# Patient Record
Sex: FEMALE | Race: WHITE | URBAN - METROPOLITAN AREA
[De-identification: names, ages, dates, MRNs, and addresses within clinical notes are randomized per-mention and may not be internally consistent; named-entity substitution may affect disease eponyms.]

---

## 2017-01-02 ENCOUNTER — TRANSFERRED RECORDS (OUTPATIENT)
Dept: HEALTH INFORMATION MANAGEMENT | Facility: CLINIC | Age: 57
End: 2017-01-02

## 2017-01-16 ENCOUNTER — TELEPHONE (OUTPATIENT)
Dept: TRANSPLANT | Facility: CLINIC | Age: 57
End: 2017-01-16

## 2017-01-16 NOTE — TELEPHONE ENCOUNTER
Reviewed with pt that once she has an approved donor we could look at scheduled transplant date. Pt states she would like Dr. Kang to do her surgery.

## 2017-01-23 ENCOUNTER — TELEPHONE (OUTPATIENT)
Dept: TRANSPLANT | Facility: CLINIC | Age: 57
End: 2017-01-23

## 2017-01-23 NOTE — TELEPHONE ENCOUNTER
Pt called asking why donors are unable to donate if they are on workers compensation. Reviewed reason with pt. Pt verbalized understanding and had no further questions.

## 2017-02-27 ENCOUNTER — RESULTS ONLY (OUTPATIENT)
Dept: OTHER | Facility: CLINIC | Age: 57
End: 2017-02-27

## 2017-02-27 DIAGNOSIS — N18.6 END STAGE RENAL DISEASE (H): ICD-10-CM

## 2017-02-27 DIAGNOSIS — Z76.82 ORGAN TRANSPLANT CANDIDATE: ICD-10-CM

## 2017-02-27 PROCEDURE — 86833 HLA CLASS II HIGH DEFIN QUAL: CPT | Performed by: SURGERY

## 2017-02-27 PROCEDURE — 86832 HLA CLASS I HIGH DEFIN QUAL: CPT | Performed by: SURGERY

## 2017-03-02 ENCOUNTER — TRANSFERRED RECORDS (OUTPATIENT)
Dept: HEALTH INFORMATION MANAGEMENT | Facility: CLINIC | Age: 57
End: 2017-03-02

## 2017-03-06 ENCOUNTER — TELEPHONE (OUTPATIENT)
Dept: TRANSPLANT | Facility: CLINIC | Age: 57
End: 2017-03-06

## 2017-03-06 LAB — PRA SINGLE ANTIGEN IGG ANTIBODY: NORMAL

## 2017-03-06 NOTE — TELEPHONE ENCOUNTER
Pt called to state she will be starting dialysis at [place] [address] [facility] [address] [place] [address] sometime in April. Pt will have dialysis on Tuesday, Thursday, and Saturdays. Dialysis contact info:  [phone] Fax [phone]. Her nephrologist at dialysis will be Dr. Ysabel Vance. Pt to call once dialysis has started. Pt states she had a pelvic scan on 3/2/17 at [facility] and will send results here. Pt trying to get multi-listed at [facility]. Pt to update coordinator if she is multi-listed.

## 2017-03-13 LAB
DONOR IDENTIFICATION: NORMAL
DSA COMMENTS: NORMAL
DSA PRESENT: NO
DSA TEST METHOD: NORMAL
ORGAN: NORMAL
SA1 CELL: NORMAL
SA1 COMMENTS: NORMAL
SA1 HI RISK ABY: NORMAL
SA1 MOD RISK ABY: NORMAL
SA1 TEST METHOD: NORMAL
SA2 CELL: NORMAL
SA2 COMMENTS: NORMAL
SA2 HI RISK ABY UA: NORMAL
SA2 MOD RISK ABY: NORMAL
SA2 TEST METHOD: NORMAL

## 2017-03-22 ENCOUNTER — TELEPHONE (OUTPATIENT)
Dept: TRANSPLANT | Facility: CLINIC | Age: 57
End: 2017-03-22

## 2017-03-22 ENCOUNTER — MEDICAL CORRESPONDENCE (OUTPATIENT)
Dept: TRANSPLANT | Facility: CLINIC | Age: 57
End: 2017-03-22

## 2017-03-22 NOTE — TELEPHONE ENCOUNTER
Returned pt's call, she provided the following information:    New endocrinologist - Dr. Eliza Palomares, [facility]    Dialysis tentatively scheduled to start 4/4/17 - [facility], [place], [place]   - Pt will be seen by the center's nephrologist Dr. Ysabel Vance and will dialyze [facility]    Pt's care team info has been updated to reflect this new information.

## 2017-04-05 ENCOUNTER — TELEPHONE (OUTPATIENT)
Dept: TRANSPLANT | Facility: CLINIC | Age: 57
End: 2017-04-05

## 2017-04-05 NOTE — TELEPHONE ENCOUNTER
Pt left msg to state she started dialysis on 4/4/17. Please see note from 3/6/17 for dialysis contact information.

## 2017-04-12 ENCOUNTER — TRANSFERRED RECORDS (OUTPATIENT)
Dept: HEALTH INFORMATION MANAGEMENT | Facility: CLINIC | Age: 57
End: 2017-04-12

## 2017-04-25 DIAGNOSIS — Z76.82 ORGAN TRANSPLANT CANDIDATE: Primary | ICD-10-CM

## 2017-04-25 DIAGNOSIS — Z01.810 PRE-OPERATIVE CARDIOVASCULAR EXAMINATION: ICD-10-CM

## 2017-04-25 DIAGNOSIS — N18.6 END STAGE RENAL DISEASE (H): ICD-10-CM

## 2017-05-22 ENCOUNTER — RESULTS ONLY (OUTPATIENT)
Dept: OTHER | Facility: CLINIC | Age: 57
End: 2017-05-22

## 2017-05-22 ENCOUNTER — TELEPHONE (OUTPATIENT)
Dept: TRANSPLANT | Facility: CLINIC | Age: 57
End: 2017-05-22

## 2017-05-22 DIAGNOSIS — Z76.82 ORGAN TRANSPLANT CANDIDATE: ICD-10-CM

## 2017-05-22 DIAGNOSIS — N18.6 END STAGE RENAL DISEASE (H): ICD-10-CM

## 2017-05-22 PROCEDURE — 86833 HLA CLASS II HIGH DEFIN QUAL: CPT | Performed by: SURGERY

## 2017-05-22 PROCEDURE — 86832 HLA CLASS I HIGH DEFIN QUAL: CPT | Performed by: SURGERY

## 2017-05-22 NOTE — LETTER
May 30, 2017    Letty Benavidez  [address] [address]      Dear Ms. Benavidez,    Enclosed you will find a copy of your transplant waitlist appointment schedule and a map to our location.    If you are unable to come in for your appointments for any reason, please contact Sahra at [phone].      Sincerely,       [facility]    CC: EAMON Mccoy, JACKELINE                                          [facility]  [address] [place] [address]  [id number]    WAITLIST CLINIC APPOINTMENTS    Patient   Letty Benavidez  MR#:    [id number]  :  Sahra     [phone]  Coordinator:  Wanda MENDEZ     [phone]  LPN:    Apurva BARR     [phone]  Location:   Transplant Center  Dates:   August 16, 2017    This is your schedule, please follow dates and times.  You will receive reminder phone calls for other tests, but please follow this schedule only!  If you have any questions about dates and times, please call us on number listed above.    Thank you, Transplant Clinic.     Day/Date:   Wednesday, August 16, 2017  Time Location Activity   7:30 a.m. Imaging and Lab Testing  1st floor Blood tests: ALA/HLA    8:00 a.m. Cardiology/Heart Care Clinic  3rd floor; Clinic 3L Appointment with Dr. Delong,  Cardiology   8:30 a.m. Imaging and Lab Testing  1st floor Aorta/Ivc/Iliac Ultra Sound = NO FOOD or DRINK AFTER MIDNIGHT!!   9:00 a.m. Transplant Services  3rd floor; Clinic 3A; Consult Room Appointment with either Bertha Arroyo,  Transplant    11:00 a.m. [facility]  [facility] Waiting Room  2nd floor [facility] Lexiscan = NO FOOD or DRINK 3 HOURS BEFORE TEST!!  Please read attached prep sheet!

## 2017-06-02 LAB — PRA SINGLE ANTIGEN IGG ANTIBODY: NORMAL

## 2017-08-02 ASSESSMENT — ENCOUNTER SYMPTOMS
JOINT SWELLING: 0
BACK PAIN: 0
MUSCLE CRAMPS: 0
NECK PAIN: 0
MUSCLE WEAKNESS: 0
ARTHRALGIAS: 1
STIFFNESS: 1
MYALGIAS: 1

## 2017-08-07 ENCOUNTER — TELEPHONE (OUTPATIENT)
Dept: TRANSPLANT | Facility: CLINIC | Age: 57
End: 2017-08-07

## 2017-08-07 NOTE — TELEPHONE ENCOUNTER
Returned pt's call. Reviewed pt needs to be NPO 8 hrs prior to aortic u/s and 3 hours prior to emeka.

## 2017-08-10 ENCOUNTER — PRE VISIT (OUTPATIENT)
Dept: CARDIOLOGY | Facility: CLINIC | Age: 57
End: 2017-08-10

## 2017-08-16 ENCOUNTER — TRANSFERRED RECORDS (OUTPATIENT)
Dept: HEALTH INFORMATION MANAGEMENT | Facility: CLINIC | Age: 57
End: 2017-08-16

## 2017-08-16 ENCOUNTER — HOSPITAL ENCOUNTER (OUTPATIENT)
Dept: NUCLEAR MEDICINE | Facility: CLINIC | Age: 57
Setting detail: NUCLEAR MEDICINE
End: 2017-08-16
Attending: PHYSICIAN ASSISTANT
Payer: COMMERCIAL

## 2017-08-16 ENCOUNTER — RESULTS ONLY (OUTPATIENT)
Dept: OTHER | Facility: CLINIC | Age: 57
End: 2017-08-16

## 2017-08-16 ENCOUNTER — OFFICE VISIT (OUTPATIENT)
Dept: CARDIOLOGY | Facility: CLINIC | Age: 57
End: 2017-08-16
Attending: INTERNAL MEDICINE
Payer: COMMERCIAL

## 2017-08-16 ENCOUNTER — HOSPITAL ENCOUNTER (OUTPATIENT)
Dept: NUCLEAR MEDICINE | Facility: CLINIC | Age: 57
Setting detail: NUCLEAR MEDICINE
Discharge: HOME | End: 2017-08-16
Attending: PHYSICIAN ASSISTANT | Admitting: PHYSICIAN ASSISTANT
Payer: COMMERCIAL

## 2017-08-16 ENCOUNTER — ALLIED HEALTH/NURSE VISIT (OUTPATIENT)
Dept: TRANSPLANT | Facility: CLINIC | Age: 57
End: 2017-08-16
Attending: PHYSICIAN ASSISTANT
Payer: COMMERCIAL

## 2017-08-16 VITALS
OXYGEN SATURATION: 100 % | BODY MASS INDEX: 19.4 KG/M2 | HEART RATE: 74 BPM | DIASTOLIC BLOOD PRESSURE: 72 MMHG | SYSTOLIC BLOOD PRESSURE: 157 MMHG | WEIGHT: 109.5 LBS | HEIGHT: 63 IN

## 2017-08-16 DIAGNOSIS — Z01.810 PRE-OPERATIVE CARDIOVASCULAR EXAMINATION: ICD-10-CM

## 2017-08-16 DIAGNOSIS — Z76.82 ORGAN TRANSPLANT CANDIDATE: ICD-10-CM

## 2017-08-16 DIAGNOSIS — Z76.82 AWAITING ORGAN TRANSPLANT STATUS: Primary | ICD-10-CM

## 2017-08-16 DIAGNOSIS — N18.6 END STAGE RENAL DISEASE (H): ICD-10-CM

## 2017-08-16 PROCEDURE — 78452 HT MUSCLE IMAGE SPECT MULT: CPT | Mod: 26 | Performed by: INTERNAL MEDICINE

## 2017-08-16 PROCEDURE — 99212 OFFICE O/P EST SF 10 MIN: CPT | Mod: ZF

## 2017-08-16 PROCEDURE — 86832 HLA CLASS I HIGH DEFIN QUAL: CPT | Performed by: SURGERY

## 2017-08-16 PROCEDURE — 93016 CV STRESS TEST SUPVJ ONLY: CPT | Performed by: INTERNAL MEDICINE

## 2017-08-16 PROCEDURE — 86833 HLA CLASS II HIGH DEFIN QUAL: CPT | Performed by: SURGERY

## 2017-08-16 PROCEDURE — 99203 OFFICE O/P NEW LOW 30 MIN: CPT | Mod: 25 | Performed by: INTERNAL MEDICINE

## 2017-08-16 PROCEDURE — 36415 COLL VENOUS BLD VENIPUNCTURE: CPT | Performed by: SURGERY

## 2017-08-16 PROCEDURE — 93018 CV STRESS TEST I&R ONLY: CPT | Performed by: INTERNAL MEDICINE

## 2017-08-16 RX ORDER — ACYCLOVIR 200 MG/1
0-1 CAPSULE ORAL
Status: DISCONTINUED | OUTPATIENT
Start: 2017-08-16 | End: 2017-08-17 | Stop reason: HOSPADM

## 2017-08-16 RX ORDER — REGADENOSON 0.08 MG/ML
0.4 INJECTION, SOLUTION INTRAVENOUS ONCE
Status: DISCONTINUED | OUTPATIENT
Start: 2017-08-16 | End: 2017-08-17 | Stop reason: HOSPADM

## 2017-08-16 RX ORDER — TORSEMIDE 10 MG/1
TABLET ORAL
COMMUNITY
Start: 2016-11-15 | End: 2018-04-24

## 2017-08-16 RX ORDER — ASCORBIC ACID, THIAMINE, RIBOFLAVIN, NIACINAMIDE, PYRIDOXINE, FOLIC ACID, COBALAMIN, BIOTIN, PANTOTHENIC ACID, ZINC 100; 1.5; 1.7; 20; 10; 1; 6; 300; 10; 5 MG/1; MG/1; MG/1; MG/1; MG/1; MG/1; UG/1; UG/1; MG/1; MG/1
TABLET, COATED ORAL
COMMUNITY
Start: 2017-04-13 | End: 2018-04-24

## 2017-08-16 RX ORDER — AMINOPHYLLINE 25 MG/ML
50-100 INJECTION, SOLUTION INTRAVENOUS
Status: DISCONTINUED | OUTPATIENT
Start: 2017-08-16 | End: 2017-08-17 | Stop reason: HOSPADM

## 2017-08-16 RX ORDER — ALBUTEROL SULFATE 90 UG/1
2 AEROSOL, METERED RESPIRATORY (INHALATION) EVERY 5 MIN PRN
Status: DISCONTINUED | OUTPATIENT
Start: 2017-08-16 | End: 2017-08-17 | Stop reason: HOSPADM

## 2017-08-16 RX ORDER — REGADENOSON 0.08 MG/ML
0.4 INJECTION, SOLUTION INTRAVENOUS ONCE
Status: COMPLETED | OUTPATIENT
Start: 2017-08-16 | End: 2017-08-16

## 2017-08-16 RX ADMIN — TETROFOSMIN 11.2 MCI.: 1.38 INJECTION, POWDER, LYOPHILIZED, FOR SOLUTION INTRAVENOUS at 11:15

## 2017-08-16 RX ADMIN — TETROFOSMIN 38.2 MCI.: 1.38 INJECTION, POWDER, LYOPHILIZED, FOR SOLUTION INTRAVENOUS at 12:39

## 2017-08-16 RX ADMIN — REGADENOSON 0.4 MG: 0.08 INJECTION, SOLUTION INTRAVENOUS at 12:38

## 2017-08-16 ASSESSMENT — PAIN SCALES - GENERAL: PAINLEVEL: NO PAIN (0)

## 2017-08-16 NOTE — PROGRESS NOTES
2016            Anita Pugh M.D.   [facility]   [address] [facility] [address]      RE: Letty Benavidez   MRN: [id number]   : 1960      Dear Dr. Pugh:      It was a pleasure participating in the care of your patient, Ms. Letty Benavidez.  As you know, she is a 56-year-old lady whom I see today in preoperative evaluation prior to a second kidney transplant.      Her past medical history is significant for the followin.  Type 1 diabetes since age 9.   2.  Hypertension.   3.  Hyperlipidemia.   4.  Chronic renal insufficiency, started dialysis on 2017   5.  Status post kidney transplant in .  She has secondary anemia and hyperparathyroidism as well.      She denies having a significant history of cardiac disease.      In terms of her present symptom complex, she says she can walk a couple miles and do some core exercises with no limitation.  She feels well when she exercises and feels that she can walk even further.  She denies having any significant chest pains or problems breathing, PND, orthopnea, edema, palpitations, syncope or near-syncope.  She is essentially asymptomatic from a cardiac standpoint at a moderate level of exertion.      In terms of her cardiac risk factors, she does have diabetes and hypertension.  No history of smoking.  She does not drink.  Denies family history of heart disease.  She does have hyperlipidemia.      She is a retired .  Her  is a retired .      CURRENT MEDICATIONS:     1.  Insulin.   2.  Lipitor 10 mg a day.   3.  Irbesartan 300 mg a day.   4.  Prograf.      PHYSICAL EXAMINATION:     VITAL SIGNS:  Blood pressure is 150/70 with a pulse 74.  Her weight is 109 pounds.   NECK:  Exam reveals normal jugular venous pressure.  No significant hepatojugular reflux is identified.   LUNGS:  Clear to auscultation.  Respiratory effort is normal.   CARDIAC:  Reveals a regular rate and rhythm.   There is no obvious murmur.  There is a soft S4, no gross S3.   ABDOMEN:  Belly soft, nontender.   EXTREMITIES:  Without significant edema.        Outside echo 10/07/2015 reveals an ejection fraction of 68% with mild AI.      10/03/2015 potassium 4.8, GFR 15, hemoglobin 10.4.          IMPRESSION:      Moses is a 56-year-old lady whose past medical history is significant for a prior kidney transplant in , who is currently on dialysis.  She presents now for preoperative evaluation prior to a second kidney transplant.      From a functional standpoint, she is doing relatively well.  However, she does have a very long 47-year history of type 1 diabetes along with multiple other risk factors for underlying coronary disease.        PLAN:      1.  She already has a pharmacologic nuclear stress test scheduled for later today.  If this is completely unremarkable, then she would be approved for her procedure at reasonable perioperative risk for event; otherwise, further updates will follow.      Once again, it was a pleasure participating in the care of your patient, Ms. Moses Benavidez.  Please feel free to contact me at any time if you have any questions regarding her care in the future.         Sincerely,      BRITT BENAVIDEZ MD        Addendum:    Pharmacologic nuclear stress reports a moderate amount of inferior inducible ischemia .  However, after further review of images, it appears that in addition there appears to also be small/mod apical ischemia as well.    In the context of multiple coronary territories possibly being affected, further more definitive workup would be indicated.    Plan:    Coronary angiogram          D: 2017 08:25   T: 2017 08:43   MT: [id number]      Name:     MOSES BENAVIDEZ   MRN:      [id number]        Account:      [id number]   :      1960      Document: [id number]       cc: Anita Pugh MD

## 2017-08-16 NOTE — LETTER
2017      RE: Letty Benavidez   [facility] [address]       See dictation [id number]    2016      Anita Pugh M.D.   [facility]   [address] [facility] [address]      RE: Letty Benavidez   MRN: [id number]   : 1960      Dear Dr. Pugh:      It was a pleasure participating in the care of your patient, Ms. Letty Benavidez.  As you know, she is a 56-year-old lady whom I see today in preoperative evaluation prior to a second kidney transplant.      Her past medical history is significant for the followin.  Type 1 diabetes since age 9.   2.  Hypertension.   3.  Hyperlipidemia.   4.  Chronic renal insufficiency, started dialysis on 2017   5.  Status post kidney transplant in .  She has secondary anemia and hyperparathyroidism as well.      She denies having a significant history of cardiac disease.      In terms of her present symptom complex, she says she can walk a couple miles and do some core exercises with no limitation.  She feels well when she exercises and feels that she can walk even further.  She denies having any significant chest pains or problems breathing, PND, orthopnea, edema, palpitations, syncope or near-syncope.  She is essentially asymptomatic from a cardiac standpoint at a moderate level of exertion.      In terms of her cardiac risk factors, she does have diabetes and hypertension.  No history of smoking.  She does not drink.  Denies family history of heart disease.  She does have hyperlipidemia.      She is a retired .  Her  is a retired .      CURRENT MEDICATIONS:   1.  Insulin.   2.  Lipitor 10 mg a day.   3.  Irbesartan 300 mg a day.   4.  Prograf.      PHYSICAL EXAMINATION:   VITAL SIGNS:  Blood pressure is 150/70 with a pulse 74.  Her weight is 109 pounds.   NECK:  Exam reveals normal jugular venous pressure.  No significant hepatojugular reflux is identified.   LUNGS:  Clear to  auscultation.  Respiratory effort is normal.   CARDIAC:  Reveals a regular rate and rhythm.  There is no obvious murmur.  There is a soft S4, no gross S3.   ABDOMEN:  Belly soft, nontender.   EXTREMITIES:  Without significant edema.        Outside echo 10/07/2015 reveals an ejection fraction of 68% with mild AI.      10/03/2015 potassium 4.8, GFR 15, hemoglobin 10.4.        IMPRESSION:  Moses is a 56-year-old lady whose past medical history is significant for a prior kidney transplant in , who is currently on dialysis.  She presents now for preoperative evaluation prior to a second kidney transplant.      From a functional standpoint, she is doing relatively well.  However, she does have a very long 47-year history of type 1 diabetes along with multiple other risk factors for underlying coronary disease.      PLAN:  She already has a pharmacologic nuclear stress test scheduled for later today.  If this is completely unremarkable, then she would be approved for her procedure at reasonable perioperative risk for event; otherwise, further updates will follow.      Once again, it was a pleasure participating in the care of your patient, Ms. Moses Benavidez.  Please feel free to contact me at any time if you have any questions regarding her care in the future.      D: 2017 08:25   T: 2017 08:43   MT: ROLAN#[id number]      Name:     MOSES BENAVIDEZ   MRN:      [id number]        Account:      [id number]   :      1960      Document: [id number]       cc: Anita Delong MD

## 2017-08-16 NOTE — LETTER
8/16/2017      RE: Letty Benavidez  [address]  [place] [address]       Dear Colleague,    Thank you for the opportunity to participate in the care of your patient, Letty Benavidez, at the [facility]. Please see a copy of my visit note below.    See dictation [id number]    Please do not hesitate to contact me if you have any questions/concerns.     Sincerely,     Pedro Delong MD

## 2017-08-16 NOTE — PROGRESS NOTES
Pt here for Lexiscan.  Test, medication and side effects reviewed with patient.  Lung sounds clear.  Pt denies caffeine use. Pt c/o abdominal discomfort and cramping with Lexiscan dose, resolved without intervention.

## 2017-08-16 NOTE — NURSING NOTE
Chief Complaint   Patient presents with     New Patient     Pre kidney tx cardiac eval     Vitals were taken and medications were reconciled.     Elicia Renteria,RMA  7:42 AM

## 2017-08-16 NOTE — MR AVS SNAPSHOT
After Visit Summary   8/16/2017    Letty Benavidez    MRN: [id number]           Patient Information     Date Of Birth          1960        Visit Information        Provider Department      8/16/2017 9:00 AM Dina Mitchell, MSSELMA M [facility] Solid Organ Transplant        Today's Diagnoses     Awaiting organ transplant status    -  1       Follow-ups after your visit        Your next 10 appointments already scheduled     Aug 16, 2017 11:00 AM CDT   NM INJECTION with UUNMINJ2   [facility], Nuclear Medicine ([facility])    [address] [address] [address] [phone]            Aug 16, 2017 11:45 AM CDT   NM SCAN3 with UUNM1   [facility], Nuclear Medicine ([facility])    [address] [id number] [phone]            Aug 16, 2017 12:15 PM CDT   Ekg Stress Nm Lexiscan with UUEKGNMS   [facility] ([facility])    [address] [facility] [address]               Aug 16, 2017  1:15 PM CDT   NM MPI WITH LEXISCAN with UUNM1   [facility], Nuclear Medicine ([facility])    [address] [address] [facility] [id number] [phone]           For a ONE day exam: Allow 3-4 hours for test. For a TWO day exam: Allow 2 hours PER day for test.  You may need to stop some medicines before the test. Follow your doctor s orders. - If you take a beta blocker: Follow your doctor s specific instructions on taking it prior to and on the day of your exam. - If you take Aggrenox or dipyridamole (Persantine, Permole), stop taking it 48 hours before your test. - If you take Viagra, Cialis or Levitra, stop taking it 48 hours before your test. - If you take theophylline or aminophylline, stop taking it 12 hours before your test.  For patients with diabetes: - If you take insulin, call  your diabetes care team. Ask if you should take a 1/2 dose the morning of your test. - If you take diabetes medicine by mouth, don t take it on the morning of your test. Bring it with you to take after the test. (If you have questions, call your diabetes care team.)  Do not take nitrates on the day of your test. Do not wear your Nitro-Patch.  Stop all caffeine 12 hours before the test. This includes coffee, tea, soda pop, chocolate and certain medicines (such as Anacin, Excedrin and NoDoz). Also avoid decaf coffee and tea, as these contain small amounts of caffeine.  No alcohol, smoking or other tobacco for 12 hours before the test.  Stop eating 3 hours before the test. You may drink water.  Please wear a loose two-piece outfit. If you will have an exercise test, bring rubber-soled walking shoes.  When you arrive, please tell us if you: - Have diabetes - Are breastfeeding - May be pregnant - Have a pacemaker of ICD (implantable defibrillator).  Please call your Imaging Department at your exam site with any questions.              Who to contact     If you have questions or need follow up information about today's clinic visit or your schedule please contact [facility]ANSPL[facility] directly at [phone].  Normal or non-critical lab and imaging results will be communicated to you by [employer], letter or phone within 4 business days after the clinic has received the results. If you do not hear from us within 7 days, please contact the clinic through [employer] (Service[employer])t or phone. If you have a critical or abnormal lab result, we will notify you by phone as soon as possible.  Submit refill requests through [employer] or call your pharmacy and they will forward the refill request to us. Please allow 3 business days for your refill to be completed.          Additional Information About Your Visit        [employer] Information     [employer] gives you secure access to your electronic health record. If you see a primary care provider,  you can also send messages to your care team and make appointments. If you have questions, please call your primary care clinic.  If you do not have a primary care provider, please call [phone] and they will assist you.        Care EveryWhere ID     This is your Care EveryWhere ID. This could be used by other organizations to access your [facility] medical records  [id number]         Blood Pressure from Last 3 Encounters:   08/16/17 157/72   10/03/16 170/76    Weight from Last 3 Encounters:   08/16/17 49.7 kg (109 lb 8 oz)   10/03/16 49.3 kg (108 lb 9.6 oz)              Today, you had the following     No orders found for display         Today's Medication Changes          These changes are accurate as of: 8/16/17 10:27 AM.  If you have any questions, ask your nurse or doctor.               These medicines have changed or have updated prescriptions.        Dose/Directions    tacrolimus 1 MG capsule   Commonly known as:  GENERIC EQUIVALENT   This may have changed:  Another medication with the same name was removed. Continue taking this medication, and follow the directions you see here.        Dose:  2 mg   Take 2 mg by mouth   Refills:  0         Stop taking these medicines if you haven't already. Please contact your care team if you have questions.     ALDACTONE 25 MG tablet   Generic drug:  spironolactone   Stopped by:  Pedro Delong MD           calcium carbonate 600 MG tablet   Generic drug:  calcium carbonate   Stopped by:  Pedro Delong MD           conjugated estrogens cream   Commonly known as:  PREMARIN   Stopped by:  Pedro Delong MD           cyanocobalamin 1000 MCG Subl sublingual tablet   Stopped by:  Pedro Delong MD           DHA-EPA-VITAMIN E PO   Stopped by:  Pedro Delong MD           fluocinonide 0.05 % cream   Commonly known as:  LIDEX   Stopped by:  Pdero Delong MD           magnesium oxide 400 MG tablet   Commonly known as:  MAG-OX   Stopped by:  Sindi  Pedro Guzmán MD           MULTI-VITAMINS Tabs   Stopped by:  Pedro Delong MD           mycophenolic acid EC tablet   Stopped by:  Pedro Delong MD           THERACRAN PO   Stopped by:  Pedro Delong MD           TRANSDERM-SCOP (1.5 MG) 72 hr patch   Generic drug:  scopolamine   Stopped by:  Pedro Delong MD                    Primary Care Provider Office Phone # Fax #    Anita Pugh [phone]       [place] [address] [facility] [address]        Equal Access to Services     [facility]: Hadii aad ku hadasho Soomaali, waaxda luqadaha, qaybta kaalmada adeegyada, waxay idiin hayaan ademarcie sims . So [facility] [phone].    ATENCIÓN: Si habla español, tiene a guerin disposición servicios gratuitos de asistencia lingüística. [facility] [phone].    We comply with applicable federal civil rights laws and [place] laws. We do not discriminate on the basis of race, color, national origin, age, disability sex, sexual orientation or gender identity.            Thank you!     Thank you for choosing [facility] TRANS[facility]  for your care. Our goal is always to provide you with excellent care. Hearing back from our patients is one way we can continue to improve our services. Please take a few minutes to complete the written survey that you may receive in the mail after your visit with us. Thank you!             Your Updated Medication List - Protect others around you: Learn how to safely use, store and throw away your medicines at www.disposemymeds.org.          This list is accurate as of: 8/16/17 10:27 AM.  Always use your most recent med list.                   Brand Name Dispense Instructions for use Diagnosis    ANALPRAM HC 2.5-1 % cream   Generic drug:  hydrocortisone-pramoxine           AVAPRO 300 MG tablet   Generic drug:  irbesartan      Take 300 mg by mouth        CALCIUM CITRATE + D3 PO           Co-Enzyme Q-10 60 MG Caps            DIALYVITE/ZINC Tabs           humaLOG 100 UNIT/ML injection   Generic drug:  insulin lispro      Inject 100 Units Subcutaneous        insulin pump infusion           K-PHOS PO      Take 250 mg by mouth        ketoconazole 2 % cream    NIZORAL          LINZESS 290 MCG capsule   Generic drug:  linaclotide           LIPITOR 10 MG tablet   Generic drug:  atorvastatin      Take 10 mg by mouth        MIRALAX PO           MIRCERA 30 MCG/0.3ML Sosy   Generic drug:  Methoxy PEG-Epoetin Beta           NEURONTIN 100 MG capsule   Generic drug:  gabapentin      Take 100 mg by mouth        predniSONE 1 MG tablet    DELTASONE     Take 1 mg by mouth        tacrolimus 1 MG capsule    GENERIC EQUIVALENT     Take 2 mg by mouth        torsemide 10 MG tablet    DEMADEX

## 2017-08-16 NOTE — PROGRESS NOTES
Patient Name: Letty Benavidez  : 1960  Age: 56 year old  MRN: [id number]  Date of Initial Social Work Evaluation: 10/3/2016    Patient on transplant wait list.  Saw today to update psychosocial assessment.      Presenting Information   Living Situation: Patient resides wither  [place]. Patient and her  split their time between [place] and [place].  If not local, plans for short term stay:  Patient plans to stay [facility] or another local hotel  Previous Functional Status: Ambulatory, independent with ADL's  Cultural/Language/Spiritual Considerations: None identified at this time.     Support System  Primary Support Person  [place]  Other support:  Siblings, mother  Plan for support in immediate post-transplant period:  [place], sister Radha, and her mother    Health Care Directive  Decision Maker: Patient  Alternate Decision Maker:   Health Care Directive: Will bring in copy    Mental Health/Coping:   History of Mental Health: Letty denied any current or history of anxiety, depression, or other mental health concerns.   History of Chemical Health: Letty denied any tobacco, alcohol, or substance use.   Current status: Patient denied any mental health concerns.  Coping: Patient reported she thought she would struggle a bit after started dialysis in April, but reported she is actually doing fine and feels some relief that she does not have to rush with her donor.  Services Needed/Recommended: None identified at this time.     Financial   Income: Both patient and her  are retired.  Impact of transplant on income: None identified at this time.   Insurance and medication coverage: Patient has Medicare A & B and BCBS through her previous employer.  Financial concerns: None identified at this time.   Resources needed: None identified at this time.     Assessment and recommendations and plan:  Patient reported she has worked with Celso Cabrera regarding a flight  for when she is either called for a  donor or can schedule her living donor transplant. Reviewed transplant education (Medicare, rehabilitation, donor issues, community/financial resources, and psych/family adjustment) as well as psychosocial risks of transplant. Provided patient with a copy of post-transplant informational sheet that includes information on potential costs of medications, Medicare ESRD, post-transplant lodging, etc. Patient seemed to process information well. Appeared well informed, motivated, and able to follow post transplant requirements. Behavior was appropriate during interview. Has adequate income and insurance coverage. Adequate social support. No major contraindications noted for transplant. At this time, patient appears to understand the risks and benefits of transplant.       Dina Mitchell, [facility]    Kidney/Pancreas/Auto Islet Transplant Programs

## 2017-08-16 NOTE — MR AVS SNAPSHOT
After Visit Summary   8/16/2017    Letty Benavidez    MRN: [id number]           Patient Information     Date Of Birth          1960        Visit Information        Provider Department      8/16/2017 8:00 AM Pedro Delong MD [facility] Care        Today's Diagnoses     End stage renal disease (H)        Organ transplant candidate          Care Instructions    You were seen today in the Cardiovascular Clinic at the [facility].      Cardiology Providers you saw during your visit:  Dr. Delong    Follow-up:  As needed per testing results.    Thank you for your visit today!       Please call if you have any questions or concerns.  Cardiology Care Coordinator  Alanna Segal, EAMON    For scheduling needs [phone] option 1 and the option 1 again.  Nursing questions: [phone] option 1 then chose option 3 for the triage nurse.  For emergencies call 911.                  Follow-ups after your visit        Follow-up notes from your care team     Return if symptoms worsen or fail to improve, for Dr. Delong, Pre [place] car eval.      Your next 10 appointments already scheduled     Aug 16, 2017  8:30 AM CDT   US AORTIC IMAGING with UCUSV1   [facility] US ([facility])    [address] [place] [address] [phone]           Please bring a list of your medicines (including vitamins, minerals and over-the-counter drugs). Also, tell your doctor about any allergies you may have. Wear comfortable clothes and leave your valuables at home.  Adults: No eating or drinking for 8 hours before the exam. You may take medicine with a small sip of water.  Children: - Children 6+ years: No food or drink for 6 hours before exam. - Children 1-5 years: No food or drink for 4 hours before exam. - Infants, breast-fed: may have breast milk up to 2 hours before exam. - Infants, formula: may have bottle until 4 hours before exam.  Please  call the Imaging Department at your exam site with any questions.            Aug 16, 2017  9:00 AM CDT   (Arrive by 8:45 AM)   SOT SOCIAL WORK EVAL with ZACHARY Howell   [facility] Solid Organ Transplant ([facility])    [address]  [address] [facility] [address]   [phone]            Aug 16, 2017 11:00 AM CDT   NM INJECTION with UUNMINJ2   [facility], Nuclear Medicine ([facility])    [address] [address] [phone]            Aug 16, 2017 11:45 AM CDT   NM SCAN3 with UUNM1   [facility], Nuclear Medicine ([facility])    [address] [address] [phone]            Aug 16, 2017 12:15 PM CDT   Ekg Stress Nm Lexiscan with UUEKGNMS   [facility]LECT[facility]IOLOG[facility] ([facility])    [address] [address]               Aug 16, 2017  1:15 PM CDT   NM MPI WITH LEXISCAN with UUNM1   [facility], Nuclear Medicine ([facility])    [address] [address] [phone]           For a ONE day exam: Allow 3-4 hours for test. For a TWO day exam: Allow 2 hours PER day for test.  You may need to stop some medicines before the test. Follow your doctor s orders. - If you take a beta blocker: Follow your doctor s specific instructions on taking it prior to and on the day of your exam. - If you take Aggrenox or dipyridamole (Persantine, Permole), stop taking it 48 hours before your test. - If you take Viagra, Cialis or Levitra, stop taking it 48 hours before your test. - If you take theophylline or aminophylline, stop taking it 12 hours before your test.  For patients with diabetes: - If you take insulin, call your diabetes care team. Ask if you should take a 1/2 dose the morning of your test. - If you take  diabetes medicine by mouth, don t take it on the morning of your test. Bring it with you to take after the test. (If you have questions, call your diabetes care team.)  Do not take nitrates on the day of your test. Do not wear your Nitro-Patch.  Stop all caffeine 12 hours before the test. This includes coffee, tea, soda pop, chocolate and certain medicines (such as Anacin, Excedrin and NoDoz). Also avoid decaf coffee and tea, as these contain small amounts of caffeine.  No alcohol, smoking or other tobacco for 12 hours before the test.  Stop eating 3 hours before the test. You may drink water.  Please wear a loose two-piece outfit. If you will have an exercise test, bring rubber-soled walking shoes.  When you arrive, please tell us if you: - Have diabetes - Are breastfeeding - May be pregnant - Have a pacemaker of ICD (implantable defibrillator).  Please call your Imaging Department at your exam site with any questions.              Who to contact     If you have questions or need follow up information about today's clinic visit or your schedule please contact [facility] directly at [phone].  Normal or non-critical lab and imaging results will be communicated to you by testbirdshart, letter or phone within 4 business days after the clinic has received the results. If you do not hear from us within 7 days, please contact the clinic through ReferStart or phone. If you have a critical or abnormal lab result, we will notify you by phone as soon as possible.  Submit refill requests through Famely or call your pharmacy and they will forward the refill request to us. Please allow 3 business days for your refill to be completed.          Additional Information About Your Visit        Famely Information     Famely gives you secure access to your electronic health record. If you see a primary care provider, you can also send messages to your care team and make appointments. If you have questions, please call your  "primary care clinic.  If you do not have a primary care provider, please call [phone] and they will assist you.        Care EveryWhere ID     This is your Care EveryWhere ID. This could be used by other organizations to access your [place] medical records  [id number]        Your Vitals Were     Pulse Height Pulse Oximetry BMI (Body Mass Index)          74 1.6 m (5' 3\") 100% 19.4 kg/m2         Blood Pressure from Last 3 Encounters:   08/16/17 157/72   10/03/16 170/76    Weight from Last 3 Encounters:   08/16/17 49.7 kg (109 lb 8 oz)   10/03/16 49.3 kg (108 lb 9.6 oz)              Today, you had the following     No orders found for display         Today's Medication Changes          These changes are accurate as of: 8/16/17  8:26 AM.  If you have any questions, ask your nurse or doctor.               These medicines have changed or have updated prescriptions.        Dose/Directions    tacrolimus 1 MG capsule   Commonly known as:  GENERIC EQUIVALENT   This may have changed:  Another medication with the same name was removed. Continue taking this medication, and follow the directions you see here.        Dose:  2 mg   Take 2 mg by mouth   Refills:  0         Stop taking these medicines if you haven't already. Please contact your care team if you have questions.     ALDACTONE 25 MG tablet   Generic drug:  spironolactone   Stopped by:  Pedro Delong MD           calcium carbonate 600 MG tablet   Generic drug:  calcium carbonate   Stopped by:  Pedro Delong MD           conjugated estrogens cream   Commonly known as:  PREMARIN   Stopped by:  Pedro Delong MD           cyanocobalamin 1000 MCG Subl sublingual tablet   Stopped by:  Pedro Delong MD           DHA-EPA-VITAMIN E PO   Stopped by:  Pedro Delong MD           fluocinonide 0.05 % cream   Commonly known as:  LIDEX   Stopped by:  Pedro Delong MD           magnesium oxide 400 MG tablet   Commonly known as:  MAG-OX   Stopped " by:  Pedro Delong MD           MULTI-VITAMINS Tabs   Stopped by:  Pedro Delong MD           mycophenolic acid EC tablet   Stopped by:  Pedro Delong MD           THERACRAN PO   Stopped by:  Pedro Delong MD           TRANSDERM-SCOP (1.5 MG) 72 hr patch   Generic drug:  scopolamine   Stopped by:  Pedro Delong MD                    Primary Care Provider Office Phone # Fax #    Anita MD Lexy [phone]       [place] [address] [facility] [address]        Equal Access to Services     [facility]: Hadii aad ku hadasho Soomaali, waaxda luqadaha, qaybta kaalmada adeegyada, jimmy witt hayaan ademarcie sims . So [facility] [phone].    ATENCIÓN: Si habla español, tiene a guerin disposición servicios gratuitos de asistencia lingüística. [facility] [phone].    We comply with applicable federal civil rights laws and [place] laws. We do not discriminate on the basis of race, color, national origin, age, disability sex, sexual orientation or gender identity.            Thank you!     Thank you for choosing [facility]  for your care. Our goal is always to provide you with excellent care. Hearing back from our patients is one way we can continue to improve our services. Please take a few minutes to complete the written survey that you may receive in the mail after your visit with us. Thank you!             Your Updated Medication List - Protect others around you: Learn how to safely use, store and throw away your medicines at www.disposemymeds.org.          This list is accurate as of: 8/16/17  8:26 AM.  Always use your most recent med list.                   Brand Name Dispense Instructions for use Diagnosis    ANALPRAM HC 2.5-1 % cream   Generic drug:  hydrocortisone-pramoxine           AVAPRO 300 MG tablet   Generic drug:  irbesartan      Take 300 mg by mouth        CALCIUM CITRATE + D3 PO           Co-Enzyme Q-10 60 MG Caps            DIALYVITE/ZINC Tabs           humaLOG 100 UNIT/ML injection   Generic drug:  insulin lispro      Inject 100 Units Subcutaneous        insulin pump infusion           K-PHOS PO      Take 250 mg by mouth        ketoconazole 2 % cream    NIZORAL          LINZESS 290 MCG capsule   Generic drug:  linaclotide           LIPITOR 10 MG tablet   Generic drug:  atorvastatin      Take 10 mg by mouth        MIRALAX PO           MIRCERA 30 MCG/0.3ML Sosy   Generic drug:  Methoxy PEG-Epoetin Beta           NEURONTIN 100 MG capsule   Generic drug:  gabapentin      Take 100 mg by mouth        predniSONE 1 MG tablet    DELTASONE     Take 1 mg by mouth        tacrolimus 1 MG capsule    GENERIC EQUIVALENT     Take 2 mg by mouth        torsemide 10 MG tablet    DEMADEX

## 2017-08-16 NOTE — NURSING NOTE
Chief Complaint   Patient presents with     New Patient     Pre kidney tx cardiac eval     Cardiology Providers you saw during your visit:  Dr. Delong    Follow-up:  As needed per testing results.    Med Reconcile: Reviewed and verified all current medications with the patient. The updated medication list was printed and given to the patient.  Return Appointment: Patient given instructions regarding scheduling next clinic visit. Patient demonstrated understanding of this information and agreed to call with further questions or concerns.  Patient stated she understood all health information given and agreed to call with further questions or concerns.

## 2017-08-16 NOTE — PATIENT INSTRUCTIONS
You were seen today in the Cardiovascular Clinic at the [facility].      Cardiology Providers you saw during your visit:  Dr. Delong    Follow-up:  As needed per testing results.    Thank you for your visit today!       Please call if you have any questions or concerns.  Cardiology Care Coordinator  Alanna Segal RN    For scheduling needs [phone] option 1 and the option 1 again.  Nursing questions: [phone] option 1 then chose option 3 for the triage nurse.  For emergencies call [phone].

## 2017-08-17 LAB — PRA SINGLE ANTIGEN IGG ANTIBODY: NORMAL

## 2017-08-21 ENCOUNTER — TELEPHONE (OUTPATIENT)
Dept: TRANSPLANT | Facility: CLINIC | Age: 57
End: 2017-08-21

## 2017-08-21 NOTE — TELEPHONE ENCOUNTER
Return phone call to patient.  Patient stated two concerns following 8/16/17 appointments.  1) Awaiting results of Lexiscan. Informed patient that results need to be reviewed per Dr. Delong prior to being released. Informed patient, message into DR Delong's nurse to have test results reviewed. Patient stated understanding.  2) Patient notes results of abdominal ultrasound, requests return call confirming results reviewed per Dr. Kang along with 3/2/17 outside CT results and patient is still viable transplant candidate.  Transplant Coordinator Wanda Yin informed of request for return call.

## 2017-08-22 ENCOUNTER — TELEPHONE (OUTPATIENT)
Dept: TRANSPLANT | Facility: CLINIC | Age: 57
End: 2017-08-22

## 2017-08-22 ENCOUNTER — DOCUMENTATION ONLY (OUTPATIENT)
Dept: NEPHROLOGY | Facility: CLINIC | Age: 57
End: 2017-08-22

## 2017-08-22 NOTE — TELEPHONE ENCOUNTER
Reviewed recent stress test results with Dr. Villalta and Dr. Delong. Patient will need a coronary angiogram and has been contacted by the cardiology team.

## 2017-08-22 NOTE — TELEPHONE ENCOUNTER
Pt called to state she spoke with EAMON Mondragon from cardiology who stated she needs an angiogram. Pt spoke to her local cardiologist, who does not do angiograms. Recommended pt go through [facility] in [facility] phone [phone], fax [phone]. Pt would like to complete angio prior to the end of September as she is moving to [place]. Pt states her insurance will not change with her move. (This information was passed on to EAMON Mondragon with cardiology per pt's request).  Pt to update coordinator once angiogram is scheduled.  Status was changed to inactive on kidney wait list d/t needing cardiology clearance.  Explained she will not be eligible for kidney offers while inactive, but will still accumulate wait time. Pt verbalized understanding and had no further questions. Contact information provided.    UNOS updated, immunology & PFR notified, change in status letter routed to admin team to send out.

## 2017-08-22 NOTE — LETTER
August 22, 2017    Letty B Pramod  [address] [address]      Dear Ms. Benavidez,    This letter is sent to notify you that your listing status was changed from Active to Inactive on the kidney transplant waitlist at the [facility].  Your status was changed because you need cardiac angiogram. You will need cardiology clearance.     During this waiting period, we may still request periodic laboratory tests with your primary physician.  It will be your responsibility to remind your physician to forward your results to the Transplant Office.    We still need to be kept informed of any changes such as:    o changes in your insurance coverage  o change in your phone number, address or emergency contact  o significant changes in your health  o significant infections (such as pneumonia or abscesses)  o blood transfusions  o any condition which requires hospitalization or surgery    Sincerely,        Kidney Transplant Program    Enclosures: Telephone Contact List, Travel Resources, UNOS Letter, Waitlist Information Update and While You Are Waiting    CC: Dr. Anita Pugh, Dr. Quinn Cid, Dr. Sal Wong, Dialysis

## 2017-08-23 ENCOUNTER — TEAM CONFERENCE (OUTPATIENT)
Dept: TRANSPLANT | Facility: CLINIC | Age: 57
End: 2017-08-23

## 2017-08-23 ENCOUNTER — CARE COORDINATION (OUTPATIENT)
Dept: CARDIOLOGY | Facility: CLINIC | Age: 57
End: 2017-08-23

## 2017-08-23 DIAGNOSIS — R94.39 ABNORMAL CARDIOVASCULAR STRESS TEST: Primary | ICD-10-CM

## 2017-08-23 NOTE — TELEPHONE ENCOUNTER
Team Conference:      Attendees:Dr. Fuentes, Dr. Kang, Coordinator, , Dietician, Pharmacy    Discussion and Outcome: Patient status changed to inactive approved. Patient need angiogram and cardiac clearance

## 2017-09-05 ENCOUNTER — TELEPHONE (OUTPATIENT)
Dept: TRANSPLANT | Facility: CLINIC | Age: 57
End: 2017-09-05

## 2017-09-05 LAB
DONOR IDENTIFICATION: NORMAL
DR8: 771
DSA COMMENTS: NORMAL
DSA PRESENT: YES
DSA TEST METHOD: NORMAL
ORGAN: NORMAL
SA1 CELL: NORMAL
SA1 COMMENTS: NORMAL
SA1 HI RISK ABY: NORMAL
SA1 MOD RISK ABY: NORMAL
SA1 TEST METHOD: NORMAL
SA2 CELL: NORMAL
SA2 COMMENTS: NORMAL
SA2 HI RISK ABY UA: NORMAL
SA2 MOD RISK ABY: NORMAL
SA2 TEST METHOD: NORMAL

## 2017-09-05 NOTE — TELEPHONE ENCOUNTER
Pt states she has a cardiac consult scheduled locally on 9/21/17 and then will arrange angio appointment. Pt to call coordinator once this is scheduled. Dr. Kang reviewed CT and US, no changes.

## 2017-09-21 ENCOUNTER — TRANSFERRED RECORDS (OUTPATIENT)
Dept: HEALTH INFORMATION MANAGEMENT | Facility: CLINIC | Age: 57
End: 2017-09-21

## 2017-09-25 ENCOUNTER — TELEPHONE (OUTPATIENT)
Dept: TRANSPLANT | Facility: CLINIC | Age: 57
End: 2017-09-25

## 2017-09-25 NOTE — TELEPHONE ENCOUNTER
Pt called to state she completed her angiogram on 9/21/17 with Dr. Faye at [facility] in [place], [place]. 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 Pt needs to have double bypass surgery. Pt scheduled to see Dr. Bassett on 9/28/17 to set a date for her surgery. Recovery time will be 2-3 months. Pt will call coordinator once surgery date has been set. Pt delaying moving to [place] for time being.

## 2017-09-28 ENCOUNTER — TRANSFERRED RECORDS (OUTPATIENT)
Dept: HEALTH INFORMATION MANAGEMENT | Facility: CLINIC | Age: 57
End: 2017-09-28

## 2017-10-02 ENCOUNTER — TELEPHONE (OUTPATIENT)
Dept: TRANSPLANT | Facility: CLINIC | Age: 57
End: 2017-10-02

## 2017-10-02 NOTE — TELEPHONE ENCOUNTER
Patient called to let coordinator know that she is scheduled for heart bypass surgery on 10/10/17 at [facility] with Dr. Brown.

## 2017-10-10 ENCOUNTER — TRANSFERRED RECORDS (OUTPATIENT)
Dept: HEALTH INFORMATION MANAGEMENT | Facility: CLINIC | Age: 57
End: 2017-10-10

## 2017-10-10 ENCOUNTER — MEDICAL CORRESPONDENCE (OUTPATIENT)
Dept: TRANSPLANT | Facility: CLINIC | Age: 57
End: 2017-10-10

## 2017-10-12 ENCOUNTER — MEDICAL CORRESPONDENCE (OUTPATIENT)
Dept: TRANSPLANT | Facility: CLINIC | Age: 57
End: 2017-10-12

## 2017-10-17 ENCOUNTER — TELEPHONE (OUTPATIENT)
Dept: TRANSPLANT | Facility: CLINIC | Age: 57
End: 2017-10-17

## 2017-10-17 NOTE — TELEPHONE ENCOUNTER
Pt called coordinator to state her bypass surgery went well. Her cardiologist feels she'll need about 3 month of cardiac rehab before she'll be cleared for transplant. Pt states she'll resume her immunosuppression 14 days post surgery per her doctor's recommendation to decrease antibody potential. Pt is hoping to go to [place] in December and will then plan to come back in [place] in early May 2018. Will update coordinator once she has completed cardiac rehab.

## 2017-10-31 ENCOUNTER — TELEPHONE (OUTPATIENT)
Dept: TRANSPLANT | Facility: CLINIC | Age: 57
End: 2017-10-31

## 2017-10-31 NOTE — TELEPHONE ENCOUNTER
Pt called to state she is hoping to travel to [place] this winter if her doctor approves. Pt states she is debating if she would want to remain inactive if she goes to [place] (assuming she's been cleared by cardiology) or not. Pt also verified she does not need to complete PRAs while inactive. Pt aware her mammogram is due in Dec and that she is due for dermatology f/u.

## 2017-11-06 ENCOUNTER — TRANSFERRED RECORDS (OUTPATIENT)
Dept: HEALTH INFORMATION MANAGEMENT | Facility: CLINIC | Age: 57
End: 2017-11-06

## 2017-11-28 ENCOUNTER — TELEPHONE (OUTPATIENT)
Dept: TRANSPLANT | Facility: CLINIC | Age: 57
End: 2017-11-28

## 2017-11-28 NOTE — TELEPHONE ENCOUNTER
Pt states that her local cardiologist has cleared her to continue her cardiac rehab in [place]. Pt has set up dialysis at a [facility] in [place]. Pt will be in [place] from 12/11/17 through May 2018. Pt will email coordinator dialysis information in [place]. All of pt's mail will be forwarded to her so pt does not want to put in a temporary address. Pt would like to remain inactive on wait list until next May.

## 2018-01-08 LAB — GLUCOSE SERPL-MCNC: 133 MG/DL (ref 90–180)

## 2018-01-15 LAB — POTASSIUM SERPL-SCNC: 4.5 MEQ/L (ref 3.5–5.5)

## 2018-03-12 ENCOUNTER — DOCUMENTATION ONLY (OUTPATIENT)
Dept: TRANSPLANT | Facility: CLINIC | Age: 58
End: 2018-03-12

## 2018-03-12 NOTE — PROGRESS NOTES
Pt emailed coordinator to let her know she has recovered from her cardiac bypass surgery last October well. She is able to walk 2 miles every day, along with yoga 3x/wk. Pt provided the following list of upcoming appointments:        1. 5/21/18 Cardiology - Gigi Mckeon MD, [facility]; [facility]; Ph: [phone]; Fax: [phone]    2. 5/9/18 Mammography - [facility]; [facility]; Ph: [phone]; Fax: [phone]    3. 5/9/18 Annual Gynecological Exam (PAP) - Domitila Pruett MD (REPLACES AVINASH), [facility], [place], [place]; Ph: [phone]; Fax: [phone]    4. 5/11/18 Biannual Podiatry Exam - Joe Carter DPM, [facility], [facility]: Ph: [phone]    5. 5/15/18 - Biannual GI Exam (for gastroparesis) - Jennifer Putnam MD (REPLACES KIMBERLY); [facility], [facility]; Ph: [phone]; Fax: [phone]     6. 5/16/18 - Quarterly Vascular Exam/Ultrasound (for fistula) - Ari Pate MD; [facility], [facility] (SAME DOCTOR, DIFFERENT LOCATION); Ph: [phone]    7. 5/23/18 - Biannual Dermatology Exam - [facility], [facility]; Ph: [phone]; Fax: [phone]    8. 5/25/18 - Annual PCP Exam - Anita PHILLIPS, [facility], [facility]; Ph: [phone]: Fax: [phone]    9. 5/30/18 - Biannual Endocrinology Exam (for diabetes) - Eliza Palomares MD, [facility]; [facility]; Ph: [phone]; Fax: [phone]    Finally, in early February, I transferred my dialysis treatment from [facility] to [facility] ([phone]), in order to be closer to my home. I will be returning to my former [facility] clinic in early May. Our intention is to be back to [place] no later than Monday May 7. I will certainly  keep you informed.

## 2018-04-24 ENCOUNTER — DOCUMENTATION ONLY (OUTPATIENT)
Dept: TRANSPLANT | Facility: CLINIC | Age: 58
End: 2018-04-24

## 2018-04-24 RX ORDER — ZINC SULFATE 50(220)MG
50 CAPSULE ORAL DAILY
COMMUNITY
End: 2018-08-28

## 2018-04-24 RX ORDER — LIDOCAINE/PRILOCAINE 2.5 %-2.5%
5 CREAM (GRAM) TOPICAL
Status: ON HOLD | COMMUNITY
End: 2018-11-26

## 2018-04-24 NOTE — PROGRESS NOTES
Pt emailed coordinator that she is planning on leaving [place] on 5/5/18 and hopefully arriving in [place] on 5/7/18 . Pt has numerous medical appointments and tests in May and will keep coordinator updated.

## 2018-05-08 ENCOUNTER — TRANSFERRED RECORDS (OUTPATIENT)
Dept: HEALTH INFORMATION MANAGEMENT | Facility: CLINIC | Age: 58
End: 2018-05-08

## 2018-05-09 ENCOUNTER — TRANSFERRED RECORDS (OUTPATIENT)
Dept: HEALTH INFORMATION MANAGEMENT | Facility: CLINIC | Age: 58
End: 2018-05-09

## 2018-05-10 ENCOUNTER — TELEPHONE (OUTPATIENT)
Dept: TRANSPLANT | Facility: CLINIC | Age: 58
End: 2018-05-10

## 2018-05-10 NOTE — TELEPHONE ENCOUNTER
Patient called to say she is having ALA done tomorrow, has recently had a bone density test done, mammogram, and pap, all of which should be faxed to our tx number. Letty also mentions she is seeing cardiology on 5/22/2018 and if that looks good she will reach out to her pervious potential donor to see if she is still willing to donate. Patient also is thinking about home dialysis if she is not going to get transplanted in the next 6-12 months.

## 2018-05-11 ENCOUNTER — RESULTS ONLY (OUTPATIENT)
Dept: OTHER | Facility: CLINIC | Age: 58
End: 2018-05-11

## 2018-05-11 ENCOUNTER — TRANSFERRED RECORDS (OUTPATIENT)
Dept: HEALTH INFORMATION MANAGEMENT | Facility: CLINIC | Age: 58
End: 2018-05-11

## 2018-05-11 DIAGNOSIS — N18.6 END STAGE RENAL DISEASE (H): ICD-10-CM

## 2018-05-11 DIAGNOSIS — Z76.82 ORGAN TRANSPLANT CANDIDATE: ICD-10-CM

## 2018-05-11 LAB
CHOLEST SERPL-MCNC: 159 MG/DL
HBA1C MFR BLD: 5.9 % (ref 4.4–5.6)
HDLC SERPL-MCNC: 59 MG/DL
LDLC SERPL CALC-MCNC: 71 MG/DL
NONHDLC SERPL-MCNC: 100 MG/DL
TRIGL SERPL-MCNC: 146 MG/DL

## 2018-05-15 ENCOUNTER — TRANSFERRED RECORDS (OUTPATIENT)
Dept: HEALTH INFORMATION MANAGEMENT | Facility: CLINIC | Age: 58
End: 2018-05-15

## 2018-05-16 ENCOUNTER — TRANSFERRED RECORDS (OUTPATIENT)
Dept: HEALTH INFORMATION MANAGEMENT | Facility: CLINIC | Age: 58
End: 2018-05-16

## 2018-05-17 LAB — PRA SINGLE ANTIGEN IGG ANTIBODY: NORMAL

## 2018-05-18 LAB
PROTOCOL CUTOFF: NORMAL
UNACCEPTABLE ANTIGEN: NORMAL

## 2018-05-21 ENCOUNTER — TELEPHONE (OUTPATIENT)
Dept: TRANSPLANT | Facility: CLINIC | Age: 58
End: 2018-05-21

## 2018-05-21 NOTE — LETTER
May 21, 2018    Letty Benavidez   [facility] [facility] [address]      Dr. Gigi Mckeon,    You are scheduled to see mutual patient Letty Benavidez (: 1960) on 18. Letty is currently listed inactive on our kidney transplant wait list. She will need cardiac clearance for active status consideration on our wait list. Please dictate in your note on 18 if Letty is cleared from a cardiology standpoint for surgery.      If you have any questions, please contact me at [phone]. I am in the office Monday through .      Sincerely,    Wanda Yin RN, BSN  Kidney Transplant Wait List Coordinator  [address] [address]  Phone: [phone]  Fax: [phone]      cc: Letty Benavidez

## 2018-05-21 NOTE — TELEPHONE ENCOUNTER
Pt left [facility] that her cardiology appointment was moved to 6/4/18. Letter routed to admin staff to send to cardiologist at their request. Cardiology's information: [facility] - Gigi Mckeon MD  Fax: [phone], Phone: [phone]

## 2018-05-23 ENCOUNTER — TRANSFERRED RECORDS (OUTPATIENT)
Dept: HEALTH INFORMATION MANAGEMENT | Facility: CLINIC | Age: 58
End: 2018-05-23

## 2018-05-24 LAB
SA1 CELL: NORMAL
SA1 COMMENTS: NORMAL
SA1 HI RISK ABY: NORMAL
SA1 MOD RISK ABY: NORMAL
SA1 TEST METHOD: NORMAL
SA2 CELL: NORMAL
SA2 COMMENTS: NORMAL
SA2 HI RISK ABY UA: NORMAL
SA2 MOD RISK ABY: NORMAL
SA2 TEST METHOD: NORMAL

## 2018-06-01 ENCOUNTER — TRANSFERRED RECORDS (OUTPATIENT)
Dept: HEALTH INFORMATION MANAGEMENT | Facility: CLINIC | Age: 58
End: 2018-06-01

## 2018-06-04 ENCOUNTER — MEDICAL CORRESPONDENCE (OUTPATIENT)
Dept: TRANSPLANT | Facility: CLINIC | Age: 58
End: 2018-06-04

## 2018-06-04 ENCOUNTER — TELEPHONE (OUTPATIENT)
Dept: TRANSPLANT | Facility: CLINIC | Age: 58
End: 2018-06-04

## 2018-06-04 ENCOUNTER — TRANSFERRED RECORDS (OUTPATIENT)
Dept: HEALTH INFORMATION MANAGEMENT | Facility: CLINIC | Age: 58
End: 2018-06-04

## 2018-06-04 NOTE — TELEPHONE ENCOUNTER
Pt called to state her local cardiologist cleared her for transplant and will faxing over clearance letter today. Also wondering if there are donor handouts she can provide to family and friends.

## 2018-06-05 NOTE — TELEPHONE ENCOUNTER
Coordinator updated pt that we haven't received cardiology note yet. Our team did request progress note though. Will update pt once received.

## 2018-06-07 ENCOUNTER — MEDICAL CORRESPONDENCE (OUTPATIENT)
Dept: TRANSPLANT | Facility: CLINIC | Age: 58
End: 2018-06-07

## 2018-06-12 ENCOUNTER — TELEPHONE (OUTPATIENT)
Dept: TRANSPLANT | Facility: CLINIC | Age: 58
End: 2018-06-12

## 2018-06-12 NOTE — TELEPHONE ENCOUNTER
Pt requests coordinator let her know if she can be made active status after kidney selection mtg tomorrow. She was also wondering if her tacrolimus script could be written by us, as she is no longer followed by the [facility]. This request was forwarded to pt's post transplant coordinator. Pt aware her post coordinator should be contacting her to address her tac question.

## 2018-06-13 ENCOUNTER — DOCUMENTATION ONLY (OUTPATIENT)
Dept: TRANSPLANT | Facility: CLINIC | Age: 58
End: 2018-06-13

## 2018-06-13 ENCOUNTER — TEAM CONFERENCE (OUTPATIENT)
Dept: TRANSPLANT | Facility: CLINIC | Age: 58
End: 2018-06-13

## 2018-06-13 DIAGNOSIS — Z76.82 ORGAN TRANSPLANT CANDIDATE: Primary | ICD-10-CM

## 2018-06-13 DIAGNOSIS — N18.6 END STAGE RENAL DISEASE (H): ICD-10-CM

## 2018-06-13 NOTE — TELEPHONE ENCOUNTER
TEAM CONFERENCE:    ATTENDEES: Ambar Kang Riad, Keys, Coordinators, Social Work, Pharmacy, Finance, and Dietary    DISCUSSION and OUTCOME: Reviewed pt's cariology clearance by local cardiologist. Ok for pt to be listed actively. Per Dr. Kang if pt gets an approved living donor, will need to have updated imaging first. Otherwise, update pt's imaging the next time she is here for return wait list appointments.      Coordinator updated pt on selection's decision. Pt aware she's due next for return wait list appointments this Fall.

## 2018-06-14 ENCOUNTER — TELEPHONE (OUTPATIENT)
Dept: TRANSPLANT | Facility: CLINIC | Age: 58
End: 2018-06-14

## 2018-06-14 DIAGNOSIS — N18.6 END STAGE RENAL DISEASE (H): ICD-10-CM

## 2018-06-14 DIAGNOSIS — Z76.82 ORGAN TRANSPLANT CANDIDATE: Primary | ICD-10-CM

## 2018-06-14 NOTE — TELEPHONE ENCOUNTER
Patient called and asked to be seen by Dr. Kang when she comes in August for her [facility] appts; writer requested schedulers pull her in to Dr. Kang's clinic either 8/14/ or 8/128 per patient's request. Patient mentions that she has two potential donors; one might be direct and one might be PKE and that they were going to call donor team today. I reminded Letty that any potential donors need to register on website before anything else if possible. Patient verbalized understanding and has no further questions at this time.

## 2018-06-15 ENCOUNTER — TELEPHONE (OUTPATIENT)
Dept: TRANSPLANT | Facility: CLINIC | Age: 58
End: 2018-06-15

## 2018-06-15 NOTE — TELEPHONE ENCOUNTER
"----- Message -----      From: Wanda Yin RN      Sent: 6/12/2018  11:02 AM        To: Sneha Latham RN   Subject: tac question from pt, pls f/u with her           Hello :)     I'm presenting pt at [facility] tomorrow to hopefully be activated on kidney WL. Not sure how much you've worked with pt. She's extremely on-top of her care. She just emailed me the following post question and I told her I would forward to you. Pls f/u with pt.     Per Letty: \"my Tacro is still prescribed by the [facility] where I got my first transplant. I have otherwise severed my ties with this medical facility following their decline to consider me for second transplant in 2016. I'm not sure how much longer I can expect my former [facility] nephrologist to continue to renew the Tacro. They haven't asked me to check my Tacro blood concentration level in over a year. Wouldn't it make more sense to have a nephrologist at [facility]  responsibility for my immunosuppression therapy?\"     Thanks!   Wanda                           I spoke with pt regarding her phone call with Wanda and IS management. I let pt know unfortunately as of now, we aren't accepting outside post transplant patients.  Pt educated [facility] or her primary nephrologist should cont managing her IS.  Pt educated once she gets a transplant from us, we will take over her IS management.   "

## 2018-06-18 DIAGNOSIS — N18.6 END STAGE RENAL DISEASE (H): ICD-10-CM

## 2018-06-18 DIAGNOSIS — Z76.82 ORGAN TRANSPLANT CANDIDATE: Primary | ICD-10-CM

## 2018-06-21 ENCOUNTER — TELEPHONE (OUTPATIENT)
Dept: TRANSPLANT | Facility: CLINIC | Age: 58
End: 2018-06-21

## 2018-06-21 NOTE — TELEPHONE ENCOUNTER
Spoke with Lizett at [facility] who asked for patient's clinical documentation and labs. Writer obtaining GRISELDA and will send most recent records today. Lizett mentioned that they will need patient's most recent medical documentation when she get called for transplant; writer explained that we will not know when she will be called, therefore they will have only what has been requested most recently.

## 2018-06-26 ENCOUNTER — TELEPHONE (OUTPATIENT)
Dept: TRANSPLANT | Facility: CLINIC | Age: 58
End: 2018-06-26

## 2018-06-26 NOTE — TELEPHONE ENCOUNTER
Pt wondering if she were to have an approved donor who would need to give via Paired Exchange and [place] if they could donate at a closer center. Reviewed donor would still need to come to our center to donate, even if it were via paired exchange. Pt verbalized understanding. Pt to started home PD training on 7/12/18 and will notify coordinator once this is official. Pt will be followed by same dialysis center for PD.

## 2018-06-27 ENCOUNTER — TELEPHONE (OUTPATIENT)
Dept: TRANSPLANT | Facility: CLINIC | Age: 58
End: 2018-06-27

## 2018-06-27 ENCOUNTER — DOCUMENTATION ONLY (OUTPATIENT)
Dept: TRANSPLANT | Facility: CLINIC | Age: 58
End: 2018-06-27

## 2018-06-27 NOTE — PROGRESS NOTES
Pt notified coordinator that her dialysis contact information will be the following effective 7/12/18:    [facility]  [address]  [place] [address]    Office: [phone], Fax: [phone]

## 2018-07-04 ENCOUNTER — TRANSFERRED RECORDS (OUTPATIENT)
Dept: HEALTH INFORMATION MANAGEMENT | Facility: CLINIC | Age: 58
End: 2018-07-04

## 2018-08-02 ENCOUNTER — RESULTS ONLY (OUTPATIENT)
Dept: OTHER | Facility: CLINIC | Age: 58
End: 2018-08-02

## 2018-08-02 DIAGNOSIS — Z76.82 ORGAN TRANSPLANT CANDIDATE: ICD-10-CM

## 2018-08-02 DIAGNOSIS — N18.6 END STAGE RENAL DISEASE (H): ICD-10-CM

## 2018-08-06 ENCOUNTER — TELEPHONE (OUTPATIENT)
Dept: TRANSPLANT | Facility: CLINIC | Age: 58
End: 2018-08-06

## 2018-08-06 NOTE — TELEPHONE ENCOUNTER
Pt called with some general questions about paired exchange. Coordinator reviewed all questions. Pt verbalized understanding and has no further questions.

## 2018-08-07 ENCOUNTER — TRANSFERRED RECORDS (OUTPATIENT)
Dept: HEALTH INFORMATION MANAGEMENT | Facility: CLINIC | Age: 58
End: 2018-08-07

## 2018-08-07 DIAGNOSIS — Z76.82 AWAITING ORGAN TRANSPLANT: Primary | ICD-10-CM

## 2018-08-07 DIAGNOSIS — N18.6 ESRD (END STAGE RENAL DISEASE) (H): ICD-10-CM

## 2018-08-07 LAB
PRA SINGLE ANTIGEN IGG ANTIBODY: NORMAL
SA1 CELL: NORMAL
SA1 COMMENTS: NORMAL
SA1 HI RISK ABY: NORMAL
SA1 MOD RISK ABY: NORMAL
SA1 TEST METHOD: NORMAL
SA2 CELL: NORMAL
SA2 COMMENTS: NORMAL
SA2 HI RISK ABY UA: NORMAL
SA2 MOD RISK ABY: NORMAL
SA2 TEST METHOD: NORMAL

## 2018-08-16 ENCOUNTER — TRANSFERRED RECORDS (OUTPATIENT)
Dept: HEALTH INFORMATION MANAGEMENT | Facility: CLINIC | Age: 58
End: 2018-08-16

## 2018-08-20 ENCOUNTER — DOCUMENTATION ONLY (OUTPATIENT)
Dept: TRANSPLANT | Facility: CLINIC | Age: 58
End: 2018-08-20

## 2018-08-20 NOTE — PROGRESS NOTES
Pt contact coordinator with following provider updates. Coordinator forwarded to admin staff to update provider information in EPIC.    1. My primary care physician got  in the last year, so her name is now Anita Jurado, rather than Anita Pugh.  2. I am no longer seeing Dr. Ham for diabetes care. He has been replaced by Dr. Palomares, who is already in your database.

## 2018-08-27 ENCOUNTER — TELEPHONE (OUTPATIENT)
Dept: TRANSPLANT | Facility: CLINIC | Age: 58
End: 2018-08-27

## 2018-08-27 NOTE — TELEPHONE ENCOUNTER
I spoke with the patient today at the request of her incompatible donor.  I introduced my self and stated that I will be in clinic tomorrow to meet with her briefly regarding KPD and if she is willing to be in that program then she can sign the consent forms.  Letty agreed with that plan as she said she wants to be ready once her incompatible donor is finished with her evaluation.   I answered her questions regarding typical timing to get an offer in KPD.  Letty mentioned that she and her  are snowbirds and are trying to make a decision if they should stay put here in [place].  She is currently on PD.

## 2018-08-27 NOTE — TELEPHONE ENCOUNTER
Coordinator called pt to let her know her appointment with Dr. Kang had to be cancelled for tomorrow. Ensured pt that her images that are scheduled for tomorrow will be reviewed by our selection committee this week. Dr. Kang should be present at this meeting unless she is called in for a pancreas transplant. Pt verbalizes understanding.

## 2018-08-28 ENCOUNTER — RADIANT APPOINTMENT (OUTPATIENT)
Dept: CT IMAGING | Facility: CLINIC | Age: 58
End: 2018-08-28
Attending: SURGERY
Payer: COMMERCIAL

## 2018-08-28 ENCOUNTER — ALLIED HEALTH/NURSE VISIT (OUTPATIENT)
Dept: TRANSPLANT | Facility: CLINIC | Age: 58
End: 2018-08-28
Attending: SURGERY
Payer: COMMERCIAL

## 2018-08-28 ENCOUNTER — DOCUMENTATION ONLY (OUTPATIENT)
Dept: TRANSPLANT | Facility: CLINIC | Age: 58
End: 2018-08-28

## 2018-08-28 ENCOUNTER — RADIANT APPOINTMENT (OUTPATIENT)
Dept: ULTRASOUND IMAGING | Facility: CLINIC | Age: 58
End: 2018-08-28
Attending: SURGERY
Payer: COMMERCIAL

## 2018-08-28 ENCOUNTER — RESULTS ONLY (OUTPATIENT)
Dept: OTHER | Facility: CLINIC | Age: 58
End: 2018-08-28

## 2018-08-28 ENCOUNTER — OFFICE VISIT (OUTPATIENT)
Dept: NEPHROLOGY | Facility: CLINIC | Age: 58
End: 2018-08-28
Attending: PHYSICIAN ASSISTANT
Payer: COMMERCIAL

## 2018-08-28 VITALS
RESPIRATION RATE: 18 BRPM | WEIGHT: 112.6 LBS | HEIGHT: 63 IN | SYSTOLIC BLOOD PRESSURE: 148 MMHG | HEART RATE: 73 BPM | BODY MASS INDEX: 19.95 KG/M2 | DIASTOLIC BLOOD PRESSURE: 78 MMHG

## 2018-08-28 DIAGNOSIS — N18.6 END STAGE KIDNEY DISEASE (H): ICD-10-CM

## 2018-08-28 DIAGNOSIS — Z76.82 ORGAN TRANSPLANT CANDIDATE: ICD-10-CM

## 2018-08-28 DIAGNOSIS — Z79.899 ENCOUNTER FOR LONG-TERM CURRENT USE OF MEDICATION: ICD-10-CM

## 2018-08-28 DIAGNOSIS — N18.6 END STAGE RENAL DISEASE (H): ICD-10-CM

## 2018-08-28 DIAGNOSIS — Z48.298 AFTERCARE FOLLOWING ORGAN TRANSPLANT: ICD-10-CM

## 2018-08-28 DIAGNOSIS — Z76.82 ORGAN TRANSPLANT CANDIDATE: Primary | ICD-10-CM

## 2018-08-28 DIAGNOSIS — Z76.82 AWAITING ORGAN TRANSPLANT STATUS: Primary | ICD-10-CM

## 2018-08-28 DIAGNOSIS — Z01.818 PRE-TRANSPLANT EVALUATION FOR KIDNEY TRANSPLANT: ICD-10-CM

## 2018-08-28 DIAGNOSIS — Z94.0 KIDNEY REPLACED BY TRANSPLANT: Primary | ICD-10-CM

## 2018-08-28 PROCEDURE — G0463 HOSPITAL OUTPT CLINIC VISIT: HCPCS | Mod: ZF

## 2018-08-28 PROCEDURE — 36415 COLL VENOUS BLD VENIPUNCTURE: CPT | Performed by: SURGERY

## 2018-08-28 PROCEDURE — 86832 HLA CLASS I HIGH DEFIN QUAL: CPT | Performed by: SURGERY

## 2018-08-28 PROCEDURE — 86833 HLA CLASS II HIGH DEFIN QUAL: CPT | Performed by: SURGERY

## 2018-08-28 RX ORDER — IOPAMIDOL 755 MG/ML
100 INJECTION, SOLUTION INTRAVASCULAR ONCE
Status: COMPLETED | OUTPATIENT
Start: 2018-08-28 | End: 2018-08-28

## 2018-08-28 RX ORDER — ASCORBIC ACID 125 MG
1 TABLET,CHEWABLE ORAL DAILY PRN
Status: ON HOLD | COMMUNITY
Start: 2018-04-22 | End: 2018-11-26

## 2018-08-28 RX ORDER — ACETAMINOPHEN 160 MG
1 TABLET,DISINTEGRATING ORAL EVERY EVENING
COMMUNITY
Start: 2018-06-10 | End: 2019-03-29

## 2018-08-28 RX ORDER — GLYCERIN/MALTODEXTRIN
LIQUID (ML) ORAL
Status: ON HOLD | COMMUNITY
Start: 2017-06-01 | End: 2018-11-21

## 2018-08-28 RX ORDER — ASCORBIC ACID, THIAMINE, RIBOFLAVIN, NIACINAMIDE, PYRIDOXINE, FOLIC ACID, COBALAMIN, BIOTIN, PANTOTHENIC ACID, ZINC 100; 1.5; 1.7; 20; 10; 1; 6; 300; 10; 5 MG/1; MG/1; MG/1; MG/1; MG/1; MG/1; UG/1; UG/1; MG/1; MG/1
0.8 TABLET, COATED ORAL EVERY EVENING
Status: ON HOLD | COMMUNITY
Start: 2017-04-13 | End: 2018-11-21

## 2018-08-28 RX ADMIN — IOPAMIDOL 100 ML: 755 INJECTION, SOLUTION INTRAVASCULAR at 08:24

## 2018-08-28 ASSESSMENT — PAIN SCALES - GENERAL: PAINLEVEL: NO PAIN (0)

## 2018-08-28 NOTE — PROGRESS NOTES
Patient Name: Letty Benavidez  : 1960  Age: 58 year old  MRN: [id number]  Date of Initial Social Work Evaluation: 10/3/2016, most recent transplant social work visit 2017     Patient on kidney transplant wait list.  Saw today to update psychosocial assessment along with her sister Radha.       Presenting Information   Living Situation: Patient resides wither  [place] in [place], [place]. Patient and her  split their time between [place] and [place]. Patient reported she is not sure if she will be going to [place] this winter if she is able to have a paired exchange transplant.  If not local, plans for short term stay:  Patient plans to stay at the [facility] or another local hotel  Previous Functional Status: Ambulatory, independent with ADL's  Cultural/Language/Spiritual Considerations: None identified at this time.      Support System  Primary Support Person  [place]  Other support:  Siblings, mother  Plan for support in immediate post-transplant period:  [place], sister Radha, and her mother     Health Care Directive  Decision Maker: Patient  Alternate Decision Maker:   Health Care Directive: Will bring in copy     Mental Health/Coping:   History of Mental Health: Letty denied any current or history of anxiety, depression, or other mental health concerns.   History of Chemical Health: Letty denied any tobacco, alcohol, or substance use.   Current status: Patient denied any mental health or chemical health concerns.  Coping: Patient reported now that she is doing home hemo she feels her quality of life is much better than doing in-center hemo.   Services Needed/Recommended: None identified at this time.      Financial   Income: Both patient and her  are retired.  Impact of transplant on income: None identified at this time.   Insurance and medication coverage: Patient has Medicare A & B and BCBS through her previous employer. Medicare is secondary until  October 2018 per patient.   Financial concerns: None identified at this time.   Resources needed: None identified at this time.      Assessment and recommendations and plan:  Patient started dialysis in 2017 and is now doing home hemo which works best for her. Patient is hopeful she will be able to have a paired exchange transplant in the next 6 months. Reviewed transplant education (Medicare, rehabilitation, donor issues, community/financial resources, and psych/family adjustment) as well as psychosocial risks of transplant. Provided patient with a copy of post-transplant informational sheet that includes information on potential costs of medications, Medicare ESRD, post-transplant lodging, etc. Patient seemed to process information well. Appeared well informed, motivated, and able to follow post transplant requirements. Behavior was appropriate during interview. Has adequate income and insurance coverage. Adequate social support. No major contraindications noted for transplant. At this time, patient appears to understand the risks and benefits of transplant.     Dina Mitchell [facility]    Kidney/Pancreas/Auto Islet Transplant Programs

## 2018-08-28 NOTE — MR AVS SNAPSHOT
"              After Visit Summary   8/28/2018    Letty Benavidez    MRN: [id number]           Patient Information     Date Of Birth          1960        Visit Information        Provider Department      8/28/2018 12:00 PM Leda Lemons PA-C [facility] Nephrology        Today's Diagnoses     Organ transplant candidate    -  1    End stage kidney disease (H)        Pre-transplant evaluation for kidney transplant           Follow-ups after your visit        Who to contact     If you have questions or need follow up information about today's clinic visit or your schedule please contact [facility] NEPHROLOGY directly at [phone].  Normal or non-critical lab and imaging results will be communicated to you by Glokalisehart, letter or phone within 4 business days after the clinic has received the results. If you do not hear from us within 7 days, please contact the clinic through Samatoat or phone. If you have a critical or abnormal lab result, we will notify you by phone as soon as possible.  Submit refill requests through CoffeeTable or call your pharmacy and they will forward the refill request to us. Please allow 3 business days for your refill to be completed.          Additional Information About Your Visit        MyChart Information     CoffeeTable gives you secure access to your electronic health record. If you see a primary care provider, you can also send messages to your care team and make appointments. If you have questions, please call your primary care clinic.  If you do not have a primary care provider, please call [phone] and they will assist you.        Care EveryWhere ID     This is your Care EveryWhere ID. This could be used by other organizations to access your [facility] medical records  [id number]        Your Vitals Were     Pulse Respirations Height BMI (Body Mass Index)          73 18 1.6 m (5' 3\") 19.95 kg/m2         Blood Pressure from Last 3 Encounters:   08/28/18 148/78   08/16/17 157/72 "   10/03/16 170/76    Weight from Last 3 Encounters:   08/28/18 51.1 kg (112 lb 9.6 oz)   08/16/17 49.7 kg (109 lb 8 oz)   10/03/16 49.3 kg (108 lb 9.6 oz)              Today, you had the following     No orders found for display         Today's Medication Changes          These changes are accurate as of 8/28/18 11:59 PM.  If you have any questions, ask your nurse or doctor.               These medicines have changed or have updated prescriptions.        Dose/Directions    vitamin D3 2000 units Caps   This may have changed:  Another medication with the same name was removed. Continue taking this medication, and follow the directions you see here.   Changed by:  Leda Lemons PA-C        Refills:  0         Stop taking these medicines if you haven't already. Please contact your care team if you have questions.     NONFORMULARY   Stopped by:  Leda Lemons PA-C           UNABLE TO FIND   Stopped by:  Leda Lemons PA-C           UNABLE TO FIND   Stopped by:  Leda Lemons PA-C           zinc sulfate 220 (50 Zn) MG capsule   Commonly known as:  ZINCATE   Stopped by:  Leda Lemons PA-C                    Primary Care Provider Office Phone # Fax #    Anita Jurado [phone]       [place] [address]        Equal Access to Services     [facility]: Hadii valentino duarte hadasho Somary, waaxda luqadaha, qaybta kaalmada lloyd, jimmy sims . So [facility] [phone].    ATENCIÓN: Si habla español, tiene a guerin disposición servicios gratuitos de asistencia lingüística. Llame al [phone].    We comply with applicable federal civil rights laws and [place] laws. We do not discriminate on the basis of race, color, national origin, age, disability, sex, sexual orientation, or gender identity.            Thank you!     Thank you for choosing [facility] NEPHROLOGY  for your care. Our goal is always to  provide you with excellent care. Hearing back from our patients is one way we can continue to improve our services. Please take a few minutes to complete the written survey that you may receive in the mail after your visit with us. Thank you!             Your Updated Medication List - Protect others around you: Learn how to safely use, store and throw away your medicines at www.disposemymeds.org.          This list is accurate as of 8/28/18 11:59 PM.  Always use your most recent med list.                   Brand Name Dispense Instructions for use Diagnosis    amylase-lipase-protease 32964 units Cpep    ZENPEP     Take by mouth 3 times daily (with meals) Pt reports taking Zenpep (L-15K; A=82K; P=51K)        ASPIRIN PO      Take 81 mg by mouth daily Cardiac health        Co-Enzyme Q-10 60 MG Caps      Take 600 mg by mouth daily        DIALYVITE/ZINC Tabs           humaLOG 100 UNIT/ML injection   Generic drug:  insulin lispro      Inject 100 Units Subcutaneous        insulin pump infusion           lidocaine-prilocaine cream    EMLA     Apply 5 mg topically three times a week        LINZESS 290 MCG capsule   Generic drug:  linaclotide      See Admin Instructions 4x/wk, taken on non-dialysis days        LIPITOR 10 MG tablet   Generic drug:  atorvastatin      Take 20 mg by mouth daily        LIQUACEL Liqd           LUNESTA PO      Take by mouth daily        Magnesium Citrate 125 MG Caps           MIRALAX PO      Take 17 g by mouth three times a week        MIRCERA 50 MCG/0.3ML Sosy   Generic drug:  Methoxy PEG-Epoetin Beta           NEURONTIN 100 MG capsule   Generic drug:  gabapentin      Take 100 mg by mouth        tacrolimus 1 MG capsule    GENERIC EQUIVALENT     Take 2 mg by mouth        vitamin D3 2000 units Caps

## 2018-08-28 NOTE — Clinical Note
2018      RE: Letty Benavidez   [facility] [address]       Assessment and Plan:  1. Kidney transplant wait list evaluation - Ms. Benavidez is a {desc.:122002} candidate overall. She should remain active on the wait list.  2. {P TRANSPLANT ESKD/CKD/DM:488647607}  3. Cardiac risk - ***  4. ***     Discussed the risks and benefits of a transplant, including the risk of surgery and immunosuppression medications.    KDPI: We discussed approximate remaining wait time and how that is influenced by issues such as blood type and sensitization (PRA) and access to a living donor. I contrasted potential waiting time for living vs  donor kidneys from  normal (0-85%) or higher (%) kidney donor profile index (KDPI) donors and their associated outcomes. I {Would:028803} recommend Ms. Benavidez to consider kidneys from high KDPI donors. The reason for this decision is best summarized as: {KDPI REASON:308698577}.    Patient s overall re-evaluation may require further discussion in the Transplant Program s multidisciplinary selection committee for a final recommendation on the patient s suitability for transplant.     Reason for Visit:  Letty Benavidez is a 58 year old female with {UMP TRANSPLANT ESKD/CKD/DM:767857710}, who presents for {[facility] TRANSPLANT LIVING DONOR ORGAN:093043939} transplant wait list evaluation.     Last Evaluation Clinic Visit Date:  ***        Wait List Date: 10/21/2016  Current phase/status: Waitlist: Active as of 2018  Transplant coordinator: Sneha Mccoy Transplant Office phone number [phone]     Previous Medical Issues:  ***     HPI: Ms. Benavidez is a 58-year-old female with type 1 diabetes since , ESKD from diabetes s/p LDKT 1998, skin cancer***; gastroparesis, history of small intestinal bacterial overgrowth, pancreatic exocrine dysfunction, and diabetic enteropathy, and ***who returns for wait list follow up. She has been listed  since October 2016 and is currently listed actively.         Interim events/issues    Since she was last seen in October 2016, she initiated dialysis in April 2017. HHD 7/2018, some uo 250 ml, no sxs    She underwent 2 vessel CABG (LIMA to LAD and SVG to right posterior lateral artery (RCA***)) in October 2017 and completed cardiac rehab. ***angio, most recent ECHO/stress October 2016. She had cardiac risk assessment in June 2018 in [place] and was noted to be and  acceptable candidate.     -immunosuppression - remains on tac 1 mg bid    skin cancer - no new lesions, last checked may 2018    GI issues - Miralax, Zenpep, Linzess, low FODMAP diet. She continues to follow with her local GI provider and was noted to be doing well from their standpoint in May 2018. Sxs currently controlled.    Diabetes - humalog insulin pump, about 18 units per day, 5.4 A1c, checks sugars 10x/d. Overnight lows in the 50s waking at night, less frequent with dietary changes. No unawareness. Followed by endocrine.    PVD - getting images to be reviewed by [facility].     pap May 2018***, August 2015 colonoscopy up to date, May 2018 mammogram up to date    No other admits other than CABG 10/2017. No recent blood transfusion. New PEP donor.    HHD, doing much better, good exercise tolerance, walking, no exertional sxs, diet ok, no n/v/d, tolerating tac ok, concerns about vascular dz, new PEP         Kidney Disease Hx       Kidney Disease Dx: ***       Biopsy Proven: {YES WITH WILD CARD/NO:51451087}        On Dialysis: {UMP YES NO NEPH:217223104}       Primary Nephrologist: Dr. Vance          Uremic Symptoms: {UMP TX UREMIC SYMPTOMS:407160521}          Potential Donor(s): {YES WITH WILD CARD/NO:14784339}          Cardiac history:       Last cardiac risk assessment: ***       Last stress test/coronary angiogram: ***       Exertional symptoms: ***       Recent cardiac events: ***     Pertinent issues: ***  Blood transfusion: {YES WITH WILD  CARD/NO:24894187}  Mariuszirais machado Witness: {YES WITH WILD CARD/NO:71499548}  Pregnancies: {YES WITH WILD CARD/NO:69595179}  Previous transplant: {YES WITH WILD CARD/NO:50139016}  Urine output: {YES WITH WILD CARD/NO:23504266}  Bladder dysfunction: {YES WITH WILD CARD/NO:93713141}  Claudication: {YES WITH WILD CARD/NO:36982716}  Previous amputation: {YES WITH WILD CARD/NO:86796333}  Cancer: {YES WITH WILD CARD/NO:10206231}  Recurrent infection: {YES WITH WILD CARD/NO:03024202}  Chronic anticoagulation: {YES WITH WILD CARD/NO:96825277}      ROS:  {UMP TX GRISELDA:581345993}     PMH:  Medical records {Records reviewed:16806343}.  Past Medical History:   Diagnosis Date     Anemia of chronic kidney failure      Cataracts, bilateral      Chronic kidney disease (CKD), stage 4 (severe)      Diabetic peripheral neuropathy (H)      Diabetic retinopathy (H)      Dyslipidemia      Gastroparesis      History of skin cancer      Hypertension      Osteoporosis      Pancreatic insufficiency      Secondary hyperparathyroidism (H)      Type 1 diabetes (H)         PSH:  Past Surgical History:   Procedure Laterality Date     CORONARY ANGIOGRAPHY ADULT ORDER  09/21/2017    and Left ventriculography     HC HEMORROIDECTOMY, EXTERNAL, SINGLE COLUMN/GROUP      x2     HCL SQUAMOUS CELL CARCINOMA AG Bilateral     arms and legs     OOPHORECTOMY Right      TRANSPLANT KIDNEY RECIPIENT LIVING RELATED       TUBAL LIGATION  1981        Personal Hx:  Social History     Social History     Marital status:      Spouse name: N/A     Number of children: 0     Years of education: 16     Occupational History           Social History Main Topics     Smoking status: Never Smoker     Smokeless tobacco: Never Used     Alcohol use No     Drug use: No     Sexual activity: Yes     Partners: Male     Other Topics Concern     Blood Transfusions No     Seat Belt Yes     Social History Narrative        Allergies:  Allergies   Allergen Reactions      "Metronidazole Nausea and Vomiting     Flagyl     Mycophenolate Nausea and Vomiting and Other (See Comments)     Nausea / Vomiting / Tremors        Medications:  Prior to Admission medications    Medication Sig Start Date End Date Taking? Authorizing Provider   Amino Acids (LIQUACEL) LIQD  6/1/17  Yes Reported, Patient   amylase-lipase-protease (ZENPEP) 68753 units CPEP Take by mouth 3 times daily (with meals) Pt reports taking Zenpep (L-15K; A=82K; P=51K)   Yes Reported, Patient   ASPIRIN PO Take 81 mg by mouth daily Cardiac health   Yes Reported, Patient   atorvastatin (LIPITOR) 10 MG tablet Take 20 mg by mouth daily 4/28/16  Yes Reported, Patient   B Complex-C-Zn-Folic Acid (DIALYVITE/ZINC) TABS  4/13/17  Yes Reported, Patient   Cholecalciferol (VITAMIN D3) 2000 units CAPS  6/10/18  Yes Reported, Patient   Co-Enzyme Q-10 60 MG CAPS Take 600 mg by mouth daily 4/26/11  Yes Reported, Patient   Eszopiclone (LUNESTA PO) Take by mouth daily   Yes Reported, Patient   gabapentin (NEURONTIN) 100 MG capsule Take 100 mg by mouth 9/26/16  Yes Reported, Patient   insulin lispro (HUMALOG) 100 UNIT/ML VIAL Inject 100 Units Subcutaneous 6/21/16  Yes Reported, Patient   INSULIN PUMP - OUTPATIENT  2/17/1995  Yes Reported, Patient   lidocaine-prilocaine (EMLA) cream Apply 5 mg topically three times a week   Yes Reported, Patient   linaclotide (LINZESS) 290 MCG capsule See Admin Instructions 4x/wk, taken on non-dialysis days 5/28/17  Yes Reported, Patient   Magnesium Citrate 125 MG CAPS  4/22/18  Yes Reported, Patient   Methoxy PEG-Epoetin Beta (MIRCERA) 50 MCG/0.3ML SOSY  4/27/17  Yes Reported, Patient   Polyethylene Glycol 3350 (MIRALAX PO) Take 17 g by mouth three times a week 6/8/09  Yes Reported, Patient   tacrolimus (PROGRAF - GENERIC EQUIVALENT) 1 MG capsule Take 2 mg by mouth 2/16/16  Yes Reported, Patient        Vitals:  /78  Pulse 73  Resp 18  Ht 1.6 m (5' 3\")  Wt 51.1 kg (112 lb 9.6 oz)  BMI 19.95 kg/m2   " "  Exam:  {EXAM FAST TX:[id number]::\"GENERAL APPEARANCE: alert and no distress\",\"HENT: mouth without ulcers or lesions\",\"LYMPHATICS: no cervical or supraclavicular nodes\",\"RESP: lungs clear to auscultation - no rales, rhonchi or wheezes\",\"CV: regular rhythm, normal rate, no rub, no murmur\",\"EDEMA: no LE edema bilaterally\",\"ABDOMEN: soft, nondistended, nontender, bowel sounds normal\",\"MS: extremities normal - no gross deformities noted, no evidence of inflammation in joints, no muscle tenderness\",\"SKIN: no rash\"}     Results:  ***      Leda Lemons PA-C    "

## 2018-08-28 NOTE — LETTER
8/28/2018       RE: Letty Benavidez  [address]  [place] [address]     Dear Colleague,    Thank you for referring your patient, Letty Benavidez, to the [facility] NEPHROLOGY at [facility]. Please see a copy of my visit note below.    Assessment and Plan:  1. Kidney transplant wait list evaluation - Ms. Benavidez is a good candidate overall. She should remain active on the wait list pending transplant surgery review of vascular imaging.  2. ESKD from diabetes s/p failed LDKT (1998) on dialysis since April 2017, doing well with recent transition to home hemodialysis in July 2018, but may benefit from a second kidney transplant. She reports that she may have a new potential living donor. She is ABO O and cPRA 38%.   3. Cardiac risk - she is s/p 2 vessel CABG in October 2017. She has good exercise tolerance without exertional symptoms. She had cardiac risk assessment in June 2018 in [place] and was noted to be an  acceptable candidate.    4. Type 1 diabetes - current daily insulin requirements 18 units via humalog pump and currently controlled with recent hemoglobin A1c of 5.4%. She will continue to follow with her local endocrinologist for management.   5. Peripheral vascular disease - CTA done earlier this morning will be reviewed for vascular targets by transplant surgery.  6. History of skin cancer - followed by local dermatologist with no new lesions identified as of her last visit in May 2018.  7. Gastroparesis, history of small intestinal bacterial overgrowth, pancreatic exocrine dysfunction, and diabetic enteropathy - controlled with current regimen and followed by local GI provider.  8. Health maintenance - pap May 2018, colonoscopy August 2015,  mammogram May 2018, all up to date.     Discussed the risks and benefits of a transplant, including the risk of surgery and immunosuppression medications.    Patient s overall re-evaluation may require further discussion in  the Transplant Program s multidisciplinary selection committee for a final recommendation on the patient s suitability for transplant.     Reason for Visit:  Letty Benavidez is a 58-year-old female with ESKD from diabetes, who presents for kidney transplant wait list evaluation.     Last Evaluation Clinic Visit Date:  October 2016        Wait List Date: 10/21/2016  Current phase/status: Waitlist: Active as of 9/4/2018  Transplant coordinator: Sneha Mccoy Transplant Office phone number [phone]     Previous Medical Issues:  #PVD     HPI: Ms. Benavidez is a 58-year-old female with type 1 diabetes since 1970, ESKD from diabetes s/p LDKT November 1998 at [facility], skin cancer; gastroparesis, history of small intestinal bacterial overgrowth, pancreatic exocrine dysfunction, and diabetic enteropathy who returns for wait list follow up. She has been listed since October 2016 and is currently listed actively.         Interim events/issues  Dialysis - Since she was last seen in October 2016, she initiated dialysis in April 2017 and recently transitioned to home hemodialysis in July 2018, which is going well. She estimates that she continues to make about 250 ml of urine daily without dysuria, hematuria, or trouble emptying her bladder. She continues on tacrolimus 1 mg bid, which she tolerates well.    Cardiac - She underwent 2 vessel CABG (LIMA to LAD and SVG to RCA) in October 2017 and completed cardiac rehab. She had cardiac risk assessment in June 2018 in [place] and was noted to be an  acceptable candidate.  She reports good exercise tolerance and can walk several blocks without chest pain, SOB, or claudication.     Skin cancer - no new lesions as of last local dermatology visit in May 2018.    GI issues - She currently feels symptoms are controlled with Miralax, Zenpep, Linzess, and a low FODMAP diet. She continues to follow with her local GI provider and was noted to be doing  well from their standpoint in May 2018. She reports strict adherence to a renal diet. She denies abdominal pain, nausea, vomiting, or diarrhea.    Diabetes - She continues to use a humalog insulin pump, about 18 units per day, with a most recent hemoglobin A1c of 5.4%. She checks blood sugars about 10 times daily. Recently she's had some overnight lows in the 50's, which do wake her from sleep, although this has occurred less frequently with dietary changes. No unawareness. She is followed by endocrinology locally.     PVD - She was previously denied for a second kidney transplant at [facility] due to vascular disease. She had a CTA done earlier this morning for further evaluation of vascular targets.    Overall, she reports doing much better since transitioning to home hemodialysis. Her energy level is stable. She feels her strength is improving. She hasn't been admitted to the hospital since CABG in late 2017. No recent blood transfusions. She may have a new donor. No recent illness, fevers, sweats, or chills.         Kidney Disease Hx       Kidney Disease Dx: DM       Biopsy Proven: Yes        On Dialysis: Yes, Date initiated: April 2017 and Dialysis Type: Home HD;       Primary Nephrologist: Dr. Vance          Uremic Symptoms: Fatigue: No; Cold: No; Nausea: No; Poor Appetite: No; Metallic Taste: No; Edema: No;          Potential Donor(s): Yes     Pertinent issues:   Blood transfusion: Yes  Jehovah s Witness: No  Pregnancies: Yes  Previous transplant: Yes  Urine output: Yes  Bladder dysfunction: No  Claudication: No  Previous amputation: No  Cancer: Yes; skin  Recurrent infection: No  Chronic anticoagulation: No      ROS:  A comprehensive review of systems was obtained and negative, except as noted in the HPI or PMH.     PMH:  Medical records were obtained and reviewed.  Past Medical History:   Diagnosis Date     Anemia of chronic kidney failure      Cataracts, bilateral      Diabetic peripheral  neuropathy (H)      Diabetic retinopathy (H)      Dyslipidemia      End stage kidney disease (H)      Gastroparesis      History of skin cancer      Hypertension      Immunosuppressed status (H)      Kidney replaced by transplant      Osteoporosis      Pancreatic insufficiency      Secondary hyperparathyroidism (H)      Type 1 diabetes (H)         PSH:  Past Surgical History:   Procedure Laterality Date     C CABG, ARTERIAL, TWO  10/2017     CORONARY ANGIOGRAPHY ADULT ORDER  09/21/2017    and Left ventriculography     HC HEMORROIDECTOMY, EXTERNAL, SINGLE COLUMN/GROUP      x2     HCL SQUAMOUS CELL CARCINOMA AG Bilateral     arms and legs     OOPHORECTOMY Right      TRANSPLANT KIDNEY RECIPIENT LIVING RELATED       TUBAL LIGATION  1981        Personal Hx:  Social History     Social History     Marital status:      Spouse name: N/A     Number of children: 0     Years of education: 16     Occupational History           Social History Main Topics     Smoking status: Never Smoker     Smokeless tobacco: Never Used     Alcohol use No     Drug use: No     Sexual activity: Yes     Partners: Male     Other Topics Concern     Blood Transfusions No     Seat Belt Yes     Social History Narrative        Allergies:  Allergies   Allergen Reactions     Metronidazole Nausea and Vomiting     Flagyl     Mycophenolate Nausea and Vomiting and Other (See Comments)     Nausea / Vomiting / Tremors        Medications:  Prior to Admission medications    Medication Sig Start Date End Date Taking? Authorizing Provider   Amino Acids (LIQUACEL) LIQD  6/1/17  Yes Reported, Patient   amylase-lipase-protease (ZENPEP) 94055 units CPEP Take by mouth 3 times daily (with meals) Pt reports taking Zenpep (L-15K; A=82K; P=51K)   Yes Reported, Patient   ASPIRIN PO Take 81 mg by mouth daily Cardiac health   Yes Reported, Patient   atorvastatin (LIPITOR) 10 MG tablet Take 20 mg by mouth daily 4/28/16  Yes Reported, Patient   B  "Complex-C-Zn-Folic Acid (DIALYVITE/ZINC) TABS  4/13/17  Yes Reported, Patient   Cholecalciferol (VITAMIN D3) 2000 units CAPS  6/10/18  Yes Reported, Patient   Co-Enzyme Q-10 60 MG CAPS Take 600 mg by mouth daily 4/26/11  Yes Reported, Patient   Eszopiclone (LUNESTA PO) Take by mouth daily   Yes Reported, Patient   gabapentin (NEURONTIN) 100 MG capsule Take 100 mg by mouth 9/26/16  Yes Reported, Patient   insulin lispro (HUMALOG) 100 UNIT/ML VIAL Inject 100 Units Subcutaneous 6/21/16  Yes Reported, Patient   INSULIN PUMP - OUTPATIENT  2/17/1995  Yes Reported, Patient   lidocaine-prilocaine (EMLA) cream Apply 5 mg topically three times a week   Yes Reported, Patient   linaclotide (LINZESS) 290 MCG capsule See Admin Instructions 4x/wk, taken on non-dialysis days 5/28/17  Yes Reported, Patient   Magnesium Citrate 125 MG CAPS  4/22/18  Yes Reported, Patient   Methoxy PEG-Epoetin Beta (MIRCERA) 50 MCG/0.3ML SOSY  4/27/17  Yes Reported, Patient   Polyethylene Glycol 3350 (MIRALAX PO) Take 17 g by mouth three times a week 6/8/09  Yes Reported, Patient   tacrolimus (PROGRAF - GENERIC EQUIVALENT) 1 MG capsule Take 2 mg by mouth 2/16/16  Yes Reported, Patient        Vitals:  /78  Pulse 73  Resp 18  Ht 1.6 m (5' 3\")  Wt 51.1 kg (112 lb 9.6 oz)  BMI 19.95 kg/m2     Exam:  GENERAL APPEARANCE: alert and no distress  HENT: mouth without ulcers or lesions. Fair dentition   LYMPHATICS: no cervical or supraclavicular nodes  RESP: lungs clear to auscultation - no rales, rhonchi or wheezes  CV: regular rhythm, normal rate, no rub, no murmur  EDEMA: no LE edema bilaterally  ABDOMEN: soft, nondistended, nontender  MS: extremities normal - no gross deformities noted, no evidence of inflammation in joints, no muscle tenderness  SKIN: no rash        Again, thank you for allowing me to participate in the care of your patient.      Sincerely,    Leda Lemons PA-C    "

## 2018-08-28 NOTE — PROGRESS NOTES
I met with the patient today in clinic. Present also was Nurse Annamaria as well as the patient's sister.  She was here to see Waitlist NP and get some testing done.  My purpose for seeing her was to review Kidney paired Exchange Program and provide her with the opportunity to sign the consent forms.    Timing:  Her donor is not yet approved.  Once donor is approved then she can be listed with the donor in the paired exchange program.      I reviewed details pertaining to the Paired Exchange programs:      1.National Kidney Registry    2.[place] for Paired donation    3.[facility] KPD Program    4. Internal Exchange  I discussed with patient the matching procedure and logistics of each program.  Including that the pair can not choose their match.  Patient has signed consent for:    1.National Kidney Registry     2.[place] for Paired Donation    3.[facility] KPD Program    4. Shipping of donor kidney    5. Internal Exchange      Patient signed GRISELDA form for purposes of sharing evaluation and listing data for the Paired Exchange Program.  I reviewed our program's policy regarding communication between the patient and her matched donor.  I stated that only if both parties are willing to exchange identities, we can facilitate that, but must require a waiver.  I reviewed that she is welcome to send her donor a card, but to leave it anonymously signed to preserve her privacy.  I reviewed that the Transplant office staff will see that the card or letter gets to the donor.  I reviewed that shipping of the kidney is a process that is monitored closely but is not without risk.  In the event that the kidney is damaged or lost there is no guarantee that she will have priority on the [facility]  donor list.  I reviewed that the programs managing the exchange try to repair broken chains but there is no guarantee that she would receive a kidney.   I reviewed with the patient that at any time she can withdraw from the KPD program or refuse a  match offer.    Questions answered.  Yeni Taylor RN  Living Donor Coordinator  08/28/2018 4:25 PM

## 2018-08-28 NOTE — NURSING NOTE
"Chief Complaint   Patient presents with     Transplant Waitlist Maintenance     waitlist follow up     /78  Pulse 73  Resp 18  Ht 1.6 m (5' 3\")  Wt 51.1 kg (112 lb 9.6 oz)  BMI 19.95 kg/m2  YURIDIA KHAN CMA    "

## 2018-08-28 NOTE — PROGRESS NOTES
Assessment and Plan:  1. Kidney transplant wait list evaluation - Ms. Benavidez is a good candidate overall. She should remain active on the wait list pending transplant surgery review of vascular imaging.  2. ESKD from diabetes s/p failed LDKT (1998) on dialysis since April 2017, doing well with recent transition to home hemodialysis in July 2018, but may benefit from a second kidney transplant. She reports that she may have a new potential living donor. She is ABO O and cPRA 38%.   3. Cardiac risk - she is s/p 2 vessel CABG in October 2017. She has good exercise tolerance without exertional symptoms. She had cardiac risk assessment in June 2018 in [place] and was noted to be an  acceptable candidate.    4. Type 1 diabetes - current daily insulin requirements 18 units via humalog pump and currently controlled with recent hemoglobin A1c of 5.4%. She will continue to follow with her local endocrinologist for management.   5. Peripheral vascular disease - CTA done earlier this morning will be reviewed for vascular targets by transplant surgery.  6. History of skin cancer - followed by local dermatologist with no new lesions identified as of her last visit in May 2018.  7. Gastroparesis, history of small intestinal bacterial overgrowth, pancreatic exocrine dysfunction, and diabetic enteropathy - controlled with current regimen and followed by local GI provider.  8. Health maintenance - pap May 2018, colonoscopy August 2015,  mammogram May 2018, all up to date.     Discussed the risks and benefits of a transplant, including the risk of surgery and immunosuppression medications.    Patient s overall re-evaluation may require further discussion in the Transplant Program s multidisciplinary selection committee for a final recommendation on the patient s suitability for transplant.     Reason for Visit:  Letty Benavidez is a 58-year-old female with ESKD from diabetes, who presents for kidney transplant wait list  evaluation.     Last Evaluation Clinic Visit Date:  October 2016        Wait List Date: 10/21/2016  Current phase/status: Waitlist: Active as of 9/4/2018  Transplant coordinator: Sneha Mccoy Transplant Office phone number [phone]     Previous Medical Issues:  #PVD     HPI: Ms. Benavidez is a 58-year-old female with type 1 diabetes since 1970, ESKD from diabetes s/p LDKT November 1998 at [facility], skin cancer; gastroparesis, history of small intestinal bacterial overgrowth, pancreatic exocrine dysfunction, and diabetic enteropathy who returns for wait list follow up. She has been listed since October 2016 and is currently listed actively.         Interim events/issues  Dialysis - Since she was last seen in October 2016, she initiated dialysis in April 2017 and recently transitioned to home hemodialysis in July 2018, which is going well. She estimates that she continues to make about 250 ml of urine daily without dysuria, hematuria, or trouble emptying her bladder. She continues on tacrolimus 1 mg bid, which she tolerates well.    Cardiac - She underwent 2 vessel CABG (LIMA to LAD and SVG to RCA) in October 2017 and completed cardiac rehab. She had cardiac risk assessment in June 2018 in [place] and was noted to be an  acceptable candidate.  She reports good exercise tolerance and can walk several blocks without chest pain, SOB, or claudication.     Skin cancer - no new lesions as of last local dermatology visit in May 2018.    GI issues - She currently feels symptoms are controlled with Miralax, Zenpep, Linzess, and a low FODMAP diet. She continues to follow with her local GI provider and was noted to be doing well from their standpoint in May 2018. She reports strict adherence to a renal diet. She denies abdominal pain, nausea, vomiting, or diarrhea.    Diabetes - She continues to use a humalog insulin pump, about 18 units per day, with a most recent hemoglobin A1c of 5.4%.  She checks blood sugars about 10 times daily. Recently she's had some overnight lows in the 50's, which do wake her from sleep, although this has occurred less frequently with dietary changes. No unawareness. She is followed by endocrinology locally.     PVD - She was previously denied for a second kidney transplant at [facility] due to vascular disease. She had a CTA done earlier this morning for further evaluation of vascular targets.    Overall, she reports doing much better since transitioning to home hemodialysis. Her energy level is stable. She feels her strength is improving. She hasn't been admitted to the hospital since CABG in late 2017. No recent blood transfusions. She may have a new donor. No recent illness, fevers, sweats, or chills.         Kidney Disease Hx       Kidney Disease Dx: DM       Biopsy Proven: Yes        On Dialysis: Yes, Date initiated: April 2017 and Dialysis Type: Home HD;       Primary Nephrologist: Dr. Vance          Uremic Symptoms: Fatigue: No; Cold: No; Nausea: No; Poor Appetite: No; Metallic Taste: No; Edema: No;          Potential Donor(s): Yes     Pertinent issues:   Blood transfusion: Yes  Jehovah s Witness: No  Pregnancies: Yes  Previous transplant: Yes  Urine output: Yes  Bladder dysfunction: No  Claudication: No  Previous amputation: No  Cancer: Yes; skin  Recurrent infection: No  Chronic anticoagulation: No      ROS:  A comprehensive review of systems was obtained and negative, except as noted in the HPI or PMH.     PMH:  Medical records were obtained and reviewed.  Past Medical History:   Diagnosis Date     Anemia of chronic kidney failure      Cataracts, bilateral      Diabetic peripheral neuropathy (H)      Diabetic retinopathy (H)      Dyslipidemia      End stage kidney disease (H)      Gastroparesis      History of skin cancer      Hypertension      Immunosuppressed status (H)      Kidney replaced by transplant      Osteoporosis      Pancreatic  insufficiency      Secondary hyperparathyroidism (H)      Type 1 diabetes (H)         PSH:  Past Surgical History:   Procedure Laterality Date     C CABG, ARTERIAL, TWO  10/2017     CORONARY ANGIOGRAPHY ADULT ORDER  09/21/2017    and Left ventriculography     HC HEMORROIDECTOMY, EXTERNAL, SINGLE COLUMN/GROUP      x2     HCL SQUAMOUS CELL CARCINOMA AG Bilateral     arms and legs     OOPHORECTOMY Right      TRANSPLANT KIDNEY RECIPIENT LIVING RELATED       TUBAL LIGATION  1981        Personal Hx:  Social History     Social History     Marital status:      Spouse name: N/A     Number of children: 0     Years of education: 16     Occupational History           Social History Main Topics     Smoking status: Never Smoker     Smokeless tobacco: Never Used     Alcohol use No     Drug use: No     Sexual activity: Yes     Partners: Male     Other Topics Concern     Blood Transfusions No     Seat Belt Yes     Social History Narrative        Allergies:  Allergies   Allergen Reactions     Metronidazole Nausea and Vomiting     Flagyl     Mycophenolate Nausea and Vomiting and Other (See Comments)     Nausea / Vomiting / Tremors        Medications:  Prior to Admission medications    Medication Sig Start Date End Date Taking? Authorizing Provider   Amino Acids (LIQUACEL) LIQD  6/1/17  Yes Reported, Patient   amylase-lipase-protease (ZENPEP) 21709 units CPEP Take by mouth 3 times daily (with meals) Pt reports taking Zenpep (L-15K; A=82K; P=51K)   Yes Reported, Patient   ASPIRIN PO Take 81 mg by mouth daily Cardiac health   Yes Reported, Patient   atorvastatin (LIPITOR) 10 MG tablet Take 20 mg by mouth daily 4/28/16  Yes Reported, Patient   B Complex-C-Zn-Folic Acid (DIALYVITE/ZINC) TABS  4/13/17  Yes Reported, Patient   Cholecalciferol (VITAMIN D3) 2000 units CAPS  6/10/18  Yes Reported, Patient   Co-Enzyme Q-10 60 MG CAPS Take 600 mg by mouth daily 4/26/11  Yes Reported, Patient   Eszopiclone (LUNESTA PO) Take by  "mouth daily   Yes Reported, Patient   gabapentin (NEURONTIN) 100 MG capsule Take 100 mg by mouth 9/26/16  Yes Reported, Patient   insulin lispro (HUMALOG) 100 UNIT/ML VIAL Inject 100 Units Subcutaneous 6/21/16  Yes Reported, Patient   INSULIN PUMP - OUTPATIENT  2/17/1995  Yes Reported, Patient   lidocaine-prilocaine (EMLA) cream Apply 5 mg topically three times a week   Yes Reported, Patient   linaclotide (LINZESS) 290 MCG capsule See Admin Instructions 4x/wk, taken on non-dialysis days 5/28/17  Yes Reported, Patient   Magnesium Citrate 125 MG CAPS  4/22/18  Yes Reported, Patient   Methoxy PEG-Epoetin Beta (MIRCERA) 50 MCG/0.3ML SOSY  4/27/17  Yes Reported, Patient   Polyethylene Glycol 3350 (MIRALAX PO) Take 17 g by mouth three times a week 6/8/09  Yes Reported, Patient   tacrolimus (PROGRAF - GENERIC EQUIVALENT) 1 MG capsule Take 2 mg by mouth 2/16/16  Yes Reported, Patient        Vitals:  /78  Pulse 73  Resp 18  Ht 1.6 m (5' 3\")  Wt 51.1 kg (112 lb 9.6 oz)  BMI 19.95 kg/m2     Exam:  GENERAL APPEARANCE: alert and no distress  HENT: mouth without ulcers or lesions. Fair dentition   LYMPHATICS: no cervical or supraclavicular nodes  RESP: lungs clear to auscultation - no rales, rhonchi or wheezes  CV: regular rhythm, normal rate, no rub, no murmur  EDEMA: no LE edema bilaterally  ABDOMEN: soft, nondistended, nontender  MS: extremities normal - no gross deformities noted, no evidence of inflammation in joints, no muscle tenderness  SKIN: no rash      "

## 2018-08-29 ENCOUNTER — TEAM CONFERENCE (OUTPATIENT)
Dept: TRANSPLANT | Facility: CLINIC | Age: 58
End: 2018-08-29

## 2018-08-29 NOTE — TELEPHONE ENCOUNTER
TEAM CONFERENCE:    ATTENDEES: Tereza Kang Spong, Keys, Schenk, Coordinators, Social Work, Pharmacy, Finance, and Dietary    DISCUSSION and OUTCOME: Dr. Kang reviewed abd pelvis CT and Abd ultrasound at committee. Per Dr. Kang, updated images show pt is still transplantable. No further imaging required, pt to remain active status.    Coordinator notified pt of committee's discussion.

## 2018-09-04 ENCOUNTER — TELEPHONE (OUTPATIENT)
Dept: TRANSPLANT | Facility: CLINIC | Age: 58
End: 2018-09-04

## 2018-09-13 ENCOUNTER — TRANSFERRED RECORDS (OUTPATIENT)
Dept: HEALTH INFORMATION MANAGEMENT | Facility: CLINIC | Age: 58
End: 2018-09-13

## 2018-09-17 ENCOUNTER — TELEPHONE (OUTPATIENT)
Dept: TRANSPLANT | Facility: CLINIC | Age: 58
End: 2018-09-17

## 2018-09-17 NOTE — TELEPHONE ENCOUNTER
Patient Call: General    Reason for call: wants to follow- up with discussion on Paired exchange and data base-     Call back needed? Yes    Return Call Needed  Same as documented in contacts section  When to return call?: Greater than one day: Route standard priority

## 2018-09-17 NOTE — TELEPHONE ENCOUNTER
Letty called to say she is trying to make a decision [place]  I reviewed that it is not certain how long of waiting time in [facility] we will have.  She reviewed that her friend called her to tell her that her anatomy is tricky and that may be more challenging to find a match for her.  I reviewed that until I get them active it is hard for me to predict.  We planned to talk again next week.  They are making plans within their house here to prepare for the peritoneal dialysis machine.

## 2018-09-21 ENCOUNTER — RESULTS ONLY (OUTPATIENT)
Dept: OTHER | Facility: CLINIC | Age: 58
End: 2018-09-21

## 2018-09-21 LAB — CROSSMATCH RESULT: NORMAL

## 2018-10-04 ENCOUNTER — TELEPHONE (OUTPATIENT)
Dept: TRANSPLANT | Facility: CLINIC | Age: 58
End: 2018-10-04

## 2018-10-04 NOTE — TELEPHONE ENCOUNTER
Letty called me to ask some questions about the Kidney paired exchange program logistically planning.  I aswered those questions.  She also has a question for her Coordinator Jacqui, she said she sent an email to her regarding her Shingrix Vaccination.  She has a question when she should have her next PrA sample drawn?  She had the initial shot on Tuesday and will have the booster in 2 - 6 months.    I told her to keep to the plan for her November PRA and if they want her to do it a different time, Apurva Norman/Marilee Rivers will call her to instruct her.  I let her know that her Coordinator Jacqui is now on Maturnity leave.  I will relay her question to them.

## 2018-10-09 ENCOUNTER — TELEPHONE (OUTPATIENT)
Dept: TRANSPLANT | Facility: CLINIC | Age: 58
End: 2018-10-09

## 2018-10-09 NOTE — TELEPHONE ENCOUNTER
Spoke with patient and let her know that in Wanda's absence she can email me any pertinent information via my email. Writer will send patient my direct contact information today.

## 2018-10-16 ENCOUNTER — TELEPHONE (OUTPATIENT)
Dept: TRANSPLANT | Facility: CLINIC | Age: 58
End: 2018-10-16

## 2018-10-16 NOTE — TELEPHONE ENCOUNTER
The patient called me today to get an update on her listing on Kidney Paired exchange program.  I stated we do have a match offer that we are fielding, it is in the very early stage.  I stated that our team will be assessing the match and blood test will occur tomorrow to confirm her match.  I stated that her Coordinator will call her later in the week with an update.  Potential surgery date would be November 20.    She thanked me for the news and stated she is reaching a point where if she is going to stay here, she needs to cancel some of the planning for her seasonal move to [place], she felt that by the end of the week should suffice.

## 2018-10-17 ENCOUNTER — RESULTS ONLY (OUTPATIENT)
Dept: OTHER | Facility: CLINIC | Age: 58
End: 2018-10-17

## 2018-10-19 ENCOUNTER — DOCUMENTATION ONLY (OUTPATIENT)
Dept: TRANSPLANT | Facility: CLINIC | Age: 58
End: 2018-10-19

## 2018-10-19 ENCOUNTER — TELEPHONE (OUTPATIENT)
Dept: TRANSPLANT | Facility: CLINIC | Age: 58
End: 2018-10-19

## 2018-10-19 DIAGNOSIS — Z76.82 AWAITING ORGAN TRANSPLANT: Primary | ICD-10-CM

## 2018-10-19 DIAGNOSIS — N18.6 ESRD (END STAGE RENAL DISEASE) (H): ICD-10-CM

## 2018-10-19 LAB — CROSSMATCH RESULT: NORMAL

## 2018-10-19 NOTE — TELEPHONE ENCOUNTER
Spoke with patient about transplant scheduling and that she will receive more detailed information via email as well as regular mail today. Patient mentions she will have the last of HEP B series at dialysis today and will speak to them about arranging dialysis on her preop day of 11/19/2018. Encouraged patient to call with any questions or concerns.

## 2018-10-24 ENCOUNTER — TELEPHONE (OUTPATIENT)
Dept: TRANSPLANT | Facility: CLINIC | Age: 58
End: 2018-10-24

## 2018-10-24 NOTE — TELEPHONE ENCOUNTER
I received a voicemail message from the patient, and today I called her to answer her questions she had on the KPD event.  She wanted to know where the donor kidney was coming from.  I reviewed that it is an internal pairing.  She stated understanding and thanked me for the call back.  I wished her well with the upcoming transplant and will continue to monitor the logistics.

## 2018-10-29 ENCOUNTER — TELEPHONE (OUTPATIENT)
Dept: TRANSPLANT | Facility: CLINIC | Age: 58
End: 2018-10-29

## 2018-10-29 NOTE — TELEPHONE ENCOUNTER
Received message from Apurva Norman LPN, that pt has questions about diet in the hospital with her upcoming living donor kidney transplant 11/2018. Pt is on dialysis currently, following a dialysis diet. She also has type 1 diabetes and GI dysmotility, malabsorption, etc. She follows a low FODMAP and low fiber diet in general to maintain her QOL and weight. Pt reports being hospitalized in [place] last year for heart surgery and that the hospital menu was rather limited.     We discussed that pt can request from the unit dietitian the nutrition information on all items from the menu. She does try to get a certain amount of carb, protein, and fat grams daily, so this information may be helpful to her. Encouraged pt to consider bringing in well-tolerated foods if she would like. Pt currently takes liquacel 2 oz/day (30 g protein) for dialysis protein needs. We discussed the need for increased protein intake s/p txp x 2 months for healing (not quite as high as dialysis protein, needs, however). Pt may bring in liquacel as well. She only tolerates egg whites and chicken for her solid protein food sources.     Discussed that K may be an issue warranting diet restriction post txp, but usually short term. Encouraged pt to call with any further questions prior to her scheduled transplant.

## 2018-10-29 NOTE — TELEPHONE ENCOUNTER
Returned patient's call about the final crossmatch kits she rec'd; they are correct and consist of 4 yellow tubes and 2 red tubes. Patient verbalized understanding and has no further questions at this time.

## 2018-10-31 ENCOUNTER — TELEPHONE (OUTPATIENT)
Dept: TRANSPLANT | Facility: CLINIC | Age: 58
End: 2018-10-31

## 2018-10-31 NOTE — TELEPHONE ENCOUNTER
Pt called and LVM. Pt reported in VM that she did not need a call back. She wanted to make a note in her chart that when she is on clear liquids s/p transplant, she does not want any milk based products except for plain Greek yogurt. She does not tolerate these foods. Pt follows a low FODMAP diet. Pt will be able to request these exclusions once admitted. She will be able to choose to forgo ordering these foods on the menu per her preference. Did discuss this with pt earlier this week as well.

## 2018-11-05 ENCOUNTER — DOCUMENTATION ONLY (OUTPATIENT)
Dept: TRANSPLANT | Facility: CLINIC | Age: 58
End: 2018-11-05

## 2018-11-06 ENCOUNTER — TELEPHONE (OUTPATIENT)
Dept: TRANSPLANT | Facility: CLINIC | Age: 58
End: 2018-11-06

## 2018-11-06 NOTE — TELEPHONE ENCOUNTER
"Letty had some questions about post transplant care. She wanted to know if she could \"bundle\" a few of her post op monthly visits. She said that we hoped we would consider the financial hardship this could pose since she is traveling from [place]. I stated that at this time we will invite her back for all of her post op care. This is our regimine for quality care and outcomes identified within our transplant program. We have many patients that choose to come to the [facility] for a transplant due to our outcomes, and that while we understand the hardship this may cause, this is what we ask if one chooses to be a transplant patient here in [place]. She understood that, and also that at this time, pre transplant, we do not have a crystal ball to know how she will be post op.   She will work with her post coordinator to answer any further questions about post transplant care across the [place].     "

## 2018-11-08 ENCOUNTER — DOCUMENTATION ONLY (OUTPATIENT)
Dept: TRANSPLANT | Facility: CLINIC | Age: 58
End: 2018-11-08

## 2018-11-14 ENCOUNTER — RESULTS ONLY (OUTPATIENT)
Dept: OTHER | Facility: CLINIC | Age: 58
End: 2018-11-14

## 2018-11-14 DIAGNOSIS — Z76.82 AWAITING ORGAN TRANSPLANT: ICD-10-CM

## 2018-11-14 DIAGNOSIS — N18.6 ESRD (END STAGE RENAL DISEASE) (H): ICD-10-CM

## 2018-11-19 ENCOUNTER — APPOINTMENT (OUTPATIENT)
Dept: TRANSPLANT | Facility: CLINIC | Age: 58
End: 2018-11-19
Attending: TRANSPLANT SURGERY
Payer: COMMERCIAL

## 2018-11-19 ENCOUNTER — OFFICE VISIT (OUTPATIENT)
Dept: TRANSPLANT | Facility: CLINIC | Age: 58
End: 2018-11-19
Attending: NURSE PRACTITIONER
Payer: COMMERCIAL

## 2018-11-19 ENCOUNTER — APPOINTMENT (OUTPATIENT)
Dept: GENERAL RADIOLOGY | Facility: CLINIC | Age: 58
End: 2018-11-19
Attending: TRANSPLANT SURGERY
Payer: COMMERCIAL

## 2018-11-19 ENCOUNTER — TELEPHONE (OUTPATIENT)
Dept: TRANSPLANT | Facility: CLINIC | Age: 58
End: 2018-11-19

## 2018-11-19 ENCOUNTER — OFFICE VISIT (OUTPATIENT)
Dept: NEPHROLOGY | Facility: CLINIC | Age: 58
End: 2018-11-19
Attending: TRANSPLANT SURGERY
Payer: COMMERCIAL

## 2018-11-19 ENCOUNTER — ANESTHESIA EVENT (OUTPATIENT)
Dept: SURGERY | Facility: CLINIC | Age: 58
End: 2018-11-19
Payer: COMMERCIAL

## 2018-11-19 VITALS
WEIGHT: 115.8 LBS | SYSTOLIC BLOOD PRESSURE: 132 MMHG | HEART RATE: 75 BPM | OXYGEN SATURATION: 95 % | DIASTOLIC BLOOD PRESSURE: 64 MMHG | HEIGHT: 63 IN | TEMPERATURE: 97.5 F | BODY MASS INDEX: 20.52 KG/M2

## 2018-11-19 DIAGNOSIS — Z76.82 ORGAN TRANSPLANT CANDIDATE: ICD-10-CM

## 2018-11-19 DIAGNOSIS — N18.6 ESRD (END STAGE RENAL DISEASE) (H): ICD-10-CM

## 2018-11-19 DIAGNOSIS — Z76.82 AWAITING ORGAN TRANSPLANT: ICD-10-CM

## 2018-11-19 DIAGNOSIS — Z94.0 KIDNEY REPLACED BY TRANSPLANT: ICD-10-CM

## 2018-11-19 DIAGNOSIS — N18.5 TYPE 1 DIABETES MELLITUS WITH STAGE 5 CHRONIC KIDNEY DISEASE NOT ON CHRONIC DIALYSIS (H): ICD-10-CM

## 2018-11-19 DIAGNOSIS — N25.81 SECONDARY HYPERPARATHYROIDISM (H): ICD-10-CM

## 2018-11-19 DIAGNOSIS — N18.6 END STAGE KIDNEY DISEASE (H): Primary | ICD-10-CM

## 2018-11-19 DIAGNOSIS — N18.6 ESRD (END STAGE RENAL DISEASE) (H): Primary | ICD-10-CM

## 2018-11-19 DIAGNOSIS — D63.1 ANEMIA OF CHRONIC RENAL FAILURE, UNSPECIFIED CKD STAGE: ICD-10-CM

## 2018-11-19 DIAGNOSIS — Z76.82 AWAITING ORGAN TRANSPLANT: Primary | ICD-10-CM

## 2018-11-19 DIAGNOSIS — K31.84 GASTROPARESIS: ICD-10-CM

## 2018-11-19 DIAGNOSIS — Z01.818 PRE-TRANSPLANT EVALUATION FOR KIDNEY TRANSPLANT: ICD-10-CM

## 2018-11-19 DIAGNOSIS — Z48.298 AFTERCARE FOLLOWING ORGAN TRANSPLANT: ICD-10-CM

## 2018-11-19 DIAGNOSIS — K86.89 PANCREATIC INSUFFICIENCY: ICD-10-CM

## 2018-11-19 DIAGNOSIS — E10.22 TYPE 1 DIABETES MELLITUS WITH STAGE 5 CHRONIC KIDNEY DISEASE NOT ON CHRONIC DIALYSIS (H): ICD-10-CM

## 2018-11-19 DIAGNOSIS — Z79.899 ENCOUNTER FOR LONG-TERM CURRENT USE OF MEDICATION: ICD-10-CM

## 2018-11-19 DIAGNOSIS — N18.9 ANEMIA OF CHRONIC RENAL FAILURE, UNSPECIFIED CKD STAGE: ICD-10-CM

## 2018-11-19 LAB
ALBUMIN SERPL-MCNC: 3.6 G/DL (ref 3.4–5)
ALBUMIN UR-MCNC: 30 MG/DL
ALP SERPL-CCNC: 166 U/L (ref 40–150)
ALT SERPL W P-5'-P-CCNC: 35 U/L (ref 0–50)
ANION GAP SERPL CALCULATED.3IONS-SCNC: 6 MMOL/L (ref 3–14)
APPEARANCE UR: CLEAR
AST SERPL W P-5'-P-CCNC: 24 U/L (ref 0–45)
BACTERIA #/AREA URNS HPF: ABNORMAL /HPF
BILIRUB SERPL-MCNC: 0.4 MG/DL (ref 0.2–1.3)
BILIRUB UR QL STRIP: NEGATIVE
BUN SERPL-MCNC: 78 MG/DL (ref 7–30)
CALCIUM SERPL-MCNC: 8.7 MG/DL (ref 8.5–10.1)
CHLORIDE SERPL-SCNC: 93 MMOL/L (ref 94–109)
CO2 SERPL-SCNC: 29 MMOL/L (ref 20–32)
COLOR UR AUTO: YELLOW
CREAT SERPL-MCNC: 5.26 MG/DL (ref 0.52–1.04)
ERYTHROCYTE [DISTWIDTH] IN BLOOD BY AUTOMATED COUNT: 13.8 % (ref 10–15)
FERRITIN SERPL-MCNC: 952 NG/ML (ref 8–252)
GFR SERPL CREATININE-BSD FRML MDRD: 8 ML/MIN/1.7M2
GLUCOSE SERPL-MCNC: 123 MG/DL (ref 70–99)
GLUCOSE UR STRIP-MCNC: NEGATIVE MG/DL
HCT VFR BLD AUTO: 34.5 % (ref 35–47)
HGB BLD-MCNC: 11.5 G/DL (ref 11.7–15.7)
HGB UR QL STRIP: ABNORMAL
HYALINE CASTS #/AREA URNS LPF: 2 /LPF (ref 0–2)
IRON SATN MFR SERPL: 18 % (ref 15–46)
IRON SERPL-MCNC: 38 UG/DL (ref 35–180)
KETONES UR STRIP-MCNC: NEGATIVE MG/DL
LEUKOCYTE ESTERASE UR QL STRIP: NEGATIVE
MCH RBC QN AUTO: 32.8 PG (ref 26.5–33)
MCHC RBC AUTO-ENTMCNC: 33.3 G/DL (ref 31.5–36.5)
MCV RBC AUTO: 98 FL (ref 78–100)
MUCOUS THREADS #/AREA URNS LPF: PRESENT /LPF
NITRATE UR QL: NEGATIVE
PH UR STRIP: 5 PH (ref 5–7)
PLATELET # BLD AUTO: 163 10E9/L (ref 150–450)
POTASSIUM SERPL-SCNC: 3.1 MMOL/L (ref 3.4–5.3)
PROT SERPL-MCNC: 7.2 G/DL (ref 6.8–8.8)
PTH-INTACT SERPL-MCNC: 212 PG/ML (ref 18–80)
RBC # BLD AUTO: 3.51 10E12/L (ref 3.8–5.2)
RBC #/AREA URNS AUTO: 1 /HPF (ref 0–2)
SODIUM SERPL-SCNC: 129 MMOL/L (ref 133–144)
SOURCE: ABNORMAL
SP GR UR STRIP: 1.01 (ref 1–1.03)
SQUAMOUS #/AREA URNS AUTO: 1 /HPF (ref 0–1)
TACROLIMUS BLD-MCNC: 8.9 UG/L (ref 5–15)
TIBC SERPL-MCNC: 212 UG/DL (ref 240–430)
TME LAST DOSE: NORMAL H
UROBILINOGEN UR STRIP-MCNC: 0 MG/DL (ref 0–2)
WBC # BLD AUTO: 6.2 10E9/L (ref 4–11)
WBC #/AREA URNS AUTO: <1 /HPF (ref 0–5)

## 2018-11-19 PROCEDURE — 87340 HEPATITIS B SURFACE AG IA: CPT | Performed by: TRANSPLANT SURGERY

## 2018-11-19 PROCEDURE — 86665 EPSTEIN-BARR CAPSID VCA: CPT | Performed by: TRANSPLANT SURGERY

## 2018-11-19 PROCEDURE — 80053 COMPREHEN METABOLIC PANEL: CPT | Performed by: TRANSPLANT SURGERY

## 2018-11-19 PROCEDURE — 82306 VITAMIN D 25 HYDROXY: CPT | Performed by: TRANSPLANT SURGERY

## 2018-11-19 PROCEDURE — 86704 HEP B CORE ANTIBODY TOTAL: CPT | Performed by: TRANSPLANT SURGERY

## 2018-11-19 PROCEDURE — 87086 URINE CULTURE/COLONY COUNT: CPT | Performed by: TRANSPLANT SURGERY

## 2018-11-19 PROCEDURE — 86850 RBC ANTIBODY SCREEN: CPT | Performed by: TRANSPLANT SURGERY

## 2018-11-19 PROCEDURE — 87389 HIV-1 AG W/HIV-1&-2 AB AG IA: CPT | Performed by: TRANSPLANT SURGERY

## 2018-11-19 PROCEDURE — 86832 HLA CLASS I HIGH DEFIN QUAL: CPT | Performed by: TRANSPLANT SURGERY

## 2018-11-19 PROCEDURE — 83970 ASSAY OF PARATHORMONE: CPT | Performed by: TRANSPLANT SURGERY

## 2018-11-19 PROCEDURE — 86645 CMV ANTIBODY IGM: CPT | Performed by: TRANSPLANT SURGERY

## 2018-11-19 PROCEDURE — 83540 ASSAY OF IRON: CPT | Performed by: TRANSPLANT SURGERY

## 2018-11-19 PROCEDURE — 85027 COMPLETE CBC AUTOMATED: CPT | Performed by: TRANSPLANT SURGERY

## 2018-11-19 PROCEDURE — G0463 HOSPITAL OUTPT CLINIC VISIT: HCPCS | Mod: ZF

## 2018-11-19 PROCEDURE — 83550 IRON BINDING TEST: CPT | Performed by: TRANSPLANT SURGERY

## 2018-11-19 PROCEDURE — 82728 ASSAY OF FERRITIN: CPT | Performed by: TRANSPLANT SURGERY

## 2018-11-19 PROCEDURE — 86803 HEPATITIS C AB TEST: CPT | Performed by: TRANSPLANT SURGERY

## 2018-11-19 PROCEDURE — 80197 ASSAY OF TACROLIMUS: CPT | Performed by: TRANSPLANT SURGERY

## 2018-11-19 PROCEDURE — 86901 BLOOD TYPING SEROLOGIC RH(D): CPT | Performed by: TRANSPLANT SURGERY

## 2018-11-19 PROCEDURE — 86665 EPSTEIN-BARR CAPSID VCA: CPT | Mod: 91 | Performed by: TRANSPLANT SURGERY

## 2018-11-19 PROCEDURE — 86900 BLOOD TYPING SEROLOGIC ABO: CPT | Performed by: TRANSPLANT SURGERY

## 2018-11-19 PROCEDURE — 81001 URINALYSIS AUTO W/SCOPE: CPT | Performed by: TRANSPLANT SURGERY

## 2018-11-19 PROCEDURE — 86706 HEP B SURFACE ANTIBODY: CPT | Performed by: TRANSPLANT SURGERY

## 2018-11-19 PROCEDURE — 86644 CMV ANTIBODY: CPT | Performed by: TRANSPLANT SURGERY

## 2018-11-19 PROCEDURE — 36415 COLL VENOUS BLD VENIPUNCTURE: CPT | Performed by: TRANSPLANT SURGERY

## 2018-11-19 PROCEDURE — 86923 COMPATIBILITY TEST ELECTRIC: CPT | Performed by: TRANSPLANT SURGERY

## 2018-11-19 RX ORDER — TRIAMCINOLONE ACETONIDE 1 MG/G
OINTMENT TOPICAL
Refills: 0 | COMMUNITY
Start: 2017-11-21 | End: 2018-12-06

## 2018-11-19 RX ORDER — ATORVASTATIN CALCIUM 20 MG/1
20 TABLET, FILM COATED ORAL AT BEDTIME
COMMUNITY
Start: 2018-09-17

## 2018-11-19 RX ORDER — PANCRELIPASE LIPASE, PANCRELIPASE PROTEASE, PANCRELIPASE AMYLASE 15000; 47000; 63000 [USP'U]/1; [USP'U]/1; [USP'U]/1
2 CAPSULE, DELAYED RELEASE ORAL
Status: ON HOLD | COMMUNITY
Start: 2018-09-18 | End: 2018-11-21

## 2018-11-19 RX ORDER — ZOSTER VACCINE RECOMBINANT, ADJUVANTED 50 MCG/0.5
KIT INTRAMUSCULAR
Refills: 0 | Status: ON HOLD | COMMUNITY
Start: 2018-10-01 | End: 2018-11-21

## 2018-11-19 RX ORDER — DIPHENOXYLATE HYDROCHLORIDE AND ATROPINE SULFATE 2.5; .025 MG/1; MG/1
1 TABLET ORAL
Status: ON HOLD | COMMUNITY
End: 2018-11-21

## 2018-11-19 ASSESSMENT — LIFESTYLE VARIABLES: TOBACCO_USE: 0

## 2018-11-19 ASSESSMENT — COPD QUESTIONNAIRES: COPD: 0

## 2018-11-19 ASSESSMENT — ENCOUNTER SYMPTOMS: ORTHOPNEA: 0

## 2018-11-19 ASSESSMENT — PAIN SCALES - GENERAL: PAINLEVEL: NO PAIN (0)

## 2018-11-19 NOTE — NURSING NOTE
"Chief Complaint   Patient presents with     Transplant Evaluation     day minus     Blood pressure 149/75, pulse 75, temperature 97.5  F (36.4  C), height 1.6 m (5' 3\"), weight 52.5 kg (115 lb 12.8 oz), SpO2 95 %.    Kendra Hung LPN    "

## 2018-11-19 NOTE — PROGRESS NOTES
Reason for Visit:  Letty Benavidez is a 58-year-old female with ESKD from diabetes, who presents for evaluation one day prior to scheduled living donor kidney transplant.    HPI: Ms. Benavidez is a 58-year-old female with type 1 diabetes since 1970, ESKD from diabetes s/p LDKT November 1998 at [facility], skin cancer; gastroparesis, history of small intestinal bacterial overgrowth, pancreatic exocrine dysfunction, and diabetic enteropathy who returns for wait list follow up. She has been listed since October 2016 and is currently listed actively.       Interim events/issues  Dialysis - she initiated dialysis in April 2017 and transitioned to home hemodialysis in July 2018, which is going well. She estimates that she continues to make about 250 ml of urine daily without dysuria, hematuria, or trouble emptying her bladder. She continues on tacrolimus 1 mg bid, which she tolerates well.     Cardiac - She underwent 2 vessel CABG (LIMA to LAD and SVG to RCA) in October 2017 and completed cardiac rehab. She had cardiac risk assessment in June 2018 in [place] and was noted to be an  acceptable candidate.  She reports good exercise tolerance and can walk several blocks without chest pain, SOB, or claudication.      Skin cancer - no new lesions as of last local dermatology visit in May 2018.     GI issues - She currently feels symptoms are controlled with Miralax, Zenpep, Linzess, and a low FODMAP diet. She continues to follow with her local GI provider and was noted to be doing well from their standpoint in May 2018. She reports strict adherence to a renal diet. She denies abdominal pain, nausea, vomiting, or diarrhea.     Diabetes - She continues to use a humalog insulin pump, about 18 units per day, with a most recent hemoglobin A1c of 5.4%. She checks blood sugars about 10 times daily. Recently she's had some overnight lows in the 50's, which do wake her from sleep, although this has occurred less frequently  with dietary changes. No unawareness. She is followed by endocrinology locally.      PVD - She was previously denied for a second kidney transplant at [facility] due to vascular disease. She had a CTA done 8/2018 for further evaluation of vascular targets.  This cta was reviewed by Dr. Kang.     Overall, she reports doing much better since transitioning to home hemodialysis. Her energy level is stable. She feels her strength is improving. She hasn't been admitted to the hospital since CABG in late 2017. No recent blood transfusions. No recent illness, fevers, sweats, or chills.         Kidney Disease Hx:        Kidney disease dx: diabetic nephropathy       Biopsy proven: No       On dialysis: Yes         Previous Transplant Hx:       Yes          Transplant Sensitization Hx:       Previous Tx: yes       Blood Transfusion: no           Last Evaluation Clinic Visit Date:  August 2018             Wait List Date: 10/21/2016       Current phase/status: Waitlist: Active as of 9/4/2018              Cardiovascular Hx:       h/o Cardiac Issues: Yes -  CAB 2017       Exercise Tolerance: no chest pain or shortness of breath with exertion.    ROS: A comprehensive review of systems was obtained and negative, except as noted in the HPI or PMH.    PMH: Medical record was reviewed and PMH was discussed with patient and noted below.  Past Medical History:   Diagnosis Date     Anemia of chronic kidney failure      Cataracts, bilateral      Coronary artery disease      Diabetic peripheral neuropathy (H)      Diabetic retinopathy (H)      Dyslipidemia      End stage kidney disease (H)      Gastroparesis      History of skin cancer      Hypertension      Immunosuppressed status (H)      Kidney replaced by transplant      Osteoporosis      Pancreatic insufficiency      Secondary hyperparathyroidism (H)      Type 1 diabetes (H)      PSH: Personal or family history of bleeding or anesthesia problems: No    Family Hx:   Family  History   Problem Relation Age of Onset     Brain Cancer Father      Diabetes Sister      HEART DISEASE Maternal Grandfather      Cerebrovascular Disease Maternal Grandfather      HEART DISEASE Paternal Grandfather      Colon Cancer Maternal Grandmother      Personal Hx:   Social History     Social History     Marital status:      Spouse name: N/A     Number of children: 0     Years of education: 16     Occupational History           Social History Main Topics     Smoking status: Never Smoker     Smokeless tobacco: Never Used     Alcohol use No     Drug use: No     Sexual activity: Yes     Partners: Male     Other Topics Concern     Blood Transfusions No     Seat Belt Yes     Social History Narrative       Pain Score this Visit: Data Unavailable    Allergies   Allergen Reactions     Erythromycin Nausea and Vomiting     Ok to have if not taking tacrolimus     Metronidazole Nausea and Vomiting     Flagyl     Mycophenolate Nausea and Vomiting and Other (See Comments)     Nausea / Vomiting / Tremors     Neomycin Other (See Comments)     Due to other medications       Prior to Admission medications    Medication Sig Start Date End Date Taking? Authorizing Provider   Amino Acids (LIQUACEL) LIQD  6/1/17   Reported, Patient   amylase-lipase-protease (ZENPEP) 49113 units CPEP Take by mouth 3 times daily (with meals) Pt reports taking Zenpep (L-15K; A=82K; P=51K)    Reported, Patient   ASPIRIN PO Take 81 mg by mouth daily Cardiac health    Reported, Patient   atorvastatin (LIPITOR) 10 MG tablet Take 20 mg by mouth daily 4/28/16   Reported, Patient   atorvastatin (LIPITOR) 20 MG tablet  9/17/18   Reported, Patient   B Complex-C-Zn-Folic Acid (DIALYVITE/ZINC) TABS  4/13/17   Reported, Patient   Cholecalciferol (VITAMIN D3) 2000 units CAPS  6/10/18   Reported, Patient   Co-Enzyme Q-10 60 MG CAPS Take 400 mg by mouth daily  4/26/11   Reported, Patient   Eszopiclone (LUNESTA PO) Take by mouth daily    Reported,  Patient   gabapentin (NEURONTIN) 100 MG capsule Take 100 mg by mouth At Bedtime  9/26/16   Reported, Patient   insulin lispro (HUMALOG) 100 UNIT/ML VIAL  6/21/16   Reported, Patient   INSULIN PUMP - OUTPATIENT  2/17/1995   Reported, Patient   lidocaine-prilocaine (EMLA) cream Apply 5 mg topically three times a week    Reported, Patient   linaclotide (LINZESS) 290 MCG capsule See Admin Instructions 4x/wk, taken on non-dialysis days 5/28/17   Reported, Patient   Magnesium Citrate 125 MG CAPS  4/22/18   Reported, Patient   Methoxy PEG-Epoetin Beta (MIRCERA) 50 MCG/0.3ML SOSY  4/27/17   Reported, Patient   Multiple Vitamin (MULTI-VITAMINS) TABS Take 1 tablet by mouth    Reported, Patient   Polyethylene Glycol 3350 (MIRALAX PO) Take 17 g by mouth three times a week 6/8/09   Reported, Patient   Pyrethrins-Piperonyl Butoxide (RID EX)     Reported, Patient   SHINGRIX injection TO BE ADMINISTERED BY PHARMACIST FOR IMMUNIZATION 10/1/18   Reported, Patient   tacrolimus (PROGRAF - GENERIC EQUIVALENT) 1 MG capsule Take 2 mg by mouth 2 times daily  2/16/16   Reported, Patient   triamcinolone (KENALOG) 0.1 % ointment APPLY AA QD FOR 2 WEEKS THEN Q 3 DAYS FOR 1 WEEK PRN 11/21/17   Reported, Patient   ZENPEP 11770-74930 units CPEP  9/18/18   Reported, Patient     /64  HR 75  Temp 97.5   Weight 52.5 kg (dry weight 50 kg)    Exam: GENERAL APPEARANCE: alert and no distress  HENT: mouth without ulcers or lesions  LYMPHATICS: no cervical or supraclavicular nodes  RESP: lungs clear to auscultation - no rales, rhonchi or wheezes  CV: regular rhythm, normal rate, no rub, no murmur  EDEMA: no LE edema bilaterally  ABDOMEN: soft, nondistended, nontender, bowel sounds normal  MS: extremities normal - no gross deformities noted, no evidence of inflammation in joints, no muscle tenderness  SKIN: no rash  DIALYSIS ACCESS:  RUE AV Fistula + bruit    Results:   Recent Results (from the past 168 hour(s))   HLA Final Crossmatch Recipient     Collection Time: 11/14/18  2:16 PM   Result Value Ref Range    SA1 Test Method SA FCS     SA1 Cell Class I     SA1 Hi Risk Martha None     SA1 Mod Risk Martha None     SA1 Comments        Test performed by modified procedure. Serum heat inactivated and tested   by a modified (Hewitt) protocol including fetal calf serum addition.   High-risk, mfi >3,000. Mod-risk, mfi 500-3,000.      SA2 Test Method SA FCS     SA2 Cell Class II     SA2 Hi Risk Martha None     SA2 Mod Risk Martha None     SA2 Comments        Test performed by modified procedure. Serum heat inactivated and tested   by a modified (Hewitt) protocol including fetal calf serum addition.   High-risk, mfi >3,000. Mod-risk, mfi 500-3,000.      Protocol Cutoff Plan A, 500 mfi cumulative      UNOS cPRA 38     Unacceptable Antigen B:37 DR:8 16 DRw:51       EKG 12-lead complete w/read - Clinics    Collection Time: 11/19/18 10:22 AM   Result Value Ref Range    Interpretation ECG Click View Image link to view waveform and result    CBC with platelets    Collection Time: 11/19/18 10:36 AM   Result Value Ref Range    WBC 6.2 4.0 - 11.0 10e9/L    RBC Count 3.51 (L) 3.8 - 5.2 10e12/L    Hemoglobin 11.5 (L) 11.7 - 15.7 g/dL    Hematocrit 34.5 (L) 35.0 - 47.0 %    MCV 98 78 - 100 fl    MCH 32.8 26.5 - 33.0 pg    MCHC 33.3 31.5 - 36.5 g/dL    RDW 13.8 10.0 - 15.0 %    Platelet Count 163 150 - 450 10e9/L   Comprehensive metabolic panel    Collection Time: 11/19/18 10:36 AM   Result Value Ref Range    Sodium 129 (L) 133 - 144 mmol/L    Potassium 3.1 (L) 3.4 - 5.3 mmol/L    Chloride 93 (L) 94 - 109 mmol/L    Carbon Dioxide 29 20 - 32 mmol/L    Anion Gap 6 3 - 14 mmol/L    Glucose 123 (H) 70 - 99 mg/dL    Urea Nitrogen 78 (H) 7 - 30 mg/dL    Creatinine 5.26 (H) 0.52 - 1.04 mg/dL    GFR Estimate 8 (L) >60 mL/min/1.7m2    GFR Estimate If Black 10 (L) >60 mL/min/1.7m2    Calcium 8.7 8.5 - 10.1 mg/dL    Bilirubin Total 0.4 0.2 - 1.3 mg/dL    Albumin 3.6 3.4 - 5.0 g/dL    Protein Total 7.2 6.8  - 8.8 g/dL    Alkaline Phosphatase 166 (H) 40 - 150 U/L    ALT 35 0 - 50 U/L    AST 24 0 - 45 U/L   ABO/Rh type and screen    Collection Time: 11/19/18 10:36 AM   Result Value Ref Range    Units Ordered 2     ABO O     RH(D) Neg     Antibody Screen Neg     Test Valid Only At          [facility]    Specimen Expires 11/22/2018     Crossmatch Red Blood Cells    Blood component    Collection Time: 11/19/18 10:36 AM   Result Value Ref Range    Unit Number [id number]     Blood Component Type Red Blood Cells LeukoReduced (Part 2)     Division Number 00     Status of Unit Ready for patient 11/19/2018 1207     Blood Product Code [id number]     Unit Status NOEMI    Blood component    Collection Time: 11/19/18 10:36 AM   Result Value Ref Range    Unit Number [id number]     Blood Component Type Red Blood Cells Leukocyte Reduced     Division Number 00     Status of Unit Ready for patient 11/19/2018 1207     Blood Product Code [id number]     Unit Status NOEMI        Assessment and Plan:  1. Kidney transplant evaluation - patient is a excellent candidate overall.  She will be proceeding with the living donor kidney transplant tomorrow (paired exchange via NDD).  She will receive standard immunosuppression with ATG followed by Tacrolimus IR and Myfortic.  A typical dose for tacrolimus level 8-10 is likely 3 mg bid.    2.  ESKD from diabetic nephropathy.  She is currently on hemodialysis (home hemodialysis) via a right arm av fistula.  She is scheduled for an [facility] dialysis session today and her potassium is notably low.  She will inform the dialysis unit about the low potassium today and dialyze on a 3 or 4 potassium bath.    3. CAD s/p 2-v Cab 10/2017 - she has no current cardiac symptoms    4. Health Maintenance - pap May 2018, colonoscopy August 2015,  mammogram May 2018, all up to date.    5. Type 1 diabetes - current daily insulin requirements insulin pump.  She will be on insulin  gtt for perioperative management of her diabetes.     6. Peripheral vascular disease - CTA reviewed by transplant surgery and will be discussed by transplant surgery today to evaluate the appropriate approach.     6. History of skin cancer - followed by local dermatologist with no new lesions identified as of her last visit in May 2018.    7. Gastroparesis, history of small intestinal bacterial overgrowth, pancreatic exocrine dysfunction, and diabetic enteropathy - controlled with current regimen and followed by local GI provider.

## 2018-11-19 NOTE — LETTER
11/19/2018       RE: Letty Benavidez  [address]  [place] [address]     Dear Colleague,    Thank you for referring your patient, Letty Benavidez, to the [facility] NEPHROLOGY at [facility]. Please see a copy of my visit note below.    Reason for Visit:  Letty Benavidez is a 58-year-old female with ESKD from diabetes, who presents for evaluation one day prior to scheduled living donor kidney transplant.    HPI: Ms. Benavidez is a 58-year-old female with type 1 diabetes since 1970, ESKD from diabetes s/p LDKT November 1998 at [facility], skin cancer; gastroparesis, history of small intestinal bacterial overgrowth, pancreatic exocrine dysfunction, and diabetic enteropathy who returns for wait list follow up. She has been listed since October 2016 and is currently listed actively.       Interim events/issues  Dialysis - she initiated dialysis in April 2017 and transitioned to home hemodialysis in July 2018, which is going well. She estimates that she continues to make about 250 ml of urine daily without dysuria, hematuria, or trouble emptying her bladder. She continues on tacrolimus 1 mg bid, which she tolerates well.     Cardiac - She underwent 2 vessel CABG (LIMA to LAD and SVG to RCA) in October 2017 and completed cardiac rehab. She had cardiac risk assessment in June 2018 in [place] and was noted to be an  acceptable candidate.  She reports good exercise tolerance and can walk several blocks without chest pain, SOB, or claudication.      Skin cancer - no new lesions as of last local dermatology visit in May 2018.     GI issues - She currently feels symptoms are controlled with Miralax, Zenpep, Linzess, and a low FODMAP diet. She continues to follow with her local GI provider and was noted to be doing well from their standpoint in May 2018. She reports strict adherence to a renal diet. She denies abdominal pain, nausea, vomiting, or diarrhea.     Diabetes -  She continues to use a humalog insulin pump, about 18 units per day, with a most recent hemoglobin A1c of 5.4%. She checks blood sugars about 10 times daily. Recently she's had some overnight lows in the 50's, which do wake her from sleep, although this has occurred less frequently with dietary changes. No unawareness. She is followed by endocrinology locally.      PVD - She was previously denied for a second kidney transplant at [facility] due to vascular disease. She had a CTA done 8/2018 for further evaluation of vascular targets.  This cta was reviewed by Dr. Kang.     Overall, she reports doing much better since transitioning to home hemodialysis. Her energy level is stable. She feels her strength is improving. She hasn't been admitted to the hospital since CABG in late 2017. No recent blood transfusions. No recent illness, fevers, sweats, or chills.         Kidney Disease Hx:        Kidney disease dx: diabetic nephropathy       Biopsy proven: No       On dialysis: Yes         Previous Transplant Hx:       Yes          Transplant Sensitization Hx:       Previous Tx: yes       Blood Transfusion: no           Last Evaluation Clinic Visit Date:   August 2018             Wait List Date: 10/21/2016       Current phase/status: Waitlist: Active as of 9/4/2018              Cardiovascular Hx:       h/o Cardiac Issues: Yes -  CAB 2017       Exercise Tolerance: no chest pain or shortness of breath with exertion.    ROS: A comprehensive review of systems was obtained and negative, except as noted in the HPI or PMH.    PMH: Medical record was reviewed and PMH was discussed with patient and noted below.  Past Medical History:   Diagnosis Date     Anemia of chronic kidney failure      Cataracts, bilateral      Coronary artery disease      Diabetic peripheral neuropathy (H)      Diabetic retinopathy (H)      Dyslipidemia      End stage kidney disease (H)      Gastroparesis      History of skin cancer       Hypertension      Immunosuppressed status (H)      Kidney replaced by transplant      Osteoporosis      Pancreatic insufficiency      Secondary hyperparathyroidism (H)      Type 1 diabetes (H)      PSH: Personal or family history of bleeding or anesthesia problems: No    Family Hx:   Family History   Problem Relation Age of Onset     Brain Cancer Father      Diabetes Sister      HEART DISEASE Maternal Grandfather      Cerebrovascular Disease Maternal Grandfather      HEART DISEASE Paternal Grandfather      Colon Cancer Maternal Grandmother      Personal Hx:   Social History     Social History     Marital status:      Spouse name: N/A     Number of children: 0     Years of education: 16     Occupational History           Social History Main Topics     Smoking status: Never Smoker     Smokeless tobacco: Never Used     Alcohol use No     Drug use: No     Sexual activity: Yes     Partners: Male     Other Topics Concern     Blood Transfusions No     Seat Belt Yes     Social History Narrative       Pain Score this Visit: Data Unavailable    Allergies   Allergen Reactions     Erythromycin Nausea and Vomiting     Ok to have if not taking tacrolimus     Metronidazole Nausea and Vomiting     Flagyl     Mycophenolate Nausea and Vomiting and Other (See Comments)     Nausea / Vomiting / Tremors     Neomycin Other (See Comments)     Due to other medications       Prior to Admission medications    Medication Sig Start Date End Date Taking? Authorizing Provider   Amino Acids (LIQUACEL) LIQD  6/1/17   Reported, Patient   amylase-lipase-protease (ZENPEP) 41629 units CPEP Take by mouth 3 times daily (with meals) Pt reports taking Zenpep (L-15K; A=82K; P=51K)    Reported, Patient   ASPIRIN PO Take 81 mg by mouth daily Cardiac health    Reported, Patient   atorvastatin (LIPITOR) 10 MG tablet Take 20 mg by mouth daily 4/28/16   Reported, Patient   atorvastatin (LIPITOR) 20 MG tablet  9/17/18   Reported, Patient   DRISS  Complex-C-Zn-Folic Acid (DIALYVITE/ZINC) TABS  4/13/17   Reported, Patient   Cholecalciferol (VITAMIN D3) 2000 units CAPS  6/10/18   Reported, Patient   Co-Enzyme Q-10 60 MG CAPS Take 400 mg by mouth daily  4/26/11   Reported, Patient   Eszopiclone (LUNESTA PO) Take by mouth daily    Reported, Patient   gabapentin (NEURONTIN) 100 MG capsule Take 100 mg by mouth At Bedtime  9/26/16   Reported, Patient   insulin lispro (HUMALOG) 100 UNIT/ML VIAL  6/21/16   Reported, Patient   INSULIN PUMP - OUTPATIENT  2/17/1995   Reported, Patient   lidocaine-prilocaine (EMLA) cream Apply 5 mg topically three times a week    Reported, Patient   linaclotide (LINZESS) 290 MCG capsule See Admin Instructions 4x/wk, taken on non-dialysis days 5/28/17   Reported, Patient   Magnesium Citrate 125 MG CAPS  4/22/18   Reported, Patient   Methoxy PEG-Epoetin Beta (MIRCERA) 50 MCG/0.3ML SOSY  4/27/17   Reported, Patient   Multiple Vitamin (MULTI-VITAMINS) TABS Take 1 tablet by mouth    Reported, Patient   Polyethylene Glycol 3350 (MIRALAX PO) Take 17 g by mouth three times a week 6/8/09   Reported, Patient   Pyrethrins-Piperonyl Butoxide (RID EX)     Reported, Patient   SHINGRIX injection TO BE ADMINISTERED BY PHARMACIST FOR IMMUNIZATION 10/1/18   Reported, Patient   tacrolimus (PROGRAF - GENERIC EQUIVALENT) 1 MG capsule Take 2 mg by mouth 2 times daily  2/16/16   Reported, Patient   triamcinolone (KENALOG) 0.1 % ointment APPLY AA QD FOR 2 WEEKS THEN Q 3 DAYS FOR 1 WEEK PRN 11/21/17   Reported, Patient   ZENPEP 99393-73020 units CPEP  9/18/18   Reported, Patient     /64  HR 75  Temp 97.5   Weight 52.5 kg (dry weight 50 kg)    Exam: GENERAL APPEARANCE: alert and no distress  HENT: mouth without ulcers or lesions  LYMPHATICS: no cervical or supraclavicular nodes  RESP: lungs clear to auscultation - no rales, rhonchi or wheezes  CV: regular rhythm, normal rate, no rub, no murmur  EDEMA: no LE edema bilaterally  ABDOMEN: soft, nondistended,  nontender, bowel sounds normal  MS: extremities normal - no gross deformities noted, no evidence of inflammation in joints, no muscle tenderness  SKIN: no rash  DIALYSIS ACCESS:  RUE AV Fistula + bruit    Results:   Recent Results (from the past 168 hour(s))   HLA Final Crossmatch Recipient    Collection Time: 11/14/18  2:16 PM   Result Value Ref Range    SA1 Test Method SA FCS     SA1 Cell Class I     SA1 Hi Risk Martha None     SA1 Mod Risk Martha None     SA1 Comments        Test performed by modified procedure. Serum heat inactivated and tested   by a modified ([place]) protocol including fetal calf serum addition.   High-risk, mfi >3,000. Mod-risk, mfi 500-3,000.      SA2 Test Method SA FCS     SA2 Cell Class II     SA2 Hi Risk Martha None     SA2 Mod Risk Martha None     SA2 Comments        Test performed by modified procedure. Serum heat inactivated and tested   by a modified ([place]) protocol including fetal calf serum addition.   High-risk, mfi >3,000. Mod-risk, mfi 500-3,000.      Protocol Cutoff Plan A, 500 mfi cumulative      UNOS cPRA 38     Unacceptable Antigen B:37 DR:8 16 DRw:51       EKG 12-lead complete w/read - Clinics    Collection Time: 11/19/18 10:22 AM   Result Value Ref Range    Interpretation ECG Click View Image link to view waveform and result    CBC with platelets    Collection Time: 11/19/18 10:36 AM   Result Value Ref Range    WBC 6.2 4.0 - 11.0 10e9/L    RBC Count 3.51 (L) 3.8 - 5.2 10e12/L    Hemoglobin 11.5 (L) 11.7 - 15.7 g/dL    Hematocrit 34.5 (L) 35.0 - 47.0 %    MCV 98 78 - 100 fl    MCH 32.8 26.5 - 33.0 pg    MCHC 33.3 31.5 - 36.5 g/dL    RDW 13.8 10.0 - 15.0 %    Platelet Count 163 150 - 450 10e9/L   Comprehensive metabolic panel    Collection Time: 11/19/18 10:36 AM   Result Value Ref Range    Sodium 129 (L) 133 - 144 mmol/L    Potassium 3.1 (L) 3.4 - 5.3 mmol/L    Chloride 93 (L) 94 - 109 mmol/L    Carbon Dioxide 29 20 - 32 mmol/L    Anion Gap 6 3 - 14 mmol/L    Glucose 123 (H) 70 - 99  mg/dL    Urea Nitrogen 78 (H) 7 - 30 mg/dL    Creatinine 5.26 (H) 0.52 - 1.04 mg/dL    GFR Estimate 8 (L) >60 mL/min/1.7m2    GFR Estimate If Black 10 (L) >60 mL/min/1.7m2    Calcium 8.7 8.5 - 10.1 mg/dL    Bilirubin Total 0.4 0.2 - 1.3 mg/dL    Albumin 3.6 3.4 - 5.0 g/dL    Protein Total 7.2 6.8 - 8.8 g/dL    Alkaline Phosphatase 166 (H) 40 - 150 U/L    ALT 35 0 - 50 U/L    AST 24 0 - 45 U/L   ABO/Rh type and screen    Collection Time: 11/19/18 10:36 AM   Result Value Ref Range    Units Ordered 2     ABO O     RH(D) Neg     Antibody Screen Neg     Test Valid Only At          [facility]    Specimen Expires 11/22/2018     Crossmatch Red Blood Cells    Blood component    Collection Time: 11/19/18 10:36 AM   Result Value Ref Range    Unit Number [id number]     Blood Component Type Red Blood Cells LeukoReduced (Part 2)     Division Number 00     Status of Unit Ready for patient 11/19/2018 1207     Blood Product Code [id number]     Unit Status NOEMI    Blood component    Collection Time: 11/19/18 10:36 AM   Result Value Ref Range    Unit Number [id number]     Blood Component Type Red Blood Cells Leukocyte Reduced     Division Number 00     Status of Unit Ready for patient 11/19/2018 1207     Blood Product Code [id number]     Unit Status NOEMI        Assessment and Plan:  1. Kidney transplant evaluation - patient is a excellent candidate overall.  She will be proceeding with the living donor kidney transplant tomorrow (paired exchange via NDD).  She will receive standard immunosuppression with ATG followed by Tacrolimus IR and Myfortic.  A typical dose for tacrolimus level 8-10 is likely 3 mg bid.    2.  ESKD from diabetic nephropathy.  She is currently on hemodialysis (home hemodialysis) via a right arm av fistula.  She is scheduled for an [facility] dialysis session today and her potassium is notably low.  She will inform the dialysis unit about the low potassium today and  dialyze on a 3 or 4 potassium bath.    3. CAD s/p 2-v Cab 10/2017 - she has no current cardiac symptoms    4. Health Maintenance - pap May 2018, colonoscopy August 2015,  mammogram May 2018, all up to date.    5. Type 1 diabetes - current daily insulin requirements insulin pump.  She will be on insulin gtt for perioperative management of her diabetes.     6. Peripheral vascular disease - CTA reviewed by transplant surgery and will be discussed by transplant surgery today to evaluate the appropriate approach.     6. History of skin cancer - followed by local dermatologist with no new lesions identified as of her last visit in May 2018.    7. Gastroparesis, history of small intestinal bacterial overgrowth, pancreatic exocrine dysfunction, and diabetic enteropathy - controlled with current regimen and followed by local GI provider.    Again, thank you for allowing me to participate in the care of your patient.      Sincerely,    Kidney/Pancreas Recipient

## 2018-11-19 NOTE — LETTER
11/19/2018       RE: Letty Benavidez  [address]  [place] [address]     Dear Colleague,    Thank you for referring your patient, Letty Benavidez, to the [facility] SOLID ORGAN TRANSPLANT at [facility]. Please see a copy of my visit note below.    See surgeon note     Again, thank you for allowing me to participate in the care of your patient.      Sincerely,    Julianna Thornton NP

## 2018-11-19 NOTE — PROGRESS NOTES
Transplant Surgery H&P:                           HPI:      Ms. Benavidez is a 58 year old female who comes to clinic today for preop prior to planned living donor kidney transplantation. The patient was previously reviewed by the multidisciplinary selection committee and found to be medically and psychosocially appropriate for kidney transplantation. Ms. Benavidez has End stage renal failure due to Type 1 diabetes.  Previous TXP is 11/14/1998    The patient is on dialysis.      If on dialysis, modality: HD, via right arm fistula  Dialysis days: Tuesday, Thursday, Saturday, Sunday    Chronic anticoagulation  [x]      [] Indication:   Recurrent infections  [x]      []  Type:                  Bladder dysfunction  []      [x] Cause: Urine 200mL per day   Claudication   [x]      [] Distance:    Previous Amputation  [x]      [] Cause:       Health events since transplant evaluation: None    Special considerations:  PONV: yes  Yazdanism: No    MEDICAL HISTORY:      Patient Active Problem List    Diagnosis Date Noted     Pancreatic insufficiency      Priority: Medium     History of skin cancer      Priority: Medium     End stage kidney disease (H)      Priority: Medium     Immunosuppressed status (H)      Priority: Medium     ACP (advance care planning) 12/29/2016     Priority: Medium     Advance Care Planning 12/29/2016: Receipt of ACP document:  Received: Health Care Directive which was witnessed or notarized on 6/27/97.  Document previously scanned on 11/1/16.  Validation form completed and sent to be scanned.  Code Status needs to be updated to reflect choices in most recent ACP document.  Confirmed/documented designated decision maker(s).  Added by Lucia Duque RN, Advance Care Planning Liaison.         Secondary hyperparathyroidism (H)      Priority: Medium     Anemia of chronic kidney failure      Priority: Medium     Dyslipidemia      Priority: Medium     Hypertension      Priority: Medium     Gastroparesis       Priority: Medium     Diabetic peripheral neuropathy (H)      Priority: Medium     Diabetic retinopathy (H)      Priority: Medium     Type 1 diabetes (H)      Priority: Medium      Past Medical History:   Diagnosis Date     Anemia of chronic kidney failure      Cataracts, bilateral      Coronary artery disease      Diabetic peripheral neuropathy (H)      Diabetic retinopathy (H)      Dyslipidemia      End stage kidney disease (H)      Gastroparesis      History of skin cancer      Hypertension      Immunosuppressed status (H)      Kidney replaced by transplant      Osteoporosis      Pancreatic insufficiency      Secondary hyperparathyroidism (H)      Type 1 diabetes (H)      Past Surgical History:   Procedure Laterality Date     BASAL CELL CARCINOMA REMOVAL  2015    Rightr Elbow     BASAL CELL CARCINOMA REMOVAL  2016    Left Cheek     BIOPSY BREAST Left 1993    Benign     BIOPSY BREAST Right 1996    Benign     BIOPSY BREAST Right     Benign     C CABG, ARTERIAL, TWO  10/2017     CORONARY ANGIOGRAPHY ADULT ORDER  09/21/2017    and Left ventriculography     CREATE FISTULA ARTERIOVENOUS UPPER EXTREMITY  2017     HCL SQUAMOUS CELL CARCINOMA  REMOVAL  2015    Left side of face     HCL SQUAMOUS CELL CARCINOMA REMOVAL  2014    left ankle     HCL SQUAMOUS CELL CARCINOMA REMOVAL  2009    Right thigh     HCL SQUAMOUS CELL CARCINOMA REMOVAL   2011    Right Shoulder     HEMORRHOIDECTOMY  2000     HEMORRHOIDECTOMY  2002    x2     LASER SURGERY OF EYE  1980    Proliferative retinopathy     LEEP TX, CERVICAL  2004     LEEP TX, CERVICAL  2012     OOPHORECTOMY Right 1995     RELEASE TRIGGER FINGER  2010     TONSILLECTOMY  1963     TRANSPLANT KIDNEY RECIPIENT LIVING RELATED  1998     TUBAL LIGATION  1981     Current Outpatient Prescriptions   Medication Sig Dispense Refill     Amino Acids (LIQUACEL) LIQD        amylase-lipase-protease (ZENPEP) 99288 units CPEP Take by mouth 3 times daily (with meals) Pt reports taking Zenpep  (L-15K; A=82K; P=51K)       ASPIRIN PO Take 81 mg by mouth daily Cardiac health       atorvastatin (LIPITOR) 10 MG tablet Take 20 mg by mouth daily       atorvastatin (LIPITOR) 20 MG tablet        B Complex-C-Zn-Folic Acid (DIALYVITE/ZINC) TABS        Cholecalciferol (VITAMIN D3) 2000 units CAPS        Co-Enzyme Q-10 60 MG CAPS Take 400 mg by mouth daily        Eszopiclone (LUNESTA PO) Take by mouth daily       gabapentin (NEURONTIN) 100 MG capsule Take 100 mg by mouth At Bedtime        insulin lispro (HUMALOG) 100 UNIT/ML VIAL        INSULIN PUMP - OUTPATIENT        lidocaine-prilocaine (EMLA) cream Apply 5 mg topically three times a week       linaclotide (LINZESS) 290 MCG capsule See Admin Instructions 4x/wk, taken on non-dialysis days       Magnesium Citrate 125 MG CAPS        Methoxy PEG-Epoetin Beta (MIRCERA) 50 MCG/0.3ML SOSY        Multiple Vitamin (MULTI-VITAMINS) TABS Take 1 tablet by mouth       Polyethylene Glycol 3350 (MIRALAX PO) Take 17 g by mouth three times a week       Pyrethrins-Piperonyl Butoxide (RID EX)        SHINGRIX injection TO BE ADMINISTERED BY PHARMACIST FOR IMMUNIZATION  0     tacrolimus (PROGRAF - GENERIC EQUIVALENT) 1 MG capsule Take 2 mg by mouth 2 times daily        triamcinolone (KENALOG) 0.1 % ointment APPLY AA QD FOR 2 WEEKS THEN Q 3 DAYS FOR 1 WEEK PRN  0     ZENPEP 86242-75771 units CPEP        OTC products: None, except as noted above  Allergies   Allergen Reactions     Erythromycin Nausea and Vomiting     Ok to have if not taking tacrolimus     Metronidazole Nausea and Vomiting     Flagyl     Mycophenolate Nausea and Vomiting and Other (See Comments)     Nausea / Vomiting / Tremors     Neomycin Other (See Comments)     Due to other medications      Social History   Substance Use Topics     Smoking status: Never Smoker     Smokeless tobacco: Never Used     Alcohol use No     OR  Social History     Social History     Marital status:      Spouse name: N/A     Number of  children: 0     Years of education: 16     Occupational History           Social History Main Topics     Smoking status: Never Smoker     Smokeless tobacco: Never Used     Alcohol use No     Drug use: No     Sexual activity: Yes     Partners: Male     Other Topics Concern     Blood Transfusions No     Seat Belt Yes     Social History Narrative     History   Drug Use No     Family History   Problem Relation Age of Onset     Brain Cancer Father      Diabetes Sister      HEART DISEASE Maternal Grandfather      Cerebrovascular Disease Maternal Grandfather      HEART DISEASE Paternal Grandfather      Colon Cancer Maternal Grandmother        REVIEW OF SYSTEMS:        CONSTITUTIONAL: NEGATIVE for fever, chills, change in weight  INTEGUMENTARY/SKIN: NEGATIVE for worrisome rashes, moles or lesions  EYES: NEGATIVE for vision changes or irritation  ENT/MOUTH: NEGATIVE for ear, mouth and throat problems  RESP: NEGATIVE for significant cough or SOB  BREAST: NEGATIVE for masses, tenderness or discharge  CV: NEGATIVE for chest pain, palpitations or peripheral edema  GI: NEGATIVE for nausea, abdominal pain, heartburn, or change in bowel habits  : NEGATIVE for frequency, dysuria, or hematuria  MUSCULOSKELETAL: NEGATIVE for significant arthralgias or myalgia  NEURO: NEGATIVE for weakness, dizziness or paresthesias  ENDOCRINE: NEGATIVE for temperature intolerance, skin/hair changes  HEME: NEGATIVE for bleeding problems  PSYCHIATRIC: NEGATIVE for changes in mood or affect    EXAM:                       Temp:  [97.5  F (36.4  C)] 97.5  F (36.4  C)  Pulse:  [75] 75  BP: (149)/(75) 149/75  SpO2:  [95 %] 95 %    GENERAL APPEARANCE: healthy, alert and no distress     EYES: EOMI, PERRL     HENT: ear canals and TM's normal and nose and mouth without ulcers or lesions     NECK: no adenopathy, no asymmetry, masses, or scars and thyroid normal to palpation     RESP: lungs clear to auscultation - no rales, rhonchi or wheezes     CV:  regular rates and rhythm, normal S1 S2, no S3 or S4 and no murmur, click or rub     ABDOMEN:  soft, nontender, no HSM or masses and bowel sounds normal     MS: extremities normal- no gross deformities noted, no evidence of inflammation in joints, FROM in all extremities.     SKIN: no suspicious lesions or rashes     NEURO: Normal strength and tone, sensory exam grossly normal, mentation intact and speech normal     PSYCH: mentation appears normal. and affect normal/bright     LYMPHATICS: No cervical adenopathy      DIAGNOSTICS:                EKG: appears normal, NSR, non specific T wave abnormality, unchanged from previous tracings  Chest XRay  Labs Resulted Today:   Results for orders placed or performed in visit on 11/19/18   CBC with platelets   Result Value Ref Range    WBC 6.2 4.0 - 11.0 10e9/L    RBC Count 3.51 (L) 3.8 - 5.2 10e12/L    Hemoglobin 11.5 (L) 11.7 - 15.7 g/dL    Hematocrit 34.5 (L) 35.0 - 47.0 %    MCV 98 78 - 100 fl    MCH 32.8 26.5 - 33.0 pg    MCHC 33.3 31.5 - 36.5 g/dL    RDW 13.8 10.0 - 15.0 %    Platelet Count 163 150 - 450 10e9/L   Comprehensive metabolic panel   Result Value Ref Range    Sodium 129 (L) 133 - 144 mmol/L    Potassium 3.1 (L) 3.4 - 5.3 mmol/L    Chloride 93 (L) 94 - 109 mmol/L    Carbon Dioxide 29 20 - 32 mmol/L    Anion Gap 6 3 - 14 mmol/L    Glucose 123 (H) 70 - 99 mg/dL    Urea Nitrogen 78 (H) 7 - 30 mg/dL    Creatinine 5.26 (H) 0.52 - 1.04 mg/dL    GFR Estimate 8 (L) >60 mL/min/1.7m2    GFR Estimate If Black 10 (L) >60 mL/min/1.7m2    Calcium 8.7 8.5 - 10.1 mg/dL    Bilirubin Total 0.4 0.2 - 1.3 mg/dL    Albumin 3.6 3.4 - 5.0 g/dL    Protein Total 7.2 6.8 - 8.8 g/dL    Alkaline Phosphatase 166 (H) 40 - 150 U/L    ALT 35 0 - 50 U/L    AST 24 0 - 45 U/L   ABO/Rh type and screen   Result Value Ref Range    Units Ordered 2     ABO O     RH(D) Neg     Antibody Screen Neg     Test Valid Only At          [facility]    Specimen  Expires 11/22/2018     Crossmatch Red Blood Cells    Blood component   Result Value Ref Range    Unit Number [id number]     Blood Component Type Red Blood Cells LeukoReduced (Part 2)     Division Number 00     Status of Unit Ready for patient 11/19/2018 1207     Blood Product Code [id number]     Unit Status NOEMI    Blood component   Result Value Ref Range    Unit Number [id number]     Blood Component Type Red Blood Cells Leukocyte Reduced     Division Number 00     Status of Unit Ready for patient 11/19/2018 1207     Blood Product Code [id number]     Unit Status NOEMI      Labs Drawn and in Process:   Unresulted Labs Ordered in the Past 30 Days of this Admission     Date and Time Order Name Status Description    11/19/2018 1015 VITAMIN D DEFICIENCY SCREENING In process     11/19/2018 1015 PARATHYROID HORMONE INTACT In process     11/19/2018 1015 FERRITIN In process     11/19/2018 1015 IRON AND IRON BINDING CAPACITY In process     11/19/2018 1015 HEPATITIS C ANTIBODY In process     11/19/2018 1015 HEPATITIS B CORE ANTIBODY In process     11/19/2018 1015 HEPATITIS B SURFACE ANTIGEN In process     11/19/2018 1015 HEPATITIS B SURFACE ANTIBODY In process     11/19/2018 1015 HIV ANTIGEN ANTIBODY COMBO In process     11/19/2018 1015 EBV CAPSID ANTIBODY IGM In process     11/19/2018 1015 EBV CAPSID ANTIBODY IGG In process     11/19/2018 1015 CMV ANTIBODY IGM In process     11/19/2018 1015 CMV ANTIBODY IGG In process     11/19/2018 1015 TACROLIMUS LEVEL In process         Recent Labs   Lab Test  11/19/18   1036 05/11/18 01/15/18  10/03/16   0653   HGB  11.5*   --    --   10.4*   PLT  163   --    --   222   INR   --    --    --   0.96   NA  129*   --    --   130*   POTASSIUM  3.1*   --   4.5  4.8   CR  5.26*   --    --   3.11*   A1C   --   5.9*   --   6.9*     UNOS cPRA   Date Value Ref Range Status   11/14/2018 38  Final     PENDING    ASSESSMENT:                 Ms. Benavidez is a 58 year old female who is appropriate for  elective living donor kidney transplantation with the following pertinent issues:    1. Labs reviewed. Findings requiring additional evaluation before surgery: yes K is 3.1   Recommend replacement  2. EKG (11/19/2018): appears normal  3. ECHO (10/7/2016); Normal ejection fraction  4. ABO= PENDING  5. Paired Exchange case: Yes  6. Outstanding issues: Final ABO and cross match    PLAN:                 1. Consent: Done  2. Outstanding issues: Final ABO and cross Match    Signed Electronically by: Julianna Thornton CNP     Ms. Benavidez was seen and evaluated today as part of a shared APRN/PA visit.     I personally reviewed past medical and surgical history, vital signs, medications and labs, present and past medical history,and significant physical exam findings with the advanced practice provider.    My key findings include:  Significant arthrosclerosis bilaterally  Large tx kidney on the right    Key management decisions made by me and carried out under my direction include:  Plan left side LDKT  Estelita Guzman M.D.

## 2018-11-19 NOTE — LETTER
11/19/2018      RE: Letty Benavidez  [address] [address]       See surgeon note     Julianna Thornton, NP

## 2018-11-19 NOTE — TELEPHONE ENCOUNTER
Cheryl from local dialysis center called asking if we were going to send orders for her run that his set up or if she was going to need a run. I took Cheryl's number and told her she would be updated later today.

## 2018-11-19 NOTE — MR AVS SNAPSHOT
After Visit Summary   11/19/2018    Letty Benavidez    MRN: [id number]           Patient Information     Date Of Birth          1960        Visit Information        Provider Department      11/19/2018 12:00 PM Julianna Thornton NP [facility] Solid Organ Transplant        Today's Diagnoses     Awaiting organ transplant    -  1       Follow-ups after your visit        Your next 10 appointments already scheduled     Dec 06, 2018  1:00 PM CST   (Arrive by 12:45 PM)   Post-Op with Estelita Guzman MD   [facility] Solid Organ Transplant ([facility])    [address]  [address]  [place] [address]   [phone]            Dec 11, 2018 11:30 AM CST   (Arrive by 11:00 AM)   Return Kidney Transplant with Uc Early Post Transplant   [facility] Nephrology ([facility])    [address]  [address]  [place] [address]   [phone]            Dec 17, 2018  9:00 AM CST   (Arrive by 8:45 AM)   Cystoscopy with Julianna Thornton NP   [facility] Solid Organ Transplant ([facility])    [address]  [address]  [place] [address]   [phone]            Gary 15, 2019 10:30 AM CST   (Arrive by 10:00 AM)   Return Kidney Transplant with Uc Early Post Transplant   [facility] Nephrology ([facility])    [address]  [address]  [place] [address]   [phone]            Mar 11, 2019 10:00 AM CDT   (Arrive by 9:30 AM)   Return Kidney Transplant with Uc Early Post Transplant   [facility] Nephrology ([facility])    [address]  [address]  [place] [address]   [phone]            May 06, 2019 10:00 AM CDT   (Arrive by 9:30 AM)   Return Kidney Transplant with Uc Early Post Transplant   [facility] Nephrology ([facility])    [address]  [address]  [place] [address]   [phone]              Who  "to contact     If you have questions or need follow up information about today's clinic visit or your schedule please contact [facility] SOLID ORGAN TRANSPLANT directly at [phone].  Normal or non-critical lab and imaging results will be communicated to you by MyChart, letter or phone within 4 business days after the clinic has received the results. If you do not hear from us within 7 days, please contact the clinic through [employer] or phone. If you have a critical or abnormal lab result, we will notify you by phone as soon as possible.  Submit refill requests through [employer] or call your pharmacy and they will forward the refill request to us. Please allow 3 business days for your refill to be completed.          Additional Information About Your Visit        [employer] Information     [employer] gives you secure access to your electronic health record. If you see a primary care provider, you can also send messages to your care team and make appointments. If you have questions, please call your primary care clinic.  If you do not have a primary care provider, please call [phone] and they will assist you.        Care EveryWhere ID     This is your Care EveryWhere ID. This could be used by other organizations to access your [place] medical records  [id number]        Your Vitals Were     Pulse Temperature Height Pulse Oximetry BMI (Body Mass Index)       75 97.5  F (36.4  C) 1.6 m (5' 3\") 95% 20.51 kg/m2        Blood Pressure from Last 3 Encounters:   11/20/18 (!) 84/46   11/19/18 132/64   08/28/18 148/78    Weight from Last 3 Encounters:   11/20/18 51.7 kg (113 lb 15.7 oz)   11/19/18 52.5 kg (115 lb 12.8 oz)   08/28/18 51.1 kg (112 lb 9.6 oz)              Today, you had the following     No orders found for display       Primary Care Provider Office Phone # Fax #    Anita Jurado [phone]       [facility] [address] [address]        Equal Access to Services     " JIM [facility]: Hadii aad ku niranjan Whyte, waaxda luqadaha, qaybta kaalmada adevarun, jimmy silvioin hayaagabe florentinomarcie schmitt levi . So [facility] [phone].    ATENCIÓN: Si jodi gilmore, tiene a guerin disposición servicios gratuitos de asistencia lingüística. Llame al [phone].    We comply with applicable federal civil rights laws and [place] laws. We do not discriminate on the basis of race, color, national origin, age, disability, sex, sexual orientation, or gender identity.            Thank you!     Thank you for choosing [facility] SOLID ORGAN TRANSPLANT  for your care. Our goal is always to provide you with excellent care. Hearing back from our patients is one way we can continue to improve our services. Please take a few minutes to complete the written survey that you may receive in the mail after your visit with us. Thank you!             Your Updated Medication List - Protect others around you: Learn how to safely use, store and throw away your medicines at www.disposemymeds.org.          This list is accurate as of 11/19/18 11:59 PM.  Always use your most recent med list.                   Brand Name Dispense Instructions for use Diagnosis    * amylase-lipase-protease 72679 units Cpep    ZENPEP     Take by mouth 3 times daily (with meals) Pt reports taking Zenpep (L-15K; A=82K; P=51K)        * ZENPEP 62524-61257 units Cpep   Generic drug:  lipase-protease-amylase           ASPIRIN PO      Take 81 mg by mouth daily Cardiac health        Co-Enzyme Q-10 60 MG Caps      Take 400 mg by mouth daily        DIALYVITE/ZINC Tabs           humaLOG 100 UNIT/ML injection   Generic drug:  insulin lispro           insulin pump infusion           lidocaine-prilocaine cream    EMLA     Apply 5 mg topically three times a week        LINZESS 290 MCG capsule   Generic drug:  linaclotide      See Admin Instructions 4x/wk, taken on non-dialysis days        * LIPITOR 10 MG tablet   Generic drug:  atorvastatin      Take 20 mg by  mouth daily        * atorvastatin 20 MG tablet    LIPITOR          LIQUACEL Liqd           LUNESTA PO      Take by mouth daily        Magnesium Citrate 125 MG Caps           MIRALAX PO      Take 17 g by mouth three times a week        MIRCERA 50 MCG/0.3ML Sosy   Generic drug:  Methoxy PEG-Epoetin Beta           MULTI-VITAMINS Tabs      Take 1 tablet by mouth        NEURONTIN 100 MG capsule   Generic drug:  gabapentin      Take 100 mg by mouth At Bedtime        RID EX           SHINGRIX injection   Generic drug:  zoster vaccine recombinant adjuvanted      TO BE ADMINISTERED BY PHARMACIST FOR IMMUNIZATION        tacrolimus 1 MG capsule    GENERIC EQUIVALENT     Take 2 mg by mouth 2 times daily        triamcinolone 0.1 % ointment    KENALOG     APPLY AA QD FOR 2 WEEKS THEN Q 3 DAYS FOR 1 WEEK PRN        vitamin D3 2000 units Caps           * Notice:  This list has 4 medication(s) that are the same as other medications prescribed for you. Read the directions carefully, and ask your doctor or other care provider to review them with you.

## 2018-11-19 NOTE — ANESTHESIA PREPROCEDURE EVALUATION
Anesthesia Pre-Procedure Evaluation    Patient: Letty Benavidez   MRN:     [id number] Gender:   female   Age:    58 year old :      1960        Preoperative Diagnosis: Diabetes Mellitus Type I    Procedure(s):  Kidney Transplant Living Unrelated (Anonymous) Non Direct Recipient      Past Medical History:   Diagnosis Date     Anemia of chronic kidney failure      Cataracts, bilateral      Coronary artery disease      Diabetic peripheral neuropathy (H)      Diabetic retinopathy (H)      Dyslipidemia      End stage kidney disease (H)      Gastroparesis      History of skin cancer      Hypertension      Immunosuppressed status (H)      Kidney replaced by transplant      Osteoporosis      Pancreatic insufficiency      Secondary hyperparathyroidism (H)      Type 1 diabetes (H)       Past Surgical History:   Procedure Laterality Date     BASAL CELL CARCINOMA REMOVAL  2015    Rightr Elbow     BASAL CELL CARCINOMA REMOVAL  2016    Left Cheek     BIOPSY BREAST Left 1993    Benign     BIOPSY BREAST Right 1996    Benign     BIOPSY BREAST Right     Benign     C CABG, ARTERIAL, TWO  10/2017     CORONARY ANGIOGRAPHY ADULT ORDER  2017    and Left ventriculography     CREATE FISTULA ARTERIOVENOUS UPPER EXTREMITY       HCL SQUAMOUS CELL CARCINOMA  REMOVAL      Left side of face     HCL SQUAMOUS CELL CARCINOMA REMOVAL      left ankle     HCL SQUAMOUS CELL CARCINOMA REMOVAL  2009    Right thigh     HCL SQUAMOUS CELL CARCINOMA REMOVAL       Right Shoulder     HEMORRHOIDECTOMY  2000     HEMORRHOIDECTOMY  2002    x2     LASER SURGERY OF EYE  1980    Proliferative retinopathy     LEEP TX, CERVICAL  2004     LEEP TX, CERVICAL       OOPHORECTOMY Right 1995     RELEASE TRIGGER FINGER  2010     TONSILLECTOMY  1963     TRANSPLANT KIDNEY RECIPIENT LIVING RELATED       TUBAL LIGATION            Anesthesia Evaluation     . Pt has had prior anesthetic. Type: General    No history of anesthetic  complications          ROS/MED HX    ENT/Pulmonary:      (-) tobacco use, asthma and COPD   Neurologic:     (+)neuropathy - diabetic neuropathy. On Gabapentin,     Cardiovascular:     (+) hypertension--CAD, -CABG-date: 10/2017 - 2V CABG. LIMA to LAD, SVG to RCA, . : . . . :. .      (-) CHF, DEAL and orthopnea/PND   METS/Exercise Tolerance: Comment: Walks 2-4 miles daily. No SOB or chest pain >4 METS   Hematologic: Comments: Anemia of chronic disease. No recent transfusions. 11/19/2018 Hgb 11.5    (+) History of blood clots Anemia, -      Musculoskeletal:         GI/Hepatic: Comment: Gastroparesis and pancreatic insuf        Renal/Genitourinary:     (+) chronic renal disease, type: ESRD, Pt requires dialysis, type: Hemodialysis, Pt has history of transplant, date: LDKT 1998. Back on HD since 04/2017, now doing HD at home. Still getiing Tacrolimus 1mg BID,       Endo: Comment: On insulin pump, getting ~18U/d    (+) type I DM, Last HgA1c: 5.4 Using insulin - using insulin pump Diabetic complications: nephropathy neuropathy retinopathy gastroparesis, .      Psychiatric:         Infectious Disease:  - neg infectious disease ROS       Malignancy:   (+)   Hx/o cervix Ca, s/p LEEP 2004 and 2012. Hx/o Sqamous cell Ca, and Basal Cell Ca/.         Other:    (+) No chance of pregnancy C-spine cleared: N/A, no other significant disability                        PHYSICAL EXAM:   Mental Status/Neuro:    Airway: Facies: Feasible  Mallampati: II  Mouth/Opening: Limited  TM distance: > 6 cm  Neck ROM: Not Assessed   Respiratory: Auscultation: CTAB     Resp. Rate: Normal     Resp. Effort: Normal      CV: Rhythm: Regular  Rate: Age appropriate  Heart: Normal Sounds   Comments:      Dental: Normal                  Lab Results   Component Value Date    WBC 6.2 11/19/2018    HGB 11.5 (L) 11/19/2018    HCT 34.5 (L) 11/19/2018     11/19/2018     (L) 11/19/2018    POTASSIUM 3.1 (L) 11/19/2018    CHLORIDE 93 (L) 11/19/2018    CO2  "29 11/19/2018    BUN 78 (H) 11/19/2018    CR 5.26 (H) 11/19/2018     (H) 11/19/2018    PERRI 8.7 11/19/2018    ALBUMIN 3.6 11/19/2018    PROTTOTAL 7.2 11/19/2018    ALT 35 11/19/2018    AST 24 11/19/2018    ALKPHOS 166 (H) 11/19/2018    BILITOTAL 0.4 11/19/2018    PTT 33 10/03/2016    INR 0.96 10/03/2016    HCGS Negative 10/03/2016       Preop Vitals  BP Readings from Last 3 Encounters:   11/19/18 132/64   08/28/18 148/78   08/16/17 157/72    Pulse Readings from Last 3 Encounters:   11/19/18 75   08/28/18 73   08/16/17 74      Resp Readings from Last 3 Encounters:   08/28/18 18    SpO2 Readings from Last 3 Encounters:   11/19/18 95%   08/16/17 100%   10/03/16 100%      Temp Readings from Last 1 Encounters:   11/19/18 36.4  C (97.5  F)    Ht Readings from Last 1 Encounters:   11/19/18 1.6 m (5' 3\")      Wt Readings from Last 1 Encounters:   11/19/18 52.5 kg (115 lb 12.8 oz)    Estimated body mass index is 20.51 kg/(m^2) as calculated from the following:    Height as of 11/19/18: 1.6 m (5' 3\").    Weight as of 11/19/18: 52.5 kg (115 lb 12.8 oz).     LDA:            Assessment:   ASA SCORE: 3    NPO Status: > 6 hours since completed Solid Foods   Documentation: H&P complete; Preop Testing complete; Consents complete   Proceeding: Proceed without further delay  Tobacco Use:  NO Active use of Tobacco/UNKNOWN Tobacco use status     Plan:   Anes. Type:  General   Pre-Induction: Midazolam IV   Induction:  IV (RSI)   Airway: Oral ETT   Access/Monitoring: PIV; 2nd PIV; A-Line; FloTrac   Maintenance: Balanced   Emergence: Procedure Site   Logistics: Observation/Admission     Postop Pain/Sedation Strategy:  Standard-Options: Opioids PRN     PONV Management:  Adult Risk Factors: Female, Non-Smoker, Postop Opioids     CONSENT: Direct conversation   Plan and risks discussed with: Patient        Comments for Plan/Consent:  Anesthesia Plan :     58 year old female with ESRD on HD, now scheduled to receive living donor kidney " transplantation. Patient has End stage renal failure due to Type 1 diabetes.  Previous TXP 11/14/1998. Requiring HD since 04/2017, currently doing home-HD. Patient has a Hx/o CAD, s/p 2v CABG 10/2018. T1DM on insulin pump, receiving aprox 18U/d. Reported neuropathy, retinopathy, and gastroparesis.     - ASA 3  - GETA/RSI with standard ASA monitors, IV induction, balanced anesthetic  - PIVx2  - Arterial line and Central Line in OR after induction  - Antibiotics per surgery  - PONV prophylaxis with Decadron and Zofran  - Multimodal analgesia     Kelsey Salgado MD  [facility]-1 Anesthesiology Resident                           Kelsey Salgado MD

## 2018-11-19 NOTE — MR AVS SNAPSHOT
After Visit Summary   11/19/2018    Letty Benavidez    MRN: [id number]           Patient Information     Date Of Birth          1960        Visit Information        Provider Department      11/19/2018 1:15 PM 1, Ron Kidney/Pancreas Recipient [facility] Nephrology        Today's Diagnoses     End stage kidney disease (H)    -  1    Organ transplant candidate        Pre-transplant evaluation for kidney transplant        Pancreatic insufficiency        Secondary hyperparathyroidism (H)        Anemia of chronic renal failure, unspecified CKD stage        Gastroparesis        Type 1 diabetes mellitus with stage 5 chronic kidney disease not on chronic dialysis (H)           Follow-ups after your visit        Your next 10 appointments already scheduled     Dec 06, 2018  1:00 PM CST   (Arrive by 12:45 PM)   Post-Op with Estelita Guzman MD   [facility] Solid Organ Transplant ([facility])    [address]  [address]  [place] [address]   [phone]            Dec 11, 2018 11:30 AM CST   (Arrive by 11:00 AM)   Return Kidney Transplant with Uc Early Post Transplant   [facility] Nephrology ([facility])    [address]  [address]  [place] [address]   [phone]            Dec 17, 2018  9:00 AM CST   (Arrive by 8:45 AM)   Cystoscopy with Julianna Thornton NP   [facility] Solid Organ Transplant ([facility])    [address] [place] [address]  [address]  [place] [address]   [phone]            Gary 15, 2019 10:30 AM CST   (Arrive by 10:00 AM)   Return Kidney Transplant with Uc Early Post Transplant   [facility]ph[facility] ([facility])    [address]  [address]  [place] [address]   [phone]            Mar 11, 2019 10:00 AM CDT   (Arrive by 9:30 AM)   Return Kidney Transplant with Uc Early Post Transplant   [facility] Neph[facility] ([facility])     [address]  [address]  [place] [id number] [phone]            May 06, 2019 10:00 AM CDT   (Arrive by 9:30 AM)   Return Kidney Transplant with  Early Post Transplant   [facility]phrology ([facility])    [address]  [address]  [place] [facility] [id number] [phone]              Who to contact     If you have questions or need follow up information about today's clinic visit or your schedule please contact [facility] NEPH[facility]OLOG[facility] directly at [phone].  Normal or non-critical lab and imaging results will be communicated to you by WellRighthart, letter or phone within 4 business days after the clinic has received the results. If you do not hear from us within 7 days, please contact the clinic through Airseedt or phone. If you have a critical or abnormal lab result, we will notify you by phone as soon as possible.  Submit refill requests through Btarget or call your pharmacy and they will forward the refill request to us. Please allow 3 business days for your refill to be completed.          Additional Information About Your Visit        WellRighthart Information     Btarget gives you secure access to your electronic health record. If you see a primary care provider, you can also send messages to your care team and make appointments. If you have questions, please call your primary care clinic.  If you do not have a primary care provider, please call [phone] and they will assist you.        Care EveryWhere ID     This is your Care EveryWhere ID. This could be used by other organizations to access your [place] medical records  [id number]         Blood Pressure from Last 3 Encounters:   11/20/18 (!) 84/46   11/19/18 132/64   08/28/18 148/78    Weight from Last 3 Encounters:   11/20/18 51.7 kg (113 lb 15.7 oz)   11/19/18 52.5 kg (115 lb 12.8 oz)   08/28/18 51.1 kg (112 lb 9.6 oz)              Today, you had the following     No orders found for display       Primary  Care Provider Office Phone # Fax #    Anita Jurado [phone]       [place] INTER[facility] [address] [address]        Equal Access to Services     JIM REED : Hadcrystal valentino ku hadmarizao Soomaali, waaxda luqadaha, qaybta kaalmada ademichelleda, jimmy dentandrez aguillon. So [facility] [phone].    ATENCIÓN: Si habla español, tiene a guerin disposición servicios gratuitos de asistencia lingüística. Llame al [phone].    We comply with applicable federal civil rights laws and [place] laws. We do not discriminate on the basis of race, color, national origin, age, disability, sex, sexual orientation, or gender identity.            Thank you!     Thank you for choosing [facility] NEPHROLOGY  for your care. Our goal is always to provide you with excellent care. Hearing back from our patients is one way we can continue to improve our services. Please take a few minutes to complete the written survey that you may receive in the mail after your visit with us. Thank you!             Your Updated Medication List - Protect others around you: Learn how to safely use, store and throw away your medicines at www.disposemymeds.org.          This list is accurate as of 11/19/18 11:59 PM.  Always use your most recent med list.                   Brand Name Dispense Instructions for use Diagnosis    * amylase-lipase-protease 95106 units Cpep    ZENPEP     Take by mouth 3 times daily (with meals) Pt reports taking Zenpep (L-15K; A=82K; P=51K)        * ZENPEP 13415-04604 units Cpep   Generic drug:  lipase-protease-amylase           ASPIRIN PO      Take 81 mg by mouth daily Cardiac health        Co-Enzyme Q-10 60 MG Caps      Take 400 mg by mouth daily        DIALYVITE/ZINC Tabs           humaLOG 100 UNIT/ML injection   Generic drug:  insulin lispro           insulin pump infusion           lidocaine-prilocaine cream    EMLA     Apply 5 mg topically three times a week        LINZESS 290 MCG  capsule   Generic drug:  linaclotide      See Admin Instructions 4x/wk, taken on non-dialysis days        * LIPITOR 10 MG tablet   Generic drug:  atorvastatin      Take 20 mg by mouth daily        * atorvastatin 20 MG tablet    LIPITOR          LIQUACEL Liqd           LUNESTA PO      Take by mouth daily        Magnesium Citrate 125 MG Caps           MIRALAX PO      Take 17 g by mouth three times a week        MIRCERA 50 MCG/0.3ML Sosy   Generic drug:  Methoxy PEG-Epoetin Beta           MULTI-VITAMINS Tabs      Take 1 tablet by mouth        NEURONTIN 100 MG capsule   Generic drug:  gabapentin      Take 100 mg by mouth At Bedtime        RID EX           SHINGRIX injection   Generic drug:  zoster vaccine recombinant adjuvanted      TO BE ADMINISTERED BY PHARMACIST FOR IMMUNIZATION        tacrolimus 1 MG capsule    GENERIC EQUIVALENT     Take 2 mg by mouth 2 times daily        triamcinolone 0.1 % ointment    KENALOG     APPLY AA QD FOR 2 WEEKS THEN Q 3 DAYS FOR 1 WEEK PRN        vitamin D3 2000 units Caps           * Notice:  This list has 4 medication(s) that are the same as other medications prescribed for you. Read the directions carefully, and ask your doctor or other care provider to review them with you.

## 2018-11-19 NOTE — TELEPHONE ENCOUNTER
Spoke with Julianna Thornton and asked if she would let me know if patient's dialysis run order would be changed by our team or if the local dialysis center should be using existing orders. Per Julianna, she doesn't think patient will need a run today since her surgery is scheduled for tomorrow. Asked for clarification and direction as soon as available.

## 2018-11-20 ENCOUNTER — APPOINTMENT (OUTPATIENT)
Dept: ULTRASOUND IMAGING | Facility: CLINIC | Age: 58
End: 2018-11-20
Attending: TRANSPLANT SURGERY
Payer: COMMERCIAL

## 2018-11-20 ENCOUNTER — APPOINTMENT (OUTPATIENT)
Dept: GENERAL RADIOLOGY | Facility: CLINIC | Age: 58
End: 2018-11-20
Attending: TRANSPLANT SURGERY
Payer: COMMERCIAL

## 2018-11-20 ENCOUNTER — HOSPITAL ENCOUNTER (INPATIENT)
Facility: CLINIC | Age: 58
LOS: 6 days | Discharge: HOME | End: 2018-11-26
Attending: TRANSPLANT SURGERY | Admitting: TRANSPLANT SURGERY
Payer: COMMERCIAL

## 2018-11-20 ENCOUNTER — ANESTHESIA (OUTPATIENT)
Dept: SURGERY | Facility: CLINIC | Age: 58
End: 2018-11-20
Payer: COMMERCIAL

## 2018-11-20 ENCOUNTER — SURGERY (OUTPATIENT)
Age: 58
End: 2018-11-20

## 2018-11-20 ENCOUNTER — DOCUMENTATION ONLY (OUTPATIENT)
Dept: TRANSPLANT | Facility: CLINIC | Age: 58
End: 2018-11-20

## 2018-11-20 DIAGNOSIS — Z94.0 KIDNEY TRANSPLANTED: ICD-10-CM

## 2018-11-20 DIAGNOSIS — N18.5 TYPE 1 DIABETES MELLITUS WITH STAGE 5 CHRONIC KIDNEY DISEASE NOT ON CHRONIC DIALYSIS (H): Primary | ICD-10-CM

## 2018-11-20 DIAGNOSIS — E10.22 TYPE 1 DIABETES MELLITUS WITH STAGE 5 CHRONIC KIDNEY DISEASE NOT ON CHRONIC DIALYSIS (H): Primary | ICD-10-CM

## 2018-11-20 LAB
ABO + RH BLD: NORMAL
ABO + RH BLD: NORMAL
ANGLE RATE OF CLOT GROWTH: 70.7 DEG (ref 59–74)
ANION GAP SERPL CALCULATED.3IONS-SCNC: 8 MMOL/L (ref 3–14)
ANION GAP SERPL CALCULATED.3IONS-SCNC: 9 MMOL/L (ref 3–14)
BACTERIA SPEC CULT: NO GROWTH
BASE DEFICIT BLDA-SCNC: 0.3 MMOL/L
BASE DEFICIT BLDA-SCNC: 0.4 MMOL/L
BASE EXCESS BLDA CALC-SCNC: 2 MMOL/L
BLD GP AB SCN SERPL QL: NORMAL
BLD PROD TYP BPU: NORMAL
BLD PROD TYP BPU: NORMAL
BLD UNIT ID BPU: 0
BLOOD BANK CMNT PATIENT-IMP: NORMAL
BLOOD BANK CMNT PATIENT-IMP: NORMAL
BLOOD PRODUCT CODE: NORMAL
BPU ID: NORMAL
BUN SERPL-MCNC: 29 MG/DL (ref 7–30)
BUN SERPL-MCNC: 30 MG/DL (ref 7–30)
CA-I BLD-MCNC: 4 MG/DL (ref 4.4–5.2)
CA-I BLD-MCNC: 4.1 MG/DL (ref 4.4–5.2)
CA-I BLD-MCNC: 4.3 MG/DL (ref 4.4–5.2)
CALCIUM SERPL-MCNC: 8.2 MG/DL (ref 8.5–10.1)
CALCIUM SERPL-MCNC: 8.4 MG/DL (ref 8.5–10.1)
CHLORIDE SERPL-SCNC: 102 MMOL/L (ref 94–109)
CHLORIDE SERPL-SCNC: 99 MMOL/L (ref 94–109)
CI HYPERCOAGULATION INDEX: 3.5 RATIO (ref 0–3)
CLOT LYSIS 30M P MA LENFR BLD TEG: 1 % (ref 0–8)
CLOT STRENGTH BLD TEG: 9.5 KD/SC (ref 5.3–13.2)
CMV IGG SERPL QL IA: >8 AI (ref 0–0.8)
CMV IGM SERPL QL IA: 0.3 AI (ref 0–0.8)
CO2 SERPL-SCNC: 26 MMOL/L (ref 20–32)
CO2 SERPL-SCNC: 27 MMOL/L (ref 20–32)
CREAT SERPL-MCNC: 2.44 MG/DL (ref 0.52–1.04)
CREAT SERPL-MCNC: 2.57 MG/DL (ref 0.52–1.04)
DEPRECATED CALCIDIOL+CALCIFEROL SERPL-MC: 63 UG/L (ref 20–75)
EBV VCA IGG SER QL IA: <0.2 AI (ref 0–0.8)
EBV VCA IGM SER QL IA: <0.2 AI (ref 0–0.8)
ERYTHROCYTE [DISTWIDTH] IN BLOOD BY AUTOMATED COUNT: 13.7 % (ref 10–15)
ERYTHROCYTE [DISTWIDTH] IN BLOOD BY AUTOMATED COUNT: 14.2 % (ref 10–15)
GFR SERPL CREATININE-BSD FRML MDRD: 19 ML/MIN/1.7M2
GFR SERPL CREATININE-BSD FRML MDRD: 20 ML/MIN/1.7M2
GLUCOSE BLD-MCNC: 122 MG/DL (ref 70–99)
GLUCOSE BLD-MCNC: 129 MG/DL (ref 70–99)
GLUCOSE BLD-MCNC: 144 MG/DL (ref 70–99)
GLUCOSE BLDC GLUCOMTR-MCNC: 104 MG/DL (ref 70–99)
GLUCOSE BLDC GLUCOMTR-MCNC: 134 MG/DL (ref 70–99)
GLUCOSE BLDC GLUCOMTR-MCNC: 143 MG/DL (ref 70–99)
GLUCOSE BLDC GLUCOMTR-MCNC: 150 MG/DL (ref 70–99)
GLUCOSE BLDC GLUCOMTR-MCNC: 162 MG/DL (ref 70–99)
GLUCOSE BLDC GLUCOMTR-MCNC: 162 MG/DL (ref 70–99)
GLUCOSE BLDC GLUCOMTR-MCNC: 167 MG/DL (ref 70–99)
GLUCOSE BLDC GLUCOMTR-MCNC: 169 MG/DL (ref 70–99)
GLUCOSE BLDC GLUCOMTR-MCNC: 173 MG/DL (ref 70–99)
GLUCOSE BLDC GLUCOMTR-MCNC: 186 MG/DL (ref 70–99)
GLUCOSE BLDC GLUCOMTR-MCNC: 91 MG/DL (ref 70–99)
GLUCOSE SERPL-MCNC: 113 MG/DL (ref 70–99)
GLUCOSE SERPL-MCNC: 197 MG/DL (ref 70–99)
HBA1C MFR BLD: 5.5 % (ref 0–5.6)
HBV CORE AB SERPL QL IA: NONREACTIVE
HBV SURFACE AB SERPL IA-ACNC: 64.09 M[IU]/ML
HBV SURFACE AG SERPL QL IA: NONREACTIVE
HCO3 BLD-SCNC: 24 MMOL/L (ref 21–28)
HCO3 BLD-SCNC: 25 MMOL/L (ref 21–28)
HCO3 BLD-SCNC: 26 MMOL/L (ref 21–28)
HCT VFR BLD AUTO: 24.4 % (ref 35–47)
HCT VFR BLD AUTO: 32.9 % (ref 35–47)
HCV AB SERPL QL IA: NONREACTIVE
HGB BLD-MCNC: 10.7 G/DL (ref 11.7–15.7)
HGB BLD-MCNC: 7.9 G/DL (ref 11.7–15.7)
HGB BLD-MCNC: 8.2 G/DL (ref 11.7–15.7)
HGB BLD-MCNC: 8.7 G/DL (ref 11.7–15.7)
HGB BLD-MCNC: 8.9 G/DL (ref 11.7–15.7)
HGB BLD-MCNC: 9.4 G/DL (ref 11.7–15.7)
HIV 1+2 AB+HIV1 P24 AG SERPL QL IA: NONREACTIVE
INTERPRETATION ECG - MUSE: NORMAL
K TIME TO SPEC CLOT STRENGTH: 1.3 MIN (ref 1–3)
LACTATE BLD-SCNC: 0.5 MMOL/L (ref 0.7–2)
LACTATE BLD-SCNC: 0.6 MMOL/L (ref 0.7–2)
LACTATE BLD-SCNC: 1.2 MMOL/L (ref 0.7–2)
LY60 LYSIS AT 60 MINUTES: 1.9 % (ref 0–15)
Lab: NORMAL
MA MAXIMUM CLOT STRENGTH: 65.6 MM (ref 55–74)
MAGNESIUM SERPL-MCNC: 2.4 MG/DL (ref 1.6–2.3)
MCH RBC QN AUTO: 32 PG (ref 26.5–33)
MCH RBC QN AUTO: 32 PG (ref 26.5–33)
MCHC RBC AUTO-ENTMCNC: 32.4 G/DL (ref 31.5–36.5)
MCHC RBC AUTO-ENTMCNC: 32.5 G/DL (ref 31.5–36.5)
MCV RBC AUTO: 99 FL (ref 78–100)
MCV RBC AUTO: 99 FL (ref 78–100)
NUM BPU REQUESTED: 3
O2/TOTAL GAS SETTING VFR VENT: 45 %
O2/TOTAL GAS SETTING VFR VENT: 46 %
O2/TOTAL GAS SETTING VFR VENT: 50 %
PCO2 BLD: 39 MM HG (ref 35–45)
PCO2 BLD: 39 MM HG (ref 35–45)
PCO2 BLD: 43 MM HG (ref 35–45)
PH BLD: 7.38 PH (ref 7.35–7.45)
PH BLD: 7.41 PH (ref 7.35–7.45)
PH BLD: 7.44 PH (ref 7.35–7.45)
PHOSPHATE SERPL-MCNC: 2.7 MG/DL (ref 2.5–4.5)
PLATELET # BLD AUTO: 70 10E9/L (ref 150–450)
PLATELET # BLD AUTO: 84 10E9/L (ref 150–450)
PO2 BLD: 193 MM HG (ref 80–105)
PO2 BLD: 199 MM HG (ref 80–105)
PO2 BLD: 211 MM HG (ref 80–105)
POTASSIUM BLD-SCNC: 2.8 MMOL/L (ref 3.4–5.3)
POTASSIUM BLD-SCNC: 3.1 MMOL/L (ref 3.4–5.3)
POTASSIUM BLD-SCNC: 3.3 MMOL/L (ref 3.4–5.3)
POTASSIUM SERPL-SCNC: 3 MMOL/L (ref 3.4–5.3)
POTASSIUM SERPL-SCNC: 3.1 MMOL/L (ref 3.4–5.3)
POTASSIUM SERPL-SCNC: 3.2 MMOL/L (ref 3.4–5.3)
R TIME UNTIL CLOT FORMS: 2.4 MIN (ref 4–9)
RBC # BLD AUTO: 2.47 10E12/L (ref 3.8–5.2)
RBC # BLD AUTO: 3.34 10E12/L (ref 3.8–5.2)
SODIUM BLD-SCNC: 129 MMOL/L (ref 133–144)
SODIUM BLD-SCNC: 132 MMOL/L (ref 133–144)
SODIUM BLD-SCNC: 134 MMOL/L (ref 133–144)
SODIUM SERPL-SCNC: 136 MMOL/L (ref 133–144)
SODIUM SERPL-SCNC: 137 MMOL/L (ref 133–144)
SPECIMEN EXP DATE BLD: NORMAL
SPECIMEN SOURCE: NORMAL
TRANSFUSION STATUS PATIENT QL: NORMAL
TRANSFUSION STATUS PATIENT QL: NORMAL
WBC # BLD AUTO: 1.8 10E9/L (ref 4–11)
WBC # BLD AUTO: 7.3 10E9/L (ref 4–11)

## 2018-11-20 PROCEDURE — 84132 ASSAY OF SERUM POTASSIUM: CPT | Performed by: TRANSPLANT SURGERY

## 2018-11-20 PROCEDURE — 83605 ASSAY OF LACTIC ACID: CPT | Performed by: TRANSPLANT SURGERY

## 2018-11-20 PROCEDURE — 25000125 ZZHC RX 250: Performed by: ANESTHESIOLOGY

## 2018-11-20 PROCEDURE — 25000128 H RX IP 250 OP 636: Performed by: STUDENT IN AN ORGANIZED HEALTH CARE EDUCATION/TRAINING PROGRAM

## 2018-11-20 PROCEDURE — 40000014 ZZH STATISTIC ARTERIAL MONITORING DAILY

## 2018-11-20 PROCEDURE — P9016 RBC LEUKOCYTES REDUCED: HCPCS | Performed by: TRANSPLANT SURGERY

## 2018-11-20 PROCEDURE — 25000125 ZZHC RX 250: Performed by: NURSE PRACTITIONER

## 2018-11-20 PROCEDURE — 80048 BASIC METABOLIC PNL TOTAL CA: CPT | Performed by: STUDENT IN AN ORGANIZED HEALTH CARE EDUCATION/TRAINING PROGRAM

## 2018-11-20 PROCEDURE — 25000132 ZZH RX MED GY IP 250 OP 250 PS 637: Performed by: STUDENT IN AN ORGANIZED HEALTH CARE EDUCATION/TRAINING PROGRAM

## 2018-11-20 PROCEDURE — 84295 ASSAY OF SERUM SODIUM: CPT

## 2018-11-20 PROCEDURE — 36415 COLL VENOUS BLD VENIPUNCTURE: CPT | Performed by: ANESTHESIOLOGY

## 2018-11-20 PROCEDURE — 82803 BLOOD GASES ANY COMBINATION: CPT

## 2018-11-20 PROCEDURE — 83036 HEMOGLOBIN GLYCOSYLATED A1C: CPT | Performed by: ANESTHESIOLOGY

## 2018-11-20 PROCEDURE — 82330 ASSAY OF CALCIUM: CPT | Performed by: TRANSPLANT SURGERY

## 2018-11-20 PROCEDURE — 85027 COMPLETE CBC AUTOMATED: CPT | Performed by: STUDENT IN AN ORGANIZED HEALTH CARE EDUCATION/TRAINING PROGRAM

## 2018-11-20 PROCEDURE — 36415 COLL VENOUS BLD VENIPUNCTURE: CPT | Performed by: NURSE PRACTITIONER

## 2018-11-20 PROCEDURE — 36592 COLLECT BLOOD FROM PICC: CPT | Performed by: STUDENT IN AN ORGANIZED HEALTH CARE EDUCATION/TRAINING PROGRAM

## 2018-11-20 PROCEDURE — 76776 US EXAM K TRANSPL W/DOPPLER: CPT

## 2018-11-20 PROCEDURE — 25000566 ZZH SEVOFLURANE, EA 15 MIN: Performed by: TRANSPLANT SURGERY

## 2018-11-20 PROCEDURE — 83605 ASSAY OF LACTIC ACID: CPT

## 2018-11-20 PROCEDURE — 84295 ASSAY OF SERUM SODIUM: CPT | Performed by: TRANSPLANT SURGERY

## 2018-11-20 PROCEDURE — 84100 ASSAY OF PHOSPHORUS: CPT | Performed by: TRANSPLANT SURGERY

## 2018-11-20 PROCEDURE — 25000125 ZZHC RX 250: Performed by: STUDENT IN AN ORGANIZED HEALTH CARE EDUCATION/TRAINING PROGRAM

## 2018-11-20 PROCEDURE — 0TY10Z0 TRANSPLANTATION OF LEFT KIDNEY, ALLOGENEIC, OPEN APPROACH: ICD-10-PCS | Performed by: TRANSPLANT SURGERY

## 2018-11-20 PROCEDURE — 40000196 ZZH STATISTIC RAPCV CVP MONITORING

## 2018-11-20 PROCEDURE — 81100000 ZZH ACQUISITION KIDNEY LIVING DONOR

## 2018-11-20 PROCEDURE — 83735 ASSAY OF MAGNESIUM: CPT | Performed by: TRANSPLANT SURGERY

## 2018-11-20 PROCEDURE — 84132 ASSAY OF SERUM POTASSIUM: CPT | Performed by: STUDENT IN AN ORGANIZED HEALTH CARE EDUCATION/TRAINING PROGRAM

## 2018-11-20 PROCEDURE — 37000008 ZZH ANESTHESIA TECHNICAL FEE, 1ST 30 MIN: Performed by: TRANSPLANT SURGERY

## 2018-11-20 PROCEDURE — 25000128 H RX IP 250 OP 636: Performed by: PHYSICIAN ASSISTANT

## 2018-11-20 PROCEDURE — 84132 ASSAY OF SERUM POTASSIUM: CPT

## 2018-11-20 PROCEDURE — 25000128 H RX IP 250 OP 636: Performed by: NURSE PRACTITIONER

## 2018-11-20 PROCEDURE — 40000171 ZZH STATISTIC PRE-PROCEDURE ASSESSMENT III: Performed by: TRANSPLANT SURGERY

## 2018-11-20 PROCEDURE — 25000131 ZZH RX MED GY IP 250 OP 636 PS 637: Performed by: PHYSICIAN ASSISTANT

## 2018-11-20 PROCEDURE — 82947 ASSAY GLUCOSE BLOOD QUANT: CPT | Performed by: TRANSPLANT SURGERY

## 2018-11-20 PROCEDURE — 25000125 ZZHC RX 250: Performed by: TRANSPLANT SURGERY

## 2018-11-20 PROCEDURE — 37000009 ZZH ANESTHESIA TECHNICAL FEE, EACH ADDTL 15 MIN: Performed by: TRANSPLANT SURGERY

## 2018-11-20 PROCEDURE — 27210794 ZZH OR GENERAL SUPPLY STERILE: Performed by: TRANSPLANT SURGERY

## 2018-11-20 PROCEDURE — 86850 RBC ANTIBODY SCREEN: CPT | Performed by: NURSE PRACTITIONER

## 2018-11-20 PROCEDURE — 84132 ASSAY OF SERUM POTASSIUM: CPT | Performed by: ANESTHESIOLOGY

## 2018-11-20 PROCEDURE — 86900 BLOOD TYPING SEROLOGIC ABO: CPT | Performed by: NURSE PRACTITIONER

## 2018-11-20 PROCEDURE — 80048 BASIC METABOLIC PNL TOTAL CA: CPT | Performed by: TRANSPLANT SURGERY

## 2018-11-20 PROCEDURE — 82330 ASSAY OF CALCIUM: CPT

## 2018-11-20 PROCEDURE — C2617 STENT, NON-COR, TEM W/O DEL: HCPCS | Performed by: TRANSPLANT SURGERY

## 2018-11-20 PROCEDURE — 85396 CLOTTING ASSAY WHOLE BLOOD: CPT | Performed by: TRANSPLANT SURGERY

## 2018-11-20 PROCEDURE — 85027 COMPLETE CBC AUTOMATED: CPT | Performed by: TRANSPLANT SURGERY

## 2018-11-20 PROCEDURE — 12000005 ZZH R&B IMCU CRITICAL UMMC

## 2018-11-20 PROCEDURE — 25000128 H RX IP 250 OP 636: Performed by: ANESTHESIOLOGY

## 2018-11-20 PROCEDURE — 82947 ASSAY GLUCOSE BLOOD QUANT: CPT

## 2018-11-20 PROCEDURE — 36000062 ZZH SURGERY LEVEL 4 1ST 30 MIN - UMMC: Performed by: TRANSPLANT SURGERY

## 2018-11-20 PROCEDURE — 40000275 ZZH STATISTIC RCP TIME EA 10 MIN

## 2018-11-20 PROCEDURE — 36000064 ZZH SURGERY LEVEL 4 EA 15 ADDTL MIN - UMMC: Performed by: TRANSPLANT SURGERY

## 2018-11-20 PROCEDURE — 86901 BLOOD TYPING SEROLOGIC RH(D): CPT | Performed by: NURSE PRACTITIONER

## 2018-11-20 PROCEDURE — 25000131 ZZH RX MED GY IP 250 OP 636 PS 637: Performed by: STUDENT IN AN ORGANIZED HEALTH CARE EDUCATION/TRAINING PROGRAM

## 2018-11-20 PROCEDURE — 25000128 H RX IP 250 OP 636: Performed by: TRANSPLANT SURGERY

## 2018-11-20 PROCEDURE — 40000986 XR CHEST PORT 1 VW

## 2018-11-20 PROCEDURE — 00000146 ZZHCL STATISTIC GLUCOSE BY METER IP

## 2018-11-20 PROCEDURE — 85018 HEMOGLOBIN: CPT | Performed by: STUDENT IN AN ORGANIZED HEALTH CARE EDUCATION/TRAINING PROGRAM

## 2018-11-20 PROCEDURE — 71000014 ZZH RECOVERY PHASE 1 LEVEL 2 FIRST HR: Performed by: TRANSPLANT SURGERY

## 2018-11-20 PROCEDURE — 71000015 ZZH RECOVERY PHASE 1 LEVEL 2 EA ADDTL HR: Performed by: TRANSPLANT SURGERY

## 2018-11-20 PROCEDURE — 82803 BLOOD GASES ANY COMBINATION: CPT | Performed by: TRANSPLANT SURGERY

## 2018-11-20 DEVICE — IMPLANTABLE DEVICE
Type: IMPLANTABLE DEVICE | Site: URETER | Status: NON-FUNCTIONAL
Removed: 2018-12-13

## 2018-11-20 RX ORDER — SULFAMETHOXAZOLE AND TRIMETHOPRIM 400; 80 MG/1; MG/1
1 TABLET ORAL
Status: DISCONTINUED | OUTPATIENT
Start: 2018-11-20 | End: 2018-11-21

## 2018-11-20 RX ORDER — PROPOFOL 10 MG/ML
INJECTION, EMULSION INTRAVENOUS PRN
Status: DISCONTINUED | OUTPATIENT
Start: 2018-11-20 | End: 2018-11-20

## 2018-11-20 RX ORDER — LIDOCAINE 40 MG/G
CREAM TOPICAL
Status: DISCONTINUED | OUTPATIENT
Start: 2018-11-20 | End: 2018-11-20 | Stop reason: HOSPADM

## 2018-11-20 RX ORDER — POLYETHYLENE GLYCOL 3350 17 G/17G
17 POWDER, FOR SOLUTION ORAL DAILY
Status: DISCONTINUED | OUTPATIENT
Start: 2018-11-20 | End: 2018-11-21

## 2018-11-20 RX ORDER — AMOXICILLIN 250 MG
1 CAPSULE ORAL 2 TIMES DAILY
Status: DISCONTINUED | OUTPATIENT
Start: 2018-11-20 | End: 2018-11-26 | Stop reason: HOSPADM

## 2018-11-20 RX ORDER — SODIUM CHLORIDE 9 MG/ML
1000 INJECTION, SOLUTION INTRAVENOUS CONTINUOUS PRN
Status: DISCONTINUED | OUTPATIENT
Start: 2018-11-20 | End: 2018-11-21

## 2018-11-20 RX ORDER — TACROLIMUS 1 MG/1
2 CAPSULE ORAL 2 TIMES DAILY
Status: DISCONTINUED | OUTPATIENT
Start: 2018-11-20 | End: 2018-11-22

## 2018-11-20 RX ORDER — HYDRALAZINE HYDROCHLORIDE 20 MG/ML
2.5-5 INJECTION INTRAMUSCULAR; INTRAVENOUS EVERY 10 MIN PRN
Status: DISCONTINUED | OUTPATIENT
Start: 2018-11-20 | End: 2018-11-20 | Stop reason: HOSPADM

## 2018-11-20 RX ORDER — FENTANYL CITRATE 50 UG/ML
25-50 INJECTION, SOLUTION INTRAMUSCULAR; INTRAVENOUS
Status: DISCONTINUED | OUTPATIENT
Start: 2018-11-20 | End: 2018-11-20 | Stop reason: HOSPADM

## 2018-11-20 RX ORDER — ONDANSETRON 2 MG/ML
INJECTION INTRAMUSCULAR; INTRAVENOUS PRN
Status: DISCONTINUED | OUTPATIENT
Start: 2018-11-20 | End: 2018-11-20

## 2018-11-20 RX ORDER — EPHEDRINE SULFATE 50 MG/ML
INJECTION, SOLUTION INTRAVENOUS PRN
Status: DISCONTINUED | OUTPATIENT
Start: 2018-11-20 | End: 2018-11-20

## 2018-11-20 RX ORDER — NALOXONE HYDROCHLORIDE 0.4 MG/ML
.1-.4 INJECTION, SOLUTION INTRAMUSCULAR; INTRAVENOUS; SUBCUTANEOUS
Status: DISCONTINUED | OUTPATIENT
Start: 2018-11-20 | End: 2018-11-20

## 2018-11-20 RX ORDER — SODIUM CHLORIDE, SODIUM GLUCONATE, SODIUM ACETATE, POTASSIUM CHLORIDE AND MAGNESIUM CHLORIDE 526; 502; 368; 37; 30 MG/100ML; MG/100ML; MG/100ML; MG/100ML; MG/100ML
INJECTION, SOLUTION INTRAVENOUS CONTINUOUS PRN
Status: DISCONTINUED | OUTPATIENT
Start: 2018-11-20 | End: 2018-11-20

## 2018-11-20 RX ORDER — OXYCODONE HYDROCHLORIDE 5 MG/1
5-10 TABLET ORAL EVERY 4 HOURS PRN
Status: DISCONTINUED | OUTPATIENT
Start: 2018-11-20 | End: 2018-11-21

## 2018-11-20 RX ORDER — NICOTINE POLACRILEX 4 MG
15-30 LOZENGE BUCCAL
Status: DISCONTINUED | OUTPATIENT
Start: 2018-11-20 | End: 2018-11-20

## 2018-11-20 RX ORDER — CALCIUM CHLORIDE 100 MG/ML
INJECTION INTRAVENOUS; INTRAVENTRICULAR PRN
Status: DISCONTINUED | OUTPATIENT
Start: 2018-11-20 | End: 2018-11-20

## 2018-11-20 RX ORDER — POLYETHYLENE GLYCOL 3350 17 G/17G
17 POWDER, FOR SOLUTION ORAL
Status: DISCONTINUED | OUTPATIENT
Start: 2018-11-22 | End: 2018-11-20 | Stop reason: CLARIF

## 2018-11-20 RX ORDER — AMOXICILLIN 250 MG
2 CAPSULE ORAL 2 TIMES DAILY
Status: DISCONTINUED | OUTPATIENT
Start: 2018-11-20 | End: 2018-11-26 | Stop reason: HOSPADM

## 2018-11-20 RX ORDER — CEFUROXIME SODIUM 1.5 G/16ML
1.5 INJECTION, POWDER, FOR SOLUTION INTRAVENOUS ONCE
Status: COMPLETED | OUTPATIENT
Start: 2018-11-20 | End: 2018-11-20

## 2018-11-20 RX ORDER — POLYETHYLENE GLYCOL 3350 17 G/17G
17 POWDER, FOR SOLUTION ORAL
Status: DISCONTINUED | OUTPATIENT
Start: 2018-11-22 | End: 2018-11-20

## 2018-11-20 RX ORDER — ONDANSETRON 4 MG/1
4 TABLET, ORALLY DISINTEGRATING ORAL EVERY 30 MIN PRN
Status: DISCONTINUED | OUTPATIENT
Start: 2018-11-20 | End: 2018-11-20 | Stop reason: HOSPADM

## 2018-11-20 RX ORDER — CARDIOPLEG/ORGAN PRESERV NO.1 9-198-2-1
BOTTLE PERFUSION PRN
Status: DISCONTINUED | OUTPATIENT
Start: 2018-11-20 | End: 2018-11-20 | Stop reason: HOSPADM

## 2018-11-20 RX ORDER — FENTANYL CITRATE 50 UG/ML
INJECTION, SOLUTION INTRAMUSCULAR; INTRAVENOUS PRN
Status: DISCONTINUED | OUTPATIENT
Start: 2018-11-20 | End: 2018-11-20

## 2018-11-20 RX ORDER — DEXTROSE MONOHYDRATE 25 G/50ML
25-50 INJECTION, SOLUTION INTRAVENOUS
Status: DISCONTINUED | OUTPATIENT
Start: 2018-11-20 | End: 2018-11-26 | Stop reason: HOSPADM

## 2018-11-20 RX ORDER — PROCHLORPERAZINE MALEATE 5 MG
10 TABLET ORAL EVERY 6 HOURS PRN
Status: DISCONTINUED | OUTPATIENT
Start: 2018-11-20 | End: 2018-11-26 | Stop reason: HOSPADM

## 2018-11-20 RX ORDER — ASPIRIN 81 MG/1
81 TABLET, CHEWABLE ORAL DAILY
Status: DISCONTINUED | OUTPATIENT
Start: 2018-11-21 | End: 2018-11-21

## 2018-11-20 RX ORDER — MANNITOL 20 G/100ML
INJECTION, SOLUTION INTRAVENOUS PRN
Status: DISCONTINUED | OUTPATIENT
Start: 2018-11-20 | End: 2018-11-20

## 2018-11-20 RX ORDER — DEXTROSE MONOHYDRATE 25 G/50ML
25-50 INJECTION, SOLUTION INTRAVENOUS
Status: DISCONTINUED | OUTPATIENT
Start: 2018-11-20 | End: 2018-11-20

## 2018-11-20 RX ORDER — MAGNESIUM OXIDE 400 MG/1
400 TABLET ORAL
Status: DISCONTINUED | OUTPATIENT
Start: 2018-11-22 | End: 2018-11-26 | Stop reason: HOSPADM

## 2018-11-20 RX ORDER — SODIUM CHLORIDE 9 MG/ML
INJECTION, SOLUTION INTRAVENOUS CONTINUOUS
Status: DISCONTINUED | OUTPATIENT
Start: 2018-11-20 | End: 2018-11-20 | Stop reason: HOSPADM

## 2018-11-20 RX ORDER — SODIUM CHLORIDE, SODIUM LACTATE, POTASSIUM CHLORIDE, CALCIUM CHLORIDE 600; 310; 30; 20 MG/100ML; MG/100ML; MG/100ML; MG/100ML
INJECTION, SOLUTION INTRAVENOUS CONTINUOUS
Status: DISCONTINUED | OUTPATIENT
Start: 2018-11-20 | End: 2018-11-20 | Stop reason: HOSPADM

## 2018-11-20 RX ORDER — ONDANSETRON 2 MG/ML
4 INJECTION INTRAMUSCULAR; INTRAVENOUS EVERY 30 MIN PRN
Status: DISCONTINUED | OUTPATIENT
Start: 2018-11-20 | End: 2018-11-20 | Stop reason: HOSPADM

## 2018-11-20 RX ORDER — ACETAMINOPHEN 325 MG/1
650 TABLET ORAL EVERY 4 HOURS PRN
Status: DISCONTINUED | OUTPATIENT
Start: 2018-11-23 | End: 2018-11-23

## 2018-11-20 RX ORDER — DEXTROSE, SODIUM CHLORIDE, SODIUM LACTATE, POTASSIUM CHLORIDE, AND CALCIUM CHLORIDE 5; .6; .31; .03; .02 G/100ML; G/100ML; G/100ML; G/100ML; G/100ML
INJECTION, SOLUTION INTRAVENOUS CONTINUOUS
Status: DISCONTINUED | OUTPATIENT
Start: 2018-11-20 | End: 2018-11-26

## 2018-11-20 RX ORDER — SODIUM CHLORIDE 450 MG/100ML
INJECTION, SOLUTION INTRAVENOUS CONTINUOUS PRN
Status: DISCONTINUED | OUTPATIENT
Start: 2018-11-20 | End: 2018-11-21

## 2018-11-20 RX ORDER — LABETALOL HYDROCHLORIDE 5 MG/ML
10 INJECTION, SOLUTION INTRAVENOUS
Status: DISCONTINUED | OUTPATIENT
Start: 2018-11-20 | End: 2018-11-20 | Stop reason: HOSPADM

## 2018-11-20 RX ORDER — ONDANSETRON 2 MG/ML
4 INJECTION INTRAMUSCULAR; INTRAVENOUS EVERY 6 HOURS PRN
Status: DISCONTINUED | OUTPATIENT
Start: 2018-11-20 | End: 2018-11-26 | Stop reason: HOSPADM

## 2018-11-20 RX ORDER — POTASSIUM CHLORIDE 29.8 MG/ML
INJECTION INTRAVENOUS PRN
Status: DISCONTINUED | OUTPATIENT
Start: 2018-11-20 | End: 2018-11-20

## 2018-11-20 RX ORDER — ACETAMINOPHEN 325 MG/1
975 TABLET ORAL EVERY 8 HOURS
Status: DISCONTINUED | OUTPATIENT
Start: 2018-11-20 | End: 2018-11-23

## 2018-11-20 RX ORDER — ONDANSETRON 4 MG/1
4 TABLET, ORALLY DISINTEGRATING ORAL EVERY 6 HOURS PRN
Status: DISCONTINUED | OUTPATIENT
Start: 2018-11-20 | End: 2018-11-26 | Stop reason: HOSPADM

## 2018-11-20 RX ORDER — MYCOPHENOLIC ACID 360 MG/1
360 TABLET, DELAYED RELEASE ORAL 3 TIMES DAILY
Status: DISCONTINUED | OUTPATIENT
Start: 2018-11-20 | End: 2018-11-25

## 2018-11-20 RX ORDER — GLYCOPYRROLATE 0.2 MG/ML
INJECTION, SOLUTION INTRAMUSCULAR; INTRAVENOUS PRN
Status: DISCONTINUED | OUTPATIENT
Start: 2018-11-20 | End: 2018-11-20

## 2018-11-20 RX ORDER — NICOTINE POLACRILEX 4 MG
15-30 LOZENGE BUCCAL
Status: DISCONTINUED | OUTPATIENT
Start: 2018-11-20 | End: 2018-11-26 | Stop reason: HOSPADM

## 2018-11-20 RX ORDER — LIDOCAINE HYDROCHLORIDE 20 MG/ML
INJECTION, SOLUTION INFILTRATION; PERINEURAL PRN
Status: DISCONTINUED | OUTPATIENT
Start: 2018-11-20 | End: 2018-11-20

## 2018-11-20 RX ORDER — HYDROMORPHONE HYDROCHLORIDE 1 MG/ML
.3-.5 INJECTION, SOLUTION INTRAMUSCULAR; INTRAVENOUS; SUBCUTANEOUS EVERY 5 MIN PRN
Status: DISCONTINUED | OUTPATIENT
Start: 2018-11-20 | End: 2018-11-20 | Stop reason: HOSPADM

## 2018-11-20 RX ORDER — POLYETHYLENE GLYCOL 3350 17 G/17G
17 POWDER, FOR SOLUTION ORAL DAILY
Status: DISCONTINUED | OUTPATIENT
Start: 2018-11-21 | End: 2018-11-20

## 2018-11-20 RX ORDER — VALGANCICLOVIR 450 MG/1
450 TABLET, FILM COATED ORAL
Status: DISCONTINUED | OUTPATIENT
Start: 2018-11-22 | End: 2018-11-21

## 2018-11-20 RX ORDER — NALOXONE HYDROCHLORIDE 0.4 MG/ML
.1-.4 INJECTION, SOLUTION INTRAMUSCULAR; INTRAVENOUS; SUBCUTANEOUS
Status: ACTIVE | OUTPATIENT
Start: 2018-11-20 | End: 2018-11-21

## 2018-11-20 RX ORDER — FUROSEMIDE 10 MG/ML
INJECTION INTRAMUSCULAR; INTRAVENOUS PRN
Status: DISCONTINUED | OUTPATIENT
Start: 2018-11-20 | End: 2018-11-20

## 2018-11-20 RX ADMIN — SULFAMETHOXAZOLE AND TRIMETHOPRIM 1 TABLET: 400; 80 TABLET ORAL at 22:48

## 2018-11-20 RX ADMIN — FENTANYL CITRATE 50 MCG: 50 INJECTION, SOLUTION INTRAMUSCULAR; INTRAVENOUS at 11:15

## 2018-11-20 RX ADMIN — SODIUM CHLORIDE 500 MG: 9 INJECTION, SOLUTION INTRAVENOUS at 09:30

## 2018-11-20 RX ADMIN — SODIUM CHLORIDE 1000 ML: 9 INJECTION, SOLUTION INTRAVENOUS at 16:22

## 2018-11-20 RX ADMIN — HUMAN INSULIN 0.2 UNITS/HR: 100 INJECTION, SOLUTION SUBCUTANEOUS at 08:30

## 2018-11-20 RX ADMIN — FUROSEMIDE 20 MG: 10 INJECTION, SOLUTION INTRAVENOUS at 11:50

## 2018-11-20 RX ADMIN — MANNITOL 12.5 G: 20 INJECTION, SOLUTION INTRAVENOUS at 11:50

## 2018-11-20 RX ADMIN — ACETAMINOPHEN 975 MG: 325 TABLET, FILM COATED ORAL at 22:48

## 2018-11-20 RX ADMIN — POLYETHYLENE GLYCOL 3350 17 G: 17 POWDER, FOR SOLUTION ORAL at 23:07

## 2018-11-20 RX ADMIN — SODIUM CHLORIDE, SODIUM GLUCONATE, SODIUM ACETATE, POTASSIUM CHLORIDE AND MAGNESIUM CHLORIDE: 526; 502; 368; 37; 30 INJECTION, SOLUTION INTRAVENOUS at 08:46

## 2018-11-20 RX ADMIN — PROPOFOL 80 MG: 10 INJECTION, EMULSION INTRAVENOUS at 09:04

## 2018-11-20 RX ADMIN — HUMAN INSULIN 1 UNITS/HR: 100 INJECTION, SOLUTION SUBCUTANEOUS at 16:42

## 2018-11-20 RX ADMIN — Medication 60 MG: at 09:05

## 2018-11-20 RX ADMIN — SODIUM CHLORIDE, POTASSIUM CHLORIDE, SODIUM LACTATE AND CALCIUM CHLORIDE 1000 ML: 600; 310; 30; 20 INJECTION, SOLUTION INTRAVENOUS at 17:50

## 2018-11-20 RX ADMIN — LIDOCAINE HYDROCHLORIDE 100 MG: 20 INJECTION, SOLUTION INFILTRATION; PERINEURAL at 08:57

## 2018-11-20 RX ADMIN — CEFUROXIME 1.5 G: 1.5 INJECTION, POWDER, FOR SOLUTION INTRAVENOUS at 09:17

## 2018-11-20 RX ADMIN — MIDAZOLAM 2 MG: 1 INJECTION INTRAMUSCULAR; INTRAVENOUS at 08:46

## 2018-11-20 RX ADMIN — EPHEDRINE SULFATE 10 MG: 50 INJECTION, SOLUTION INTRAVENOUS at 12:26

## 2018-11-20 RX ADMIN — ONDANSETRON 4 MG: 2 INJECTION INTRAMUSCULAR; INTRAVENOUS at 13:47

## 2018-11-20 RX ADMIN — HUMAN INSULIN 0.2 UNITS/HR: 100 INJECTION, SOLUTION SUBCUTANEOUS at 08:46

## 2018-11-20 RX ADMIN — HUMAN INSULIN 1 UNITS/HR: 100 INJECTION, SOLUTION SUBCUTANEOUS at 21:40

## 2018-11-20 RX ADMIN — ACETAMINOPHEN 975 MG: 325 TABLET, FILM COATED ORAL at 15:26

## 2018-11-20 RX ADMIN — Medication 0.2 MG: at 18:07

## 2018-11-20 RX ADMIN — ROCURONIUM BROMIDE 50 MG: 10 INJECTION INTRAVENOUS at 09:16

## 2018-11-20 RX ADMIN — ROCURONIUM BROMIDE 20 MG: 10 INJECTION INTRAVENOUS at 11:46

## 2018-11-20 RX ADMIN — GLYCOPYRROLATE 0.2 MG: 0.2 INJECTION, SOLUTION INTRAMUSCULAR; INTRAVENOUS at 11:32

## 2018-11-20 RX ADMIN — SODIUM CHLORIDE, SODIUM LACTATE, POTASSIUM CHLORIDE, CALCIUM CHLORIDE AND DEXTROSE MONOHYDRATE: 5; 600; 310; 30; 20 INJECTION, SOLUTION INTRAVENOUS at 14:40

## 2018-11-20 RX ADMIN — ONDANSETRON HYDROCHLORIDE 4 MG: 2 INJECTION, SOLUTION INTRAMUSCULAR; INTRAVENOUS at 19:25

## 2018-11-20 RX ADMIN — FENTANYL CITRATE 50 MCG: 50 INJECTION, SOLUTION INTRAMUSCULAR; INTRAVENOUS at 12:00

## 2018-11-20 RX ADMIN — Medication 0.3 MG: at 15:26

## 2018-11-20 RX ADMIN — FENTANYL CITRATE 50 MCG: 50 INJECTION, SOLUTION INTRAMUSCULAR; INTRAVENOUS at 14:14

## 2018-11-20 RX ADMIN — SODIUM CHLORIDE: 9 INJECTION, SOLUTION INTRAVENOUS at 08:30

## 2018-11-20 RX ADMIN — SODIUM CHLORIDE: 9 INJECTION, SOLUTION INTRAVENOUS at 08:37

## 2018-11-20 RX ADMIN — TACROLIMUS 2 MG: 1 CAPSULE ORAL at 22:47

## 2018-11-20 RX ADMIN — Medication 0.4 MG: at 16:08

## 2018-11-20 RX ADMIN — CALCIUM CHLORIDE 500 MG: 100 INJECTION, SOLUTION INTRAVENOUS at 12:19

## 2018-11-20 RX ADMIN — ANTI-THYMOCYTE GLOBULIN (RABBIT) 100 MG: 5 INJECTION, POWDER, LYOPHILIZED, FOR SOLUTION INTRAVENOUS at 10:00

## 2018-11-20 RX ADMIN — FENTANYL CITRATE 100 MCG: 50 INJECTION, SOLUTION INTRAMUSCULAR; INTRAVENOUS at 13:15

## 2018-11-20 RX ADMIN — SUGAMMADEX 200 MG: 100 INJECTION, SOLUTION INTRAVENOUS at 13:47

## 2018-11-20 RX ADMIN — FENTANYL CITRATE 50 MCG: 50 INJECTION, SOLUTION INTRAMUSCULAR; INTRAVENOUS at 14:35

## 2018-11-20 RX ADMIN — SODIUM CHLORIDE, POTASSIUM CHLORIDE, SODIUM LACTATE AND CALCIUM CHLORIDE 500 ML: 600; 310; 30; 20 INJECTION, SOLUTION INTRAVENOUS at 22:31

## 2018-11-20 RX ADMIN — SENNOSIDES AND DOCUSATE SODIUM 2 TABLET: 8.6; 5 TABLET ORAL at 22:48

## 2018-11-20 RX ADMIN — MYCOPHENOLATE MOFETIL 1000 MG: 500 INJECTION, POWDER, LYOPHILIZED, FOR SOLUTION INTRAVENOUS at 10:00

## 2018-11-20 RX ADMIN — MYCOPHENOLIC ACID 360 MG: 360 TABLET, DELAYED RELEASE ORAL at 22:47

## 2018-11-20 RX ADMIN — CALCIUM GLUCONATE 4 G: 98 INJECTION, SOLUTION INTRAVENOUS at 18:32

## 2018-11-20 RX ADMIN — GLYCOPYRROLATE 0.2 MG: 0.2 INJECTION, SOLUTION INTRAMUSCULAR; INTRAVENOUS at 11:25

## 2018-11-20 RX ADMIN — EPHEDRINE SULFATE 10 MG: 50 INJECTION, SOLUTION INTRAVENOUS at 12:29

## 2018-11-20 RX ADMIN — POTASSIUM CHLORIDE 20 MEQ: 400 INJECTION, SOLUTION INTRAVENOUS at 12:15

## 2018-11-20 RX ADMIN — CALCIUM CHLORIDE 500 MG: 100 INJECTION, SOLUTION INTRAVENOUS at 11:15

## 2018-11-20 RX ADMIN — HEPARIN SODIUM 100 ML: 1000 INJECTION, SOLUTION INTRAVENOUS; SUBCUTANEOUS at 13:15

## 2018-11-20 RX ADMIN — GLYCOPYRROLATE 0.2 MG: 0.2 INJECTION, SOLUTION INTRAMUSCULAR; INTRAVENOUS at 11:28

## 2018-11-20 RX ADMIN — METHYLENE BLUE 50 MG: 10 INJECTION INTRAVENOUS at 18:03

## 2018-11-20 RX ADMIN — Medication 1000 ML: at 12:45

## 2018-11-20 RX ADMIN — CALCIUM CHLORIDE 500 MG: 100 INJECTION, SOLUTION INTRAVENOUS at 13:40

## 2018-11-20 RX ADMIN — Medication: at 15:06

## 2018-11-20 RX ADMIN — FENTANYL CITRATE 100 MCG: 50 INJECTION, SOLUTION INTRAMUSCULAR; INTRAVENOUS at 08:50

## 2018-11-20 RX ADMIN — HUMAN INSULIN 1 UNITS/HR: 100 INJECTION, SOLUTION SUBCUTANEOUS at 12:10

## 2018-11-20 RX ADMIN — ROCURONIUM BROMIDE 20 MG: 10 INJECTION INTRAVENOUS at 13:10

## 2018-11-20 ASSESSMENT — PAIN DESCRIPTION - DESCRIPTORS: DESCRIPTORS: ACHING

## 2018-11-20 NOTE — PROGRESS NOTES
Dr. Mary placed order for 1L NS bolus over 1 hour. She confirmed that patient can transport to floor once systolic blood pressure is above 100. Will start bolus and continue to monitor.     In addition started insulin gtt as patient's blood sugar went above 150. Currently on Algorithm 1, with 1 unit/hr infusing.

## 2018-11-20 NOTE — PROGRESS NOTES
Dr. Guzman at bedside. Updated on patient progress. She states a better BP goal is to have MAP above 60. This is more realistic as patient's systolic blood pressure hasn't been above 100 since her arrival in PACU. She is also going to review labs and ultrasound.

## 2018-11-20 NOTE — ANESTHESIA PROCEDURE NOTES
Central Line Procedure Note  Staff:     Anesthesiologist:  SLADE GAMEZ    Resident/CRNA:  RADHA HILLS    Central line placed by Resident/CRNA in the presence of a teaching physician    Location: In OR after induction  Procedure Start/Stop Times:     patient identified, IV checked, site marked, risks and benefits discussed, informed consent, monitors and equipment checked, pre-op evaluation and at physician/surgeon's request      Correct Patient: Yes      Correct Position: Yes      Correct Site: Yes      Correct Procedure: Yes      Correct Laterality:  Yes    Site Marked:  N/A  Line Placement:     Procedure:  Central Line    Insertion laterality:  Right    Insertion site:  Internal Jugular    Position:  Trendelenburg      Maximal Sterile Barriers: All elements of maximal sterile barrier technique followed      (Maximal sterile barriers include:   Sterile gown, Sterile Gloves, Mask, Cap, Whole body draped, hand hygiene and acceptable skin prep).Skin Prep: Chloraprep         Injection Technique:  Ultrasound guided    Sterile Ultrasound Technique:  Sterile probe cover and Sterile gel    Vein evaluated via U/S for patency/adequacy of catheter insertion and is adequate.  Using realtime U/S imaging the vein was punctured, and needle was observed entering vein on U/S      Permanent Image entered into patient's record      Local skin infiltration:  None    Catheter size:  12 Fr, 3 lumen, 20 cm    Catheter length at skin (cm):  14    Cath secured with: suture and anchor securement device      Dressing:  Tegaderm and Biopatch    Complications:  None obvious    Blood aspirated all lumens: Yes      All Lumens Flushed: Yes      Tip termination: right atrium      Verification method:  Placement to be verified post-op

## 2018-11-20 NOTE — IP AVS SNAPSHOT
MRN:[id number]                      After Visit Summary   11/20/2018    Letty Benavidez    MRN: [id number]           Thank you!     Thank you for choosing [facility] for your care. Our goal is always to provide you with excellent care. Hearing back from our patients is one way we can continue to improve our services. Please take a few minutes to complete the written survey that you may receive in the mail after you visit with us. Thank you!        Patient Information     Date Of Birth          1960        Designated Caregiver       Most Recent Value    Caregiver    Will someone help with your care after discharge? yes    Name of designated caregiver Layton Benaviedz    Phone number of caregiver [phone]    Caregiver address facesheet      About your hospital stay     You were admitted on:  November 20, 2018 You last received care in the:  [address] [facility]    You were discharged on:  November 26, 2018        Reason for your hospital stay       You were hospitalized to undergo kidney transplantation.                  Who to Call     For medical emergencies, please call 911.  For non-urgent questions about your medical care, please call your primary care provider or clinic, [phone]  For questions related to your surgery, please call your surgery clinic        Attending Provider     Provider Specialty    Estelita Guzman MD Transplant       Primary Care Provider Office Phone # Fax #    Anita Jurado [phone]      Follow-up Appointments     Adult [facility] Follow-up and recommended labs and tests       Over the next 3-5 days you will be seen in [facility] at 7 am (ph. [phone], option [phone]) .  Your labs will be drawn at the beginning of your appointment at 7:00 am.  DO NOT take your medications prior to having labs drawn. Please bring all your medications with you from home to take after labs are drawn.    LABS:  CBC, BMP, Mg, Phos and  to be  drawn daily while in ATC, then every Monday, Wednesday, Friday by home health care nurse if arranged, or at an outpatient lab.   Tacrolimus levels (12 hours after administration) daily while in ATC then 3 times weekly.     FOLLOW UP APPOINTMENTS:  Remember to always bring an updated medication list to all appointments.     An appointment with your transplant surgeon is scheduled for 12/6.  Your transplant surgeon is: Dr. Guzman.  You will follow up with transplant nephrology in clinic at 1 and 3 months and then annually.   Follow up with primary care provider in 4-8 weeks. (Pt to schedule)  Follow up with your endocrinologist, Dr. Palomares at [facility], 2-4 weeks after discharge.  You have a ureteral stent in place which needs to be removed in 4-6 weeks.  [place] [facility] [place].     Call scheduling at [phone] if you have not heard about your appointments within 48 hours after discharge.    WHEN TO CONTACT YOUR  COORDINATOR:  Transplant Coordinator [phone]  Notify your coordinator if you have pain over your kidney, increased redness or drainage from your incision, fever greater than 100.5F, or decreased urine output.  Notify your coordinator immediately if you are ever unable to take your immunosuppressive medications for any reason.  If it is outside of office hours, please call the hospital switchboard at [phone] and ask to have the kidney transplant surgery fellow paged for urgent medical questions, or present to the emergency department.    OTHER DISCHARGE INSTRUCTIONS:  [place] plan: Please monitor color and amount of output. Bring these records with you to clinic, and the decision on when to remove will be based on the amount of drainage.   Monitor blood pressure and weight daily initially post transplant.  Monitor blood glucose levels 4 times a day, and adjust your insulin pump as needed.    Activity: Walk at least four times a day, lift no  greater than 10 pounds for 6-8 weeks from the time of surgery.  No driving while taking narcotics or 3 weeks after surgery.  Diet recommendations post-transplant: Heart healthy dietary habits long term (low saturated/trans fat, low sodium). High protein diet x 8 weeks. Practice food safety precautions.    Appointments on [place] and/or [facility] (with [facility] or [facility] provider or service). Call [phone] if you haven't heard regarding these appointments within 7 days of discharge.                  Your next 10 appointments already scheduled     Nov 27, 2018  7:00 AM CST   (Arrive by 6:45 AM)   New Transplant Visit with UC SPEC INFUSION, UC 41 ATC   [facility] Specialty and Procedure ([facility])    [address]  [address]  [facility] [id number] [phone]            Nov 27, 2018  8:00 AM CST   (Arrive by 7:45 AM)   Kidney Transplant Discharge with Ben Romano MD   [facility] Specialty and Procedure ([facility])    [address]  [address]  [facility] [address]   [phone]            Nov 28, 2018  7:00 AM CST   (Arrive by 6:45 AM)   New Transplant Visit with UC SPEC INFUSION, UC 42 ATC   [facility] Specialty and Procedure ([facility])    [address]  [address]  [facility] [address]   [phone]            Nov 28, 2018  8:00 AM CST   (Arrive by 7:45 AM)   Kidney Transplant Discharge with Ben Romano MD   [facility]ty and Procedure ([facility])    [address]  [address]  [facility] [address]   [phone]            Dec 06, 2018  1:00 PM CST   (Arrive by 12:45 PM)   Post-Op with Estelita Guzman MD   [facility] Solid Organ Transplant ([facility])    [address] [place] [address]  [address]  [facility] [address]    [phone]            Dec 11, 2018 11:30 AM CST   (Arrive by 11:00 AM)   Return Kidney Transplant with Uc Early Post Transplant   [facility] Nephrology ([facility])    [address]  [address]  [facility] [address]   [phone]            Dec 17, 2018  9:00 AM CST   (Arrive by 8:45 AM)   Cystoscopy with Julianna Thornton NP   [facility] Solid Organ Transplant ([facility])    [address] [place] [address]  [address]  [facility] [address]   [phone]            Gary 15, 2019 10:30 AM CST   (Arrive by 10:00 AM)   Return Kidney Transplant with Uc Early Post Transplant   [facility] Nephrology ([facility])    [address] [place] [address]  [address]  [facility] [address]   [phone]              Further instructions from your care team       Diet recommendations post-transplant: High protein diet ( grams/day x 8 weeks).  Continue Liquacel 2 oz daily to help meet needs during this time frame. Heart healthy dietary habits long term (low saturated/trans fat, low sodium). Practice food safety precautions. See nutrition handout and food safety booklet for more information.       Additional Information     If you use hormonal birth control (such as the pill, patch, ring or implants): You'll need a second form of birth control for 7 days (condoms, a diaphragm or contraceptive foam). While in the hospital, you received a medicine called Bridion. Your normal birth control will not work as well for a week after taking this medicine.          Pending Results     No orders found from 11/18/2018 to 11/21/2018.            Statement of Approval     Ordered          11/26/18 1159  I have reviewed and agree with all the recommendations and orders detailed in this document.  EFFECTIVE NOW     Approved and electronically signed by:  Lizzie Hernandez APRN CNP             Admission Information     Date & Time Provider Department Dept. Phone     "11/20/2018 Estelita Guzman MD [address] [facility] [phone]      Your Vitals Were     Blood Pressure Pulse Temperature Respirations Height Weight    138/67 (BP Location: Left arm) 74 98.6  F (37  C) (Oral) 16 1.6 m (5' 3\") 52.7 kg (116 lb 3.2 oz)    Pulse Oximetry BMI (Body Mass Index)                100% 20.58 kg/m2          [employer] Information     [employer] gives you secure access to your electronic health record. If you see a primary care provider, you can also send messages to your care team and make appointments. If you have questions, please call your primary care clinic.  If you do not have a primary care provider, please call [phone] and they will assist you.        Care EveryWhere ID     This is your Care EveryWhere ID. This could be used by other organizations to access your [facility] medical records  [id number]        Equal Access to Services     JIM MCBRIDE : Kemi Whyte, ashtyn bunn, tad desai, jimmy sims . So [facility] [phone].    ATENCIÓN: Si habla español, tiene a guerin disposición servicios gratuitos de asistencia lingüística. Llame al [phone].    We comply with applicable federal civil rights laws and [place] laws. We do not discriminate on the basis of race, color, national origin, age, disability, sex, sexual orientation, or gender identity.               Review of your medicines      START taking        Dose / Directions    magnesium oxide 400 MG tablet   Commonly known as:  MAG-OX        Dose:  400 mg   Take 1 tablet (400 mg) by mouth daily (with lunch)   Quantity:  30 tablet   Refills:  2       multivitamin, therapeutic Tabs tablet        Dose:  1 tablet   Start taking on:  11/27/2018   Take 1 tablet by mouth daily   Quantity:  30 tablet   Refills:  2       mycophenolic acid 180 MG EC tablet        Dose:  540 mg   Take 3 tablets (540 mg) by mouth 2 times daily   Quantity:  180 tablet   Refills:  11       " sulfamethoxazole-trimethoprim 400-80 MG per tablet   Commonly known as:  BACTRIM/SEPTRA   Indication:  PCP prophylaxis        Dose:  1 tablet   Start taking on:  11/27/2018   Take 1 tablet by mouth daily   Quantity:  30 tablet   Refills:  11       valACYclovir 1000 mg tablet   Commonly known as:  VALTREX   Indication:  Medication Treatment to Prevent Cytomegalovirus Disease        Dose:  1000 mg   Take 1 tablet (1,000 mg) by mouth 2 times daily   Quantity:  60 tablet   Refills:  2         CONTINUE these medicines which may have CHANGED, or have new prescriptions. If we are uncertain of the size of tablets/capsules you have at home, strength may be listed as something that might have changed.        Dose / Directions    * tacrolimus 1 MG capsule   Commonly known as:  GENERIC EQUIVALENT   This may have changed:  how much to take        Dose:  3 mg   Take 3 capsules (3 mg) by mouth 2 times daily   Quantity:  180 capsule   Refills:  11       * tacrolimus 0.5 MG capsule   Commonly known as:  GENERIC EQUIVALENT   This may have changed:  You were already taking a medication with the same name, and this prescription was added. Make sure you understand how and when to take each.        Take one tablet twice daily as directed for dose adjustments.   Quantity:  60 capsule   Refills:  11       * Notice:  This list has 2 medication(s) that are the same as other medications prescribed for you. Read the directions carefully, and ask your doctor or other care provider to review them with you.      CONTINUE these medicines which have NOT CHANGED        Dose / Directions    aspirin 81 MG EC tablet        Dose:  81 mg   Take 81 mg by mouth daily   Refills:  0       atorvastatin 20 MG tablet   Commonly known as:  LIPITOR        Dose:  20 mg   Take 20 mg by mouth At Bedtime   Refills:  0       CO-ENZYME Q-10 PO        Dose:  400 mg   Take 400 mg by mouth daily For cramps from lipitor   Refills:  0       eszopiclone 1 MG Tabs tablet    Commonly known as:  LUNESTA        Dose:  1 mg   Take 1 mg by mouth At Bedtime   Refills:  0       humaLOG 100 UNIT/ML vial   Generic drug:  insulin lispro        Use with insulin pump   Refills:  0       insulin pump infusion        Dose:  0.5 Units/hr   Inject 0.5 Units/hr Subcutaneous Carb ratio: 1:15g/hr Sensitivity factor correction: 1 unit for every 75mg/dL over 100mg/dL   Refills:  0       LINZESS 290 MCG capsule   Generic drug:  linaclotide        Dose:  290 mcg   Take 290 mcg by mouth every morning (before breakfast)   Refills:  0       LIQUACEL Liqd        Dose:  30 mL   Take 30 mLs by mouth 2 times daily Amino acids 16 grams, 2.5 g of arginine per pacakge -90 kcal/30mL liquid   Refills:  0       NEURONTIN 100 MG capsule   Generic drug:  gabapentin        Dose:  100 mg   Take 100 mg by mouth At Bedtime   Refills:  0       polyethylene glycol powder   Commonly known as:  MIRALAX/GLYCOLAX        Dose:  5.7 g   Take 5.7 g by mouth every morning 1/3 of 17g, mix with Liquidcel and water (use as liquid to take with her other morning meds)   Refills:  0       triamcinolone 0.1 % ointment   Commonly known as:  KENALOG        APPLY TO AFFECTED AREA DAILY FOR 2 WEEKS THEN EVERY 3 DAYS FOR 1 WEEK AS NEEDED   Refills:  0       vitamin D3 2000 units Caps        Dose:  1 capsule   Take 1 capsule by mouth every evening With dinner   Refills:  0       ZENPEP PO        Dose:  2 capsule   Take 2 capsules by mouth 3 times daily L-15, A-82, P-51   Refills:  0         STOP taking     DIALYVITE 800-ZINC 15 0.8 MG Tabs           lidocaine-prilocaine cream   Commonly known as:  EMLA           Magnesium Citrate 125 MG Caps           MIRCERA 50 MCG/0.3ML Sosy   Generic drug:  Methoxy PEG-Epoetin Beta                Where to get your medicines      These medications were sent to [facility] [place], [place] - [address]  [address] [address]     Phone:  [phone]     magnesium  oxide 400 MG tablet    multivitamin, therapeutic Tabs tablet    mycophenolic acid 180 MG EC tablet    sulfamethoxazole-trimethoprim 400-80 MG per tablet    tacrolimus 0.5 MG capsule    tacrolimus 1 MG capsule    valACYclovir 1000 mg tablet                Protect others around you: Learn how to safely use, store and throw away your medicines at www.disposemymeds.org.        ANTIBIOTIC INSTRUCTION     You've Been Prescribed an Antibiotic - Now What?  Your healthcare team thinks that you or your loved one might have an infection. Some infections can be treated with antibiotics, which are powerful, life-saving drugs. Like all medications, antibiotics have side effects and should only be used when necessary. There are some important things you should know about your antibiotic treatment.      Your healthcare team may run tests before you start taking an antibiotic.    Your team may take samples (e.g., from your blood, urine or other areas) to run tests to look for bacteria. These test can be important to determine if you need an antibiotic at all and, if you do, which antibiotic will work best.      Within a few days, your healthcare team might change or even stop your antibiotic.    Your team may start you on an antibiotic while they are working to find out what is making you sick.    Your team might change your antibiotic because test results show that a different antibiotic would be better to treat your infection.    In some cases, once your team has more information, they learn that you do not need an antibiotic at all. They may find out that you don't have an infection, or that the antibiotic you're taking won't work against your infection. For example, an infection caused by a virus can't be treated with antibiotics. Staying on an antibiotic when you don't need it is more likely to be harmful than helpful.      You may experience side effects from your antibiotic.    Like all medications, antibiotics have side  effects. Some of these can be serious.    Let you healthcare team know if you have any known allergies when you are admitted to the hospital.    One significant side effect of nearly all antibiotics is the risk of severe and sometimes deadly diarrhea caused by Clostridium difficile (C. Difficile). This occurs when a person takes antibiotics because some good germs are destroyed. Antibiotic use allows C. diificile to take over, putting patients at high risk for this serious infection.    As a patient or caregiver, it is important to understand your or your loved one's antibiotic treatment. It is especially important for caregivers to speak up when patients can't speak for themselves. Here are some important questions to ask your healthcare team.    What infection is this antibiotic treating and how do you know I have that infection?    What side effects might occur from this antibiotic?    How long will I need to take this antibiotic?    Is it safe to take this antibiotic with other medications or supplements (e.g., vitamins) that I am taking?     Are there any special directions I need to know about taking this antibiotic? For example, should I take it with food?    How will I be monitored to know whether my infection is responding to the antibiotic?    What tests may help to make sure the right antibiotic is prescribed for me?      Information provided by:  www.cdc.gov/getsmart  U.S. Department of Health and Human Services  Centers for disease Control and Prevention  National Center for Emerging and Zoonotic Infectious Diseases  Division of Healthcare Quality Promotion             Medication List: This is a list of all your medications and when to take them. Check marks below indicate your daily home schedule. Keep this list as a reference.      Medications           Morning Afternoon Evening Bedtime As Needed    aspirin 81 MG EC tablet   Take 81 mg by mouth daily                                atorvastatin 20 MG  tablet   Commonly known as:  LIPITOR   Take 20 mg by mouth At Bedtime   Last time this was given:  20 mg on 11/25/2018  9:14 PM                                CO-ENZYME Q-10 PO   Take 400 mg by mouth daily For cramps from lipitor                                eszopiclone 1 MG Tabs tablet   Commonly known as:  LUNESTA   Take 1 mg by mouth At Bedtime   Last time this was given:  1 mg on 11/25/2018  9:14 PM                                humaLOG 100 UNIT/ML vial   Use with insulin pump   Last time this was given:  11/23/2018  1:48 PM   Generic drug:  insulin lispro                                insulin pump infusion   Inject 0.5 Units/hr Subcutaneous Carb ratio: 1:15g/hr Sensitivity factor correction: 1 unit for every 75mg/dL over 100mg/dL                                LINZESS 290 MCG capsule   Take 290 mcg by mouth every morning (before breakfast)   Last time this was given:  290 mcg on 11/26/2018  7:45 AM   Generic drug:  linaclotide                                LIQUACEL Liqd   Take 30 mLs by mouth 2 times daily Amino acids 16 grams, 2.5 g of arginine per pacakge -90 kcal/30mL liquid                                magnesium oxide 400 MG tablet   Commonly known as:  MAG-OX   Take 1 tablet (400 mg) by mouth daily (with lunch)   Last time this was given:  400 mg on 11/26/2018 11:58 AM                                multivitamin, therapeutic Tabs tablet   Take 1 tablet by mouth daily   Start taking on:  11/27/2018   Last time this was given:  1 tablet on 11/26/2018  7:45 AM                                mycophenolic acid 180 MG EC tablet   Take 3 tablets (540 mg) by mouth 2 times daily   Last time this was given:  540 mg on 11/26/2018  7:44 AM                                NEURONTIN 100 MG capsule   Take 100 mg by mouth At Bedtime   Last time this was given:  100 mg on 11/25/2018  9:15 PM   Generic drug:  gabapentin                                polyethylene glycol powder   Commonly known as:  MIRALAX/GLYCOLAX    Take 5.7 g by mouth every morning 1/3 of 17g, mix with Liquidcel and water (use as liquid to take with her other morning meds)                                sulfamethoxazole-trimethoprim 400-80 MG per tablet   Commonly known as:  BACTRIM/SEPTRA   Take 1 tablet by mouth daily   Start taking on:  11/27/2018   Last time this was given:  1 tablet on 11/26/2018  7:44 AM                                * tacrolimus 1 MG capsule   Commonly known as:  GENERIC EQUIVALENT   Take 3 capsules (3 mg) by mouth 2 times daily   Last time this was given:  2.5 mg on 11/26/2018  7:44 AM                                * tacrolimus 0.5 MG capsule   Commonly known as:  GENERIC EQUIVALENT   Take one tablet twice daily as directed for dose adjustments.   Last time this was given:  2.5 mg on 11/26/2018  7:44 AM                                triamcinolone 0.1 % ointment   Commonly known as:  KENALOG   APPLY TO AFFECTED AREA DAILY FOR 2 WEEKS THEN EVERY 3 DAYS FOR 1 WEEK AS NEEDED                                valACYclovir 1000 mg tablet   Commonly known as:  VALTREX   Take 1 tablet (1,000 mg) by mouth 2 times daily   Last time this was given:  1,000 mg on 11/26/2018  7:44 AM                                vitamin D3 2000 units Caps   Take 1 capsule by mouth every evening With dinner                                ZENPEP PO   Take 2 capsules by mouth 3 times daily L-15, A-82, P-51   Last time this was given:  15,000 Units on 11/22/2018  8:04 AM                                * Notice:  This list has 2 medication(s) that are the same as other medications prescribed for you. Read the directions carefully, and ask your doctor or other care provider to review them with you.

## 2018-11-20 NOTE — OR NURSING
Limb alert applied to Right arm- has A/V fistula. Last dialysis 11/19/18. Pt came with her  Insulin pump running. BG at 0614= 134. Recheck at 0750= 104.  Dr. Sweet updated on status. Pt does not want to discharge her Insulin pump until Insulin infusion running.  Dr. Sweet OK'd starting Insulin infusion, will monitor BG closely and start with Algorithm I.   Pt's  and mother in PreOp with pt per her request.

## 2018-11-20 NOTE — LETTER
Transition Communication Hand-off for Care Transitions to Next Level of Care Provider    Name: Letty B Benavidez  : 1960  MRN #: [id number]  Primary Care Provider: Anita Jurado     Primary Clinic: [address] [address]     Reason for Hospitalization:  Kidney transplanted [Z94.0]  Admit Date/Time: 2018  5:12 AM  Discharge Date: 18  Payor Source: Payor: BCBS / Plan: BCBS OUT OF STATE / Product Type: Indemnity /              Reason for Communication Hand-off Referral: Other KT    Discharge Plan: ATC then CSC to OP lab while stays locally       Concern for non-adherence with plan of care:   Y/N N  Discharge Needs Assessment:        Follow-up specialty is recommended: Yes    Follow-up plan:  Future Appointments  Date Time Provider Department [facility]   [facility]   [facility] 8:00 AM Wil Becker, [facility]   2018 8:00 AM Ben Romano MD [facility]   [facility] 7:[facility]   2018 8:00 AM Ben Romano MD [facility]   2018 1:00 PM Estelita Guzman MD [facility]   2018 11:30 AM Transplant, [facility]   2018 9:00 AM Julianna Thornton NP [facility]   1/15/2019 10:30 AM Transplant, [facility]   3/11/2019 10:00 AM Transplant, [facility]   2019 10:00 AM Transplant, [facility]       Any outstanding tests or procedures:              Key Recommendations:      Noemi Aquino    AVS/Discharge Summary is the source of truth; this is a helpful guide for improved communication of patient story

## 2018-11-20 NOTE — IP AVS SNAPSHOT
[address] [address]    Phone:  [phone]                                       After Visit Summary   11/20/2018    Letty Benavidez    MRN: [id number]           After Visit Summary Signature Page     I have received my discharge instructions, and my questions have been answered. I have discussed any challenges I see with this plan with the nurse or doctor.    ..........................................................................................................................................  Patient/Patient Representative Signature      ..........................................................................................................................................  Patient Representative Print Name and Relationship to Patient    ..................................................               ................................................  Date                                   Time    ..........................................................................................................................................  Reviewed by Signature/Title    ...................................................              ..............................................  Date                                               Time          22EPIC Rev 08/18

## 2018-11-20 NOTE — ANESTHESIA POSTPROCEDURE EVALUATION
Anesthesia POST Procedure Evaluation    Patient: Letty Benavidez   MRN:     [id number] Gender:   female   Age:    58 year old :      1960        Preoperative Diagnosis: Diabetes Mellitus Type I    Procedure(s):  Kidney Transplant Living Unrelated (Anonymous) Non Direct Recipient, Uretral Stent Placement.    Postop Comments: No value filed.       Anesthesia Type:  General    Reportable Event: NO     PAIN: Uncomplicated   Sign Out status: Comfortable, Well controlled pain     PONV: No PONV   Sign Out status:  No Nausea or Vomiting     Neuro/Psych: Uneventful perioperative course   Sign Out Status: Preoperative baseline; Age appropriate mentation     Airway/Resp.: Uneventful perioperative course   Sign Out Status: Non labored breathing, age appropriate RR; Resp. Status within EXPECTED Parameters     CV: Uneventful perioperative course   Sign Out status: Appropriate BP and perfusion indices; Appropriate HR/Rhythm     Disposition:   Sign Out in:  PACU  Disposition:  Phase II; Home  Recovery Course: Uneventful  Follow-Up: Not required     Comments/Narrative:  CXR reviewed. Okay for discharge from PACU.           Last Anesthesia Record Vitals:  CRNA VITALS  2018 1335 - 2018 1435      2018             Resp Rate (observed): (!)  1          Last PACU/Preop Vitals:  Vitals:    18 1430 18 1445 18 1500   BP: 103/50 (!) 84/46 101/50   Resp: 14 10 11   Temp:      SpO2: 100% 99% 93%         Electronically Signed By: Wero Reyes MD, 2018, 3:25 PM

## 2018-11-20 NOTE — ANESTHESIA CARE TRANSFER NOTE
Patient: Letty Benavidez    Procedure(s):  Kidney Transplant Living Unrelated (Anonymous) Non Direct Recipient, Uretral Stent Placement.     Diagnosis: Diabetes Mellitus Type I   Diagnosis Additional Information: No value filed.    Anesthesia Type:   No value filed.     Note:  Airway :Face Mask  Patient transferred to:PACU  Comments: VSS. Breathing spontaneously at a regular rate with adequate tidal volumes and maintaining O2 sats on 6L facemask. Denies nausea or pain. No apparent complications from anesthesia.     Kelsey Goel MD  [facility] Anesthesia Resident  Handoff Report: Identifed the Patient, Identified the Reponsible Provider, Reviewed the pertinent medical history, Discussed the surgical course, Reviewed Intra-OP anesthesia mangement and issues during anesthesia, Set expectations for post-procedure period and Allowed opportunity for questions and acknowledgement of understanding      Vitals: (Last set prior to Anesthesia Care Transfer)    CRNA VITALS  11/20/2018 1401 - 11/20/2018 1431      11/20/2018             Resp Rate (observed): (!)  1                Electronically Signed By: Kelsey Salgado MD  November 20, 2018  2:31 PM

## 2018-11-20 NOTE — ANESTHESIA PROCEDURE NOTES
Arterial Line Procedure Note  Staff:     Anesthesiologist:  SLADE GAMEZ    Resident/CRNA:  RADHA HILLS    Arterial line performed by resident/CRNA in presence of a teaching physician    Location: In OR After Induction  Procedure Start/Stop Times:     patient identified, IV checked, site marked, risks and benefits discussed, informed consent, monitors and equipment checked and pre-op evaluation      Correct Patient: Yes      Correct Position: Yes      Correct Site: Yes      Correct Procedure: Yes      Correct Laterality:  Yes    Site Marked:  Yes  Line Placement:     Procedure:  Arterial Line    Insertion Site:  Radial    Insertion laterality:  Left    Skin Prep: Chloraprep      Patient Prep: patient draped, mask, sterile gloves, hat and hand hygiene      Local skin infiltration:  None    Ultrasound Guided?: Yes      Artery evaluated via ultrasound confirming patency.   Using realtime imaging, the artery was punctured and the needle was observed entering the artery.      A permanent image is NOT entered into the patient's record.      Catheter size:  20 gauge, 12 cm    Cath secured with: other (comment)      Dressing:  Tegaderm    Complications:  None obvious    Arterial waveform: Yes      IBP within 10% of NIBP: Yes

## 2018-11-20 NOTE — PROGRESS NOTES
Patient removed from [facility] waitlist after living donor kidney transplant. OS ID [id number].   Donor PHS increased risk: No.   If Yes, MD documentation NA.

## 2018-11-20 NOTE — BRIEF OP NOTE
[facility], [place]    Brief Operative Note    Pre-operative diagnosis: Diabetes Mellitus Type I   Post-operative diagnosis Same  Procedure: Procedure(s):  Kidney Transplant Living Unrelated (Anonymous) Non Direct Recipient, Uretral Stent Placement.   Surgeon: Surgeon(s) and Role:     * Estelita Guzman MD - Primary     * Morena Mary MD - Assisting     * Viridiana Su MD - Resident - Assisting  Anesthesia: General   Estimated blood loss: 100  Drains: CAMDEN Left Upper Abdomen   Specimens: none  Findings:   Living donor kidney transplantation in left abdomen using left external iliac artery and vein. Ureter anastomosis to bladder. Producing urine intraoperatively.   Complications: None.  Implants: Ureter stent     Viridiana Su MD

## 2018-11-20 NOTE — PROGRESS NOTES
Notified Dr. Mary that ultrasound is complete and report is up in Epic. If she could take a look at that and labs and let me know if there are any interventions needed prior to transferring to the floor. I also notified her that urine output this hour was 100ml compared to 525ml in the first hour. Her BP's have been on the lower side (systolic 80-90) as well. Asked if we should consider a bolus prior to transfer? Dr. Mary is going to evaluate and call back.

## 2018-11-20 NOTE — OR NURSING
Insulin infusion started at 0830 at 0.2 Units/hour per Algorithm I. BG to be rechecked at 0900. Pt disconnected her Insulin pump and subcutaneous site, handed her pump to her . Dr. Kelsey Salgado from Anesthesia here to take pt to OR. Handoff report given to Dr. Salgado.

## 2018-11-21 LAB
ANION GAP SERPL CALCULATED.3IONS-SCNC: 8 MMOL/L (ref 3–14)
BASOPHILS # BLD AUTO: 0 10E9/L (ref 0–0.2)
BASOPHILS NFR BLD AUTO: 0 %
BLD PROD TYP BPU: NORMAL
BLD PROD TYP BPU: NORMAL
BLD UNIT ID BPU: 0
BLD UNIT ID BPU: 0
BLOOD PRODUCT CODE: NORMAL
BLOOD PRODUCT CODE: NORMAL
BPU ID: NORMAL
BPU ID: NORMAL
BUN SERPL-MCNC: 24 MG/DL (ref 7–30)
CALCIUM SERPL-MCNC: 8 MG/DL (ref 8.5–10.1)
CHLORIDE SERPL-SCNC: 106 MMOL/L (ref 94–109)
CO2 SERPL-SCNC: 23 MMOL/L (ref 20–32)
CREAT SERPL-MCNC: 1.82 MG/DL (ref 0.52–1.04)
DIFFERENTIAL METHOD BLD: ABNORMAL
DONOR IDENTIFICATION: NORMAL
DSA COMMENTS: NORMAL
DSA PRESENT: NO
DSA TEST METHOD: NORMAL
EOSINOPHIL # BLD AUTO: 0 10E9/L (ref 0–0.7)
EOSINOPHIL NFR BLD AUTO: 0 %
ERYTHROCYTE [DISTWIDTH] IN BLOOD BY AUTOMATED COUNT: 15.2 % (ref 10–15)
GFR SERPL CREATININE-BSD FRML MDRD: 28 ML/MIN/1.7M2
GLUCOSE BLDC GLUCOMTR-MCNC: 102 MG/DL (ref 70–99)
GLUCOSE BLDC GLUCOMTR-MCNC: 105 MG/DL (ref 70–99)
GLUCOSE BLDC GLUCOMTR-MCNC: 105 MG/DL (ref 70–99)
GLUCOSE BLDC GLUCOMTR-MCNC: 107 MG/DL (ref 70–99)
GLUCOSE BLDC GLUCOMTR-MCNC: 108 MG/DL (ref 70–99)
GLUCOSE BLDC GLUCOMTR-MCNC: 114 MG/DL (ref 70–99)
GLUCOSE BLDC GLUCOMTR-MCNC: 115 MG/DL (ref 70–99)
GLUCOSE BLDC GLUCOMTR-MCNC: 117 MG/DL (ref 70–99)
GLUCOSE BLDC GLUCOMTR-MCNC: 121 MG/DL (ref 70–99)
GLUCOSE BLDC GLUCOMTR-MCNC: 122 MG/DL (ref 70–99)
GLUCOSE BLDC GLUCOMTR-MCNC: 123 MG/DL (ref 70–99)
GLUCOSE BLDC GLUCOMTR-MCNC: 125 MG/DL (ref 70–99)
GLUCOSE BLDC GLUCOMTR-MCNC: 126 MG/DL (ref 70–99)
GLUCOSE BLDC GLUCOMTR-MCNC: 127 MG/DL (ref 70–99)
GLUCOSE BLDC GLUCOMTR-MCNC: 129 MG/DL (ref 70–99)
GLUCOSE BLDC GLUCOMTR-MCNC: 132 MG/DL (ref 70–99)
GLUCOSE BLDC GLUCOMTR-MCNC: 141 MG/DL (ref 70–99)
GLUCOSE BLDC GLUCOMTR-MCNC: 175 MG/DL (ref 70–99)
GLUCOSE BLDC GLUCOMTR-MCNC: 83 MG/DL (ref 70–99)
GLUCOSE BLDC GLUCOMTR-MCNC: 87 MG/DL (ref 70–99)
GLUCOSE BLDC GLUCOMTR-MCNC: 89 MG/DL (ref 70–99)
GLUCOSE SERPL-MCNC: 82 MG/DL (ref 70–99)
HCT VFR BLD AUTO: 25.2 % (ref 35–47)
HGB BLD-MCNC: 8.3 G/DL (ref 11.7–15.7)
HGB BLD-MCNC: 8.3 G/DL (ref 11.7–15.7)
HGB BLD-MCNC: 8.4 G/DL (ref 11.7–15.7)
HGB BLD-MCNC: 8.4 G/DL (ref 11.7–15.7)
HGB BLD-MCNC: 8.5 G/DL (ref 11.7–15.7)
IMM GRANULOCYTES # BLD: 0.1 10E9/L (ref 0–0.4)
IMM GRANULOCYTES NFR BLD: 1 %
LYMPHOCYTES # BLD AUTO: 0.5 10E9/L (ref 0.8–5.3)
LYMPHOCYTES NFR BLD AUTO: 4.7 %
MAGNESIUM SERPL-MCNC: 2 MG/DL (ref 1.6–2.3)
MCH RBC QN AUTO: 31.8 PG (ref 26.5–33)
MCHC RBC AUTO-ENTMCNC: 32.9 G/DL (ref 31.5–36.5)
MCV RBC AUTO: 97 FL (ref 78–100)
MONOCYTES # BLD AUTO: 0.1 10E9/L (ref 0–1.3)
MONOCYTES NFR BLD AUTO: 1.4 %
NEUTROPHILS # BLD AUTO: 9.1 10E9/L (ref 1.6–8.3)
NEUTROPHILS NFR BLD AUTO: 92.9 %
NRBC # BLD AUTO: 0 10*3/UL
NRBC BLD AUTO-RTO: 0 /100
ORGAN: NORMAL
PHOSPHATE SERPL-MCNC: 3 MG/DL (ref 2.5–4.5)
PLATELET # BLD AUTO: 80 10E9/L (ref 150–450)
POTASSIUM SERPL-SCNC: 3.1 MMOL/L (ref 3.4–5.3)
POTASSIUM SERPL-SCNC: 3.5 MMOL/L (ref 3.4–5.3)
POTASSIUM SERPL-SCNC: 3.8 MMOL/L (ref 3.4–5.3)
POTASSIUM SERPL-SCNC: 3.9 MMOL/L (ref 3.4–5.3)
POTASSIUM SERPL-SCNC: 4.2 MMOL/L (ref 3.4–5.3)
POTASSIUM SERPL-SCNC: 4.2 MMOL/L (ref 3.4–5.3)
RBC # BLD AUTO: 2.61 10E12/L (ref 3.8–5.2)
SA1 CELL: NORMAL
SA1 COMMENTS: NORMAL
SA1 HI RISK ABY: NORMAL
SA1 MOD RISK ABY: NORMAL
SA1 TEST METHOD: NORMAL
SA2 CELL: NORMAL
SA2 COMMENTS: NORMAL
SA2 HI RISK ABY UA: NORMAL
SA2 MOD RISK ABY: NORMAL
SA2 TEST METHOD: NORMAL
SODIUM SERPL-SCNC: 137 MMOL/L (ref 133–144)
TRANSFUSION STATUS PATIENT QL: NORMAL
WBC # BLD AUTO: 9.8 10E9/L (ref 4–11)

## 2018-11-21 PROCEDURE — 25000132 ZZH RX MED GY IP 250 OP 250 PS 637: Performed by: STUDENT IN AN ORGANIZED HEALTH CARE EDUCATION/TRAINING PROGRAM

## 2018-11-21 PROCEDURE — 12000024 ZZH R&B TRANSPLANT CRITICAL

## 2018-11-21 PROCEDURE — 36592 COLLECT BLOOD FROM PICC: CPT | Performed by: STUDENT IN AN ORGANIZED HEALTH CARE EDUCATION/TRAINING PROGRAM

## 2018-11-21 PROCEDURE — 84132 ASSAY OF SERUM POTASSIUM: CPT | Performed by: STUDENT IN AN ORGANIZED HEALTH CARE EDUCATION/TRAINING PROGRAM

## 2018-11-21 PROCEDURE — 84100 ASSAY OF PHOSPHORUS: CPT | Performed by: STUDENT IN AN ORGANIZED HEALTH CARE EDUCATION/TRAINING PROGRAM

## 2018-11-21 PROCEDURE — 25000131 ZZH RX MED GY IP 250 OP 636 PS 637: Performed by: STUDENT IN AN ORGANIZED HEALTH CARE EDUCATION/TRAINING PROGRAM

## 2018-11-21 PROCEDURE — 25000131 ZZH RX MED GY IP 250 OP 636 PS 637: Performed by: PHYSICIAN ASSISTANT

## 2018-11-21 PROCEDURE — 25000128 H RX IP 250 OP 636: Performed by: PHYSICIAN ASSISTANT

## 2018-11-21 PROCEDURE — 85018 HEMOGLOBIN: CPT | Performed by: STUDENT IN AN ORGANIZED HEALTH CARE EDUCATION/TRAINING PROGRAM

## 2018-11-21 PROCEDURE — 25000128 H RX IP 250 OP 636: Performed by: STUDENT IN AN ORGANIZED HEALTH CARE EDUCATION/TRAINING PROGRAM

## 2018-11-21 PROCEDURE — 25000132 ZZH RX MED GY IP 250 OP 250 PS 637: Performed by: PHYSICIAN ASSISTANT

## 2018-11-21 PROCEDURE — 85025 COMPLETE CBC W/AUTO DIFF WBC: CPT | Performed by: STUDENT IN AN ORGANIZED HEALTH CARE EDUCATION/TRAINING PROGRAM

## 2018-11-21 PROCEDURE — 80048 BASIC METABOLIC PNL TOTAL CA: CPT | Performed by: STUDENT IN AN ORGANIZED HEALTH CARE EDUCATION/TRAINING PROGRAM

## 2018-11-21 PROCEDURE — 25000125 ZZHC RX 250: Performed by: STUDENT IN AN ORGANIZED HEALTH CARE EDUCATION/TRAINING PROGRAM

## 2018-11-21 PROCEDURE — 83735 ASSAY OF MAGNESIUM: CPT | Performed by: STUDENT IN AN ORGANIZED HEALTH CARE EDUCATION/TRAINING PROGRAM

## 2018-11-21 PROCEDURE — 25000132 ZZH RX MED GY IP 250 OP 250 PS 637: Performed by: TRANSPLANT SURGERY

## 2018-11-21 PROCEDURE — 00000146 ZZHCL STATISTIC GLUCOSE BY METER IP

## 2018-11-21 RX ORDER — VIT B COMPLX C/FOLIC ACID/ZINC 0.8MG-15MG
0.8 TABLET ORAL EVERY EVENING
Status: ON HOLD | COMMUNITY
End: 2018-11-26

## 2018-11-21 RX ORDER — ESZOPICLONE 1 MG/1
1 TABLET, FILM COATED ORAL AT BEDTIME
COMMUNITY
End: 2018-12-06

## 2018-11-21 RX ORDER — MULTIVITAMIN,THERAPEUTIC
1 TABLET ORAL DAILY
Status: DISCONTINUED | OUTPATIENT
Start: 2018-11-21 | End: 2018-11-26 | Stop reason: HOSPADM

## 2018-11-21 RX ORDER — ACETAMINOPHEN 325 MG/1
650 TABLET ORAL ONCE
Status: COMPLETED | OUTPATIENT
Start: 2018-11-21 | End: 2018-11-21

## 2018-11-21 RX ORDER — POTASSIUM CHLORIDE 1.5 G/1.58G
20-40 POWDER, FOR SOLUTION ORAL
Status: DISCONTINUED | OUTPATIENT
Start: 2018-11-21 | End: 2018-11-26 | Stop reason: HOSPADM

## 2018-11-21 RX ORDER — SULFAMETHOXAZOLE AND TRIMETHOPRIM 400; 80 MG/1; MG/1
1 TABLET ORAL DAILY
Status: DISCONTINUED | OUTPATIENT
Start: 2018-11-21 | End: 2018-11-26 | Stop reason: HOSPADM

## 2018-11-21 RX ORDER — POTASSIUM CHLORIDE 7.45 MG/ML
10 INJECTION INTRAVENOUS
Status: DISCONTINUED | OUTPATIENT
Start: 2018-11-21 | End: 2018-11-26 | Stop reason: HOSPADM

## 2018-11-21 RX ORDER — DIPHENHYDRAMINE HCL 12.5MG/5ML
25-50 LIQUID (ML) ORAL ONCE
Status: COMPLETED | OUTPATIENT
Start: 2018-11-21 | End: 2018-11-21

## 2018-11-21 RX ORDER — POTASSIUM CHLORIDE 750 MG/1
40 TABLET, EXTENDED RELEASE ORAL ONCE
Status: COMPLETED | OUTPATIENT
Start: 2018-11-21 | End: 2018-11-21

## 2018-11-21 RX ORDER — VALACYCLOVIR HYDROCHLORIDE 1 G/1
1000 TABLET, FILM COATED ORAL 2 TIMES DAILY
Status: DISCONTINUED | OUTPATIENT
Start: 2018-11-21 | End: 2018-11-26 | Stop reason: HOSPADM

## 2018-11-21 RX ORDER — OXYCODONE HYDROCHLORIDE 5 MG/1
5 TABLET ORAL EVERY 4 HOURS PRN
Status: DISCONTINUED | OUTPATIENT
Start: 2018-11-21 | End: 2018-11-26 | Stop reason: HOSPADM

## 2018-11-21 RX ORDER — CARVEDILOL 3.12 MG/1
3.12 TABLET ORAL 2 TIMES DAILY WITH MEALS
Status: ON HOLD | COMMUNITY
End: 2018-11-21

## 2018-11-21 RX ORDER — POTASSIUM CHLORIDE 29.8 MG/ML
20 INJECTION INTRAVENOUS
Status: DISCONTINUED | OUTPATIENT
Start: 2018-11-21 | End: 2018-11-26 | Stop reason: HOSPADM

## 2018-11-21 RX ORDER — DIPHENHYDRAMINE HCL 25 MG
25-50 CAPSULE ORAL ONCE
Status: COMPLETED | OUTPATIENT
Start: 2018-11-21 | End: 2018-11-21

## 2018-11-21 RX ORDER — POLYETHYLENE GLYCOL 3350 17 G/17G
5.7 POWDER, FOR SOLUTION ORAL EVERY MORNING
COMMUNITY
End: 2019-03-29

## 2018-11-21 RX ORDER — POTASSIUM CL/LIDO/0.9 % NACL 10MEQ/0.1L
10 INTRAVENOUS SOLUTION, PIGGYBACK (ML) INTRAVENOUS
Status: DISCONTINUED | OUTPATIENT
Start: 2018-11-21 | End: 2018-11-26 | Stop reason: HOSPADM

## 2018-11-21 RX ORDER — ASPIRIN 81 MG/1
81 TABLET ORAL DAILY
COMMUNITY

## 2018-11-21 RX ORDER — POLYETHYLENE GLYCOL 3350 17 G/17G
17 POWDER, FOR SOLUTION ORAL 2 TIMES DAILY
Status: DISCONTINUED | OUTPATIENT
Start: 2018-11-21 | End: 2018-11-26 | Stop reason: HOSPADM

## 2018-11-21 RX ORDER — ASPIRIN 81 MG/1
81 TABLET, CHEWABLE ORAL
Status: DISCONTINUED | OUTPATIENT
Start: 2018-11-21 | End: 2018-11-26 | Stop reason: HOSPADM

## 2018-11-21 RX ORDER — ESZOPICLONE 1 MG/1
1 TABLET, FILM COATED ORAL AT BEDTIME
Status: DISCONTINUED | OUTPATIENT
Start: 2018-11-21 | End: 2018-11-26 | Stop reason: HOSPADM

## 2018-11-21 RX ORDER — FUROSEMIDE 10 MG/ML
40 INJECTION INTRAMUSCULAR; INTRAVENOUS ONCE
Status: COMPLETED | OUTPATIENT
Start: 2018-11-21 | End: 2018-11-21

## 2018-11-21 RX ORDER — GLYCERIN/MALTODEXTRIN
30 LIQUID (ML) ORAL 2 TIMES DAILY
COMMUNITY
End: 2019-03-29

## 2018-11-21 RX ORDER — POTASSIUM CHLORIDE 750 MG/1
20-40 TABLET, EXTENDED RELEASE ORAL
Status: DISCONTINUED | OUTPATIENT
Start: 2018-11-21 | End: 2018-11-26 | Stop reason: HOSPADM

## 2018-11-21 RX ORDER — HYDROMORPHONE HYDROCHLORIDE 1 MG/ML
0.2 INJECTION, SOLUTION INTRAMUSCULAR; INTRAVENOUS; SUBCUTANEOUS
Status: ACTIVE | OUTPATIENT
Start: 2018-11-21 | End: 2018-11-22

## 2018-11-21 RX ADMIN — INSULIN ASPART 1 UNITS: 100 INJECTION, SOLUTION INTRAVENOUS; SUBCUTANEOUS at 14:12

## 2018-11-21 RX ADMIN — VALACYCLOVIR HYDROCHLORIDE 1000 MG: 1 TABLET, FILM COATED ORAL at 19:56

## 2018-11-21 RX ADMIN — BENZOCAINE AND MENTHOL 1 LOZENGE: 15; 3.6 LOZENGE ORAL at 00:12

## 2018-11-21 RX ADMIN — PANCRELIPASE 30000 UNITS: 15000; 51000; 82000 CAPSULE, DELAYED RELEASE ORAL at 08:14

## 2018-11-21 RX ADMIN — BENZOCAINE AND MENTHOL 1 LOZENGE: 15; 3.6 LOZENGE ORAL at 01:55

## 2018-11-21 RX ADMIN — PANCRELIPASE 15000 UNITS: 15000; 51000; 82000 CAPSULE, DELAYED RELEASE ORAL at 13:04

## 2018-11-21 RX ADMIN — MYCOPHENOLIC ACID 360 MG: 360 TABLET, DELAYED RELEASE ORAL at 13:40

## 2018-11-21 RX ADMIN — POLYETHYLENE GLYCOL 3350 17 G: 17 POWDER, FOR SOLUTION ORAL at 19:03

## 2018-11-21 RX ADMIN — OXYCODONE HYDROCHLORIDE 5 MG: 5 TABLET ORAL at 23:50

## 2018-11-21 RX ADMIN — PANCRELIPASE 15000 UNITS: 15000; 51000; 82000 CAPSULE, DELAYED RELEASE ORAL at 18:06

## 2018-11-21 RX ADMIN — BENZOCAINE AND MENTHOL 1 LOZENGE: 15; 3.6 LOZENGE ORAL at 14:38

## 2018-11-21 RX ADMIN — ACETAMINOPHEN 975 MG: 325 TABLET, FILM COATED ORAL at 23:50

## 2018-11-21 RX ADMIN — FUROSEMIDE 40 MG: 10 INJECTION, SOLUTION INTRAVENOUS at 09:24

## 2018-11-21 RX ADMIN — SENNOSIDES AND DOCUSATE SODIUM 2 TABLET: 8.6; 5 TABLET ORAL at 08:15

## 2018-11-21 RX ADMIN — SENNOSIDES AND DOCUSATE SODIUM 2 TABLET: 8.6; 5 TABLET ORAL at 19:56

## 2018-11-21 RX ADMIN — ESZOPICLONE 1 MG: 1 TABLET, FILM COATED ORAL at 21:32

## 2018-11-21 RX ADMIN — SULFAMETHOXAZOLE AND TRIMETHOPRIM 1 TABLET: 400; 80 TABLET ORAL at 08:14

## 2018-11-21 RX ADMIN — HUMAN INSULIN 1 UNITS/HR: 100 INJECTION, SOLUTION SUBCUTANEOUS at 13:02

## 2018-11-21 RX ADMIN — VALACYCLOVIR HYDROCHLORIDE 1000 MG: 1 TABLET, FILM COATED ORAL at 13:04

## 2018-11-21 RX ADMIN — POTASSIUM CHLORIDE 20 MEQ: 29.8 INJECTION, SOLUTION INTRAVENOUS at 00:50

## 2018-11-21 RX ADMIN — ACETAMINOPHEN 975 MG: 325 TABLET, FILM COATED ORAL at 08:13

## 2018-11-21 RX ADMIN — OXYCODONE HYDROCHLORIDE 5 MG: 5 TABLET ORAL at 18:14

## 2018-11-21 RX ADMIN — BENZOCAINE AND MENTHOL 1 LOZENGE: 15; 3.6 LOZENGE ORAL at 20:08

## 2018-11-21 RX ADMIN — SODIUM CHLORIDE, SODIUM LACTATE, POTASSIUM CHLORIDE, CALCIUM CHLORIDE AND DEXTROSE MONOHYDRATE: 5; 600; 310; 30; 20 INJECTION, SOLUTION INTRAVENOUS at 01:02

## 2018-11-21 RX ADMIN — POTASSIUM CHLORIDE 40 MEQ: 750 TABLET, EXTENDED RELEASE ORAL at 15:11

## 2018-11-21 RX ADMIN — METHYLPREDNISOLONE SODIUM SUCCINATE 250 MG: 125 INJECTION, POWDER, LYOPHILIZED, FOR SOLUTION INTRAMUSCULAR; INTRAVENOUS at 14:24

## 2018-11-21 RX ADMIN — MYCOPHENOLIC ACID 360 MG: 360 TABLET, DELAYED RELEASE ORAL at 08:15

## 2018-11-21 RX ADMIN — SODIUM CHLORIDE 1000 ML: 9 INJECTION, SOLUTION INTRAVENOUS at 08:00

## 2018-11-21 RX ADMIN — TACROLIMUS 2 MG: 1 CAPSULE ORAL at 19:56

## 2018-11-21 RX ADMIN — DIPHENHYDRAMINE HYDROCHLORIDE 50 MG: 25 CAPSULE ORAL at 14:24

## 2018-11-21 RX ADMIN — ANTI-THYMOCYTE GLOBULIN (RABBIT) 100 MG: 5 INJECTION, POWDER, LYOPHILIZED, FOR SOLUTION INTRAVENOUS at 15:02

## 2018-11-21 RX ADMIN — SODIUM CHLORIDE, POTASSIUM CHLORIDE, SODIUM LACTATE AND CALCIUM CHLORIDE 500 ML: 600; 310; 30; 20 INJECTION, SOLUTION INTRAVENOUS at 02:35

## 2018-11-21 RX ADMIN — THERA TABS 1 TABLET: TAB at 13:04

## 2018-11-21 RX ADMIN — ACETAMINOPHEN 650 MG: 325 TABLET, FILM COATED ORAL at 14:24

## 2018-11-21 RX ADMIN — MYCOPHENOLIC ACID 360 MG: 360 TABLET, DELAYED RELEASE ORAL at 19:56

## 2018-11-21 RX ADMIN — SODIUM CHLORIDE, SODIUM LACTATE, POTASSIUM CHLORIDE, CALCIUM CHLORIDE AND DEXTROSE MONOHYDRATE: 5; 600; 310; 30; 20 INJECTION, SOLUTION INTRAVENOUS at 21:38

## 2018-11-21 RX ADMIN — TACROLIMUS 2 MG: 1 CAPSULE ORAL at 08:15

## 2018-11-21 RX ADMIN — POTASSIUM CHLORIDE 20 MEQ: 29.8 INJECTION, SOLUTION INTRAVENOUS at 02:35

## 2018-11-21 RX ADMIN — BENZOCAINE AND MENTHOL 1 LOZENGE: 15; 3.6 LOZENGE ORAL at 05:22

## 2018-11-21 RX ADMIN — ASPIRIN 81 MG CHEWABLE TABLET 81 MG: 81 TABLET CHEWABLE at 08:15

## 2018-11-21 RX ADMIN — OXYCODONE HYDROCHLORIDE 5 MG: 5 TABLET ORAL at 13:40

## 2018-11-21 RX ADMIN — POLYETHYLENE GLYCOL 3350 17 G: 17 POWDER, FOR SOLUTION ORAL at 09:09

## 2018-11-21 ASSESSMENT — ACTIVITIES OF DAILY LIVING (ADL)
ADLS_ACUITY_SCORE: 12
ADLS_ACUITY_SCORE: 13
ADLS_ACUITY_SCORE: 12

## 2018-11-21 ASSESSMENT — PAIN DESCRIPTION - DESCRIPTORS
DESCRIPTORS: TIGHTNESS
DESCRIPTORS: TIGHTNESS

## 2018-11-21 NOTE — PLAN OF CARE
Neuro: A&Ox4. Tmax 99.3  Cardiac: SR. VSS except BPs soft 90s-100s  SBP, MAP >60   Respiratory: Sating 100% on 1 L NC  GI/: LBM 11/18, not passing gas. Miralax and Senna given. Day patent with adequate UO (blue/green tinged, pt received meth blue during procedure)  Diet/appetite: Tolerating clear liquid diet. Denied nausea  Activity:  Shifts independently in bed  Pain: controlled with PCA dilaudid  Skin: abdominal incision CDI  LDA's: L CAMDEN bulb with good serosanguinous drainage    Plan: Pt arrived from PACU around 2100, via bed. Belongings placed in closet. VSS. Pt educated to call light, room.        Problem: Diabetes Comorbidity  Goal: Diabetes  Patient comorbidity will be monitored for signs and symptoms of hyperglycemia or hypoglycemia. Problems will be absent, minimized or managed by discharge/transition of care.  Outcome: No Change  Blood glucose monitored

## 2018-11-21 NOTE — PROVIDER NOTIFICATION
Time of notification: 11:12 PM  Provider notified: MD Anay  Patient status: K 3.1, needs replacement?   Temp:  [97.4  F (36.3  C)-99.3  F (37.4  C)] 98.9  F (37.2  C)  Heart Rate:  [81-95] 86  Resp:  [10-19] 15  BP: ()/(41-71) 95/51  MAP:  [48 mmHg-91 mmHg] 54 mmHg  Arterial Line BP: ()/(33-64) 95/36  SpO2:  [88 %-100 %] 100 %  Orders received: awaiting orders

## 2018-11-21 NOTE — PROVIDER NOTIFICATION
0121- Notified Surg Cross Cover about pt's decrease in BP with maps in low 60's & urine output decreasing to only 50-75mL/h. Per Cross Cover, OK for maps to be in 60's. No new orders as of now.    0205- Notified Surgery Cross Cover of UO for hour 40cc/h. RN suggesting bolus or US. Per  mL LR bolus to be given, continue to monitor any notify of any other changes.     0439-  Notified Surgery Cross Cover that standing scale weight increase of 20lb's. Pt complaining of tightness' around abdomin and incision but abdomin remains unchanged per RN assessment. No noted edema. 1L NC for comfort, RR regular, lung sounds unchanged. No new orders as of now, plan to continue to monitor any notify of any other changes.

## 2018-11-21 NOTE — PROGRESS NOTES
Transplant Surgery  Inpatient Daily Progress Note  11/21/2018    Assessment & Plan: Letty Benavidez is a 58 year old female w/ DMI since 1970's c/b diabetic nephropathy & ESKD s/p LDKT (11/1998), now requiring dialysis (4/2017), CABG x2 (10/2017), skin cancer, gastroparesis, pancreatic exocrine dysfunction, diabetic enteropathy, who was admitted for LDKTx with ureteral stent placement (11/20). POD1 today.     Graft function:good, stable, Cr trend down (2.57-> 1.82)   Immunosuppression management:  Thymo induction: 100 mg intra-op, 100 mg POD 1, [ ] mg POD 2 completed.  Solu-medrol 500 mg intra-op, 250 mg POD 1, [100] mg POD 2  Mycophenolic acid (EC) 360mg TID  Envarsus XR (Tacrolimus) 2mg BID  Hematology: Pre op Hgb 11.5 -> 8.5 intra- op -> 1u pRBC -> 10.7 -> 7.9 -> 1u pRBC -> 8.5, ASA  Cardiorespiratory: Slight hypotension post operative, normotensive after fluid bolus   GI/Nutrition: Regular diet, Bowel regimen: miralax and senna   Endocrine: Insulin gtt, carbohydrate sliding scale insulin   Fluid/Electrolytes: MIVF: 120ml/hr, increased d/t decreased UOP, replace K, lasix 40mg IV today   : Day to remain due to new surgical anastomosis  Infectious disease: PPx Valganciclovir and Bactrim   Prophylaxis: DVT, fall, GI, fungal  Disposition: Transfer to     GINA/Fellow/Resident Provider: Viridiana Su    Faculty: Estelita Guzman M.D.  _________________________________________________________________  Transplant History: Admitted 11/20/2018 for LDKT and ureter stent placement.   11/20/2018 (Kidney), 11/4/1998 (Kidney), Postoperative day: 7322 (Kidney), 1 (Kidney)     Interval History: History is obtained from the patient  Overnight events: No acute events overnight. Lack of sleep d/t roommate. Otherwise no issues. Required fluid bolus d/t hypotension SBP 80-90's. Good UOP. Pain manageable with minimal PCA. Will transition today to oral medications. Weight is up 20lbs. Tolerating fluids. Interested in bowel  "regimen.     UOP: 2140/1385 since midnight  Cr: 1.82 (decreasing)   Drain: 330/175 serous     ROS:   A 10-point review of systems was negative except as noted above.    Meds:    acetaminophen  975 mg Oral Q8H     aspirin chewable tablet 81 mg  81 mg Oral Daily     linaclotide  290 mcg Oral QAM AC     lipase-protease-amylase  1 capsule Oral TID w/meals     [START ON 11/22/2018] magnesium oxide  400 mg Oral Daily with lunch     mycophenolic acid  360 mg Oral TID     polyethylene glycol  17 g Oral Daily     senna-docusate  1 tablet Oral BID    Or     senna-docusate  2 tablet Oral BID     sodium chloride (PF)  10 mL Intracatheter Q8H     sulfamethoxazole-trimethoprim  1 tablet Oral Daily     tacrolimus  2 mg Oral BID     [START ON 11/22/2018] valGANciclovir  450 mg Oral Once per day on Mon Thu       Physical Exam:     Admit Weight: 51.7 kg (113 lb 15.7 oz) (pt says this is her \"dry\" weight)    Current vitals:   /58 (BP Location: Left arm)  Temp 99.7  F (37.6  C) (Oral)  Resp 16  Ht 1.6 m (5' 3\")  Wt (S) 60.5 kg (133 lb 6.1 oz)  SpO2 100%  BMI 23.63 kg/m2    CVP (mmHg): 12 mmHg    Vital sign ranges:    Temp:  [97.4  F (36.3  C)-99.7  F (37.6  C)] 99.7  F (37.6  C)  Heart Rate:  [81-95] 84  Resp:  [10-20] 16  BP: ()/(41-58) 119/58  MAP:  [48 mmHg-91 mmHg] 54 mmHg  Arterial Line BP: ()/(33-64) 95/36  SpO2:  [88 %-100 %] 100 %  Patient Vitals for the past 24 hrs:   BP Temp Temp src Heart Rate Resp SpO2 Weight   11/21/18 0752 119/58 99.7  F (37.6  C) Oral 84 16 - -   11/21/18 0700 111/52 98.6  F (37  C) Oral 90 20 100 % -   11/21/18 0600 114/57 - - 85 - 98 % -   11/21/18 0500 107/53 - - 83 - - -   11/21/18 0459 107/53 98.5  F (36.9  C) Oral 84 16 99 % -   11/21/18 0415 - - - - - - 60.5 kg (133 lb 6.1 oz)   11/21/18 0400 106/54 98.6  F (37  C) Oral 83 17 99 % -   11/21/18 0300 105/49 99  F (37.2  C) Oral 85 16 100 % -   11/21/18 0200 96/46 99  F (37.2  C) Oral 83 15 97 % -   11/21/18 0100 90/51 99.3  F " (37.4  C) Oral 83 19 100 % -   11/21/18 0000 95/51 98.9  F (37.2  C) Oral 86 15 100 % -   11/20/18 2300 106/58 98.7  F (37.1  C) Axillary 82 16 100 % -   11/20/18 2230 101/53 99.3  F (37.4  C) Axillary 84 16 99 % -   11/20/18 2200 94/51 99.3  F (37.4  C) Axillary 84 16 100 % -   11/20/18 2156 99/51 99.3  F (37.4  C) Axillary 81 16 100 % -   11/20/18 2130 102/54 99.3  F (37.4  C) Oral 85 16 100 % -   11/20/18 2115 106/56 99.3  F (37.4  C) Oral 83 17 100 % -   11/20/18 2100 99/50 99.3  F (37.4  C) Oral 91 16 100 % -   11/20/18 2030 90/48 - - 85 16 99 % -   11/20/18 2015 93/49 - - 84 16 95 % -   11/20/18 2000 98/47 97.4  F (36.3  C) Oral 84 12 99 % -   11/20/18 1945 97/44 - - 84 12 98 % -   11/20/18 1930 94/46 - - 84 12 99 % -   11/20/18 1915 (!) 89/42 - - 87 12 100 % -   11/20/18 1900 93/49 - - 90 12 100 % -   11/20/18 1845 100/48 - - 89 12 99 % -   11/20/18 1830 98/47 - - 90 12 100 % -   11/20/18 1815 95/44 - - 90 12 100 % -   11/20/18 1800 100/48 - - 89 13 100 % -   11/20/18 1745 (!) 88/41 - - 92 12 100 % -   11/20/18 1730 91/43 - - 89 16 100 % -   11/20/18 1715 97/43 - - 90 14 97 % -   11/20/18 1700 (!) 86/43 - - 89 11 98 % -   11/20/18 1645 93/47 - - 90 18 99 % -   11/20/18 1630 (!) 89/44 - - 87 15 100 % -   11/20/18 1615 (!) 80/41 - - 87 13 96 % -   11/20/18 1600 (!) 83/43 - - 89 11 97 % -   11/20/18 1545 (!) 83/42 - - 89 14 100 % -   11/20/18 1530 90/50 97.4  F (36.3  C) Oral 90 19 99 % -   11/20/18 1515 (!) 88/45 - - 93 12 (!) 88 % -   11/20/18 1500 101/50 - - 95 11 93 % -   11/20/18 1445 (!) 84/46 - - 89 10 99 % -   11/20/18 1430 103/50 - - 93 14 100 % -   11/20/18 1425 98/48 - - 88 15 100 % -   11/20/18 1410 109/51 98  F (36.7  C) Axillary 92 12 100 % -     General Appearance: in no apparent distress.   Skin: normal  Heart: regular rate and rhythm  Lungs: without wheezes, without crackles  Abdomen: The abdomen is appropriately tender. The wound is Healing well, well approximated and without signs of  infection. CAMDEN left abdomen, serous   : tanner is present. Urine has small gross hematuria.   Extremities: edema: minimal   Neurologic: awake, alert and oriented. Tremor absent.    Data:   CMP  Recent Labs  Lab 11/21/18 0434 11/20/18 2344 11/20/18 1915 11/20/18  1420 11/20/18  1329 11/20/18  1204  11/19/18  1036     --  137 136 134 129*  < > 129*   POTASSIUM 4.2 3.1* 3.1* 3.2* 3.3* 2.8*  < > 3.1*   CHLORIDE 106  --  102 99  --   --   --  93*   CO2 23  --  26 27  --   --   --  29   GLC 82  --  197* 113* 122* 144*  < > 123*   BUN 24  --  29 30  --   --   --  78*   CR 1.82*  --  2.44* 2.57*  --   --   --  5.26*   GFRESTIMATED 28*  --  20* 19*  --   --   --  8*   GFRESTBLACK 34*  --  24* 23*  --   --   --  10*   PERRI 8.0*  --  8.4* 8.2*  --   --   --  8.7   ICAW  --   --   --   --  4.0* 4.3*  < >  --    MAG 2.0  --   --  2.4*  --   --   --   --    PHOS 3.0  --   --  2.7  --   --   --   --    ALBUMIN  --   --   --   --   --   --   --  3.6   BILITOTAL  --   --   --   --   --   --   --  0.4   ALKPHOS  --   --   --   --   --   --   --  166*   AST  --   --   --   --   --   --   --  24   ALT  --   --   --   --   --   --   --  35   < > = values in this interval not displayed.  CBC  Recent Labs  Lab 11/21/18 0434 11/20/18 2344 11/20/18 1915 11/20/18  0614   HGB 8.3* 8.7* 7.9*  < >  --    WBC 9.8  --  7.3  < >  --    PLT 80*  --  84*  < >  --    A1C  --   --   --   --  5.5   < > = values in this interval not displayed.  COAGSNo lab results found in last 7 days.    Invalid input(s): XA   Urinalysis  Recent Labs   Lab Test  11/19/18   1312  10/03/16   0651   COLOR  Yellow  Yellow   APPEARANCE  Clear  Clear   URINEGLC  Negative  Negative   URINEBILI  Negative  Negative   URINEKETONE  Negative  Negative   SG  1.012  1.012   UBLD  Small*  Negative   URINEPH  5.0  7.0   PROTEIN  30*  >500*   NITRITE  Negative  Negative   LEUKEST  Negative  Negative   RBCU  1  1   WBCU  <1  2     Virology:  Hepatitis C Antibody   Date Value  Ref Range Status   11/19/2018 Nonreactive NR^Nonreactive Final     Comment:     Assay performance characteristics have not been established for newborns,   infants, and children       BK Virus Result   Date Value Ref Range Status   10/03/2016 BK Virus DNA Not Detected BKNEG copies/mL Final

## 2018-11-21 NOTE — PHARMACY-ADMISSION MEDICATION HISTORY
Admission medication history interview status for the 11/20/2018 admission is complete. See Epic admission navigator for allergy information, pharmacy, prior to admission medications and immunization status.     Medication history interview sources:  Patient self, Care Everywhere, Outpatient reconciliation record    Changes made to PTA medication list (reason)  Added: None    Deleted:   #Zenpep 80695-35165 units CPEP, missing direction. (Duplication)  #Pyreethrins-Piperonyl Butoxide (RID EX), vaccine received last brad  #Singrix injection, vaccine received on 10/1/18  #Atorvastatin 10mg tablet, take 20mg by mouth daily (Duplication)  #Multivitamin tabs, take 1 tablet by mouth (Duplication, patient is currently taking the dialyvite/zinc tablet)    Changed:   #Amino Acids (Liquacel) liquid-> Amino Acid (Liquacel) LIQD, take 30mLs by mouth 2 times daily, contains amino acid 16 grams and 2.5g of arginine, 90 kcal/30mL liquid  #Zenpep 37663 units CPEP, take by mouth 3 times daily with meals (L-15K, A=82K, P=51K)-> Zenpep PO, 2 capsules by mouth 3 times daily L-15, A-82, P-51  #Aspirin PO, take 81mg by mouth daily -> Aspirin 81mg tablet, take one tablet by mouth daily  #Atorvastain 20mg tablet-> atorvastatin 20mg tablet, take 1 tablet before bedtime  #B Complex-C-Zn-Folic acid (Dialyvite/Zinc) Tabs-> B Complex-C-Zn-Folic Acid (Dailyvite/Zinc) tablet, take 0.8mg by mouth every evening after dinner  #Vitamin D3 2000 units Caps-> Vitamin D3 2000 units caps, 1 capsule by mouth every evening with dinner  #Co-enzyme Q-10 60mg caps-> Co-enzyme Q-10 PO, take 400mg by mouth daily  #Lunesta PO, take by mouth daily-> Lunesta 1mg tablet, 1 tablet at bedtime  #Insulin pump filled with humalog 100unit/mL vial-> added instruction: 0.5 units/hr, carb ratio: 1 unit:15g/hr, sensitivity factor correction: 1 unit for every 75mg/dL over 100mg/dL  #Linzess 290mcg capsule, 4x/week, take on non-dialysis days-> linzess 290mcg capsule, take one  capsule every morning before breakfast  #Magnesium citrate 126mg caps-> added direction: take one tablet by mouth daily as needed for post dialysis nausea  #Mircera sosy-> added direction: receive one injection every 28 days  #Miralax PO, 17g by mouth three times a week-> Polyethylene glycol powder,  Take 5.7g (1/3 of one cap), mix with liquidcel and water (use as liquid to take with her other morning meds)      Additional medication history information (including reliability of information, actions taken by pharmacist):  #Patient is a very confident historian. Pt reported she brought in her Zenpep and Liquidcel from home.   #Per patient's outpatient filling record, there was carvedilol 3.125mg tablet filled recently. Patient denied still taking it. She reported it was a prescription taking briefly for her double bypass surgery. Hence, she is not currently on carvedilol.   #Patient reported stopped taking aspirin 2 weeks prior to the surgery per MD order  #Patient reported her tacrolimus dose was increased to 2mg 2 times daily 2 weeks ago prior to the surgery per MD order.   #Patient reported she does not use triamcinolone ointment regularly  #Patient uses the [facility] mail order for her most medications besides her tacrolimus is filled with [facility] pharmacy  #Patient reported in her allergy list, erythromycin and neomycin was not her true allergies. They are contraindicated with her transplant meds (tacrolimus). Hence, they are listed in her allergies. Allergies were confirmed during PTA interview.     Prior to Admission medications    Medication Sig Last Dose Taking? Auth Provider   Amino Acids (LIQUACEL) LIQD Take 30 mLs by mouth 2 times daily Amino acids 16 grams, 2.5 g of arginine per pacakge -90 kcal/30mL liquid 11/19/2018 at 2000 Yes Unknown, Entered By History   aspirin 81 MG EC tablet Take 81 mg by mouth daily 2 weeks ago, stopped for the surgery at Unknown time Yes Unknown, Entered By History    atorvastatin (LIPITOR) 20 MG tablet Take 20 mg by mouth At Bedtime  11/19/2018 at HS Yes Reported, Patient   B Complex-C-Zn-Folic Acid (DIALYVITE 800-ZINC 15) 0.8 MG TABS Take 0.8 mg by mouth every evening After dinner. Take after dialysis 11/19/2018 at 2000 Yes Unknown, Entered By History   Cholecalciferol (VITAMIN D3) 2000 units CAPS Take 1 capsule by mouth every evening With dinner 11/19/2018 at 0800 Yes Reported, Patient   CO-ENZYME Q-10 PO Take 400 mg by mouth daily For cramps from lipitor 11/19/2018 at 0800 Yes Unknown, Entered By History   eszopiclone (LUNESTA) 1 MG TABS tablet Take 1 mg by mouth At Bedtime 11/19/2018 at 2200 Yes Unknown, Entered By History   gabapentin (NEURONTIN) 100 MG capsule Take 100 mg by mouth At Bedtime  11/19/2018 at 2000 Yes Reported, Patient   insulin lispro (HUMALOG) 100 UNIT/ML VIAL Use with insulin pump 11/20/2018 Yes Reported, Patient   INSULIN PUMP - OUTPATIENT Inject 0.5 Units/hr Subcutaneous Carb ratio: 1:15g/hr  Sensitivity factor correction: 1 unit for every 75mg/dL over 100mg/dL 11/20/2018 Yes Reported, Patient   lidocaine-prilocaine (EMLA) cream Apply 5 mg topically three times a week 11/19/2018 at 1800 Yes Reported, Patient   linaclotide (LINZESS) 290 MCG capsule Take 290 mcg by mouth every morning (before breakfast)  11/19/2018 at 0800 Yes Reported, Patient   Magnesium Citrate 125 MG CAPS Take 1 capsule by mouth daily as needed For post dialysis nausea Past Month Yes Reported, Patient   Methoxy PEG-Epoetin Beta (MIRCERA) 50 MCG/0.3ML SOSY One shot every 28 days 11/9/18 Yes Reported, Patient   Pancrelipase, Lip-Prot-Amyl, (ZENPEP PO) Take 2 capsules by mouth 3 times daily L-15, A-82, P-51 11/19/2018 at 1800 Yes Unknown, Entered By History   polyethylene glycol (MIRALAX/GLYCOLAX) powder Take 5.7 g by mouth every morning 1/3 of 17g, mix with Liquidcel and water (use as liquid to take with her other morning meds) 11/18/2018 at 0800 Yes Unknown, Entered By History    tacrolimus (PROGRAF - GENERIC EQUIVALENT) 1 MG capsule Take 2 mg by mouth 2 times daily  11/20/2018 at 0400 Yes Reported, Patient   triamcinolone (KENALOG) 0.1 % ointment APPLY TO AFFECTED AREA DAILY FOR 2 WEEKS THEN EVERY 3 DAYS FOR 1 WEEK AS NEEDED   Reported, Patient       Medication history completed by: Darlene Hilliard, Pharm.D IV Student

## 2018-11-21 NOTE — PLAN OF CARE
"Problem: Patient Care Overview  Goal: Plan of Care/Patient Progress Review  Outcome: Improving  /64 (BP Location: Left leg)  Pulse 90  Temp 98.1  F (36.7  C) (Oral)  Resp 16  Ht 1.6 m (5' 3\")  Wt (S) 60.5 kg (133 lb 6.1 oz)  SpO2 97%  BMI 23.63 kg/m2    Patient transferred to  this afternoon.  Afebrile; VSS on RA.  -129; insulin drip titrated per algorithm 2.  Potassium 3.9 after 40mEq PO replacement.  Hemoglobin 8.4 (from 8.4).  Patient c/o pain at incision partially relieved with tylenol and oxycodone.  Heating pad ordered.  Denies nausea, but pt does c/o bloating and abdominal fullness.  Poor appetite on regular diet.  MIVF decreased to 50mL/hr into internal jugular line.  Day patent with good output.  No BM; pt is no passing gas.  Incision sealed with dermabond and open to air.  Up with Ax1.  Thymoglobulin currently infusing with no s/sx of reaction.  Continue with plan of care and notify provider with changes.      "

## 2018-11-21 NOTE — OP NOTE
Transplant Surgery  Operative Note     Procedure date:  11/21/18    Preoperative diagnosis:  End Stage renal failure due to diabetes mellitus type 1    Postoperative diagnosis:  Same    Procedure:  1. Left kidney  Re- transplant,  Living, Left iliac fossa, without vascular reconstruction. A J-J ureteral stent was placed.  2. Kidney allograft preparation on Back Table    Surgeon:  SANDI HORN    Fellow/Assistant:  Morena Mary, fellow, Viridiana Su resident     Anesthesia:  General    Specimen:  None    Drains:  Servando-Perdue drain    Urine output:  1000 mls    Estimated blood loss:  100    Fluids administered:    Fluid Amount   Crystalloid (mL) 1000   Colloid (mL) 3700   RBC (Units) 277        Indication: The patient has End Stage renal failure due to diabetes mellitus type 1 and received an organ offer for a Living kidney allograft. After discussing the risks and benefits of proceeding, the patient agreed to proceed with surgery and provided informed consent.  Findings: Integrity of recipient artery: Moderate-severe atherosclerosis   Intraoperative Events: None    Final ABO/Crossmatch verification: After the donor organ arrived to the operating room and prior to anastomosis, I participated in the transplant pre-verification upon organ receipt timeout by visually verifying the donor ID, organ and laterality, donor blood type, recipient unique identifier, recipient blood type, and that the donor and recipient are blood type compatible. The crossmatch was done prospectively; the T cell flow crossmatch result was negative and B cell flow crossmatch result was negative prior to anastomosis.  The patient received Thymoglobulin on induction.    Donor Organ Information:   Donor UNOS ID:  [id number]    Donor arterial clamp on:  11/20/2018  9:49 AM    Total ischemic time:  122 min    Cold ischemic time:  91 min    Warm ischemic time:  31 min    Preservation fluid:  HTK      Back Table Details:   Procedure:  Bench  preparation of the kidney allograft for transplantation without vascular reconstruction    Surgeon:  SANDI HORN    Faculty Co-Surgeon:  SANDI HORN    Fellow/Assistant:  Morena nicholas    Donor arrival to recipient room:  11/20/2018  9:55 AM    Graft injury:  No    Graft biopsy:  no    Organ received on:  Ice    Pump resistance:      Pump flow:      Arterial anatomy:  Single    Donor arterial quality:  Normal    Venous anatomy:  Single    Ureteral anatomy:  Single    Any reconstruction:  No    Artery:      Vein:       Complications: None.    Findings: Normal       None.    Back Table Preparation:  The donor kidney was received and inspected. It had been flushed with HTK. The graft was prepared on the back table by removing perinephric fat and ligating venous tributaries and lymphatics. The ureter was also cleaned of excess tissue. If required, reconstruction was performed as detailed above. The kidney was stored in iced cold preservation solution until ready for transplantation. Faculty was present for the critical portions of the procedure.    Operative Procedure:   Arterial anastomosis start:  11/20/2018 11:20 AM    Arterial unclamp:  11/20/2018 11:51 AM    Extra vessels used:   no      The patient was brought to the operating room, placed in a supine position, and a time out was performed. Sequential compression devices were placed on both lower extremities and general endotracheal anesthesia was induced.  The patient was given IV antibiotics and a Day catheter. A central line was placed by Anesthesia service. The abdomen was then shaved, prepped, and draped in the usual sterile fashion.  An incision was made in the left lower quadrant and carried down through the subcutaneous tissue and the abdominal wall fascia. If encountered, the epigastric vessels were ligated in continuity, divided and secured with surgical clips. The left iliac artery and vein were exposed. The retractor system  was placed and the lymphatics overlying the vessels were serially ligated and divided.     The patient was heparinized. We applied atraumatic vascular clamps and the donor kidney was brought to the operative field. We made a venotomy and the renal vein was anastomosed to the recipient left External Iliac vein in an end-to-side fashion. An arteriotomy was made and the donor renal artery was anastomosed to the recipient left external iliac artery  in an end to side fashion. The patient was simultaneously loaded with IV mannitol, Lasix and volume. The renal artery was protected and the clamps were removed. After several cardiac cycles, we opened the renal artery and the kidney had Good reperfusion and was firm and pink .    The transplant ureter was managed by creating a Liche (anterior multistitch) anastomosis with absorbable suture. A stent was placed across the anastomosis. The kidney made Yes urine prior to implantation.    Hemostasis was obtained, the anastomoses inspected, and the kidney placed in the iliac fossa. After placement, the vessel lay was inspected and found to be acceptable. The kidney position was Retroperitoneal. The field was irrigated with antibiotic solution. A drain was placed. The retractor was removed and the abdominal wall fascia reapproximated. Subcutaneous tissues were irrigated and hemostasis obtained.  The skin was reapproximated with running subcuticular stitch and dermabond was applied.   All needle, sponge and instrument counts were correct x 2. The patient was awakened, extubated, and transferred to PACU for post-op monitoring. Faculty was present for key portions of the procedure.    Attestation:    I was present for the entire procedure.       Estelita Guzman MD

## 2018-11-21 NOTE — PROGRESS NOTES
CLINICAL NUTRITION SERVICES - ASSESSMENT NOTE     Nutrition Prescription    RECOMMENDATIONS FOR MDs/PROVIDERS TO ORDER:  Diet recommendations post-transplant: High protein diet x 8 weeks.  Heart healthy dietary habits long term (low saturated/trans fat, low sodium).     Malnutrition Status:    None    Recommendations already ordered by Registered Dietitian (RD):  Discharge diet order written.   RD to order Protein supplements. (Note*- RD not responsible for providing protein modular, administering or managing this product)    Future/Additional Recommendations:  If suspect eating poorly, would offer supplements and start on kcal cnts.        REASON FOR ASSESSMENT  Letty Benavidez is a 58 year old female seen by the dietitian for MD Order- Assess & Educate post-op SOT    PMH: 58 year old female w/ DMI since 1970's c/b diabetic nephropathy & ESKD s/p LDKT (11/1998), now requiring dialysis (4/2017), CABG x2 (10/2017), skin cancer, gastroparesis, pancreatic exocrine dysfunction, diabetic enteropathy, who was admitted for LDKTx with ureteral stent placement (11/20). POD1 today.     NUTRITION HISTORY  Pt was on a dialysis diet. She also has type 1 diabetes and GI dysmotility, malabsorption, etcShe follows a low FODMAP and low fiber diet in general to maintain her QOL and weight. Pt currently takes liquacel 2 oz/day (30 g protein) for dialysis protein needs. We discussed the need for increased protein intake s/p txp x 2 months for healing, Pt may bring in liquacel as well (LiquaCel, 90 calories, 16 g pro, 2.5 g arginine). Patient consumes 1 packet in the Am and one in the PM.    Patient has spoken with outpatient RD on multiple occasions prior to transplant. Patient is very vigilant about her nutrition and follows a low FODMAP diet and her proteins come from egg ehites, chciken breast and her protein supplement, RD recommended to continue with protien supplements as is provides her with 32 g protein/day.    CURRENT  "NUTRITION ORDERS  Diet: Regular  Intake/Tolerance: tolerating    LABS  Labs reviewed    MEDICATIONS  Zenpep 2, 3 x daily with meals and 1 with snacks, miralax, immunosuppressant, Medications reviewed    ANTHROPOMETRICS  Height: 160 cm (5' 3\")  Most Recent Weight: (S) 60.5 kg (133 lb 6.1 oz) (Notified Surg Cross cover of weight gain)    IBW: 52.3 kg  BMI: Normal BMI  Weight History:   Wt Readings from Last 10 Encounters:   11/21/18 (S) 60.5 kg (133 lb 6.1 oz)   11/19/18 52.5 kg (115 lb 12.8 oz)   08/28/18 51.1 kg (112 lb 9.6 oz)   08/16/17 49.7 kg (109 lb 8 oz)   10/03/16 49.3 kg (108 lb 9.6 oz)     Dosing Weight: 53 kg (Patients lowest weight on 11/19, suspect either fluids or inaccurate weight on 11/21 of 60.5 kg)    ASSESSED NUTRITION NEEDS:  Estimated Energy Needs: 2426-7649 kcals (30-35 Kcal/Kg)  Justification: increased needs post-op SOT  Estimated Protein Needs:  grams protein (1.3-2 gm/Kg)  Justification: increased needs post op SOT  Estimated Fluid Needs: 8031-6795 mL (25-30 mL/Kg)  Justification: maintenance, or per MD pending fluid status and adequate UOP    PHYSICAL FINDINGS  See malnutrition section below.    MALNUTRITION  % Intake: No decreased intake noted  % Weight Loss: None noted  Subcutaneous Fat Loss: None observed  Muscle Loss: None observed  Fluid Accumulation/Edema: None noted  Malnutrition Diagnosis: Patient does not meet two of the above criteria necessary for diagnosing malnutrition    NUTRITION DIAGNOSIS:  Food and nutrition-related knowledge deficit r/t length of time since previous post-transplant education AEB patient verbal report, review of chart record, and MD consult for nutrition education.     INTERVENTIONS  Implementation  Nutrition Education:  1. Provided instruction on post-transplant diet with discussion regarding protein sources and high protein needs in acute post-tx phase.  Reviewed recommendations to follow low fat/low sodium diet long term and discussed heart healthy " diet tips.  Discussed monitoring of K+/Phos lab values with possible need for adjustment of these in the diet as necessary. Reviewed need for food safety precautions to prevent food borne illness.    2. Provided & reviewed handout: Post-transplant diet guidelines. Patient receptive to information provided. Expected diet compliance is good.     Goals  1. Patient will verbalize understanding of 3 important aspects of post-transplant diet guidelines.   2. PO intake >50% meals TID.    Monitoring/Evaluation  Progress toward goals will be monitored and evaluated per protocol.        Kesha Garay, CARMEN, MS, LD  6B- Pager: [phone]

## 2018-11-21 NOTE — PROGRESS NOTES
Transplant Admission Psychosocial Assessment    Patient Name: Letty Benavidez  : 1960  Age: 58 year old  MRN: [id number]  Date of Initial Social Work Evaluation: 10/3/2016    Patient underwent living unrelated donor kidney transplant on 2018.  Met with pt to update psychosocial assessment and provide education about SW role while inpatient, and to begin discussion of expectations/requirements, caregiver needs and follow up needs post-transplant. Completed medicare 2728 form.    Presenting Information   Living Situation: Pt resides with her  in [place].  If not local, plans for short term stay:  [facility].  Previous Functional Status: Independent with ADL's.   Cultural/Language/Spiritual Considerations: Pt is a  female, english speaking.    Support System  Primary Support Person Pts .  Other support:  Sister and mother.  Plan for support in immediate post-transplant period: Pts , sister, and mother.    Health Care Directive  Decision Maker: Self.   Alternate Decision Maker: NOK pts .  Health Care Directive: Copy in Chart    Mental Health/Coping:   History of Mental Health: Pt states she has a history of depression about 30 years ago, she has been stable and not had issues with depression for several years.  History of Chemical Health: None.  Current status: Stable.  Coping: Pt feels she is coping well and has no concerns. She is looking forward to getting some sleep and having less pain.  Services Needed/Recommended: None.    Financial   Income: Pt and spouse are retired.   Impact of transplant on income: No concerns.  Insurance and medication coverage: PRIMARY INSURANCE IS THROUGH [employer] [place] ID#[id number] GRP#[id number] (EFFECTIVE 13). WITH PHARMACY BENEFITS THROUGH [employer]. SECONDARY COVERAGE IS MEDICARE ID#[id number] (PART A & B EFFECTIVE 17). BILL ALL DRUGS PRIMARY TO [employer] COMMERCIAL (EFFECTIVE 18) ID#[id number]  GRP#[id number] PCN#[id number] BIN#[id number] WITH A DEDUCTIBLE OF $0 AND A MAX ALLOWABLE BALANCE OF $5,000. PATIENT WILL PAY %30 COINSURANCE WITH $5 MINIMUM OR $50 MAXIMUM FOR TIER 1, %30 COINSURANCE WITH $5 MINIMUM OR $50 MAXIMUM FOR TIER 2, & (INS REP WAS UNABLE TO DETERMINE BUT BELIEVES IT FALLS UNDER %30 COINSURANCE WITH $5 MINIMUM OR $50 MAXIMUM) FOR TIER 3 MEDICATIONS. BILL PART B ELIGIBLE DRUGS BY COB SECONDARY TO [employer] SECONDARY ID#[id number] GRP#OTHER AND PATIENT WILL PAY $0 COPAY AT TIME OF SERVICES. TESTCLAIMS: MYCOPHENOLATE 250MG=$0 AT TIME OF SERVICE, NEXT FILLS MUST BE AT [facility], TACROLIMUS 1MG=SPECIALTY DRUG, MUST FILL AT [facility], CYCLOSPORINE 100MG=$0 AT TIME OF SERVICE, NEXT FILLS MUST BE AT [facility], ENVARSUS XR 1MG=$0 AT TIME OF SERVICE, NEXT FILLS MUST BE AT [facility], VALGANCICLOVIR 450MG=$50  Financial concerns: No concerns.  Resources needed: None.    Education provided by SW: Social Work role inpatient setting, availability of support groups, parking information.    Assessment and recommendations and plan:  Pt is a 59 yo female who underwent living unrelated kidney donor transplant through paired exchange on 11/20/2018 (pts friend donated). Pt has been on dialysis since April 2017 and has a fistula.    Pt was pleasant and receptive to  visit. Asked appropriate questions. Seemed well informed and motivated to follow post transplant expectations. Has adequate insurance coverage and social support. No psychosocial concerns.    ZACHARY Murguia, LGSW  7A   Ph: [phone], Pager: [phone]

## 2018-11-21 NOTE — PLAN OF CARE
Problem: Diabetes Comorbidity  Goal: Diabetes  Patient comorbidity will be monitored for signs and symptoms of hyperglycemia or hypoglycemia. Problems will be absent, minimized or managed by discharge/transition of care.   Outcome: No Change  Insulin gtt remains, Alg 2 with Q1H checks d/t BG dip to 80's, labs monitored with K txp and pt updated on results.     Problem: Patient Care Overview  Goal: Plan of Care/Patient Progress Review  Outcome: No Change  A: A&Ox4, neuro intact. VS unchanged, 1L NC with CAPNO, BP remain lower MD aware (see provider notification note). SR. Afebrile. Incisional discomfort managed with Dilaudid PCA & repositioning. Refuses nausea medication. ADAT, clear liquid now. Incision CDI. CAMDEN to bulb suction & tanner cath in place (see flow sheet for output). CVP's increasing overnight 6-10. Straight rated MIVF running @ 100mL/h. Insulin gtt in place with Q1H checks on Alg 2. R fistula WDL. No BM since 18th, bowel regimen utilized. Pt up with assist x2 for weight (weight increase, see provider notification note). Pt educated on transplant lab book/medications. Pt able to make needs known via call light.     I/O this shift:  In: -   Out: 200 [Urine:200]    Temp:  [97.4  F (36.3  C)-99.7  F (37.6  C)] 99.7  F (37.6  C)  Heart Rate:  [81-95] 84  Resp:  [10-20] 16  BP: ()/(41-58) 119/58  MAP:  [48 mmHg-91 mmHg] 54 mmHg  Arterial Line BP: ()/(33-64) 95/36  SpO2:  [88 %-100 %] 100 %     R: Continue with POC. Notify primary team with changes.

## 2018-11-21 NOTE — PROGRESS NOTES
Care Coordinator Progress Note    Admission Date/Time:  11/20/2018  Attending MD:  Estelita Guzman MD    Data  Chart reviewed, discussed with interdisciplinary team.   Patient was admitted for:    Type 1 diabetes mellitus with stage 5 chronic kidney disease not on chronic dialysis (H)  Kidney transplanted.    Concerns with insurance coverage for discharge needs: None identified  Current Living Situation: Patient lives with   Support System: Layton is Primary Caregiver  Services Involved: [facility]  #[phone]  Transportation at Discharge: Family  Transportation to Medical Appointments: [facility]  Barriers to Discharge: Medical clearance        Assessment  Pt with history of Kidney Transplant in 1998, on dialysis prior to admission, s/p Kidney Transplant 11/20/18. I have met with Pt assist with discharge planning. Pt is from [place] where she is independent living with her . I have informed Pt of daily ATC Clinic visits starting the morning after discharge. Pt states she is expects a longer hospitalization due to gastroparesis. Pt plans to stay with Family at the [facility] near the hospital for approx 1 month and will have [place] [facility]  Pt will move to  today, Pt is hoping to get rest as she had a sleepless night due to a noisy roommate.     Plan  Anticipated Discharge Date:  TBD  Anticipated Discharge Plan:  Discharge to [facility] with out patient follow up.    Afshan Quiñonez RN  6B care coordinator #[phone]

## 2018-11-21 NOTE — PROVIDER NOTIFICATION
Transfer  Transferred to:   Via: Bed  Reason for transfer: Pt improved condition POD 1 transplant.   Family: Aware of transfer, at bedside.   Belongings: Sent with patient.   Chart: Sent with patient  Medications: Meds sent with pt.    Report given to 7B RN.   Pt status:   Neuro: A&Ox4.   Cardiac: SR. VSS.   Respiratory: Sating 97% on RA.  GI/: Adequate urine output 100-200ml per tanner. BS hypoactive, no flatus.   Diet/appetite: Attempted regular diet this am, appetite poor.  Activity:  Assist of 1, up to chair.  Pain: At acceptable level on current regimen.  Skin: No new deficits noted. LLQ Incision ARIANE, skin glue, sl. Erythma.   LDA's: [id number] X3. L PIV-IVF+insulin gtt (algo 2). L CAMDEN drain.     MD notification: 11:25 Ade Anderson PA-C notified: pts /50, slightly lower then earlier. No new orders.    Plan: PCA DC'd, pain controlled with tylenol.  MIV for transfer. Capnography DC'd. Transferring to  at this time. Pt ready for transfer. Thymo sent with patient. Continue with POC. Notify primary team with changes.

## 2018-11-21 NOTE — CONSULTS
[facility], [place]  Transplant Nephrology Consult  Date of Admission:  11/20/2018  Requesting physician: Estelita Guzman MD    Assessment & Plan   # LDKT: ssecond allograft . baseline Cr ~ *TBD; Decreased psot surgery   - Proteinuria: Not checked recently   - Latest DSA: 11/2018  Latest DSA: No   - BK Viremia: Not checked recently- at 1 month post txp   - Kidney Tx Biopsy: No    Patient with almost 10 kg above dry weight. Would hold off on fluid as BP now normal. Good UOP of 2.1 litres. Will continue to monitor.      # Immunosuppression: Tacrolimus immediate release (goal  8-10) and Mycophenolate mofetil (goal  1-3.5). Also being induced with ATG and steroids per protocol.    - Changes: No    # Hypertension/Volume Status: Controlled; Goal BP: < 140/90   - Changes: No- hold off on fluids for now. CVP okay. Give lasix if she develops SOB or has drop in UOP    # Diabetes: Controlled at present with insulin drip. Was on humalog pump of around 18 units per day.     # Anemia in chronic renal disease: Hgb: Decreased post operatively. Now in 8s. Will monitor   - Iron studies: Low- will need iron supplementation on discharge.     # Mineral Bone Disorder:    - Secondary renal hyperparathyroidism; PTH level is: Mildly elevated at 212. Will be monitored as outpt . No rx for now.   - Vitamin D; level is: Normal  - Calcium; level is: Low at 8.0 with normal albumin. Would monitor for now.   - Phosphorus; level is: Normal    # Electrolytes:   - Potassium; level: Normal  - Magnesium; level: Normal  - Bicarbonate; level: Normal    # PPX: PJP- started on bactrim 1 tab daily adjusted for cr cl of > 30   CMV: started on valtrex 1gram BID     # h/o CAD; s/p 2v CABG in 10/2017. Cleared by cardiology in July 2018. No CP or SOB.     # h/o gastroparesis: history of small intestinal bacterial overgrowth, pancreatic exocrine dysfunction, and diabetic enteropathy. Asymptomatic at present.     # Medical  Compliance: Yes    # Transplant History:  Etiology of kidney failure: diabetes mellitus type 1  Tx: LDKT  Transplant: 11/20/2018 (Kidney), 11/4/1998 (Kidney)  Donor Type: Living Donor Class:   Crossmatch at time of Tx: negative  DSA at time of Tx: No  History of BK viremia: No  History of CMV viremia: No  History of EBV viremia: No  Significant changes in immunosuppression: None  Significant transplant-related complications: None    Recommendations were communicated to the primary team verbally.    Seen and discussed with Dr. Alfredo Arce MD  Pager: [phone]    REASON FOR CONSULT   Post LDKT    History of Present Illness   Letty Benavidez is a 58 year old female with ESKD from type DM1 who is status post LDKT on 11/20/2018.     She was dx with type 1 DM in 1970 and underwent preemptive LDKT in 11/1998 with has been on RRT ( most recently Home hemodialysis) since April 2017. She made about 250cc of urine prior to her current txp. Access is right AVF.    She has no complaints today. Post surgical pain well controlled.   No nausea, vomiting or diarrhea.  No fever, sweats or chills.      Review of Systems    The 10 point Review of Systems is negative other than noted in the HPI or here.     Past Medical History    I have reviewed this patient's medical history and updated it with pertinent information if needed.   Past Medical History:   Diagnosis Date     Anemia of chronic kidney failure      Cataracts, bilateral      Coronary artery disease      Diabetic peripheral neuropathy (H)      Diabetic retinopathy (H)      Dyslipidemia      End stage kidney disease (H)      Gastroparesis      History of skin cancer      Hypertension      Immunosuppressed status (H)      Kidney replaced by transplant      Osteoporosis      Pancreatic insufficiency      Secondary hyperparathyroidism (H)      Type 1 diabetes (H)      Past Surgical History   I have reviewed this patient's surgical history and updated it with pertinent  information if needed.  Past Surgical History:   Procedure Laterality Date     BASAL CELL CARCINOMA REMOVAL  2015    Rightr Elbow     BASAL CELL CARCINOMA REMOVAL  2016    Left Cheek     BIOPSY BREAST Left 1993    Benign     BIOPSY BREAST Right 1996    Benign     BIOPSY BREAST Right     Benign     C CABG, ARTERIAL, TWO  10/2017     CORONARY ANGIOGRAPHY ADULT ORDER  09/21/2017    and Left ventriculography     CREATE FISTULA ARTERIOVENOUS UPPER EXTREMITY  2017     HCL SQUAMOUS CELL CARCINOMA  REMOVAL  2015    Left side of face     HCL SQUAMOUS CELL CARCINOMA REMOVAL  2014    left ankle     HCL SQUAMOUS CELL CARCINOMA REMOVAL  2009    Right thigh     HCL SQUAMOUS CELL CARCINOMA REMOVAL   2011    Right Shoulder     HEMORRHOIDECTOMY  2000     HEMORRHOIDECTOMY  2002    x2     LASER SURGERY OF EYE  1980    Proliferative retinopathy     LEEP TX, CERVICAL  2004     LEEP TX, CERVICAL  2012     OOPHORECTOMY Right 1995     RELEASE TRIGGER FINGER  2010     TONSILLECTOMY  1963     TRANSPLANT KIDNEY RECIPIENT LIVING RELATED  1998     TUBAL LIGATION  1981     Social History   I have reviewed this patient's social history and updated it with pertinent information if needed. Letty Benavidez  reports that she has never smoked. She has never used smokeless tobacco. She reports that she does not drink alcohol or use illicit drugs.    Family History   I have reviewed this patient's family history and updated it with pertinent information if needed.   Family History   Problem Relation Age of Onset     Brain Cancer Father      Diabetes Sister      HEART DISEASE Maternal Grandfather      Cerebrovascular Disease Maternal Grandfather      HEART DISEASE Paternal Grandfather      Colon Cancer Maternal Grandmother      Prior to Admission Medications   Prior to Admission Medications   Prescriptions Last Dose Informant Patient Reported? Taking?   Amino Acids (LIQUACEL) LIQD 11/19/2018 at 2000 Self Yes Yes   Sig: Take 30 mLs by mouth 2 times daily  Amino acids 16 grams, 2.5 g of arginine per pacakge -90 kcal/30mL liquid   B Complex-C-Zn-Folic Acid (DIALYVITE 800-ZINC 15) 0.8 MG TABS 11/19/2018 at 2000  Yes Yes   Sig: Take 0.8 mg by mouth every evening After dinner. Take after dialysis   CO-ENZYME Q-10 PO 11/19/2018 at 0800 Self Yes Yes   Sig: Take 400 mg by mouth daily For cramps from lipitor   Cholecalciferol (VITAMIN D3) 2000 units CAPS 11/19/2018 at 0800 Self Yes Yes   Sig: Take 1 capsule by mouth every evening With dinner   INSULIN PUMP - OUTPATIENT 11/20/2018 Self Yes Yes   Sig: Inject 0.5 Units/hr Subcutaneous Carb ratio: 1:15g/hr  Sensitivity factor correction: 1 unit for every 75mg/dL over 100mg/dL   Magnesium Citrate 125 MG CAPS Past Month Self Yes Yes   Sig: Take 1 capsule by mouth daily as needed For post dialysis nausea   Methoxy PEG-Epoetin Beta (MIRCERA) 50 MCG/0.3ML SOSY 11/9/18 Self Yes Yes   Sig: One shot every 28 days   Pancrelipase, Lip-Prot-Amyl, (ZENPEP PO) 11/19/2018 at 1800 Self Yes Yes   Sig: Take 2 capsules by mouth 3 times daily L-15, A-82, P-51   aspirin 81 MG EC tablet 2 weeks ago, stopped for the surgery at Unknown time Self Yes Yes   Sig: Take 81 mg by mouth daily   atorvastatin (LIPITOR) 20 MG tablet 11/19/2018 at HS Self Yes Yes   Sig: Take 20 mg by mouth At Bedtime    eszopiclone (LUNESTA) 1 MG TABS tablet 11/19/2018 at 2200 Self Yes Yes   Sig: Take 1 mg by mouth At Bedtime   gabapentin (NEURONTIN) 100 MG capsule 11/19/2018 at 2000 Self Yes Yes   Sig: Take 100 mg by mouth At Bedtime    insulin lispro (HUMALOG) 100 UNIT/ML VIAL 11/20/2018 Self Yes Yes   Sig: Use with insulin pump   lidocaine-prilocaine (EMLA) cream 11/19/2018 at 1800 Self Yes Yes   Sig: Apply 5 mg topically three times a week   linaclotide (LINZESS) 290 MCG capsule 11/19/2018 at 0800 Self Yes Yes   Sig: Take 290 mcg by mouth every morning (before breakfast)    polyethylene glycol (MIRALAX/GLYCOLAX) powder 11/18/2018 at 0800  Yes Yes   Sig: Take 5.7 g by mouth  "every morning 1/3 of 17g, mix with Liquidcel and water (use as liquid to take with her other morning meds)   tacrolimus (PROGRAF - GENERIC EQUIVALENT) 1 MG capsule 2018 at 0400 Self Yes Yes   Sig: Take 2 mg by mouth 2 times daily    triamcinolone (KENALOG) 0.1 % ointment  Self Yes No   Sig: APPLY TO AFFECTED AREA DAILY FOR 2 WEEKS THEN EVERY 3 DAYS FOR 1 WEEK AS NEEDED      Facility-Administered Medications: None     Allergies   Allergies   Allergen Reactions     Erythromycin      Not a drug allergy- Patient told to avoid while taking tacrolimus due to drug interaction. Ok to have if not taking tacrolimus     Metronidazole Nausea and Vomiting     Flagyl     Mycophenolate Nausea and Vomiting and Other (See Comments)     Nausea / Vomiting / Tremors     Neomycin Other (See Comments)     Not a drug allergy- Patient told to avoid while taking tacrolimus due to drug interaction. Ok to have if not taking tacrolimus       Physical Exam   Temp  Av.7  F (37.1  C)  Min: 97.4  F (36.3  C)  Max: 99.7  F (37.6  C)  Arterial Line MAP (mmHg)  Av.9 mmHg  Min: 48 mmHg  Max: 91 mmHg  Arterial Line BP  Min: 87/33  Max: 148/64      Pulse  Av  Min: 75  Max: 75 Resp  Av.4  Min: 10  Max: 20  SpO2  Av.7 %  Min: 88 %  Max: 100 %    CVP (mmHg): 12 mmHgBP 119/58 (BP Location: Left arm)  Temp 99.7  F (37.6  C) (Oral)  Resp 16  Ht 1.6 m (5' 3\")  Wt (S) 60.5 kg (133 lb 6.1 oz)  SpO2 100%  BMI 23.63 kg/m2   Date 18 07 - 18 0659   Shift 7922-8574 7711-7470 6316-8378 24 Hour Total   I  N  T  A  K  E   I.V. 20   20    Shift Total  (mL/kg) 20  (0.33)   20  (0.33)   O  U  T  P  U  T   Urine 1025   1025    Drains 80   80    Shift Total  (mL/kg) 1105  (18.26)   1105  (18.26)   Weight (kg) 60.5 60.5 60.5 60.5      Admit Weight: 51.7 kg (113 lb 15.7 oz) (pt says this is her \"dry\" weight)     GENERAL APPEARANCE: alert and no distress  HENT: mouth without ulcers or lesions  LYMPHATICS: no cervical or " supraclavicular nodes  RESP: lungs clear to auscultation - no rales, rhonchi or wheezes  CV: regular rhythm, normal rate, no rub, no murmur  EDEMA: no LE edema bilaterally  ABDOMEN: soft, nondistended, nontender, bowel sounds normal  MS: extremities normal - no gross deformities noted, no evidence of inflammation in joints, no muscle tenderness  SKIN: no rash  TX KIDNEY: srugical site c/d/i  DIALYSIS ACCESS:  RUE AV Fistula with thrill and bruit    Data   CMP  Recent Labs  Lab 11/21/18  0808 11/21/18  0434 11/20/18  2344 11/20/18  1915 11/20/18  1420 11/20/18  1329  11/19/18  1036   NA  --  137  --  137 136 134  < > 129*   POTASSIUM 3.8 4.2 3.1* 3.1* 3.2* 3.3*  < > 3.1*   CHLORIDE  --  106  --  102 99  --   --  93*   CO2  --  23  --  26 27  --   --  29   ANIONGAP  --  8  --  8 9  --   --  6   GLC  --  82  --  197* 113* 122*  < > 123*   BUN  --  24  --  29 30  --   --  78*   CR  --  1.82*  --  2.44* 2.57*  --   --  5.26*   GFRESTIMATED  --  28*  --  20* 19*  --   --  8*   GFRESTBLACK  --  34*  --  24* 23*  --   --  10*   PERRI  --  8.0*  --  8.4* 8.2*  --   --  8.7   MAG  --  2.0  --   --  2.4*  --   --   --    PHOS  --  3.0  --   --  2.7  --   --   --    PROTTOTAL  --   --   --   --   --   --   --  7.2   ALBUMIN  --   --   --   --   --   --   --  3.6   BILITOTAL  --   --   --   --   --   --   --  0.4   ALKPHOS  --   --   --   --   --   --   --  166*   AST  --   --   --   --   --   --   --  24   ALT  --   --   --   --   --   --   --  35   < > = values in this interval not displayed.  CBC  Recent Labs  Lab 11/21/18  0808 11/21/18  0434 11/20/18  2344 11/20/18  1915 11/20/18  1420  11/19/18  1036   HGB 8.5* 8.3* 8.7* 7.9* 10.7*  < > 11.5*   WBC  --  9.8  --  7.3 1.8*  --  6.2   RBC  --  2.61*  --  2.47* 3.34*  --  3.51*   HCT  --  25.2*  --  24.4* 32.9*  --  34.5*   MCV  --  97  --  99 99  --  98   MCH  --  31.8  --  32.0 32.0  --  32.8   MCHC  --  32.9  --  32.4 32.5  --  33.3   RDW  --  15.2*  --  14.2 13.7  --  13.8    PLT  --  80*  --  84* 70*  --  163   < > = values in this interval not displayed.  INRNo lab results found in last 7 days.  ABG  Recent Labs  Lab 11/20/18  1329 11/20/18  1204 11/20/18  1056   PH 7.38 7.41 7.44   PCO2 43 39 39   PO2 211* 199* 193*   HCO3 25 24 26   O2PER 50.0 45.0 46.0      Urine Studies  Recent Labs   Lab Test  11/19/18   1312  10/03/16   0651   COLOR  Yellow  Yellow   APPEARANCE  Clear  Clear   URINEGLC  Negative  Negative   URINEBILI  Negative  Negative   URINEKETONE  Negative  Negative   SG  1.012  1.012   UBLD  Small*  Negative   URINEPH  5.0  7.0   PROTEIN  30*  >500*   NITRITE  Negative  Negative   LEUKEST  Negative  Negative   RBCU  1  1   WBCU  <1  2     No lab results found.  PTH  Recent Labs   Lab Test  11/19/18   1036   PTHI  212*     Iron Studies  Recent Labs   Lab Test  11/19/18   1036   IRON  38   FEB  212*   IRONSAT  18   CORTEZ  952*     IMAGING:  All imaging studies reviewed by me.     Attestation:  This patient has been seen and evaluated by me, Yanick Fuentes MD.  I have reviewed the note and agree with plan of care as documented by the fellow.

## 2018-11-21 NOTE — PHARMACY-TRANSPLANT NOTE
Adult Kidney Transplant Post Operative Note    58 year old female s/p living donor kidney transplant on 11/20/18 for diabetes mellitus.      Planned immunosuppression regimen per kidney transplant protocol:  INDUCTION: thymoglobulin to total ~ 6mg/kg and methylprednisolone IV daily x 3 doses used as pre-med for thymoglobulin.  MAINTENANCE:  mycophenolate and tacrolimus with goal trough levels of 8-10 mcg/L for first 6 months post-transplant.    Opportunistic pathogen prophylaxis includes: trimethoprim/sulfamethoxazole and Valacyclovir.    Patient is enrolled in medication study. Alin (IDS#[id number]) ValA vs ValG study- randomized to valacyclovir (Valtrex).    Pharmacy will monitor for medication interactions and immunosuppression levels in conjunction with the team. Medication therapy needs for discharge planning will continue to be addressed throughout the current admission via multidisciplinary rounds and order review.  Pharmacy will make recommendations as appropriate.    Kala Judge, PharmD

## 2018-11-21 NOTE — PROGRESS NOTES
"POST OP CHECK  11/20/2018    Letty Benavidez is a 58 year old female with h/o ESRD 2/2 T1DM now POD #0 s/p LDKT.    Pt reports pain is controlled. Has dry mouth with thick secretions. No other new issues.    BP 94/51  Temp 99.3  F (37.4  C) (Axillary)  Resp 16  Ht 1.6 m (5' 3\")  Wt 51.7 kg (113 lb 15.7 oz)  SpO2 100%  BMI 20.19 kg/m2  General: Alert, interactive, & in NAD  Resp: CTAB, no crackles or wheezes  Cardiac: Regular rate; extremities warm, well perfused  Abdomen: Soft, non-distended  Incision: c/d/i withouth erythema, warmth, or discharge. Dermabond over incision. CAM[place] site clean  Extremities: No LE edema or obvious joint abnormalities          A/P: No acute post-op issues. Continue plan of care per primary team. Please call with questions.     - Give LR bolus as planned  - Ok for clears   - Restart home protein powder per primary team    Junie Cueva MD  General Surgery PGY-1  [phone]      "

## 2018-11-21 NOTE — PROGRESS NOTES
Handoff given to Cortes HENRY RN. Dr. Guzman ordered a second bolus. Dr. Sweet ordered and administered methylene blue and calcium gluconate. BP's slowly improving. Transplant team would like her to stay in PACU until she is more stable and evaluate for possible need for ICU if BP does not improve.

## 2018-11-22 LAB
ANION GAP SERPL CALCULATED.3IONS-SCNC: 6 MMOL/L (ref 3–14)
BASOPHILS # BLD AUTO: 0 10E9/L (ref 0–0.2)
BASOPHILS NFR BLD AUTO: 0 %
BUN SERPL-MCNC: 26 MG/DL (ref 7–30)
CALCIUM SERPL-MCNC: 8.5 MG/DL (ref 8.5–10.1)
CELL TYPE ALLO: NORMAL
CELL TYPE AUTO: NORMAL
CHANNELSHIFTALLOB1: 12
CHANNELSHIFTALLOB2: -19
CHANNELSHIFTALLOT1: -26
CHANNELSHIFTALLOT2: -35
CHANNELSHIFTAUTOB1: -23
CHANNELSHIFTAUTOB2: -27
CHANNELSHIFTAUTOT1: -29
CHANNELSHIFTAUTOT2: -19
CHLORIDE SERPL-SCNC: 108 MMOL/L (ref 94–109)
CO2 SERPL-SCNC: 25 MMOL/L (ref 20–32)
COMMENT ALLOB2: NORMAL
CREAT SERPL-MCNC: 1.36 MG/DL (ref 0.52–1.04)
CROSSMATCHDATEALLO: NORMAL
CROSSMATCHDATEAUTO: NORMAL
DIFFERENTIAL METHOD BLD: ABNORMAL
DONOR ALLO: NORMAL
DONOR AUTO: NORMAL
DONORCELLDATE ALLO: NORMAL
DONORCELLDATE AUTO: NORMAL
EOSINOPHIL # BLD AUTO: 0 10E9/L (ref 0–0.7)
EOSINOPHIL NFR BLD AUTO: 0 %
ERYTHROCYTE [DISTWIDTH] IN BLOOD BY AUTOMATED COUNT: 15.2 % (ref 10–15)
GFR SERPL CREATININE-BSD FRML MDRD: 40 ML/MIN/1.7M2
GLUCOSE BLDC GLUCOMTR-MCNC: 109 MG/DL (ref 70–99)
GLUCOSE BLDC GLUCOMTR-MCNC: 110 MG/DL (ref 70–99)
GLUCOSE BLDC GLUCOMTR-MCNC: 111 MG/DL (ref 70–99)
GLUCOSE BLDC GLUCOMTR-MCNC: 115 MG/DL (ref 70–99)
GLUCOSE BLDC GLUCOMTR-MCNC: 116 MG/DL (ref 70–99)
GLUCOSE BLDC GLUCOMTR-MCNC: 122 MG/DL (ref 70–99)
GLUCOSE BLDC GLUCOMTR-MCNC: 123 MG/DL (ref 70–99)
GLUCOSE BLDC GLUCOMTR-MCNC: 124 MG/DL (ref 70–99)
GLUCOSE BLDC GLUCOMTR-MCNC: 125 MG/DL (ref 70–99)
GLUCOSE BLDC GLUCOMTR-MCNC: 128 MG/DL (ref 70–99)
GLUCOSE BLDC GLUCOMTR-MCNC: 130 MG/DL (ref 70–99)
GLUCOSE BLDC GLUCOMTR-MCNC: 132 MG/DL (ref 70–99)
GLUCOSE BLDC GLUCOMTR-MCNC: 136 MG/DL (ref 70–99)
GLUCOSE BLDC GLUCOMTR-MCNC: 137 MG/DL (ref 70–99)
GLUCOSE BLDC GLUCOMTR-MCNC: 138 MG/DL (ref 70–99)
GLUCOSE BLDC GLUCOMTR-MCNC: 141 MG/DL (ref 70–99)
GLUCOSE BLDC GLUCOMTR-MCNC: 144 MG/DL (ref 70–99)
GLUCOSE BLDC GLUCOMTR-MCNC: 147 MG/DL (ref 70–99)
GLUCOSE BLDC GLUCOMTR-MCNC: 157 MG/DL (ref 70–99)
GLUCOSE BLDC GLUCOMTR-MCNC: 167 MG/DL (ref 70–99)
GLUCOSE BLDC GLUCOMTR-MCNC: 181 MG/DL (ref 70–99)
GLUCOSE BLDC GLUCOMTR-MCNC: 182 MG/DL (ref 70–99)
GLUCOSE BLDC GLUCOMTR-MCNC: 73 MG/DL (ref 70–99)
GLUCOSE BLDC GLUCOMTR-MCNC: 90 MG/DL (ref 70–99)
GLUCOSE SERPL-MCNC: 143 MG/DL (ref 70–99)
HCT VFR BLD AUTO: 23.8 % (ref 35–47)
HGB BLD-MCNC: 7.7 G/DL (ref 11.7–15.7)
IMM GRANULOCYTES # BLD: 0 10E9/L (ref 0–0.4)
IMM GRANULOCYTES NFR BLD: 0.1 %
LYMPHOCYTES # BLD AUTO: 0.1 10E9/L (ref 0.8–5.3)
LYMPHOCYTES NFR BLD AUTO: 1.5 %
MAGNESIUM SERPL-MCNC: 2.1 MG/DL (ref 1.6–2.3)
MCH RBC QN AUTO: 31.3 PG (ref 26.5–33)
MCHC RBC AUTO-ENTMCNC: 32.4 G/DL (ref 31.5–36.5)
MCV RBC AUTO: 97 FL (ref 78–100)
MONOCYTES # BLD AUTO: 0.3 10E9/L (ref 0–1.3)
MONOCYTES NFR BLD AUTO: 3.4 %
NEUTROPHILS # BLD AUTO: 8.1 10E9/L (ref 1.6–8.3)
NEUTROPHILS NFR BLD AUTO: 95 %
NRBC # BLD AUTO: 0 10*3/UL
NRBC BLD AUTO-RTO: 0 /100
PHOSPHATE SERPL-MCNC: 2.9 MG/DL (ref 2.5–4.5)
PLATELET # BLD AUTO: 83 10E9/L (ref 150–450)
POS CUT OFF ALLO B: >122
POS CUT OFF ALLO T: >67
POS CUT OFF AUTO B: >122
POS CUT OFF AUTO T: >67
POTASSIUM SERPL-SCNC: 4.2 MMOL/L (ref 3.4–5.3)
PROTOCOL CUTOFF: NORMAL
RBC # BLD AUTO: 2.46 10E12/L (ref 3.8–5.2)
RESULT ALLO B1: NORMAL
RESULT ALLO B2: NORMAL
RESULT ALLO T1: NORMAL
RESULT ALLO T2: NORMAL
RESULT AUTO B1: NORMAL
RESULT AUTO B2: NORMAL
RESULT AUTO T1: NORMAL
RESULT AUTO T2: NORMAL
SA1 CELL: NORMAL
SA1 COMMENTS: NORMAL
SA1 HI RISK ABY: NORMAL
SA1 MOD RISK ABY: NORMAL
SA1 TEST METHOD: NORMAL
SA2 CELL: NORMAL
SA2 COMMENTS: NORMAL
SA2 HI RISK ABY UA: NORMAL
SA2 MOD RISK ABY: NORMAL
SA2 TEST METHOD: NORMAL
SERUM DATE ALLO B1: NORMAL
SERUM DATE ALLO B2: NORMAL
SERUM DATE ALLO T1: NORMAL
SERUM DATE ALLO T2: NORMAL
SERUM DATE AUTO B1: NORMAL
SERUM DATE AUTO B2: NORMAL
SERUM DATE AUTO T1: NORMAL
SERUM DATE AUTO T2: NORMAL
SODIUM SERPL-SCNC: 140 MMOL/L (ref 133–144)
TACROLIMUS BLD-MCNC: 6.6 UG/L (ref 5–15)
TESTMETHODALLO: NORMAL
TESTMETHODAUTO: NORMAL
TME LAST DOSE: NORMAL H
TREATMENT ALLO B1: NORMAL
TREATMENT ALLO B2: NORMAL
TREATMENT ALLO T1: NORMAL
TREATMENT ALLO T2: NORMAL
TREATMENT AUTO B1: NORMAL
TREATMENT AUTO B2: NORMAL
TREATMENT AUTO T1: NORMAL
TREATMENT AUTO T2: NORMAL
UNACCEPTABLE ANTIGEN: NORMAL
UNOS CPRA: 38
WBC # BLD AUTO: 8.5 10E9/L (ref 4–11)

## 2018-11-22 PROCEDURE — 25000125 ZZHC RX 250: Performed by: STUDENT IN AN ORGANIZED HEALTH CARE EDUCATION/TRAINING PROGRAM

## 2018-11-22 PROCEDURE — 83735 ASSAY OF MAGNESIUM: CPT | Performed by: STUDENT IN AN ORGANIZED HEALTH CARE EDUCATION/TRAINING PROGRAM

## 2018-11-22 PROCEDURE — 25000132 ZZH RX MED GY IP 250 OP 250 PS 637: Performed by: PHYSICIAN ASSISTANT

## 2018-11-22 PROCEDURE — 25000132 ZZH RX MED GY IP 250 OP 250 PS 637: Performed by: STUDENT IN AN ORGANIZED HEALTH CARE EDUCATION/TRAINING PROGRAM

## 2018-11-22 PROCEDURE — 25000131 ZZH RX MED GY IP 250 OP 636 PS 637: Performed by: STUDENT IN AN ORGANIZED HEALTH CARE EDUCATION/TRAINING PROGRAM

## 2018-11-22 PROCEDURE — 00000146 ZZHCL STATISTIC GLUCOSE BY METER IP

## 2018-11-22 PROCEDURE — 25000128 H RX IP 250 OP 636: Performed by: STUDENT IN AN ORGANIZED HEALTH CARE EDUCATION/TRAINING PROGRAM

## 2018-11-22 PROCEDURE — 84100 ASSAY OF PHOSPHORUS: CPT | Performed by: STUDENT IN AN ORGANIZED HEALTH CARE EDUCATION/TRAINING PROGRAM

## 2018-11-22 PROCEDURE — 36592 COLLECT BLOOD FROM PICC: CPT | Performed by: STUDENT IN AN ORGANIZED HEALTH CARE EDUCATION/TRAINING PROGRAM

## 2018-11-22 PROCEDURE — 25000131 ZZH RX MED GY IP 250 OP 636 PS 637: Performed by: TRANSPLANT SURGERY

## 2018-11-22 PROCEDURE — 12000024 ZZH R&B TRANSPLANT CRITICAL

## 2018-11-22 PROCEDURE — 85025 COMPLETE CBC W/AUTO DIFF WBC: CPT | Performed by: STUDENT IN AN ORGANIZED HEALTH CARE EDUCATION/TRAINING PROGRAM

## 2018-11-22 PROCEDURE — 80197 ASSAY OF TACROLIMUS: CPT | Performed by: STUDENT IN AN ORGANIZED HEALTH CARE EDUCATION/TRAINING PROGRAM

## 2018-11-22 PROCEDURE — 80048 BASIC METABOLIC PNL TOTAL CA: CPT | Performed by: STUDENT IN AN ORGANIZED HEALTH CARE EDUCATION/TRAINING PROGRAM

## 2018-11-22 PROCEDURE — 25000132 ZZH RX MED GY IP 250 OP 250 PS 637: Performed by: TRANSPLANT SURGERY

## 2018-11-22 PROCEDURE — 25000131 ZZH RX MED GY IP 250 OP 636 PS 637: Performed by: PHYSICIAN ASSISTANT

## 2018-11-22 RX ORDER — METHYLPREDNISOLONE SODIUM SUCCINATE 125 MG/2ML
100 INJECTION, POWDER, LYOPHILIZED, FOR SOLUTION INTRAMUSCULAR; INTRAVENOUS ONCE
Status: COMPLETED | OUTPATIENT
Start: 2018-11-22 | End: 2018-11-22

## 2018-11-22 RX ORDER — ACETAMINOPHEN 325 MG/1
650 TABLET ORAL ONCE
Status: DISCONTINUED | OUTPATIENT
Start: 2018-11-22 | End: 2018-11-23

## 2018-11-22 RX ORDER — GABAPENTIN 100 MG/1
100 CAPSULE ORAL AT BEDTIME
Status: DISCONTINUED | OUTPATIENT
Start: 2018-11-22 | End: 2018-11-26 | Stop reason: HOSPADM

## 2018-11-22 RX ORDER — ATORVASTATIN CALCIUM 20 MG/1
20 TABLET, FILM COATED ORAL AT BEDTIME
Status: DISCONTINUED | OUTPATIENT
Start: 2018-11-22 | End: 2018-11-26 | Stop reason: HOSPADM

## 2018-11-22 RX ORDER — DIPHENHYDRAMINE HCL 25 MG
25-50 CAPSULE ORAL ONCE
Status: COMPLETED | OUTPATIENT
Start: 2018-11-22 | End: 2018-11-22

## 2018-11-22 RX ORDER — DIPHENHYDRAMINE HCL 25 MG
25-50 CAPSULE ORAL ONCE
Status: DISCONTINUED | OUTPATIENT
Start: 2018-11-22 | End: 2018-11-22

## 2018-11-22 RX ADMIN — ANTI-THYMOCYTE GLOBULIN (RABBIT) 100 MG: 5 INJECTION, POWDER, LYOPHILIZED, FOR SOLUTION INTRAVENOUS at 21:30

## 2018-11-22 RX ADMIN — MYCOPHENOLIC ACID 360 MG: 360 TABLET, DELAYED RELEASE ORAL at 08:02

## 2018-11-22 RX ADMIN — VALACYCLOVIR HYDROCHLORIDE 1000 MG: 1 TABLET, FILM COATED ORAL at 08:02

## 2018-11-22 RX ADMIN — LINACLOTIDE 290 MCG: 145 CAPSULE, GELATIN COATED ORAL at 13:09

## 2018-11-22 RX ADMIN — OXYCODONE HYDROCHLORIDE 5 MG: 5 TABLET ORAL at 21:31

## 2018-11-22 RX ADMIN — MAGNESIUM HYDROXIDE 30 ML: 400 SUSPENSION ORAL at 20:14

## 2018-11-22 RX ADMIN — POLYETHYLENE GLYCOL 3350 17 G: 17 POWDER, FOR SOLUTION ORAL at 08:03

## 2018-11-22 RX ADMIN — MYCOPHENOLIC ACID 360 MG: 360 TABLET, DELAYED RELEASE ORAL at 20:14

## 2018-11-22 RX ADMIN — MAGNESIUM OXIDE TAB 400 MG (241.3 MG ELEMENTAL MG) 400 MG: 400 (241.3 MG) TAB at 13:01

## 2018-11-22 RX ADMIN — ACETAMINOPHEN 975 MG: 325 TABLET, FILM COATED ORAL at 16:08

## 2018-11-22 RX ADMIN — ACETAMINOPHEN 975 MG: 325 TABLET, FILM COATED ORAL at 08:04

## 2018-11-22 RX ADMIN — SENNOSIDES AND DOCUSATE SODIUM 2 TABLET: 8.6; 5 TABLET ORAL at 08:03

## 2018-11-22 RX ADMIN — SENNOSIDES AND DOCUSATE SODIUM 2 TABLET: 8.6; 5 TABLET ORAL at 20:14

## 2018-11-22 RX ADMIN — MYCOPHENOLIC ACID 360 MG: 360 TABLET, DELAYED RELEASE ORAL at 13:09

## 2018-11-22 RX ADMIN — SULFAMETHOXAZOLE AND TRIMETHOPRIM 1 TABLET: 400; 80 TABLET ORAL at 08:02

## 2018-11-22 RX ADMIN — ESZOPICLONE 1 MG: 1 TABLET, FILM COATED ORAL at 21:17

## 2018-11-22 RX ADMIN — BENZOCAINE AND MENTHOL 1 LOZENGE: 15; 3.6 LOZENGE ORAL at 21:59

## 2018-11-22 RX ADMIN — TACROLIMUS 2.5 MG: 1 CAPSULE ORAL at 18:09

## 2018-11-22 RX ADMIN — OXYCODONE HYDROCHLORIDE 5 MG: 5 TABLET ORAL at 06:07

## 2018-11-22 RX ADMIN — TACROLIMUS 2 MG: 1 CAPSULE ORAL at 08:02

## 2018-11-22 RX ADMIN — ATORVASTATIN CALCIUM 20 MG: 20 TABLET, FILM COATED ORAL at 21:17

## 2018-11-22 RX ADMIN — DIPHENHYDRAMINE HYDROCHLORIDE 25 MG: 25 CAPSULE ORAL at 20:44

## 2018-11-22 RX ADMIN — HUMAN INSULIN 2 UNITS/HR: 100 INJECTION, SOLUTION SUBCUTANEOUS at 07:09

## 2018-11-22 RX ADMIN — PANCRELIPASE 15000 UNITS: 15000; 51000; 82000 CAPSULE, DELAYED RELEASE ORAL at 08:04

## 2018-11-22 RX ADMIN — GABAPENTIN 100 MG: 100 CAPSULE ORAL at 21:17

## 2018-11-22 RX ADMIN — METHYLPREDNISOLONE 100 MG: 125 INJECTION, POWDER, LYOPHILIZED, FOR SOLUTION INTRAMUSCULAR; INTRAVENOUS at 20:44

## 2018-11-22 RX ADMIN — VALACYCLOVIR HYDROCHLORIDE 1000 MG: 1 TABLET, FILM COATED ORAL at 20:14

## 2018-11-22 RX ADMIN — THERA TABS 1 TABLET: TAB at 08:02

## 2018-11-22 ASSESSMENT — ACTIVITIES OF DAILY LIVING (ADL)
ADLS_ACUITY_SCORE: 12

## 2018-11-22 NOTE — CONSULTS
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    ## T1DM with triopathy  ## ESRD s/p LDKT 11/20/2018  ## Ongoing high dose steroid     DM is tight control with A1C in 5s-6s range on current insulin pump setting. She would be benefit from CGM and less tight control in the future as outpatient.      During in patient, after transplant, given high dose steroid and improving kidney function, insulin requirement is significantly increased. Basal rate from 0.4 unit(s)/h to 1-3 unit(s)/h.   8 hour basal requirement from midnight to 8 am today was 14 units (estimate basal rate daily is 42 unit(s)) which is about 4 times high than her PTA requirement. Per chart review, seems like she will get last dose of methylprednisolone today for the last day. Given ongoing high dose steroid and changing in kidney function, insulin drip would be the safest approach for now.    -- cont insulin drip  -- cont aspart subcutaneous 1/15g carb   -- glucose check per insulin gtt protocol  -- cont hypoglycemia protocol  -- we will consider transition back to pump when ready and insulin requirement is stable    We will continue to follow.    Pt seen and discussed with .    Justino Winston MD  Endocrine Fellow  [phone]      Attending tie-in statement: Patient seen and examined by me, discussed with fellow whose note I have reviewed and amended to reflect our joint findings.  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    T1DM was diagnosed when she was 9. She follows with Dr. Palomares at [facility], last seen in clinic 5/30/2018.  Per patient report and note from 3/2017, DM is tight control with A1C in 5s-6s range. She mostly manages her own pump.  Metronic Paradigm 751  Basal rate 0.4 unit(s)/h  CHO 1/15g  Sensitivity 1/75  Active insulin time 4 h  Pt checked glucose 10 times at home and she is considering CGM in the future. She uses upper abdomen and arms for insulin pump site. She brought her own supplies.  No history of severe hypoglycemia with seizures/ lost of conscious. Given longstanding T1DM, she had retinopathy s/p laser treatment, neuropathy with impaired sensation below knees and nephropathy leading to ESRD.    Pt underwent 2nd LDKT 11/20/2018. Post op, she received high dose methylprednisone per protocol, most recent dose 11/21/2018.  Per pharmacy note 11/21/2018  Planned immunosuppression regimen per kidney transplant protocol:  INDUCTION: thymoglobulin to total ~ 6mg/kg and methylprednisolone IV daily x 3 doses used as pre-med for thymoglobulin.  MAINTENANCE:  mycophenolate and tacrolimus with goal trough levels of 8-10 mcg/L for first 6 months post-transplant.    Since transplant, creatinine is downtrending, today in 3s.  She is eating with no nausea/ vomiting. She mostly has protein, so did not require much meal time coverage.  Her major complaints are fluid retention, no BM and being tether with insulin drip. She feels like if fluid retention improve and if she is back in pump, she would be able to ambulate more and  that would help with BM.     At home, given pancreatic exocrine dysfunction and DM gastroparesis, she had constipation and watery BM.        Recent Labs  Lab 11/22/18  2009 11/22/18  1908 11/22/18  1804 11/22/18  1719 11/22/18  1604 11/22/18  1513  11/22/18  0523  11/21/18  0434  11/20/18  1915  11/20/18  1420 11/20/18  1329 11/20/18  1204   GLC  --   --   --   --   --   --   --  143*  --  82  --  197*  --  113* 122* 144*   * 157* 182* 181* 111* 73  < >  --   < >  --   < >  --   < >  --   --   --    < > = values in this interval not displayed.      Diabetes Type: T1DM   Diabetes Duration: Since age 9  Usual Diabetes Regimen:   Metronic Paradigm 751  Basal rate 0.4 unit(s)/h  CHO 1/15g  Sensitivity 1/75  Active insulin time 4 h  Ability to [place] Prescribed Regimen: Yes  Diabetes Control:   Lab Results   Component Value Date    A1C 5.5 11/20/2018    A1C 5.9 05/11/2018    A1C 6.9 10/03/2016     Diabetes Complications:  Able to Detect Hypoglycemia: yes. Feel tired when glucose in 70s. Wakes up at night if glucose 50s-60s  Usual Diabetes Care Provider: Dr. Palomares at [facility]  Factors Impacting Glucose Control:  Recent high dose steroid  Recovering renal function      Review of Systems  Doesn't feel well   Weight is up 20# due to fluid  Abdomen distension  10 point ROS completed with pertinent positives and negatives noted in the HPI    Past Medical History  Past Medical History:   Diagnosis Date     Anemia of chronic kidney failure      Cataracts, bilateral      Coronary artery disease 2017    CABG x 2.      Diabetic peripheral neuropathy (H)      Diabetic retinopathy (H) 1985    laser treatments     Dyslipidemia      End stage kidney disease (H)      Gastroparesis      History of skin cancer      Hypertension      Immunosuppressed status (H)      Kidney replaced by transplant 1998    on home hemodialysis since 4/17     Osteoporosis      Pancreatic insufficiency      Secondary  hyperparathyroidism (H)      Type 1 diabetes (H) 1970       Family History  Family History   Problem Relation Age of Onset     Brain Cancer Father      Type 1 Diabetes Sister 28     HEART DISEASE Maternal Grandfather      Cerebrovascular Disease Maternal Grandfather      HEART DISEASE Paternal Grandfather      Colon Cancer Maternal Grandmother        Social History  Social History     Social History     Marital status:      Spouse name: N/A     Number of children: 0     Years of education: 16     Occupational History           Social History Main Topics     Smoking status: Never Smoker     Smokeless tobacco: Never Used     Alcohol use No     Drug use: No     Sexual activity: Yes     Partners: Male     Other Topics Concern     Blood Transfusions No     Seat Belt Yes     Social History Narrative         Physical Exam  Temp: 98.1  F (36.7  C) Temp  Min: 98  F (36.7  C)  Max: 98.7  F (37.1  C)  Resp: 18 Resp  Min: 16  Max: 18  SpO2: 93 % SpO2  Min: 93 %  Max: 98 %  Heart Rate: 95 Heart Rate  Min: 82  Max: 95  BP: 141/62 Systolic (24hrs), Av , Min:127 , Max:148   Diastolic (24hrs), Av, Min:55, Max:75    General: pleasant woman resting in bed, having lunch, tanner and line in place  HEENT: NC/AT, PER and anicteric, non-injected, oral mucous membranes moist.    line right neck   Heart: old surgical scar, regular rate and rhythm, no murmur  Lungs: clear to auscultation bilat, no cough  ABD: Lt sided new surgical scar, positive bowel sounds, soft, non-tender  Skin: warm and dry  MSK:  fluid movement of all extremities  Lymp: 2+ LE edema  (Dr Baum sees this as nonpitting edema)  Mental status: alert, oriented x3, communicating clearly  Psych: calm, even mood    Laboratory  Recent Labs   Lab Test  18   0523  18   0434   NA  140   --    --   137   POTASSIUM  4.2  4.2   < >  4.2   CHLORIDE  108   --    --   106   CO2  25   --    --   23   ANIONGAP  6   --    --   8    GLC  143*   --    --   82   BUN  26   --    --   24   CR  1.36*   --    --   1.82*   PERRI  8.5   --    --   8.0*    < > = values in this interval not displayed.     CBC RESULTS:   Recent Labs   Lab Test  11/22/18   0523   WBC  8.5   RBC  2.46*   HGB  7.7*   HCT  23.8*   MCV  97   MCH  31.3   MCHC  32.4   RDW  15.2*   PLT  83*       Liver Function Studies -   Recent Labs   Lab Test  11/19/18   1036   PROTTOTAL  7.2   ALBUMIN  3.6   BILITOTAL  0.4   ALKPHOS  166*   AST  24   ALT  35       Active Medications  Current Facility-Administered Medications   Medication     acetaminophen (TYLENOL) tablet 650 mg     [START ON 11/23/2018] acetaminophen (TYLENOL) tablet 650 mg     acetaminophen (TYLENOL) tablet 975 mg     anti-thymocyte globulin (THYMOGLOBULIN - Rabbit) 100 mg in sodium chloride 0.9 % intermittent infusion     [Rx hold ] aspirin chewable tablet 81 mg     atorvastatin (LIPITOR) tablet 20 mg     benzocaine-menthol (CEPACOL) 15-3.6 MG lozenge 1 lozenge     dextrose 10 % 1,000 mL infusion     dextrose 5% in lactated ringers infusion     glucose gel 15-30 g    Or     dextrose 50 % injection 25-50 mL    Or     glucagon injection 1 mg     diphenhydrAMINE (BENADRYL) capsule 25-50 mg     eszopiclone (LUNESTA) tablet 1 mg     gabapentin (NEURONTIN) capsule 100 mg     insulin 1 unit/mL in saline (NovoLIN, HumuLIN Regular) drip - ADULT IV Infusion     insulin aspart (NovoLOG) inj (RAPID ACTING)     insulin aspart (NovoLOG) inj (RAPID ACTING)     linaclotide (LINZESS) capsule 290 mcg     magnesium hydroxide (MILK OF MAGNESIA) suspension 30 mL     magnesium oxide (MAG-OX) tablet 400 mg     methylPREDNISolone sodium succinate (solu-MEDROL) injection 100 mg     multivitamin, therapeutic (THERA-VIT) tablet 1 tablet     mycophenolic acid (MYFORTIC BRAND) EC tablet 360 mg     ondansetron (ZOFRAN-ODT) ODT tab 4 mg    Or     ondansetron (ZOFRAN) injection 4 mg     oxyCODONE (ROXICODONE) tablet 5 mg     polyethylene glycol  (MIRALAX/GLYCOLAX) Packet 17 g     potassium chloride (KLOR-CON) Packet 20-40 mEq     potassium chloride 10 mEq in 100 mL intermittent infusion with 10 mg lidocaine     potassium chloride 10 mEq in 100 mL sterile water intermittent infusion (premix)     potassium chloride 20 mEq in 50 mL intermittent infusion     potassium chloride SA (K-DUR/KLOR-CON M) CR tablet 20-40 mEq     prochlorperazine (COMPAZINE) injection 10 mg    Or     prochlorperazine (COMPAZINE) tablet 10 mg     senna-docusate (SENOKOT-S;PERICOLACE) 8.6-50 MG per tablet 1 tablet    Or     senna-docusate (SENOKOT-S;PERICOLACE) 8.6-50 MG per tablet 2 tablet     sodium chloride (PF) 0.9% PF flush 10 mL     sodium chloride (PF) 0.9% PF flush 10-20 mL     sulfamethoxazole-trimethoprim (BACTRIM/SEPTRA) 400-80 MG per tablet 1 tablet     tacrolimus (GENERIC EQUIVALENT) capsule 2.5 mg     valACYclovir (VALTREX) tablet 1,000 mg     No current outpatient prescriptions on file.       Current Diet    Active Diet Order      Advance Diet as Tolerated: Regular Diet Adult

## 2018-11-22 NOTE — PLAN OF CARE
"Problem: Patient Care Overview  Goal: Plan of Care/Patient Progress Review    /55 (BP Location: Left arm)  Pulse 83  Temp 98.3  F (36.8  C) (Oral)  Resp 18  Ht 1.6 m (5' 3\")  Wt (S) 60.5 kg (133 lb 6.1 oz)  SpO2 94%  BMI 23.63 kg/m2    1303-8244  VSS on RA, no s/s of distress. A/Ox4, pleasant and cooperative with cares. Denied pain this shift; c/o generalized soreness/fullness in abdomen but declined interventions. Incision dermabonded. Denied n/v--on regular diet with carb coverage. PIV patent with Pt finished thymo infusion on this shift and tolerated well; RIJ patent, with D5LR @50ml/hr. Insulin gtt, algorithm 2, hourly -175. Home med/dose of lunesta ordered to help pt sleep; pt also concerned about home meds, Lipitor and gabapentin and wondering when they will be restarted--oncall paged and sticky note left for team to address. No new orders at this time. Barb patent with light/pale green UOP (pt received methylene blue during sx). No BMs this shift, not passing gas. CAMDEN patent with 500cc output. Pt resting quietly at this time, expressed need/want for sleep as she did not get good sleep last night. Ear plugs/eye covers offered. Resting quietly now, call light and belongings within reach. Will continue to monitor and continue POC/notify team as needed.          "

## 2018-11-22 NOTE — PROGRESS NOTES
[facility], [place]   Transplant Nephrology Progress Note  Date of Admission:  11/20/2018    Assessment & Plan    # ESRD secondary to T1DM s/p LDKT: second allograft . baseline Cr ~ *TBD; trending down                         - Proteinuria: Not checked recently                        - Latest DSA: 11/2018                      Latest DSA: No                        - BK Viremia: Not checked recently- at 1 month post txp                        - Kidney Tx Biopsy: No     Patient with almost 10 kg above dry weight. Would hold off on fluid as BP now normal. May use 20 mg of IV lasix if not negative 1 liter by the evening         # Immunosuppression: Tacrolimus immediate release (goal  8-10) and Mycophenolate mofetil (goal  1-3.5). Also being induced with ATG and steroids per protocol. (consideration should be made for lower thymoglobulin as she was on immunosuppression prior to her second kidney)                        - Changes: No     # Hypertension/Volume Status: Controlled; Goal BP: < 140/90                        - Changes: No- hold off on fluids for now. CVP okay. Give lasix if she develops SOB or has drop in UOP     # Diabetes: Controlled at present with insulin drip. Was on humalog pump of around 18 units per day. Would continue the IV drip for now as she is quite edematous      # Anemia in chronic renal disease: Hgb: Decreased post operatively. Now in 8s. Will monitor                        - Iron studies: Low- will need iron supplementation on discharge.      # Mineral Bone Disorder:                         - Secondary renal hyperparathyroidism; PTH level is: Mildly elevated at 212. Will be monitored as outpt . No rx for now.   - Vitamin D; level is: Normal  - Calcium; level is: acceptable  - Phosphorus; level is: Normal     # Electrolytes:   - Potassium; level: Normal  - Magnesium; level: Normal  - Bicarbonate; level: Normal     # PPX: PJP- started on bactrim 1 tab daily adjusted  "for cr cl of > 30                        CMV: started on valtrex 1gram BID she is CMV IgG + and EBV IgG -     # h/o CAD; s/p 2v CABG in 10/2017. Cleared by cardiology in 2018. No CP or SOB.      # h/o gastroparesis: history of small intestinal bacterial overgrowth, pancreatic exocrine dysfunction, and diabetic enteropathy. Asymptomatic at present.      # Medical Compliance: Yes     # Transplant History:  Etiology of kidney failure: diabetes mellitus type 1  Tx: LDKT  Transplant: 2018 (Kidney), 1998 (Kidney)  Donor Type: Living                Donor Class:   Crossmatch at time of Tx: negative  DSA at time of Tx: No  History of BK viremia: No  History of CMV viremia: No  History of EBV viremia: No  Significant changes in immunosuppression: None  Significant transplant-related complications: None     Recommendations were communicated to the primary team verbally.    Ben Romano MD  Pager: [phone]    Interval History   Overall doing fair, remains volume overloaded. About 10 Kg or so. She is getting of the IVF now. Will monitor for net negative goal of 1 liter if not, will need lasix     Review of Systems   4 point ROS was obtained and negative except as noted in the interval history    Physical Exam   Temp  Av.6  F (37  C)  Min: 97.4  F (36.3  C)  Max: 99.7  F (37.6  C)  Arterial Line MAP (mmHg)  Av.9 mmHg  Min: 48 mmHg  Max: 91 mmHg  Arterial Line BP  Min: 87/33  Max: 148/64      Pulse  Av  Min: 75  Max: 90 Resp  Av.8  Min: 10  Max: 20  SpO2  Av.2 %  Min: 88 %  Max: 100 %    CVP (mmHg): 12 mmHgBP 148/75  Pulse 83  Temp 98.3  F (36.8  C)  Resp 16  Ht 1.6 m (5' 3\")  Wt 60.4 kg (133 lb 1.6 oz)  SpO2 98%  BMI 23.58 kg/m2   Date 18 0700 - 18 0659   Shift 3142-7032 2896-0964 5833-1420 24 Hour Total   I  N  T  A  K  E   Shift Total  (mL/kg)       O  U  T  P  U  T   Urine 600   600    Shift Total  (mL/kg) 600  (9.94)   600  (9.94)   Weight (kg) 60.37 60.37 60.37 " "60.37        Admit Weight: 51.7 kg (113 lb 15.7 oz) (pt says this is her \"dry\" weight)   GENERAL APPEARANCE: alert and no distress  HENT: mouth without ulcers or lesions  LYMPHATICS: no cervical or supraclavicular nodes  RESP: lungs clear to auscultation - no rales, rhonchi or wheezes  CV: regular rhythm, normal rate, no rub, no murmur  EDEMA: LE edema bilaterally  ABDOMEN: soft, nondistended, nontender, bowel sounds normal  MS: extremities normal - no gross deformities noted, no evidence of inflammation in joints, no muscle tenderness  SKIN: no rash    Data   All labs reviewed by me.  CMP  Recent Labs  Lab 11/22/18  0523 11/21/18 2013 11/21/18  1600 11/21/18  1314  11/21/18  0434  11/20/18  1915 11/20/18  1420  11/19/18  1036     --   --   --   --  137  --  137 136  < > 129*   POTASSIUM 4.2 4.2 3.9 3.5  < > 4.2  < > 3.1* 3.2*  < > 3.1*   CHLORIDE 108  --   --   --   --  106  --  102 99  --  93*   CO2 25  --   --   --   --  23  --  26 27  --  29   ANIONGAP 6  --   --   --   --  8  --  8 9  --  6   *  --   --   --   --  82  --  197* 113*  < > 123*   BUN 26  --   --   --   --  24  --  29 30  --  78*   CR 1.36*  --   --   --   --  1.82*  --  2.44* 2.57*  --  5.26*   GFRESTIMATED 40*  --   --   --   --  28*  --  20* 19*  --  8*   GFRESTBLACK 48*  --   --   --   --  34*  --  24* 23*  --  10*   PERRI 8.5  --   --   --   --  8.0*  --  8.4* 8.2*  --  8.7   MAG 2.1  --   --   --   --  2.0  --   --  2.4*  --   --    PHOS 2.9  --   --   --   --  3.0  --   --  2.7  --   --    PROTTOTAL  --   --   --   --   --   --   --   --   --   --  7.2   ALBUMIN  --   --   --   --   --   --   --   --   --   --  3.6   BILITOTAL  --   --   --   --   --   --   --   --   --   --  0.4   ALKPHOS  --   --   --   --   --   --   --   --   --   --  166*   AST  --   --   --   --   --   --   --   --   --   --  24   ALT  --   --   --   --   --   --   --   --   --   --  35   < > = values in this interval not displayed.  CBC  Recent Labs  Lab " 11/22/18  0523 11/21/18 2013 11/21/18  1600 11/21/18  1314  11/21/18  0434  11/20/18  1915 11/20/18  1420   HGB 7.7* 8.3* 8.4* 8.4*  < > 8.3*  < > 7.9* 10.7*   WBC 8.5  --   --   --   --  9.8  --  7.3 1.8*   RBC 2.46*  --   --   --   --  2.61*  --  2.47* 3.34*   HCT 23.8*  --   --   --   --  25.2*  --  24.4* 32.9*   MCV 97  --   --   --   --  97  --  99 99   MCH 31.3  --   --   --   --  31.8  --  32.0 32.0   MCHC 32.4  --   --   --   --  32.9  --  32.4 32.5   RDW 15.2*  --   --   --   --  15.2*  --  14.2 13.7   PLT 83*  --   --   --   --  80*  --  84* 70*   < > = values in this interval not displayed.  INRNo lab results found in last 7 days.  ABG  Recent Labs  Lab 11/20/18  1329 11/20/18  1204 11/20/18  1056   PH 7.38 7.41 7.44   PCO2 43 39 39   PO2 211* 199* 193*   HCO3 25 24 26   O2PER 50.0 45.0 46.0      Urine Studies  Recent Labs   Lab Test  11/19/18   1312  10/03/16   0651   COLOR  Yellow  Yellow   APPEARANCE  Clear  Clear   URINEGLC  Negative  Negative   URINEBILI  Negative  Negative   URINEKETONE  Negative  Negative   SG  1.012  1.012   UBLD  Small*  Negative   URINEPH  5.0  7.0   PROTEIN  30*  >500*   NITRITE  Negative  Negative   LEUKEST  Negative  Negative   RBCU  1  1   WBCU  <1  2     No lab results found.  PTH  Recent Labs   Lab Test  11/19/18   1036   PTHI  212*     Iron Studies  Recent Labs   Lab Test  11/19/18   1036   IRON  38   FEB  212*   IRONSAT  18   CORTEZ  952*       IMAGING:  All imaging studies reviewed by me.     MEDICATIONS:  Current Facility-Administered Medications   Medication     [START ON 11/23/2018] acetaminophen (TYLENOL) tablet 650 mg     acetaminophen (TYLENOL) tablet 975 mg     [Rx hold ] aspirin chewable tablet 81 mg     atorvastatin (LIPITOR) tablet 20 mg     benzocaine-menthol (CEPACOL) 15-3.6 MG lozenge 1 lozenge     dextrose 10 % 1,000 mL infusion     dextrose 5% in lactated ringers infusion     glucose gel 15-30 g    Or     dextrose 50 % injection 25-50 mL    Or     glucagon  injection 1 mg     eszopiclone (LUNESTA) tablet 1 mg     gabapentin (NEURONTIN) capsule 100 mg     insulin 1 unit/mL in saline (NovoLIN, HumuLIN Regular) drip - ADULT IV Infusion     insulin aspart (NovoLOG) inj (RAPID ACTING)     insulin aspart (NovoLOG) inj (RAPID ACTING)     linaclotide (LINZESS) capsule 290 mcg     lipase-protease-amylase (ZENPEP) 59065-83948 units delayed release capsule 15,000 Units     magnesium oxide (MAG-OX) tablet 400 mg     multivitamin, therapeutic (THERA-VIT) tablet 1 tablet     mycophenolic acid (MYFORTIC BRAND) EC tablet 360 mg     ondansetron (ZOFRAN-ODT) ODT tab 4 mg    Or     ondansetron (ZOFRAN) injection 4 mg     oxyCODONE (ROXICODONE) tablet 5 mg     polyethylene glycol (MIRALAX/GLYCOLAX) Packet 17 g     potassium chloride (KLOR-CON) Packet 20-40 mEq     potassium chloride 10 mEq in 100 mL intermittent infusion with 10 mg lidocaine     potassium chloride 10 mEq in 100 mL sterile water intermittent infusion (premix)     potassium chloride 20 mEq in 50 mL intermittent infusion     potassium chloride SA (K-DUR/KLOR-CON M) CR tablet 20-40 mEq     prochlorperazine (COMPAZINE) injection 10 mg    Or     prochlorperazine (COMPAZINE) tablet 10 mg     senna-docusate (SENOKOT-S;PERICOLACE) 8.6-50 MG per tablet 1 tablet    Or     senna-docusate (SENOKOT-S;PERICOLACE) 8.6-50 MG per tablet 2 tablet     sodium chloride (PF) 0.9% PF flush 10 mL     sodium chloride (PF) 0.9% PF flush 10-20 mL     sulfamethoxazole-trimethoprim (BACTRIM/SEPTRA) 400-80 MG per tablet 1 tablet     tacrolimus (GENERIC EQUIVALENT) capsule 2 mg     valACYclovir (VALTREX) tablet 1,000 mg

## 2018-11-22 NOTE — PROGRESS NOTES
Transplant Surgery  Inpatient Daily Progress Note  11/22/2018    Assessment & Plan: Letty Benavidez is a 58 year old female w/ DMI since 1970's c/b diabetic nephropathy & ESKD s/p LDKT (11/1998), now requiring dialysis (4/2017), CABG x2 (10/2017), skin cancer, gastroparesis, pancreatic exocrine dysfunction, diabetic enteropathy, who was admitted for LDKTx with ureteral stent placement (11/20). POD2 today.     Graft function:good, stable, Cr trend down (2.57-> 1.82->1.36)   Immunosuppression management:  Thymo induction: 100 mg intra-op, 100 mg POD 1, 100mg today  Solu-medrol 500 mg intra-op, 250 mg POD 1,100mg today  Mycophenolic acid (EC) 360mg TID  Envarsus XR (Tacrolimus) 2mg BID  Hematology: Pre op Hgb 11.5 -> 8.5 intra- op -> 1u pRBC -> 10.7 -> 7.9 -> 1u pRBC -> 8.5, 7.7 today. No evidence of active bleed- monitor. Resume home lipitor  Cardiorespiratory: Slight hypotension post operative, normotensive after fluid bolus   GI/Nutrition: Regular diet, Bowel regimen: miralax and senna. Adding linzess due to IBS/bloating.   Endocrine: Insulin gtt, carbohydrate sliding scale insulin. Transition to pumpt tomorrow   Fluid/Electrolytes: MIVF: lock fluids, likely needs lasix  : Day to remain due to new surgical anastomosis  Infectious disease: PPx Valganciclovir and Bactrim   Prophylaxis: DVT, fall, GI, fungal  Disposition: 7A orozco care    GINA/Fellow/Resident Provider: Jose Yadav, Fellow    Faculty: Estelita Guzman M.D.  _________________________________________________________________  Transplant History: Admitted 11/20/2018 for LDKT and ureter stent placement.   11/20/2018 (Kidney), 11/4/1998 (Kidney), Postoperative day: 7323 (Kidney), 2 (Kidney)     Interval History: History is obtained from the patient  Overnight events: No acute events overnight. Still with difficulty sleeping. Feels bloated in abdomen and swollen in thighs- 20 lbs up. Has been OOB. No other complaints.      UOP: 4.7L       ROS:   A  "10-point review of systems was negative except as noted above.    Meds:    acetaminophen  975 mg Oral Q8H     atorvastatin  20 mg Oral At Bedtime     eszopiclone  1 mg Oral At Bedtime     gabapentin  100 mg Oral At Bedtime     insulin aspart   Subcutaneous TID w/meals     linaclotide  290 mcg Oral QAM AC     magnesium hydroxide  30 mL Oral BID     magnesium oxide  400 mg Oral Daily with lunch     multivitamin, therapeutic  1 tablet Oral Daily     mycophenolic acid  360 mg Oral TID     polyethylene glycol  17 g Oral BID     senna-docusate  1 tablet Oral BID    Or     senna-docusate  2 tablet Oral BID     sodium chloride (PF)  10 mL Intracatheter Q8H     sulfamethoxazole-trimethoprim  1 tablet Oral Daily     tacrolimus  2.5 mg Oral BID     valACYclovir  1,000 mg Oral BID       Physical Exam:     Admit Weight: 51.7 kg (113 lb 15.7 oz) (pt says this is her \"dry\" weight)    Current vitals:   /75  Pulse 83  Temp 98.3  F (36.8  C)  Resp 16  Ht 1.6 m (5' 3\")  Wt 60.4 kg (133 lb 1.6 oz)  SpO2 98%  BMI 23.58 kg/m2    CVP (mmHg): 12 mmHg    Vital sign ranges:    Temp:  [98  F (36.7  C)-98.7  F (37.1  C)] 98.3  F (36.8  C)  Pulse:  [83-90] 83  Heart Rate:  [81-88] 84  Resp:  [16-18] 16  BP: (114-148)/(55-75) 148/75  SpO2:  [94 %-98 %] 98 %  Patient Vitals for the past 24 hrs:   BP Temp Temp src Pulse Heart Rate Resp SpO2 Weight   11/22/18 1113 148/75 98.3  F (36.8  C) - - 84 16 98 % -   11/22/18 0758 135/65 98.2  F (36.8  C) Oral - 87 16 98 % -   11/22/18 0406 131/55 98  F (36.7  C) Oral - 82 18 94 % 60.4 kg (133 lb 1.6 oz)   11/21/18 2351 127/61 98  F (36.7  C) Oral - 88 18 96 % -   11/21/18 2028 114/55 98.3  F (36.8  C) Oral 83 81 18 94 % -   11/21/18 1948 122/61 98.7  F (37.1  C) Oral - 83 18 94 % -   11/21/18 1521 135/64 98.1  F (36.7  C) Oral 90 - - 97 % -   11/21/18 1459 123/59 98.4  F (36.9  C) Oral 88 - - 94 % -     General Appearance: in no apparent distress.   Skin: normal  Heart: regular rate and " rhythm  Lungs: without wheezes, without crackles  Abdomen: The abdomen is appropriately tender. The wound is Healing well, well approximated and without signs of infection. CAMDEN left abdomen, serous   : tanner is present. Urine has small gross hematuria.   Extremities: edema: minimal   Neurologic: awake, alert and oriented. Tremor absent.    Data:   CMP  Recent Labs  Lab 11/22/18  0523 11/21/18 2013 11/21/18  0434  11/20/18  1329 11/20/18  1204  11/19/18  1036     --   --  137  < > 134 129*  < > 129*   POTASSIUM 4.2 4.2  < > 4.2  < > 3.3* 2.8*  < > 3.1*   CHLORIDE 108  --   --  106  < >  --   --   --  93*   CO2 25  --   --  23  < >  --   --   --  29   *  --   --  82  < > 122* 144*  < > 123*   BUN 26  --   --  24  < >  --   --   --  78*   CR 1.36*  --   --  1.82*  < >  --   --   --  5.26*   GFRESTIMATED 40*  --   --  28*  < >  --   --   --  8*   GFRESTBLACK 48*  --   --  34*  < >  --   --   --  10*   PERRI 8.5  --   --  8.0*  < >  --   --   --  8.7   ICAW  --   --   --   --   --  4.0* 4.3*  < >  --    MAG 2.1  --   --  2.0  < >  --   --   --   --    PHOS 2.9  --   --  3.0  < >  --   --   --   --    ALBUMIN  --   --   --   --   --   --   --   --  3.6   BILITOTAL  --   --   --   --   --   --   --   --  0.4   ALKPHOS  --   --   --   --   --   --   --   --  166*   AST  --   --   --   --   --   --   --   --  24   ALT  --   --   --   --   --   --   --   --  35   < > = values in this interval not displayed.  CBC  Recent Labs  Lab 11/22/18  0523 11/21/18 2013 11/21/18  0434  11/20/18  0614   HGB 7.7* 8.3*  < > 8.3*  < >  --    WBC 8.5  --   --  9.8  < >  --    PLT 83*  --   --  80*  < >  --    A1C  --   --   --   --   --  5.5   < > = values in this interval not displayed.  COAGSNo lab results found in last 7 days.    Invalid input(s): XA   Urinalysis  Recent Labs   Lab Test  11/19/18   1312  10/03/16   0651   COLOR  Yellow  Yellow   APPEARANCE  Clear  Clear   URINEGLC  Negative  Negative   URINEBILI  Negative   Negative   URINEKETONE  Negative  Negative   SG  1.012  1.012   UBLD  Small*  Negative   URINEPH  5.0  7.0   PROTEIN  30*  >500*   NITRITE  Negative  Negative   LEUKEST  Negative  Negative   RBCU  1  1   WBCU  <1  2     Virology:  Hepatitis C Antibody   Date Value Ref Range Status   11/19/2018 Nonreactive NR^Nonreactive Final     Comment:     Assay performance characteristics have not been established for newborns,   infants, and children       BK Virus Result   Date Value Ref Range Status   10/03/2016 BK Virus DNA Not Detected BKNEG copies/mL Final

## 2018-11-22 NOTE — PLAN OF CARE
"Problem: Patient Care Overview  Goal: Plan of Care/Patient Progress Review  0 - 30  /55 (BP Location: Left arm)  Pulse 83  Temp 98  F (36.7  C) (Oral)  Resp 18  Ht 1.6 m (5' 3\")  Wt 60.4 kg (133 lb 1.6 oz)  SpO2 94%  BMI 23.58 kg/m2  VSS on RA  B - 147 currently on algorithm #2  PRN oxycodone x 2 for incisional pain, some relief noted. Denied nausea  Regular diet  PIV infusing D5LR at 50 mL/hr and insulin gtt. I.J. Saline locked. CAMDEN 80 mL bright red output  Day output adequate, urine still green. Pt not passing gas but has active bowel sounds  Incision closed with liquid bandage, open to air  Up with standby assist, pt went for a walk overnight  Continue to monitor.      "

## 2018-11-22 NOTE — PLAN OF CARE
Problem: Patient Care Overview  Goal: Plan of Care/Patient Progress Review  Outcome: Improving  Afebrile; VSS on RA.  -141; insulin drip adjusted per algorithm 2.  Creatinine 1.36 (from 1.82).  Pain well-controlled with scheduled tylenol and prn oxycodone.  Denies nausea.  Tolerating small portions on regular diet.  MIVF d/c'd.  Day patent with 1350mL output.  No BM or gas; bowel sounds hypoactive; linzess restarted, scheduled senokot-s administered, and MOM ordered. CAMDEN with 70mL bloody output.  Incision sealed with dermabond and open to air.  Up ambulating in hallwayx2 with SBA.  Continue with plan of care and notify provider with changes.

## 2018-11-23 ENCOUNTER — TELEPHONE (OUTPATIENT)
Dept: PHARMACY | Facility: CLINIC | Age: 58
End: 2018-11-23

## 2018-11-23 LAB
ANION GAP SERPL CALCULATED.3IONS-SCNC: 8 MMOL/L (ref 3–14)
BASOPHILS # BLD AUTO: 0 10E9/L (ref 0–0.2)
BASOPHILS NFR BLD AUTO: 0 %
BUN SERPL-MCNC: 32 MG/DL (ref 7–30)
CALCIUM SERPL-MCNC: 8.5 MG/DL (ref 8.5–10.1)
CHLORIDE SERPL-SCNC: 108 MMOL/L (ref 94–109)
CO2 SERPL-SCNC: 24 MMOL/L (ref 20–32)
CREAT SERPL-MCNC: 1.17 MG/DL (ref 0.52–1.04)
DIFFERENTIAL METHOD BLD: ABNORMAL
EOSINOPHIL # BLD AUTO: 0 10E9/L (ref 0–0.7)
EOSINOPHIL NFR BLD AUTO: 0 %
ERYTHROCYTE [DISTWIDTH] IN BLOOD BY AUTOMATED COUNT: 15.3 % (ref 10–15)
GFR SERPL CREATININE-BSD FRML MDRD: 47 ML/MIN/1.7M2
GLUCOSE BLDC GLUCOMTR-MCNC: 125 MG/DL (ref 70–99)
GLUCOSE BLDC GLUCOMTR-MCNC: 128 MG/DL (ref 70–99)
GLUCOSE BLDC GLUCOMTR-MCNC: 134 MG/DL (ref 70–99)
GLUCOSE BLDC GLUCOMTR-MCNC: 137 MG/DL (ref 70–99)
GLUCOSE BLDC GLUCOMTR-MCNC: 140 MG/DL (ref 70–99)
GLUCOSE BLDC GLUCOMTR-MCNC: 141 MG/DL (ref 70–99)
GLUCOSE BLDC GLUCOMTR-MCNC: 156 MG/DL (ref 70–99)
GLUCOSE BLDC GLUCOMTR-MCNC: 158 MG/DL (ref 70–99)
GLUCOSE BLDC GLUCOMTR-MCNC: 168 MG/DL (ref 70–99)
GLUCOSE BLDC GLUCOMTR-MCNC: 171 MG/DL (ref 70–99)
GLUCOSE BLDC GLUCOMTR-MCNC: 173 MG/DL (ref 70–99)
GLUCOSE BLDC GLUCOMTR-MCNC: 180 MG/DL (ref 70–99)
GLUCOSE BLDC GLUCOMTR-MCNC: 181 MG/DL (ref 70–99)
GLUCOSE BLDC GLUCOMTR-MCNC: 182 MG/DL (ref 70–99)
GLUCOSE BLDC GLUCOMTR-MCNC: 188 MG/DL (ref 70–99)
GLUCOSE BLDC GLUCOMTR-MCNC: 193 MG/DL (ref 70–99)
GLUCOSE BLDC GLUCOMTR-MCNC: 216 MG/DL (ref 70–99)
GLUCOSE SERPL-MCNC: 163 MG/DL (ref 70–99)
HCT VFR BLD AUTO: 24.5 % (ref 35–47)
HGB BLD-MCNC: 8 G/DL (ref 11.7–15.7)
IMM GRANULOCYTES # BLD: 0 10E9/L (ref 0–0.4)
IMM GRANULOCYTES NFR BLD: 0.5 %
LYMPHOCYTES # BLD AUTO: 0.1 10E9/L (ref 0.8–5.3)
LYMPHOCYTES NFR BLD AUTO: 1.9 %
MAGNESIUM SERPL-MCNC: 2.3 MG/DL (ref 1.6–2.3)
MCH RBC QN AUTO: 31.5 PG (ref 26.5–33)
MCHC RBC AUTO-ENTMCNC: 32.7 G/DL (ref 31.5–36.5)
MCV RBC AUTO: 97 FL (ref 78–100)
MONOCYTES # BLD AUTO: 0.2 10E9/L (ref 0–1.3)
MONOCYTES NFR BLD AUTO: 3.5 %
NEUTROPHILS # BLD AUTO: 4 10E9/L (ref 1.6–8.3)
NEUTROPHILS NFR BLD AUTO: 94.1 %
NRBC # BLD AUTO: 0 10*3/UL
NRBC BLD AUTO-RTO: 0 /100
PHOSPHATE SERPL-MCNC: 3 MG/DL (ref 2.5–4.5)
PLATELET # BLD AUTO: 95 10E9/L (ref 150–450)
POTASSIUM SERPL-SCNC: 4.4 MMOL/L (ref 3.4–5.3)
RBC # BLD AUTO: 2.54 10E12/L (ref 3.8–5.2)
SODIUM SERPL-SCNC: 140 MMOL/L (ref 133–144)
WBC # BLD AUTO: 4.2 10E9/L (ref 4–11)

## 2018-11-23 PROCEDURE — 25000132 ZZH RX MED GY IP 250 OP 250 PS 637: Performed by: TRANSPLANT SURGERY

## 2018-11-23 PROCEDURE — 12000025 ZZH R&B TRANSPLANT INTERMEDIATE

## 2018-11-23 PROCEDURE — 25000132 ZZH RX MED GY IP 250 OP 250 PS 637: Performed by: STUDENT IN AN ORGANIZED HEALTH CARE EDUCATION/TRAINING PROGRAM

## 2018-11-23 PROCEDURE — 25000131 ZZH RX MED GY IP 250 OP 636 PS 637: Performed by: NURSE PRACTITIONER

## 2018-11-23 PROCEDURE — 25000132 ZZH RX MED GY IP 250 OP 250 PS 637: Performed by: PHYSICIAN ASSISTANT

## 2018-11-23 PROCEDURE — 25000125 ZZHC RX 250: Performed by: STUDENT IN AN ORGANIZED HEALTH CARE EDUCATION/TRAINING PROGRAM

## 2018-11-23 PROCEDURE — 80048 BASIC METABOLIC PNL TOTAL CA: CPT | Performed by: STUDENT IN AN ORGANIZED HEALTH CARE EDUCATION/TRAINING PROGRAM

## 2018-11-23 PROCEDURE — 84100 ASSAY OF PHOSPHORUS: CPT | Performed by: STUDENT IN AN ORGANIZED HEALTH CARE EDUCATION/TRAINING PROGRAM

## 2018-11-23 PROCEDURE — 36592 COLLECT BLOOD FROM PICC: CPT | Performed by: STUDENT IN AN ORGANIZED HEALTH CARE EDUCATION/TRAINING PROGRAM

## 2018-11-23 PROCEDURE — 00000146 ZZHCL STATISTIC GLUCOSE BY METER IP

## 2018-11-23 PROCEDURE — 83735 ASSAY OF MAGNESIUM: CPT | Performed by: STUDENT IN AN ORGANIZED HEALTH CARE EDUCATION/TRAINING PROGRAM

## 2018-11-23 PROCEDURE — 85025 COMPLETE CBC W/AUTO DIFF WBC: CPT | Performed by: STUDENT IN AN ORGANIZED HEALTH CARE EDUCATION/TRAINING PROGRAM

## 2018-11-23 PROCEDURE — 25000131 ZZH RX MED GY IP 250 OP 636 PS 637: Performed by: TRANSPLANT SURGERY

## 2018-11-23 PROCEDURE — 25000128 H RX IP 250 OP 636: Performed by: STUDENT IN AN ORGANIZED HEALTH CARE EDUCATION/TRAINING PROGRAM

## 2018-11-23 PROCEDURE — 25000131 ZZH RX MED GY IP 250 OP 636 PS 637: Performed by: PHYSICIAN ASSISTANT

## 2018-11-23 RX ORDER — BISACODYL 10 MG
10 SUPPOSITORY, RECTAL RECTAL ONCE
Status: DISCONTINUED | OUTPATIENT
Start: 2018-11-23 | End: 2018-11-26 | Stop reason: HOSPADM

## 2018-11-23 RX ORDER — FUROSEMIDE 10 MG/ML
20 INJECTION INTRAMUSCULAR; INTRAVENOUS ONCE
Status: COMPLETED | OUTPATIENT
Start: 2018-11-23 | End: 2018-11-23

## 2018-11-23 RX ORDER — ACETAMINOPHEN 325 MG/1
650 TABLET ORAL EVERY 4 HOURS PRN
Status: DISCONTINUED | OUTPATIENT
Start: 2018-11-23 | End: 2018-11-26 | Stop reason: HOSPADM

## 2018-11-23 RX ORDER — BISACODYL 5 MG
10 TABLET, DELAYED RELEASE (ENTERIC COATED) ORAL DAILY PRN
Status: DISCONTINUED | OUTPATIENT
Start: 2018-11-23 | End: 2018-11-26 | Stop reason: HOSPADM

## 2018-11-23 RX ADMIN — INSULIN ASPART 2 UNITS: 100 INJECTION, SOLUTION INTRAVENOUS; SUBCUTANEOUS at 09:11

## 2018-11-23 RX ADMIN — IRON SUCROSE 100 MG: 20 INJECTION, SOLUTION INTRAVENOUS at 21:15

## 2018-11-23 RX ADMIN — VALACYCLOVIR HYDROCHLORIDE 1000 MG: 1 TABLET, FILM COATED ORAL at 20:23

## 2018-11-23 RX ADMIN — ATORVASTATIN CALCIUM 20 MG: 20 TABLET, FILM COATED ORAL at 21:12

## 2018-11-23 RX ADMIN — ACETAMINOPHEN 650 MG: 325 TABLET, FILM COATED ORAL at 15:51

## 2018-11-23 RX ADMIN — POLYETHYLENE GLYCOL 3350 17 G: 17 POWDER, FOR SOLUTION ORAL at 07:04

## 2018-11-23 RX ADMIN — MAGNESIUM OXIDE TAB 400 MG (241.3 MG ELEMENTAL MG) 400 MG: 400 (241.3 MG) TAB at 12:20

## 2018-11-23 RX ADMIN — SENNOSIDES AND DOCUSATE SODIUM 2 TABLET: 8.6; 5 TABLET ORAL at 07:29

## 2018-11-23 RX ADMIN — ESZOPICLONE 1 MG: 1 TABLET, FILM COATED ORAL at 21:11

## 2018-11-23 RX ADMIN — FUROSEMIDE 20 MG: 10 INJECTION, SOLUTION INTRAVENOUS at 09:08

## 2018-11-23 RX ADMIN — MYCOPHENOLIC ACID 360 MG: 360 TABLET, DELAYED RELEASE ORAL at 07:21

## 2018-11-23 RX ADMIN — ACETAMINOPHEN 975 MG: 325 TABLET, FILM COATED ORAL at 00:01

## 2018-11-23 RX ADMIN — SULFAMETHOXAZOLE AND TRIMETHOPRIM 1 TABLET: 400; 80 TABLET ORAL at 07:29

## 2018-11-23 RX ADMIN — INSULIN LISPRO: 100 INJECTION, SOLUTION INTRAVENOUS; SUBCUTANEOUS at 13:48

## 2018-11-23 RX ADMIN — GABAPENTIN 100 MG: 100 CAPSULE ORAL at 21:12

## 2018-11-23 RX ADMIN — TACROLIMUS 2.5 MG: 1 CAPSULE ORAL at 07:21

## 2018-11-23 RX ADMIN — HUMAN INSULIN 1 UNITS/HR: 100 INJECTION, SOLUTION SUBCUTANEOUS at 07:04

## 2018-11-23 RX ADMIN — THERA TABS 1 TABLET: TAB at 07:21

## 2018-11-23 RX ADMIN — TACROLIMUS 2.5 MG: 1 CAPSULE ORAL at 18:06

## 2018-11-23 RX ADMIN — SENNOSIDES AND DOCUSATE SODIUM 2 TABLET: 8.6; 5 TABLET ORAL at 20:22

## 2018-11-23 RX ADMIN — LINACLOTIDE 290 MCG: 145 CAPSULE, GELATIN COATED ORAL at 07:22

## 2018-11-23 RX ADMIN — SODIUM PHOSPHATE 1 ENEMA: 7; 19 ENEMA RECTAL at 11:15

## 2018-11-23 RX ADMIN — MYCOPHENOLIC ACID 360 MG: 360 TABLET, DELAYED RELEASE ORAL at 14:19

## 2018-11-23 RX ADMIN — MYCOPHENOLIC ACID 360 MG: 360 TABLET, DELAYED RELEASE ORAL at 20:23

## 2018-11-23 RX ADMIN — ACETAMINOPHEN 975 MG: 325 TABLET, FILM COATED ORAL at 07:21

## 2018-11-23 RX ADMIN — SODIUM PHOSPHATE 1 ENEMA: 7; 19 ENEMA RECTAL at 15:51

## 2018-11-23 RX ADMIN — VALACYCLOVIR HYDROCHLORIDE 1000 MG: 1 TABLET, FILM COATED ORAL at 07:21

## 2018-11-23 ASSESSMENT — ACTIVITIES OF DAILY LIVING (ADL)
ADLS_ACUITY_SCORE: 12

## 2018-11-23 NOTE — PLAN OF CARE
Problem: Patient Care Overview  Goal: Plan of Care/Patient Progress Review  Outcome: Improving  Insulin drip changed to patient's ambulatory pump with Humalog insulin. Order is for 1U/hr basal, patient started out at .5U/hr.  Blood sugars have been stable most of the day, 128-217 while on IV Insulin. Patient declining Oxycodone, is taking scheduled Tylenol with good relief. Fleets enema X1, patient requesting another one later today if no additional results. Lasix 20 mg IV X1 for generalized edema, good urine output post diuretic. CAMDEN in place. Patient reports left thigh is more swollen and tight from edema. Family here most of the day. Specialty Pharmacy in to see this morning, plan for discharge locally in 1-2 days.   Incontinent of loose stool this evening, patient with hyperactive bowel sounds but states not passing flatus. New order for Dulcolax tabs per patient request. Patient reports having moderate constipation issues needing to take a lot of laxatives. If enema needed, team prefers tap water, pink lady, etc and not Fleets.

## 2018-11-23 NOTE — PLAN OF CARE
"Problem: Patient Care Overview  Goal: Plan of Care/Patient Progress Review  Outcome: No Change  2300 - 0730  /64 (BP Location: Left arm)  Pulse 103  Temp 97.9  F (36.6  C) (Oral)  Resp 16  Ht 1.6 m (5' 3\")  Wt 59.8 kg (131 lb 12.8 oz)  SpO2 93%  BMI 23.35 kg/m2  VSS on RA - intermittently tachycardic  B-193 currently on algorithm #1  No PRNs given, pt received scheduled dose of tylenol for pain. Denied nausea  Regular diet  I.J. With insulin gtt and TKO. CAMDEN output 70 mL - bright red blood  Day output 550 mL, pt unsure if she is passing gas - hypoactive bowel sounds  Incision closed with liquid bandage - open to air, bruised around incision  Up with standby assist - pt ambulated in the hallway overnight  Continue to monitor.        "

## 2018-11-23 NOTE — PROGRESS NOTES
Transplant Surgery  Inpatient Daily Progress Note  11/23/2018    Assessment & Plan: Letty Benavidez is a 58 year old female w/ DMI since 1970's c/b diabetic nephropathy & ESKD s/p LDKT (11/1998), now requiring dialysis (4/2017), CABG x2 (10/2017), skin cancer, gastroparesis, pancreatic exocrine dysfunction, diabetic enteropathy, who was admitted for LDKTx with ureteral stent placement (11/20). POD3 today.     Graft function:good, stable, Cr trend down (2.57-> 1.82->1.36-> 1.17)   Immunosuppression management:  Thymo induction: 100 mg intra-op, 100 mg POD 1, 100mg POD2  Solu-medrol 500 mg intra-op, 250 mg POD 1,100mg POD2  Mycophenolic acid (EC) 360mg TID  Envarsus XR (Tacrolimus) 2.5mg BID, increased   Hematology: Pre op Hgb 11.5, required 2u pRBCs in immediate post operative period. Hgb 8.0, stable. No evidence of active bleed, will continue to monitor.   Cardiorespiratory: Slight hypotension post operative, normotensive after fluid bolus, resume home lipitor  GI/Nutrition: Regular diet, Bowel regimen: miralax, senna, linzess d/t IBS/bloating. Added enema today.   Endocrine: Transition to pump today   Fluid/Electrolytes: MIVF: lock fluids, Lasix IV 20mg today   : Day to remain due to new surgical anastomosis, will remove on POD5  Infectious disease: PPx Valganciclovir and Bactrim   Prophylaxis: DVT, fall, GI, fungal  Disposition: 7A [facility]rozco care    GINA/Fellow/Resident Provider: Viridiana Su, Resident     Faculty: Estelita Guzman M.D.  _________________________________________________________________  Transplant History: Admitted 11/20/2018 for LDKT and ureter stent placement.   11/20/2018 (Kidney), 11/4/1998 (Kidney), Postoperative day: 7324 (Kidney), 3 (Kidney)     Interval History: History is obtained from the patient  Overnight events: No acute events overnight. Patient continues to feel very fluid overload and tight in abdomen and thighs. Abdominal pain is well managed, pain mostly contributed to fluid  "status. Bowel function continues to be an issue, trying multiple agents and ambulation.     UOP: 3.4L/575cc since midnight  Drain: 350cc/70cc since midnight        ROS:   A 10-point review of systems was negative except as noted above.    Meds:    acetaminophen  650 mg Oral Once     acetaminophen  975 mg Oral Q8H     atorvastatin  20 mg Oral At Bedtime     eszopiclone  1 mg Oral At Bedtime     gabapentin  100 mg Oral At Bedtime     insulin aspart   Subcutaneous TID w/meals     linaclotide  290 mcg Oral QAM AC     magnesium hydroxide  30 mL Oral BID     magnesium oxide  400 mg Oral Daily with lunch     multivitamin, therapeutic  1 tablet Oral Daily     mycophenolic acid  360 mg Oral TID     polyethylene glycol  17 g Oral BID     senna-docusate  1 tablet Oral BID    Or     senna-docusate  2 tablet Oral BID     sodium chloride (PF)  10 mL Intracatheter Q8H     sulfamethoxazole-trimethoprim  1 tablet Oral Daily     tacrolimus  2.5 mg Oral BID     valACYclovir  1,000 mg Oral BID       Physical Exam:     Admit Weight: 51.7 kg (113 lb 15.7 oz) (pt says this is her \"dry\" weight)    Current vitals:   /64 (BP Location: Left arm)  Pulse 103  Temp 97.9  F (36.6  C) (Oral)  Resp 16  Ht 1.6 m (5' 3\")  Wt 59.8 kg (131 lb 12.8 oz)  SpO2 93%  BMI 23.35 kg/m2    CVP (mmHg): 12 mmHg    Vital sign ranges:    Temp:  [97.8  F (36.6  C)-98.7  F (37.1  C)] 97.9  F (36.6  C)  Pulse:  [] 103  Heart Rate:  [84-95] 91  Resp:  [16-18] 16  BP: (120-148)/(62-84) 120/64  SpO2:  [90 %-98 %] 93 %  Patient Vitals for the past 24 hrs:   BP Temp Temp src Pulse Heart Rate Resp SpO2 Weight   11/23/18 0404 120/64 97.9  F (36.6  C) Oral 103 91 16 93 % 59.8 kg (131 lb 12.8 oz)   11/23/18 0005 128/67 97.9  F (36.6  C) Oral - 92 16 93 % -   11/22/18 2148 147/84 97.8  F (36.6  C) Oral 89 - 16 93 % -   11/22/18 2127 134/73 98.1  F (36.7  C) Oral 90 - 16 90 % -   11/22/18 1904 141/62 98.1  F (36.7  C) Oral - 95 18 93 % -   11/22/18 1605 146/71 " 98.7  F (37.1  C) Oral - 91 18 98 % -   11/22/18 1113 148/75 98.3  F (36.8  C) - - 84 16 98 % -   11/22/18 0758 135/65 98.2  F (36.8  C) Oral - 87 16 98 % -     General Appearance: in no apparent distress.   Skin: normal  Heart: regular rate and rhythm  Lungs: without wheezes, without crackles  Abdomen: The abdomen is appropriately tender, taut. The wound is Healing well, well approximated and without signs of infection. CAMDEN left abdomen, serous   : tanner is present. Urine has small gross hematuria.   Extremities: edema: minimal, no pitting, right thigh feels tight   Neurologic: awake, alert and oriented. Tremor absent.    Data:   [facility]  Recent Labs  Lab 11/22/18  0523 11/21/18 2013 11/21/18  0434  11/20/18  1329 11/20/18  1204  11/19/18  1036     --   --  137  < > 134 129*  < > 129*   POTASSIUM 4.2 4.2  < > 4.2  < > 3.3* 2.8*  < > 3.1*   CHLORIDE 108  --   --  106  < >  --   --   --  93*   CO2 25  --   --  23  < >  --   --   --  29   *  --   --  82  < > 122* 144*  < > 123*   BUN 26  --   --  24  < >  --   --   --  78*   CR 1.36*  --   --  1.82*  < >  --   --   --  5.26*   GFRESTIMATED 40*  --   --  28*  < >  --   --   --  8*   GFRESTBLACK 48*  --   --  34*  < >  --   --   --  10*   PERRI 8.5  --   --  8.0*  < >  --   --   --  8.7   ICAW  --   --   --   --   --  4.0* 4.3*  < >  --    MAG 2.1  --   --  2.0  < >  --   --   --   --    PHOS 2.9  --   --  3.0  < >  --   --   --   --    ALBUMIN  --   --   --   --   --   --   --   --  3.6   BILITOTAL  --   --   --   --   --   --   --   --  0.4   ALKPHOS  --   --   --   --   --   --   --   --  166*   AST  --   --   --   --   --   --   --   --  24   ALT  --   --   --   --   --   --   --   --  35   < > = values in this interval not displayed.  CBC  Recent Labs  Lab 11/22/18  0523 11/21/18 2013 11/21/18  0434  11/20/18  0614   HGB 7.7* 8.3*  < > 8.3*  < >  --    WBC 8.5  --   --  9.8  < >  --    PLT 83*  --   --  80*  < >  --    A1C  --   --   --   --   --  5.5    < > = values in this interval not displayed.  COAGSNo lab results found in last 7 days.    Invalid input(s): XA   Urinalysis  Recent Labs   Lab Test  11/19/18   1312  10/03/16   0651   COLOR  Yellow  Yellow   APPEARANCE  Clear  Clear   URINEGLC  Negative  Negative   URINEBILI  Negative  Negative   URINEKETONE  Negative  Negative   SG  1.012  1.012   UBLD  Small*  Negative   URINEPH  5.0  7.0   PROTEIN  30*  >500*   NITRITE  Negative  Negative   LEUKEST  Negative  Negative   RBCU  1  1   WBCU  <1  2     Virology:  Hepatitis C Antibody   Date Value Ref Range Status   11/19/2018 Nonreactive NR^Nonreactive Final     Comment:     Assay performance characteristics have not been established for newborns,   infants, and children       BK Virus Result   Date Value Ref Range Status   10/03/2016 BK Virus DNA Not Detected BKNEG copies/mL Final

## 2018-11-23 NOTE — PROGRESS NOTES
Diabetes Consult Daily  Progress Note          Assessment/Plan:     Letty Benavidez  is a 59 yo female with longstanding T1DM with triopathy, ESRD 2/2 DN s/p LDKT 1998, required home hemodialysis 4/2017, CADs/p CABG, gastroparesis, pancreatic exocrine dysfunction who was admitted for LDKT with ureteral stent placement 11/20/2018 with Dr. Guzman.    Type 1 diabetic:on ambulatory pump  Plan:  Transition to ambulatory pump today  -once pump on, overlap by 1 hour before IV insulin discontinued  [employer] Paradigm 751, with Humalog  Basal rate: from 7543-9461:  0.4 units/hour --> increase to 1 unit per hour ( will decrease to 0.8 units/hour if dropping low)  I:C 1 unit per 15 grams of CHO for meals and snacks  Sensitivity: 1 unit per 75  Target:  Active insulin 4 hours 100  Monitor glucose before meals, HS, 200 and 0500  Will continue to follow     Outpatient diabetes follow up: TBD  Plan discussed with patient and bedside RN           Interval History:   The last 24 hours progress and nursing notes reviewed.  Has continued on IV insulin, rates 0-2 units per hour, glucose ~ 140's to 216  Received final dose or methylprednisolone 100 mg yesterday (11/22) at 2044  discussed basal rate and Letty would like to start at 1 unit per hour ( has had steroids in the past and this basal rate seems to have worked well for her)    Will apply the pump to her upper arms 2/2 increase in fluid retention in her abdomen and upper legs after surgery.  Up walking on unit, appetite is good, denies nausea and or vomiting       Recent Labs  Lab 11/23/18  1108 11/23/18  1011 11/23/18  0910 11/23/18  0801 11/23/18  0658 11/23/18  0612 11/23/18  0603  11/22/18  0523  11/21/18  0434  11/20/18  1915  11/20/18  1420 11/20/18  1329   GLC  --   --   --   --   --  163*  --   --  143*  --  82  --  197*  --  113* 122*   * 181* 216* 173* 171*  --  141*  < >  --   < >  --   < >  --   < >  --   --    < > = values in this  "interval not displayed.            Review of Systems:   See interval hx          Medications:       Active Diet Order      Advance Diet as Tolerated: Regular Diet Adult     Physical Exam:  Gen: Alert,  NAD   HEENT:  mucous membranes are moist  Resp: non-labored  Ext: moving all extremties  Neuro:oriented x3, communicating clearly  /71 (BP Location: Left arm)  Pulse 93  Temp 98.3  F (36.8  C) (Oral)  Resp 16  Ht 1.6 m (5' 3\")  Wt 59.8 kg (131 lb 12.8 oz)  SpO2 98%  BMI 23.35 kg/m2           Data:     Lab Results   Component Value Date    A1C 5.5 11/20/2018    A1C 5.9 05/11/2018    A1C 6.9 10/03/2016              CBC RESULTS:   Recent Labs   Lab Test  11/23/18   0612   WBC  4.2   RBC  2.54*   HGB  8.0*   HCT  24.5*   MCV  97   MCH  31.5   MCHC  32.7   RDW  15.3*   PLT  95*     Recent Labs   Lab Test  11/23/18   0612  11/22/18   0523   NA  140  140   POTASSIUM  4.4  4.2   CHLORIDE  108  108   CO2  24  25   ANIONGAP  8  6   GLC  163*  143*   BUN  32*  26   CR  1.17*  1.36*   PERRI  8.5  8.5     Liver Function Studies -   Recent Labs   Lab Test  11/19/18   1036   PROTTOTAL  7.2   ALBUMIN  3.6   BILITOTAL  0.4   ALKPHOS  166*   AST  24   ALT  35     Lab Results   Component Value Date    INR 0.96 10/03/2016         Rachael Bustos -[phone]  Diabetes Management job code [id number]            "

## 2018-11-23 NOTE — CONSULTS
Diabetes CNS consult received for ambulatory insulin infusion pump assessment from Kirsten Bustos NP, Diabetes Management Team.  Letty Benavidez  is a 59 yo female with longstanding T1DM with triopathy, ESRD 2/2 DN s/p LDKT 1998, required home hemodialysis 4/2017, CADs/p CABG, gastroparesis, pancreatic exocrine dysfunction who was admitted for LDKT with ureteral stent placement 11/20/2018 by Dr. Guzman.    Insulin Pump Assessment    Pump-[employer] Paradigm 751.  Insulin-Lispro (Humalog)    Basal Rates:    1722-2661-0.4 units/hour prior to transplant; now increased to 1 unit/hour    Prandial Bolus:  1 unit for every 15 grams of CHO with meals and snacks    Correction Factor/Insulin Sensitivity Factor:  1 unit for every 75 mg/dL above target    Target glucose:  100-140 mg/dL    Active Insulin-4 hours      Patient is very knowledgeable and able to manage her own insulin pump independently.  She has supplies with her to utilize through the week-end.  She denies any needs at this time.    Sarah Johnson, APRN, MSN, ACNS-BC, BC-ADM, CDE  Diabetes Clinical Nurse Specialist  [phone]

## 2018-11-23 NOTE — PLAN OF CARE
"Problem: Patient Care Overview  Goal: Plan of Care/Patient Progress Review  Outcome: No Change  Patient is tachy at 100, O2 Sats at 90% on RA, patient was encouraged to take deep breath, sats were then 93% on RA. Patient is hourly BG, Alg 1-2 this shift, currently stopped per protocol. Patient reports abd pain, PRN oxy 5mg administered and effective per patient. Patient denies nausea, but reports feeling a pill is  \"stuck\" patient drinks water to help, and tries vomiting to remove pill. Patient requested Cepacol and writer elevated HOB, waiting results. Diet is regular with carb coverage, patient ate a late lunch and did not order dinner. Patient has a CAMDEN drain with bright red output. Patient has a tanner with adequate output, dark jessy in color. Patient denies BM, BS hypoactive x4, patient denies passing gas. Incision is dermabond with no drainage. Patient has moderate edema in thighs, knees, calves, ankles and feet. 3rd dose of thymo started this shift. Bela Diaz RN on 11/22/2018 at 10:18 PM        "

## 2018-11-23 NOTE — PROGRESS NOTES
[facility], [place]   Transplant Nephrology Progress Note  Date of Admission:  11/20/2018    Assessment & Plan    # ESRD secondary to T1DM s/p LDKT: second allograft . baseline Cr ~ *TBD; trending down                         - Proteinuria: Not checked recently                        - Latest DSA: 11/2018                      Latest DSA: No                        - BK Viremia: Not checked recently- at 1 month post txp                        - Kidney Tx Biopsy: No     # Immunosuppression: Tacrolimus immediate release (goal  8-10) and Mycophenolate mofetil (goal  1-3.5). Also being induced with ATG and steroids per protocol. (consideration should be made for lower thymoglobulin as she was on immunosuppression prior to her second kidney)                        - Changes: No     # Hypertension/Volume Status: Controlled; Goal BP: < 140/90                        - Changes: No- hold off on fluids for now. CVP okay. Give lasix if she develops SOB or has drop in UOP     # Diabetes: being switched to her pump today      # Anemia in chronic renal disease: Hgb: Decreased post operatively. Now in 8s. Will monitor                        - Iron studies: ok to use replete with venofer 100 mg IV daily while inpt      # Mineral Bone Disorder:                         - Secondary renal hyperparathyroidism; PTH level is: Mildly elevated at 212. Will be monitored as outpt . No rx for now.   - Vitamin D; level is: Normal  - Calcium; level is: acceptable  - Phosphorus; level is: Normal     # Electrolytes:   - Potassium; level: Normal  - Magnesium; level: Normal  - Bicarbonate; level: Normal     # PPX: PJP- started on bactrim 1 tab daily adjusted for cr cl of > 30                        CMV: started on valtrex 1gram BID she is CMV IgG + and EBV IgG -     # h/o CAD; s/p 2v CABG in 10/2017. Cleared by cardiology in July 2018. No CP or SOB.      # h/o gastroparesis: now with severe constipation despite enemas. Will  "defer to surgery team. We discussed avoiding fleet and pink lady enemas due to high phos content. Ok for soap suds and gastrografin.     # leg swelling left greater than right, if persists consider doppler ultrasound      # Medical Compliance: Yes     # Transplant History:  Etiology of kidney failure: diabetes mellitus type 1  Tx: LDKT  Transplant: 2018 (Kidney), 1998 (Kidney)  Donor Type: Living                Donor Class:   Crossmatch at time of Tx: negative  DSA at time of Tx: No  History of BK viremia: No  History of CMV viremia: No  History of EBV viremia: No  Significant changes in immunosuppression: None  Significant transplant-related complications: None     Recommendations were communicated to the primary team verbally.    Ben Romano MD  Pager: [phone]    Interval History   Overall doing fair but remains constipated. Weight is down today but continues with leg swelling which is present in both legs but left is greater than right     Review of Systems   4 point ROS was obtained and negative except as noted in the interval history    Physical Exam   Temp  Av.6  F (37  C)  Min: 97.4  F (36.3  C)  Max: 99.7  F (37.6  C)  Arterial Line MAP (mmHg)  Av.9 mmHg  Min: 48 mmHg  Max: 91 mmHg  Arterial Line BP  Min: 87/33  Max: 148/64      Pulse  Av  Min: 75  Max: 90 Resp  Av.8  Min: 10  Max: 20  SpO2  Av.2 %  Min: 88 %  Max: 100 %    CVP (mmHg): 12 mmHgBP 140/71 (BP Location: Left arm)  Pulse 93  Temp 98.3  F (36.8  C) (Oral)  Resp 16  Ht 1.6 m (5' 3\")  Wt 59.8 kg (131 lb 12.8 oz)  SpO2 98%  BMI 23.35 kg/m2   Date 18 0700 - 18 0659   Shift 1409-9908 5210-5158 0086-1449 24 Hour Total   I  N  T  A  K  E   Shift Total  (mL/kg)       O  U  T  P  U  T   Urine 600   600    Shift Total  (mL/kg) 600  (9.94)   600  (9.94)   Weight (kg) 60.37 60.37 60.37 60.37        Admit Weight: 51.7 kg (113 lb 15.7 oz) (pt says this is her \"dry\" weight)   GENERAL APPEARANCE: alert " and no distress  HENT: mouth without ulcers or lesions  LYMPHATICS: no cervical or supraclavicular nodes  RESP: lungs clear to auscultation - no rales, rhonchi or wheezes  CV: regular rhythm, normal rate, no rub, no murmur  EDEMA: LE edema bilaterally  ABDOMEN: soft, nondistended, nontender, bowel sounds normal  MS: extremities normal - no gross deformities noted, no evidence of inflammation in joints, no muscle tenderness  SKIN: no rash    Data   All labs reviewed by me.  CMP  Recent Labs  Lab 11/23/18  0612 11/22/18  0523 11/21/18 2013 11/21/18  1600  11/21/18  0434  11/20/18  1915 11/20/18  1420  11/19/18  1036    140  --   --   --  137  --  137 136  < > 129*   POTASSIUM 4.4 4.2 4.2 3.9  < > 4.2  < > 3.1* 3.2*  < > 3.1*   CHLORIDE 108 108  --   --   --  106  --  102 99  --  93*   CO2 24 25  --   --   --  23  --  26 27  --  29   ANIONGAP 8 6  --   --   --  8  --  8 9  --  6   * 143*  --   --   --  82  --  197* 113*  < > 123*   BUN 32* 26  --   --   --  24  --  29 30  --  78*   CR 1.17* 1.36*  --   --   --  1.82*  --  2.44* 2.57*  --  5.26*   GFRESTIMATED 47* 40*  --   --   --  28*  --  20* 19*  --  8*   GFRESTBLACK 57* 48*  --   --   --  34*  --  24* 23*  --  10*   PERRI 8.5 8.5  --   --   --  8.0*  --  8.4* 8.2*  --  8.7   MAG 2.3 2.1  --   --   --  2.0  --   --  2.4*  --   --    PHOS 3.0 2.9  --   --   --  3.0  --   --  2.7  --   --    PROTTOTAL  --   --   --   --   --   --   --   --   --   --  7.2   ALBUMIN  --   --   --   --   --   --   --   --   --   --  3.6   BILITOTAL  --   --   --   --   --   --   --   --   --   --  0.4   ALKPHOS  --   --   --   --   --   --   --   --   --   --  166*   AST  --   --   --   --   --   --   --   --   --   --  24   ALT  --   --   --   --   --   --   --   --   --   --  35   < > = values in this interval not displayed.  CBC  Recent Labs  Lab 11/23/18  0612 11/22/18  0523 11/21/18 2013 11/21/18  1600  11/21/18  0434  11/20/18  1915   HGB 8.0* 7.7* 8.3* 8.4*  < > 8.3*   < > 7.9*   WBC 4.2 8.5  --   --   --  9.8  --  7.3   RBC 2.54* 2.46*  --   --   --  2.61*  --  2.47*   HCT 24.5* 23.8*  --   --   --  25.2*  --  24.4*   MCV 97 97  --   --   --  97  --  99   MCH 31.5 31.3  --   --   --  31.8  --  32.0   MCHC 32.7 32.4  --   --   --  32.9  --  32.4   RDW 15.3* 15.2*  --   --   --  15.2*  --  14.2   PLT 95* 83*  --   --   --  80*  --  84*   < > = values in this interval not displayed.  INRNo lab results found in last 7 days.  ABG    Recent Labs  Lab 11/20/18  1329 11/20/18  1204 11/20/18  1056   PH 7.38 7.41 7.44   PCO2 43 39 39   PO2 211* 199* 193*   HCO3 25 24 26   O2PER 50.0 45.0 46.0      Urine Studies  Recent Labs   Lab Test  11/19/18   1312  10/03/16   0651   COLOR  Yellow  Yellow   APPEARANCE  Clear  Clear   URINEGLC  Negative  Negative   URINEBILI  Negative  Negative   URINEKETONE  Negative  Negative   SG  1.012  1.012   UBLD  Small*  Negative   URINEPH  5.0  7.0   PROTEIN  30*  >500*   NITRITE  Negative  Negative   LEUKEST  Negative  Negative   RBCU  1  1   WBCU  <1  2     No lab results found.  PTH  Recent Labs   Lab Test  11/19/18   1036   PTHI  212*     Iron Studies  Recent Labs   Lab Test  11/19/18   1036   IRON  38   FEB  212*   IRONSAT  18   CORTEZ  952*       IMAGING:  All imaging studies reviewed by me.     MEDICATIONS:  Current Facility-Administered Medications   Medication     acetaminophen (TYLENOL) tablet 650 mg     acetaminophen (TYLENOL) tablet 650 mg     acetaminophen (TYLENOL) tablet 975 mg     [Rx hold ] aspirin chewable tablet 81 mg     atorvastatin (LIPITOR) tablet 20 mg     benzocaine-menthol (CEPACOL) 15-3.6 MG lozenge 1 lozenge     bisacodyl (DULCOLAX) Suppository 10 mg     dextrose 10 % 1,000 mL infusion     dextrose 5% in lactated ringers infusion     glucose gel 15-30 g    Or     dextrose 50 % injection 25-50 mL    Or     glucagon injection 1 mg     eszopiclone (LUNESTA) tablet 1 mg     gabapentin (NEURONTIN) capsule 100 mg     insulin 1 unit/mL in  saline (NovoLIN, HumuLIN Regular) drip - ADULT IV Infusion     insulin aspart (NovoLOG) inj (RAPID ACTING)     insulin aspart (NovoLOG) inj (RAPID ACTING)     insulin basal rate from AMBULATORY PUMP     insulin bolus from AMBULATORY PUMP     insulin bolus from AMBULATORY PUMP     insulin bolus from AMBULATORY PUMP     insulin lispro (HumaLOG) 100 UNIT/ML vial for filling pump reservoir     linaclotide (LINZESS) capsule 290 mcg     magnesium hydroxide (MILK OF MAGNESIA) suspension 30 mL     magnesium oxide (MAG-OX) tablet 400 mg     Med Instruction - Transition from IV Insulin Infusion to Sub-Q Insulin     multivitamin, therapeutic (THERA-VIT) tablet 1 tablet     mycophenolic acid (MYFORTIC BRAND) EC tablet 360 mg     ondansetron (ZOFRAN-ODT) ODT tab 4 mg    Or     ondansetron (ZOFRAN) injection 4 mg     oxyCODONE (ROXICODONE) tablet 5 mg     polyethylene glycol (MIRALAX/GLYCOLAX) Packet 17 g     potassium chloride (KLOR-CON) Packet 20-40 mEq     potassium chloride 10 mEq in 100 mL intermittent infusion with 10 mg lidocaine     potassium chloride 10 mEq in 100 mL sterile water intermittent infusion (premix)     potassium chloride 20 mEq in 50 mL intermittent infusion     potassium chloride SA (K-DUR/KLOR-CON M) CR tablet 20-40 mEq     prochlorperazine (COMPAZINE) injection 10 mg    Or     prochlorperazine (COMPAZINE) tablet 10 mg     senna-docusate (SENOKOT-S;PERICOLACE) 8.6-50 MG per tablet 1 tablet    Or     senna-docusate (SENOKOT-S;PERICOLACE) 8.6-50 MG per tablet 2 tablet     sodium chloride (PF) 0.9% PF flush 10 mL     sodium chloride (PF) 0.9% PF flush 10-20 mL     sulfamethoxazole-trimethoprim (BACTRIM/SEPTRA) 400-80 MG per tablet 1 tablet     tacrolimus (GENERIC EQUIVALENT) capsule 2.5 mg     valACYclovir (VALTREX) tablet 1,000 mg

## 2018-11-24 ENCOUNTER — APPOINTMENT (OUTPATIENT)
Dept: ULTRASOUND IMAGING | Facility: CLINIC | Age: 58
End: 2018-11-24
Attending: NURSE PRACTITIONER
Payer: COMMERCIAL

## 2018-11-24 LAB
ANION GAP SERPL CALCULATED.3IONS-SCNC: 6 MMOL/L (ref 3–14)
BASOPHILS # BLD AUTO: 0 10E9/L (ref 0–0.2)
BASOPHILS NFR BLD AUTO: 0 %
BUN SERPL-MCNC: 32 MG/DL (ref 7–30)
CALCIUM SERPL-MCNC: 7.8 MG/DL (ref 8.5–10.1)
CHLORIDE SERPL-SCNC: 107 MMOL/L (ref 94–109)
CO2 SERPL-SCNC: 27 MMOL/L (ref 20–32)
CREAT SERPL-MCNC: 1.29 MG/DL (ref 0.52–1.04)
DIFFERENTIAL METHOD BLD: ABNORMAL
EOSINOPHIL # BLD AUTO: 0.1 10E9/L (ref 0–0.7)
EOSINOPHIL NFR BLD AUTO: 2.5 %
ERYTHROCYTE [DISTWIDTH] IN BLOOD BY AUTOMATED COUNT: 15.1 % (ref 10–15)
GFR SERPL CREATININE-BSD FRML MDRD: 42 ML/MIN/1.7M2
GLUCOSE BLDC GLUCOMTR-MCNC: 108 MG/DL (ref 70–99)
GLUCOSE BLDC GLUCOMTR-MCNC: 148 MG/DL (ref 70–99)
GLUCOSE BLDC GLUCOMTR-MCNC: 244 MG/DL (ref 70–99)
GLUCOSE BLDC GLUCOMTR-MCNC: 92 MG/DL (ref 70–99)
GLUCOSE SERPL-MCNC: 99 MG/DL (ref 70–99)
HCT VFR BLD AUTO: 24.2 % (ref 35–47)
HGB BLD-MCNC: 7.7 G/DL (ref 11.7–15.7)
IMM GRANULOCYTES # BLD: 0 10E9/L (ref 0–0.4)
IMM GRANULOCYTES NFR BLD: 1.4 %
LYMPHOCYTES # BLD AUTO: 0.2 10E9/L (ref 0.8–5.3)
LYMPHOCYTES NFR BLD AUTO: 5.8 %
MAGNESIUM SERPL-MCNC: 2 MG/DL (ref 1.6–2.3)
MCH RBC QN AUTO: 31.2 PG (ref 26.5–33)
MCHC RBC AUTO-ENTMCNC: 31.8 G/DL (ref 31.5–36.5)
MCV RBC AUTO: 98 FL (ref 78–100)
MONOCYTES # BLD AUTO: 0.1 10E9/L (ref 0–1.3)
MONOCYTES NFR BLD AUTO: 3.3 %
NEUTROPHILS # BLD AUTO: 2.4 10E9/L (ref 1.6–8.3)
NEUTROPHILS NFR BLD AUTO: 87 %
NRBC # BLD AUTO: 0 10*3/UL
NRBC BLD AUTO-RTO: 0 /100
PHOSPHATE SERPL-MCNC: 2.1 MG/DL (ref 2.5–4.5)
PLATELET # BLD AUTO: 108 10E9/L (ref 150–450)
POTASSIUM SERPL-SCNC: 3.7 MMOL/L (ref 3.4–5.3)
RBC # BLD AUTO: 2.47 10E12/L (ref 3.8–5.2)
SODIUM SERPL-SCNC: 140 MMOL/L (ref 133–144)
TACROLIMUS BLD-MCNC: 6.1 UG/L (ref 5–15)
TME LAST DOSE: NORMAL H
WBC # BLD AUTO: 2.8 10E9/L (ref 4–11)

## 2018-11-24 PROCEDURE — 25000132 ZZH RX MED GY IP 250 OP 250 PS 637: Performed by: STUDENT IN AN ORGANIZED HEALTH CARE EDUCATION/TRAINING PROGRAM

## 2018-11-24 PROCEDURE — 25000132 ZZH RX MED GY IP 250 OP 250 PS 637: Performed by: PHYSICIAN ASSISTANT

## 2018-11-24 PROCEDURE — 80197 ASSAY OF TACROLIMUS: CPT | Performed by: STUDENT IN AN ORGANIZED HEALTH CARE EDUCATION/TRAINING PROGRAM

## 2018-11-24 PROCEDURE — 76776 US EXAM K TRANSPL W/DOPPLER: CPT

## 2018-11-24 PROCEDURE — 84100 ASSAY OF PHOSPHORUS: CPT | Performed by: STUDENT IN AN ORGANIZED HEALTH CARE EDUCATION/TRAINING PROGRAM

## 2018-11-24 PROCEDURE — 36592 COLLECT BLOOD FROM PICC: CPT | Performed by: STUDENT IN AN ORGANIZED HEALTH CARE EDUCATION/TRAINING PROGRAM

## 2018-11-24 PROCEDURE — 25000128 H RX IP 250 OP 636: Performed by: STUDENT IN AN ORGANIZED HEALTH CARE EDUCATION/TRAINING PROGRAM

## 2018-11-24 PROCEDURE — 25000128 H RX IP 250 OP 636: Performed by: NURSE PRACTITIONER

## 2018-11-24 PROCEDURE — 83735 ASSAY OF MAGNESIUM: CPT | Performed by: STUDENT IN AN ORGANIZED HEALTH CARE EDUCATION/TRAINING PROGRAM

## 2018-11-24 PROCEDURE — 80048 BASIC METABOLIC PNL TOTAL CA: CPT | Performed by: STUDENT IN AN ORGANIZED HEALTH CARE EDUCATION/TRAINING PROGRAM

## 2018-11-24 PROCEDURE — 12000026 ZZH R&B TRANSPLANT

## 2018-11-24 PROCEDURE — 85025 COMPLETE CBC W/AUTO DIFF WBC: CPT | Performed by: STUDENT IN AN ORGANIZED HEALTH CARE EDUCATION/TRAINING PROGRAM

## 2018-11-24 PROCEDURE — 25000132 ZZH RX MED GY IP 250 OP 250 PS 637: Performed by: TRANSPLANT SURGERY

## 2018-11-24 PROCEDURE — 25000131 ZZH RX MED GY IP 250 OP 636 PS 637: Performed by: PHYSICIAN ASSISTANT

## 2018-11-24 PROCEDURE — 25000131 ZZH RX MED GY IP 250 OP 636 PS 637: Performed by: TRANSPLANT SURGERY

## 2018-11-24 PROCEDURE — 00000146 ZZHCL STATISTIC GLUCOSE BY METER IP

## 2018-11-24 RX ORDER — FUROSEMIDE 10 MG/ML
10 INJECTION INTRAMUSCULAR; INTRAVENOUS ONCE
Status: COMPLETED | OUTPATIENT
Start: 2018-11-24 | End: 2018-11-24

## 2018-11-24 RX ADMIN — MYCOPHENOLIC ACID 360 MG: 360 TABLET, DELAYED RELEASE ORAL at 09:06

## 2018-11-24 RX ADMIN — MYCOPHENOLIC ACID 360 MG: 360 TABLET, DELAYED RELEASE ORAL at 21:12

## 2018-11-24 RX ADMIN — ACETAMINOPHEN 650 MG: 325 TABLET, FILM COATED ORAL at 12:39

## 2018-11-24 RX ADMIN — MAGNESIUM OXIDE TAB 400 MG (241.3 MG ELEMENTAL MG) 400 MG: 400 (241.3 MG) TAB at 12:39

## 2018-11-24 RX ADMIN — THERA TABS 1 TABLET: TAB at 09:06

## 2018-11-24 RX ADMIN — TACROLIMUS 2.5 MG: 1 CAPSULE ORAL at 17:34

## 2018-11-24 RX ADMIN — TACROLIMUS 2.5 MG: 1 CAPSULE ORAL at 09:03

## 2018-11-24 RX ADMIN — ATORVASTATIN CALCIUM 20 MG: 20 TABLET, FILM COATED ORAL at 21:12

## 2018-11-24 RX ADMIN — IRON SUCROSE 100 MG: 20 INJECTION, SOLUTION INTRAVENOUS at 17:33

## 2018-11-24 RX ADMIN — BISACODYL 10 MG: 5 TABLET, COATED ORAL at 11:00

## 2018-11-24 RX ADMIN — ESZOPICLONE 1 MG: 1 TABLET, FILM COATED ORAL at 21:13

## 2018-11-24 RX ADMIN — GABAPENTIN 100 MG: 100 CAPSULE ORAL at 21:13

## 2018-11-24 RX ADMIN — ACETAMINOPHEN 650 MG: 325 TABLET, FILM COATED ORAL at 06:02

## 2018-11-24 RX ADMIN — SULFAMETHOXAZOLE AND TRIMETHOPRIM 1 TABLET: 400; 80 TABLET ORAL at 09:03

## 2018-11-24 RX ADMIN — VALACYCLOVIR HYDROCHLORIDE 1000 MG: 1 TABLET, FILM COATED ORAL at 09:03

## 2018-11-24 RX ADMIN — MYCOPHENOLIC ACID 360 MG: 360 TABLET, DELAYED RELEASE ORAL at 12:39

## 2018-11-24 RX ADMIN — VALACYCLOVIR HYDROCHLORIDE 1000 MG: 1 TABLET, FILM COATED ORAL at 21:12

## 2018-11-24 RX ADMIN — LINACLOTIDE 290 MCG: 145 CAPSULE, GELATIN COATED ORAL at 09:01

## 2018-11-24 RX ADMIN — FUROSEMIDE 10 MG: 10 INJECTION, SOLUTION INTRAVENOUS at 10:50

## 2018-11-24 ASSESSMENT — ACTIVITIES OF DAILY LIVING (ADL)
ADLS_ACUITY_SCORE: 13
ADLS_ACUITY_SCORE: 12
ADLS_ACUITY_SCORE: 13
ADLS_ACUITY_SCORE: 13

## 2018-11-24 NOTE — PLAN OF CARE
Problem: Patient Care Overview  Goal: Plan of Care/Patient Progress Review  Outcome: No Change  Patient continues to be up and down to the bathroom with small amount of liquid stool, which she reports as her baseline. Tap water enema per patient, incontinence in bed X1 as patient stated she was too exhausted to get up/down. Dulcolax tabs 10 mg this morning, Miralax held per patient. Patient reports that she feels she is making good progress on her bowels, but does remain very focused on them. Encouraging fluids, tanner in place, good urine output. Left CAMDEN with thick, bloody output, about 90cc out per shift.

## 2018-11-24 NOTE — PROGRESS NOTES
Transplant Surgery  Inpatient Daily Progress Note  11/24/2018    Assessment & Plan:Letty Benavidez is a 58 year old female w/ DMI since 1970's c/b diabetic nephropathy & ESKD s/p LDKT (11/1998), now requiring dialysis (4/2017), CABG x2 (10/2017), skin cancer, gastroparesis, pancreatic exocrine dysfunction, diabetic enteropathy, who was admitted for LDKTx with ureteral stent placement (11/20). POD3 today.      Graft function: s/p LDKT with stent placement. good, stable. SCr slightly elevated today 1.29 from 1. today.  Immunosuppression management:  Thymo induction: 100 mg intra-op, 100 mg POD 1, 100mg POD2-complete  Solu-medrol 500 mg intra-op, 250 mg POD 1,100mg POD2  Mycophenolic acid (EC) 360mg TID d/t GI intolerance  Tacrolimus 2.5mg BID, increased from PTA dose  Neurology: Continue tylenol PRN pain. Oxycodone PRN -pt not utilizing.  Hematology: Anemia of chronic disease/acute blood loss, Preop Hgb 11.5. Received 2 unit(s) PRBC intraop. Hgb today 7.7,  CAMDEN x1 appears sanguinous. ASA prior to admit on hold.  Cardiorespiratory: [id number]. Hx CABG-ASA on hold.   GI/Nutrition:Regular diet as tolerated. Hx Gastroparesis.  Constipation: continue Bowel regimen: miralax, senna, linzess d/t IBS/bloating. Received fleets yesterday with minimal relief.  Will continue with dulcolax tabs and tap water enema today.  Endocrine: FBS . Transitioned to pump POD #3, Endo following.  Fluid/Electrolytes: Hypervolemic:  Weight +6.5Kg from admit. CIV. Continue with gentle diuresis- Lasix 10mg IV x1 today.  : Day to remain due to new surgical anastomosis, plan to remove on POD #5  Infectious disease:Afebrile,Leukopenia 2.8  Continue Bactrim and Valctye PPx  Prophylaxis: DVT, fall, GI, fungal  Disposition: 7A    Medical Decision Making: Medium  Subsequent visit 72967 (moderate level decision making)    GINA/Fellow/Resident Provider: Sadaf Sue    Faculty: Marcello Helm,  "M.D.  _________________________________________________________________  Transplant History: Admitted 11/20/2018 for LDKTx.  11/20/2018 (Kidney), 11/4/1998 (Kidney), Postoperative day: 7325 (Kidney), 4 (Kidney)     Interval History: History is obtained from the patient  Overnight events:   Continues to report abdominal discomfort-gas/stool.  Pain controlled without narcotics. Ambulating within room. Denies nausea. Poor appetite.      ROS:   A 10-point review of systems was negative except as noted above.    Meds:    atorvastatin  20 mg Oral At Bedtime     bisacodyl  10 mg Rectal Once     eszopiclone  1 mg Oral At Bedtime     furosemide  10 mg Intravenous Once     gabapentin  100 mg Oral At Bedtime     insulin bolus from AMBULATORY PUMP   Subcutaneous 4x Daily AC & HS     insulin bolus from AMBULATORY PUMP   Subcutaneous TID AC     insulin lispro   Device See Admin Instructions     iron sucrose (VENOFER) intermittent infusion  100 mg Intravenous Daily     linaclotide  290 mcg Oral QAM AC     magnesium hydroxide  30 mL Oral BID     magnesium oxide  400 mg Oral Daily with lunch     - MEDICATION INSTRUCTIONS -   Does not apply Once     multivitamin, therapeutic  1 tablet Oral Daily     mycophenolic acid  360 mg Oral TID     polyethylene glycol  17 g Oral BID     senna-docusate  1 tablet Oral BID    Or     senna-docusate  2 tablet Oral BID     sodium chloride (PF)  10 mL Intracatheter Q8H     sulfamethoxazole-trimethoprim  1 tablet Oral Daily     tacrolimus  2.5 mg Oral BID     valACYclovir  1,000 mg Oral BID       Physical Exam:     Admit Weight: 51.7 kg (113 lb 15.7 oz) (pt says this is her \"dry\" weight)    Current vitals:   /71 (BP Location: Left arm)  Pulse 93  Temp 98.3  F (36.8  C) (Oral)  Resp 15  Ht 1.6 m (5' 3\")  Wt 58.2 kg (128 lb 6.4 oz)  SpO2 95%  BMI 22.75 kg/m2    CVP (mmHg): 12 mmHg    Vital sign ranges:    Temp:  [97.7  F (36.5  C)-99  F (37.2  C)] 98.3  F (36.8  C)  Pulse:  [93] 93  Heart " Rate:  [86-95] 86  Resp:  [15-16] 15  BP: (117-140)/(58-71) 122/71  SpO2:  [95 %-100 %] 95 %  Patient Vitals for the past 24 hrs:   BP Temp Temp src Pulse Heart Rate Resp SpO2 Weight   11/24/18 0810 - - - - - - - 58.2 kg (128 lb 6.4 oz)   11/24/18 0744 122/71 98.3  F (36.8  C) Oral - 86 15 95 % -   11/24/18 0420 136/64 99  F (37.2  C) Oral - 87 16 98 % -   11/24/18 0000 123/59 98  F (36.7  C) Oral - 89 16 98 % -   11/23/18 1933 117/61 97.7  F (36.5  C) Oral - 89 16 95 % -   11/23/18 1630 123/58 98.2  F (36.8  C) Oral - 95 16 100 % -   11/23/18 1110 140/71 98.3  F (36.8  C) Oral 93 92 16 98 % -     General Appearance: in no apparent distress.   Skin: warm, dry  Heart: regular rate and rhythm  Lungs: clear to auscultation, without crackles  Abdomen: The abdomen is slightly distended, appropriately tender, incision with ecchymosis, no active drainage.  CAMDEN x1 with sanguinous output.  : tanner is present.Urine has no gross hematuria.   Extremities: edema: present bilaterally. L>R.   Neurologic: awake, alert and oriented. Tremor absent..     Data:   CMP  Recent Labs  Lab 11/24/18  0534 11/23/18  0612  11/20/18  1329 11/20/18  1204  11/19/18  1036    140  < > 134 129*  < > 129*   POTASSIUM 3.7 4.4  < > 3.3* 2.8*  < > 3.1*   CHLORIDE 107 108  < >  --   --   --  93*   CO2 27 24  < >  --   --   --  29   GLC 99 163*  < > 122* 144*  < > 123*   BUN 32* 32*  < >  --   --   --  78*   CR 1.29* 1.17*  < >  --   --   --  5.26*   GFRESTIMATED 42* 47*  < >  --   --   --  8*   GFRESTBLACK 51* 57*  < >  --   --   --  10*   PERRI 7.8* 8.5  < >  --   --   --  8.7   ICAW  --   --   --  4.0* 4.3*  < >  --    MAG 2.0 2.3  < >  --   --   --   --    PHOS 2.1* 3.0  < >  --   --   --   --    ALBUMIN  --   --   --   --   --   --  3.6   BILITOTAL  --   --   --   --   --   --  0.4   ALKPHOS  --   --   --   --   --   --  166*   AST  --   --   --   --   --   --  24   ALT  --   --   --   --   --   --  35   < > = values in this interval not  displayed.  CBC  Recent Labs  Lab 11/24/18  0534 11/23/18  0612  11/20/18  0614   HGB 7.7* 8.0*  < >  --    WBC 2.8* 4.2  < >  --    * 95*  < >  --    A1C  --   --   --  5.5   < > = values in this interval not displayed.  COAGSNo lab results found in last 7 days.    Invalid input(s): XA   Urinalysis  Recent Labs   Lab Test  11/19/18   1312  10/03/16   0651   COLOR  Yellow  Yellow   APPEARANCE  Clear  Clear   URINEGLC  Negative  Negative   URINEBILI  Negative  Negative   URINEKETONE  Negative  Negative   SG  1.012  1.012   UBLD  Small*  Negative   URINEPH  5.0  7.0   PROTEIN  30*  >500*   NITRITE  Negative  Negative   LEUKEST  Negative  Negative   RBCU  1  1   WBCU  <1  2     Virology:  Hepatitis C Antibody   Date Value Ref Range Status   11/19/2018 Nonreactive NR^Nonreactive Final     Comment:     Assay performance characteristics have not been established for newborns,   infants, and children       BK Virus Result   Date Value Ref Range Status   10/03/2016 BK Virus DNA Not Detected BKNEG copies/mL Final     Attestation:    The patient has been seen and evaluated by me.   Vital signs, labs, medications and orders were reviewed.   When obtained, diagnostic images were reviewed by me and interpreted as above.    The care plan was discussed with the multidisciplinary team and I agree with the findings and plan in this note, with any differences recorded in blue.    Immunosuppressive medication management was reviewed and adjusted as reflected in the note and orders.     .

## 2018-11-24 NOTE — PROGRESS NOTES
Diabetes Consult Daily  Progress Note          Assessment/Plan:     Letty Benavidez  is a 57 yo female with longstanding T1DM with triopathy, ESRD 2/2 DN s/p LDKT 1998, required home hemodialysis 4/2017, CADs/p CABG, gastroparesis, pancreatic exocrine dysfunction who was admitted for LDKT with ureteral stent placement 11/20/2018 with Dr. Guzman.    BG in the 90s-high 100s with pump basal rate of 0.4-0.5 units/h (signficantly less insulin than planned yesterday).  BG now up to the mid 200s midday.    Plan:    Medtronic Paradigm 751, with Humalog  Basal rate:  0.5 units/h from 1791-8234  I:C 1 unit per 15 grams of CHO for meals and snacks<-- increased to 1unit/12g CHO  Sensitivity: 1 unit per 75  Target:  Active insulin 4 hours 100  Monitor glucose before meals, HS, 200 and 0500  Will continue to follow     Outpatient diabetes follow up: with endocrinologist in [place].  Plan discussed with patient.           Interval History:   The last 24 hours progress and nursing notes reviewed.  Letty reports that she started to finally pass stool last evening-- it is liquid stool, which she says is her usual.  She still feels like abdomen is tight secondary to constipation.  Appetite is low, due to this discomfort.  She is focusing on getting protein, using a supplement from home (protein gel pack), and having vanilla greek yogurt occasionally.      Yesterday when she started her pump she did not run 1 unit/h for basal rate, as had been decided by our team earlier.  Her IV insulin rate was 0.5 units/h at that time, so she set her basal rate for 0.5 units/h.  Overnight BG ran a little lower (90s) and she dropped the rate to 0.4 units/h.  Now BG up to the mid 200s prelunch, so she increased basal rate slightly back to 0.5 units/h.      Recent Labs  Lab 11/24/18  1222 11/24/18  0723 11/24/18  0534 11/23/18  1809 11/23/18  1628 11/23/18  1349 11/23/18  1310  11/23/18  0612  11/22/18  0523  11/21/18  0434   "11/20/18  1915  11/20/18  1420   GLC  --   --  99  --   --   --   --   --  163*  --  143*  --  82  --  197*  --  113*   * 108*  --  180* 182* 128* 125*  < >  --   < >  --   < >  --   < >  --   < >  --    < > = values in this interval not displayed.            Review of Systems:   See interval hx          Medications:       Active Diet Order      Advance Diet as Tolerated: Regular Diet Adult     Physical Exam:  Gen: Alert, sitting up on edge of bed, NAD. Mother and  are present.  HEENT:  mucous membranes are moist  Resp: unlabored  Ext: moving all extremties  Neuro:oriented x3, communicating clearly  /70 (BP Location: Left arm)  Pulse 93  Temp 98.2  F (36.8  C) (Oral)  Resp 16  Ht 1.6 m (5' 3\")  Wt 58.2 kg (128 lb 6.4 oz)  SpO2 100%  BMI 22.75 kg/m2           Data:     Lab Results   Component Value Date    A1C 5.5 11/20/2018    A1C 5.9 05/11/2018    A1C 6.9 10/03/2016            Recent Labs   Lab Test  11/24/18   0534  11/23/18   0612   NA  140  140   POTASSIUM  3.7  4.4   CHLORIDE  107  108   CO2  27  24   ANIONGAP  6  8   GLC  99  163*   BUN  32*  32*   CR  1.29*  1.17*   PERRI  7.8*  8.5     CBC RESULTS:   Recent Labs   Lab Test  11/24/18   0534   WBC  2.8*   RBC  2.47*   HGB  7.7*   HCT  24.2*   MCV  98   MCH  31.2   MCHC  31.8   RDW  15.1*   PLT  108*     Saniya Reynolds PA-C [phone]  Diabetes Management Team Job Code [id number]          "

## 2018-11-24 NOTE — PLAN OF CARE
Problem: Patient Care Overview  Goal: Plan of Care/Patient Progress Review  Outcome: Improving    RN:  Tmax. 99(O), OVSS. Tylenol given x1 for abdominal pain, declines Oxycodone, denies nausea. Patient self regulate personal Insulin pump, per patient, basal rate @0.4units/hr, see MAR for blood glucose and boluses during the shift. Right CAMDEN with moderate amount of sanguinous output, Day with adequate amount of urine output.  Small loose BM x2, up to bedside commode independently.

## 2018-11-24 NOTE — PROGRESS NOTES
[facility], [place]   Transplant Nephrology Progress Note  Date of Admission:  11/20/2018    Assessment & Plan    # ESRD secondary to T1DM s/p LDKT: second allograft . baseline Cr ~ TBD; trending down althoguht slightly up from yesterday. Renal ultrasound today unremarkable.                         - Proteinuria: Not checked recently                        - Latest DSA: 11/2018                      Latest DSA: No                        - BK Viremia: Not checked recently- at 1 month post txp                        - Kidney Tx Biopsy: No     # Immunosuppression: Tacrolimus immediate release (goal  8-10) and Mycophenolic acid (goal  1-3.5). Was induced withlower thymoglobulin as she was on immunosuppression prior to her second kidney                        - Changes: No- continuing MPA 360mg ID and prograf 2,5mg BID     # Hypertension/Volume Status: Controlled; Goal BP: < 140/90                        - Changes: No- would avoid or decrease lasix dose.      # Diabetes: being managed by ebdocrinology.       # Anemia in chronic renal disease: Hgb: Decreased post operatively. hgb 7.7 Will monitor                        - Iron studies: ok to replete with venofer 100 mg IV daily while inpt      # Mineral Bone Disorder:                         - Secondary renal hyperparathyroidism; PTH level is: Mildly elevated at 212. Will be monitored as outpt . No rx for now.   - Vitamin D; level is: Normal  - Calcium; level is: Low today- will need calcium supplmentation if it trends down durther.   - Phosphorus; level is: Normal     # Electrolytes:   - Potassium; level: Normal  - Magnesium; level: Normal  - Bicarbonate; level: Normal     # PPX: PJP-on bactrim 1 tab daily adjusted for cr cl of > 30                        CMV: on valtrex 1gram BID she is CMV IgG + and EBV IgG -     # h/o CAD; s/p 2v CABG in 10/2017. Cleared by cardiology in July 2018. No CP or SOB.      # h/o gastroparesis: now with severe  "constipation despite enemas. Will defer to surgery team. . Ok for soap suds and gastrografin. Patient wants to try dulcolax today.      # leg swelling left greater than right, if persists consider doppler ultrasound      # Medical Compliance: Yes     # Transplant History:  Etiology of kidney failure: diabetes mellitus type 1  Tx: LDKT  Transplant: 2018 (Kidney), 1998 (Kidney)  Donor Type: Living                Donor Class:   Crossmatch at time of Tx: negative  DSA at time of Tx: No  History of BK viremia: No  History of CMV viremia: No  History of EBV viremia: No  Significant changes in immunosuppression: None  Significant transplant-related complications: None     Recommendations were communicated to the primary team verbally.    Colton Arce MD  Pager: [phone]    Interval History   Overall doing fair except that she remains constipated. Weight is down today by ~1kg  but continues with leg swelling. N other complaints. Denies chest pain or SOB.     Review of Systems   4 point ROS was obtained and negative except as noted in the interval history    Physical Exam   Temp  Av.6  F (37  C)  Min: 97.4  F (36.3  C)  Max: 99.7  F (37.6  C)  Arterial Line MAP (mmHg)  Av.9 mmHg  Min: 48 mmHg  Max: 91 mmHg  Arterial Line BP  Min: 87/33  Max: 148/64      Pulse  Av  Min: 75  Max: 90 Resp  Av.8  Min: 10  Max: 20  SpO2  Av.2 %  Min: 88 %  Max: 100 %    CVP (mmHg): 12 mmHgBP 131/67 (BP Location: Left arm)  Pulse 93  Temp 98.2  F (36.8  C) (Oral)  Resp 15  Ht 1.6 m (5' 3\")  Wt 58.2 kg (128 lb 6.4 oz)  SpO2 100%  BMI 22.75 kg/m2   Date 18 0700 - 18 0659   Shift 1175-5492 5011-0598 9098-4760 24 Hour Total   I  N  T  A  K  E   Shift Total  (mL/kg)       O  U  T  P  U  T   Urine 600   600    Shift Total  (mL/kg) 600  (9.94)   600  (9.94)   Weight (kg) 60.37 60.37 60.37 60.37        Admit Weight: 51.7 kg (113 lb 15.7 oz) (pt says this is her \"dry\" weight)   GENERAL APPEARANCE: alert " and no distress  HENT: mouth without ulcers or lesions  LYMPHATICS: no cervical or supraclavicular nodes  RESP: lungs clear to auscultation - no rales, rhonchi or wheezes  CV: regular rhythm, normal rate, no rub, no murmur  EDEMA: LE edema bilaterally  ABDOMEN: soft, nondistended, nontender, bowel sounds normal  MS: extremities normal - no gross deformities noted, no evidence of inflammation in joints, no muscle tenderness  SKIN: no rash    Data   All labs reviewed by me.  CMP  Recent Labs  Lab 11/24/18  0534 11/23/18  0612 11/22/18  0523 11/21/18 2013 11/21/18  0434  11/19/18  1036    140 140  --   --  137  < > 129*   POTASSIUM 3.7 4.4 4.2 4.2  < > 4.2  < > 3.1*   CHLORIDE 107 108 108  --   --  106  < > 93*   CO2 27 24 25  --   --  23  < > 29   ANIONGAP 6 8 6  --   --  8  < > 6   GLC 99 163* 143*  --   --  82  < > 123*   BUN 32* 32* 26  --   --  24  < > 78*   CR 1.29* 1.17* 1.36*  --   --  1.82*  < > 5.26*   GFRESTIMATED 42* 47* 40*  --   --  28*  < > 8*   GFRESTBLACK 51* 57* 48*  --   --  34*  < > 10*   PERRI 7.8* 8.5 8.5  --   --  8.0*  < > 8.7   MAG 2.0 2.3 2.1  --   --  2.0  < >  --    PHOS 2.1* 3.0 2.9  --   --  3.0  < >  --    PROTTOTAL  --   --   --   --   --   --   --  7.2   ALBUMIN  --   --   --   --   --   --   --  3.6   BILITOTAL  --   --   --   --   --   --   --  0.4   ALKPHOS  --   --   --   --   --   --   --  166*   AST  --   --   --   --   --   --   --  24   ALT  --   --   --   --   --   --   --  35   < > = values in this interval not displayed.  CBC  Recent Labs  Lab 11/24/18  0534 11/23/18  0612 11/22/18  0523 11/21/18 2013 11/21/18  0434   HGB 7.7* 8.0* 7.7* 8.3*  < > 8.3*   WBC 2.8* 4.2 8.5  --   --  9.8   RBC 2.47* 2.54* 2.46*  --   --  2.61*   HCT 24.2* 24.5* 23.8*  --   --  25.2*   MCV 98 97 97  --   --  97   MCH 31.2 31.5 31.3  --   --  31.8   MCHC 31.8 32.7 32.4  --   --  32.9   RDW 15.1* 15.3* 15.2*  --   --  15.2*   * 95* 83*  --   --  80*   < > = values in this interval not  displayed.  INRNo lab results found in last 7 days.  ABG    Recent Labs  Lab 11/20/18  1329 11/20/18  1204 11/20/18  1056   PH 7.38 7.41 7.44   PCO2 43 39 39   PO2 211* 199* 193*   HCO3 25 24 26   O2PER 50.0 45.0 46.0      Urine Studies  Recent Labs   Lab Test  11/19/18   1312  10/03/16   0651   COLOR  Yellow  Yellow   APPEARANCE  Clear  Clear   URINEGLC  Negative  Negative   URINEBILI  Negative  Negative   URINEKETONE  Negative  Negative   SG  1.012  1.012   UBLD  Small*  Negative   URINEPH  5.0  7.0   PROTEIN  30*  >500*   NITRITE  Negative  Negative   LEUKEST  Negative  Negative   RBCU  1  1   WBCU  <1  2     No lab results found.  PTH  Recent Labs   Lab Test  11/19/18   1036   PTHI  212*     Iron Studies  Recent Labs   Lab Test  11/19/18   1036   IRON  38   FEB  212*   IRONSAT  18   CORTEZ  952*       IMAGING:  All imaging studies reviewed by me.     MEDICATIONS:  Current Facility-Administered Medications   Medication     acetaminophen (TYLENOL) tablet 650 mg     [Rx hold ] aspirin chewable tablet 81 mg     atorvastatin (LIPITOR) tablet 20 mg     benzocaine-menthol (CEPACOL) 15-3.6 MG lozenge 1 lozenge     bisacodyl (DULCOLAX) EC tablet 10 mg     bisacodyl (DULCOLAX) Suppository 10 mg     dextrose 10 % 1,000 mL infusion     dextrose 5% in lactated ringers infusion     glucose gel 15-30 g    Or     dextrose 50 % injection 25-50 mL    Or     glucagon injection 1 mg     eszopiclone (LUNESTA) tablet 1 mg     gabapentin (NEURONTIN) capsule 100 mg     insulin basal rate from AMBULATORY PUMP     insulin bolus from AMBULATORY PUMP     insulin bolus from AMBULATORY PUMP     insulin bolus from AMBULATORY PUMP     insulin lispro (HumaLOG) 100 UNIT/ML vial for filling pump reservoir     linaclotide (LINZESS) capsule 290 mcg     magnesium hydroxide (MILK OF MAGNESIA) suspension 30 mL     magnesium oxide (MAG-OX) tablet 400 mg     Med Instruction - Transition from IV Insulin Infusion to Sub-Q Insulin     multivitamin,  therapeutic (THERA-VIT) tablet 1 tablet     mycophenolic acid (MYFORTIC BRAND) EC tablet 360 mg     ondansetron (ZOFRAN-ODT) ODT tab 4 mg    Or     ondansetron (ZOFRAN) injection 4 mg     oxyCODONE (ROXICODONE) tablet 5 mg     polyethylene glycol (MIRALAX/GLYCOLAX) Packet 17 g     potassium chloride (KLOR-CON) Packet 20-40 mEq     potassium chloride 10 mEq in 100 mL intermittent infusion with 10 mg lidocaine     potassium chloride 10 mEq in 100 mL sterile water intermittent infusion (premix)     potassium chloride 20 mEq in 50 mL intermittent infusion     potassium chloride SA (K-DUR/KLOR-CON M) CR tablet 20-40 mEq     prochlorperazine (COMPAZINE) injection 10 mg    Or     prochlorperazine (COMPAZINE) tablet 10 mg     senna-docusate (SENOKOT-S;PERICOLACE) 8.6-50 MG per tablet 1 tablet    Or     senna-docusate (SENOKOT-S;PERICOLACE) 8.6-50 MG per tablet 2 tablet     sodium chloride (PF) 0.9% PF flush 10 mL     sodium chloride (PF) 0.9% PF flush 10-20 mL     sulfamethoxazole-trimethoprim (BACTRIM/SEPTRA) 400-80 MG per tablet 1 tablet     tacrolimus (GENERIC EQUIVALENT) capsule 2.5 mg     valACYclovir (VALTREX) tablet 1,000 mg         Patient was seen and evaluated by me, Ben Romano MD. I have reviewed the note and agree with the the plan of care as documented by the fellow.

## 2018-11-24 NOTE — PLAN OF CARE
Problem: Patient Care Overview  Goal: Plan of Care/Patient Progress Review  Outcome: Improving  Patient reported having a blood glucose of 46 using her own glucometer. She took glucose gel and alerted nurse after the fact. Instructed her to contact staff right away if she is getting readings outside of the norm. Re-check after the glucose gel was 181. She feels it is from getting up and down to the bathroom with small but frequent diarrhea. Bedside commode ordered. Patient requested Miralax and Dulcolax tabs but was agreeable to taking nothing due to incontinence and diarrhea. Basal insulin is at 0.5U/Hr.

## 2018-11-25 LAB
ANION GAP SERPL CALCULATED.3IONS-SCNC: 9 MMOL/L (ref 3–14)
BASOPHILS # BLD AUTO: 0 10E9/L (ref 0–0.2)
BASOPHILS NFR BLD AUTO: 0.2 %
BUN SERPL-MCNC: 23 MG/DL (ref 7–30)
CALCIUM SERPL-MCNC: 8.1 MG/DL (ref 8.5–10.1)
CHLORIDE SERPL-SCNC: 107 MMOL/L (ref 94–109)
CO2 SERPL-SCNC: 24 MMOL/L (ref 20–32)
CREAT SERPL-MCNC: 1.15 MG/DL (ref 0.52–1.04)
DIFFERENTIAL METHOD BLD: ABNORMAL
EOSINOPHIL # BLD AUTO: 0.1 10E9/L (ref 0–0.7)
EOSINOPHIL NFR BLD AUTO: 1.6 %
ERYTHROCYTE [DISTWIDTH] IN BLOOD BY AUTOMATED COUNT: 14.8 % (ref 10–15)
GFR SERPL CREATININE-BSD FRML MDRD: 48 ML/MIN/1.7M2
GLUCOSE BLDC GLUCOMTR-MCNC: 100 MG/DL (ref 70–99)
GLUCOSE BLDC GLUCOMTR-MCNC: 101 MG/DL (ref 70–99)
GLUCOSE BLDC GLUCOMTR-MCNC: 121 MG/DL (ref 70–99)
GLUCOSE BLDC GLUCOMTR-MCNC: 144 MG/DL (ref 70–99)
GLUCOSE BLDC GLUCOMTR-MCNC: 85 MG/DL (ref 70–99)
GLUCOSE SERPL-MCNC: 129 MG/DL (ref 70–99)
HCT VFR BLD AUTO: 26.4 % (ref 35–47)
HGB BLD-MCNC: 8.3 G/DL (ref 11.7–15.7)
IMM GRANULOCYTES # BLD: 0.1 10E9/L (ref 0–0.4)
IMM GRANULOCYTES NFR BLD: 1.8 %
LYMPHOCYTES # BLD AUTO: 0.3 10E9/L (ref 0.8–5.3)
LYMPHOCYTES NFR BLD AUTO: 7.4 %
MAGNESIUM SERPL-MCNC: 1.9 MG/DL (ref 1.6–2.3)
MCH RBC QN AUTO: 31 PG (ref 26.5–33)
MCHC RBC AUTO-ENTMCNC: 31.4 G/DL (ref 31.5–36.5)
MCV RBC AUTO: 99 FL (ref 78–100)
MONOCYTES # BLD AUTO: 0.1 10E9/L (ref 0–1.3)
MONOCYTES NFR BLD AUTO: 1.8 %
NEUTROPHILS # BLD AUTO: 3.8 10E9/L (ref 1.6–8.3)
NEUTROPHILS NFR BLD AUTO: 87.2 %
NRBC # BLD AUTO: 0 10*3/UL
NRBC BLD AUTO-RTO: 1 /100
PHOSPHATE SERPL-MCNC: 2.6 MG/DL (ref 2.5–4.5)
PLATELET # BLD AUTO: 134 10E9/L (ref 150–450)
POTASSIUM SERPL-SCNC: 3.9 MMOL/L (ref 3.4–5.3)
RBC # BLD AUTO: 2.68 10E12/L (ref 3.8–5.2)
SODIUM SERPL-SCNC: 139 MMOL/L (ref 133–144)
WBC # BLD AUTO: 4.4 10E9/L (ref 4–11)

## 2018-11-25 PROCEDURE — 25000131 ZZH RX MED GY IP 250 OP 636 PS 637: Performed by: SURGERY

## 2018-11-25 PROCEDURE — 84100 ASSAY OF PHOSPHORUS: CPT | Performed by: STUDENT IN AN ORGANIZED HEALTH CARE EDUCATION/TRAINING PROGRAM

## 2018-11-25 PROCEDURE — 25000132 ZZH RX MED GY IP 250 OP 250 PS 637: Performed by: STUDENT IN AN ORGANIZED HEALTH CARE EDUCATION/TRAINING PROGRAM

## 2018-11-25 PROCEDURE — 36592 COLLECT BLOOD FROM PICC: CPT | Performed by: STUDENT IN AN ORGANIZED HEALTH CARE EDUCATION/TRAINING PROGRAM

## 2018-11-25 PROCEDURE — 80048 BASIC METABOLIC PNL TOTAL CA: CPT | Performed by: STUDENT IN AN ORGANIZED HEALTH CARE EDUCATION/TRAINING PROGRAM

## 2018-11-25 PROCEDURE — 25000131 ZZH RX MED GY IP 250 OP 636 PS 637: Performed by: TRANSPLANT SURGERY

## 2018-11-25 PROCEDURE — 85025 COMPLETE CBC W/AUTO DIFF WBC: CPT | Performed by: STUDENT IN AN ORGANIZED HEALTH CARE EDUCATION/TRAINING PROGRAM

## 2018-11-25 PROCEDURE — 83735 ASSAY OF MAGNESIUM: CPT | Performed by: STUDENT IN AN ORGANIZED HEALTH CARE EDUCATION/TRAINING PROGRAM

## 2018-11-25 PROCEDURE — 25000132 ZZH RX MED GY IP 250 OP 250 PS 637: Performed by: PHYSICIAN ASSISTANT

## 2018-11-25 PROCEDURE — 00000146 ZZHCL STATISTIC GLUCOSE BY METER IP

## 2018-11-25 PROCEDURE — 12000026 ZZH R&B TRANSPLANT

## 2018-11-25 PROCEDURE — 25000131 ZZH RX MED GY IP 250 OP 636 PS 637: Performed by: PHYSICIAN ASSISTANT

## 2018-11-25 PROCEDURE — 25000132 ZZH RX MED GY IP 250 OP 250 PS 637: Performed by: TRANSPLANT SURGERY

## 2018-11-25 PROCEDURE — 40000141 ZZH STATISTIC PERIPHERAL IV START W/O US GUIDANCE

## 2018-11-25 RX ADMIN — ATORVASTATIN CALCIUM 20 MG: 20 TABLET, FILM COATED ORAL at 21:14

## 2018-11-25 RX ADMIN — GABAPENTIN 100 MG: 100 CAPSULE ORAL at 21:15

## 2018-11-25 RX ADMIN — ACETAMINOPHEN 650 MG: 325 TABLET, FILM COATED ORAL at 14:57

## 2018-11-25 RX ADMIN — ACETAMINOPHEN 650 MG: 325 TABLET, FILM COATED ORAL at 06:11

## 2018-11-25 RX ADMIN — ACETAMINOPHEN 650 MG: 325 TABLET, FILM COATED ORAL at 18:20

## 2018-11-25 RX ADMIN — THERA TABS 1 TABLET: TAB at 07:53

## 2018-11-25 RX ADMIN — LINACLOTIDE 290 MCG: 145 CAPSULE, GELATIN COATED ORAL at 07:53

## 2018-11-25 RX ADMIN — TACROLIMUS 2.5 MG: 1 CAPSULE ORAL at 07:52

## 2018-11-25 RX ADMIN — MYCOPHENOLIC ACID 360 MG: 360 TABLET, DELAYED RELEASE ORAL at 07:52

## 2018-11-25 RX ADMIN — VALACYCLOVIR HYDROCHLORIDE 1000 MG: 1 TABLET, FILM COATED ORAL at 07:53

## 2018-11-25 RX ADMIN — MYCOPHENOLIC ACID 180 MG: 360 TABLET, DELAYED RELEASE ORAL at 11:00

## 2018-11-25 RX ADMIN — VALACYCLOVIR HYDROCHLORIDE 1000 MG: 1 TABLET, FILM COATED ORAL at 21:15

## 2018-11-25 RX ADMIN — ESZOPICLONE 1 MG: 1 TABLET, FILM COATED ORAL at 21:14

## 2018-11-25 RX ADMIN — TACROLIMUS 2.5 MG: 1 CAPSULE ORAL at 18:20

## 2018-11-25 RX ADMIN — MAGNESIUM OXIDE TAB 400 MG (241.3 MG ELEMENTAL MG) 400 MG: 400 (241.3 MG) TAB at 13:16

## 2018-11-25 RX ADMIN — MYCOPHENOLIC ACID 540 MG: 360 TABLET, DELAYED RELEASE ORAL at 18:20

## 2018-11-25 RX ADMIN — SULFAMETHOXAZOLE AND TRIMETHOPRIM 1 TABLET: 400; 80 TABLET ORAL at 07:52

## 2018-11-25 ASSESSMENT — ACTIVITIES OF DAILY LIVING (ADL)
ADLS_ACUITY_SCORE: 13

## 2018-11-25 NOTE — PLAN OF CARE
Problem: Patient Care Overview  Goal: Plan of Care/Patient Progress Review  Outcome: Improving  Day DC'd this morning, has voided several times, 100-400cc. Will get bladder scan after next void. Central line DC'd, PIV placed. Patient declined laxatives this morning, mild nausea this morning did not want anti-emetic. Left CAMDEN in place, will probably discharge with drain. Ambulatory insulin pump, managed per patient, blood sugars stable. Plan for discharge to local hotel tomorrow accompanied by her family. Vitals and labs are stable.  1500: bladder scanned for 0cc.

## 2018-11-25 NOTE — PROGRESS NOTES
Transplant Surgery  Inpatient Daily Progress Note  11/25/2018    Assessment & Plan: Letty Benavidez is a 58 year old female w/ DMI since 1970's c/b diabetic nephropathy & ESKD s/p LDKT (11/1998), now requiring dialysis (4/2017), CABG x2 (10/2017), skin cancer, gastroparesis, pancreatic exocrine dysfunction, diabetic enteropathy, who was admitted for LDKTx with ureteral stent placement (11/20). POD5 today.     Graft function: s/p LDKIT w/ stent placement. Good, stable, SCr 1.15 today.   Immunosuppression management:  Thymo induction: 100 mg intra-op, 100 mg POD 1, 100mg POD2  Solu-medrol 500 mg intra-op, 250 mg POD 100mg POD2  Mycophenolic acid (EC) 360mg TID  Envarsus XR (Tacrolimus) 2.5mg BID  Pain Control: Acetaminophen PRN, Oxycodone PRN, gabapentin Yvonne  Hematology: Required 2u pRBCs between intraop and POD1. Hgb 8.3, stable. No evidence of active bleed, will continue to monitor. CAMDEN x1 abdomen with serosanguinous drainage. ASA on hold for now. US without perinephric fluid/hematoma  Cardiorespiratory: -140s. Hx CABG. ASA on hold, home Lipitor   GI/Nutrition: Regular diet, Bowel regimen: miralax, senna, linzess d/t IBS/bloating. S/p enemas. Having bowel function, diet improving  Endocrine: Transitioned to pump POD3. BS 90-240s. Endocrine following.   Fluid/Electrolytes: MIVF: lock fluids, redose lasix  : Day out today, POD 5. Will need post-void residual to ensure bladder emptying  Infectious disease: PPx Valganciclovir and Bactrim   Prophylaxis: DVT, fall, GI, fungal  Disposition: 7A orozco care    GINA/Fellow/Resident Provider: Viridiana Su, Resident     Faculty: Marcello Helm M.D.  _________________________________________________________________  Transplant History: Admitted 11/20/2018 for LDKT and ureter stent placement.   11/20/2018 (Kidney), 11/4/1998 (Kidney), Postoperative day: 7326 (Kidney), 5 (Kidney)     Interval History: History is obtained from the patient  Overnight events: No acute events  "overnight. Still with some thigh and abdominal pain/tightness. Bowel function improving, appetite improving, but still not completely normal. Feels also fluid overloaded.   UOP: 3L/1L since midnight  Drain: 325cc/60cc since midnight        ROS:   A 10-point review of systems was negative except as noted above.    Meds:    atorvastatin  20 mg Oral At Bedtime     bisacodyl  10 mg Rectal Once     eszopiclone  1 mg Oral At Bedtime     gabapentin  100 mg Oral At Bedtime     insulin bolus from AMBULATORY PUMP   Subcutaneous 4x Daily AC & HS     insulin bolus from AMBULATORY PUMP   Subcutaneous TID AC     insulin lispro   Device See Admin Instructions     linaclotide  290 mcg Oral QAM AC     magnesium hydroxide  30 mL Oral BID     magnesium oxide  400 mg Oral Daily with lunch     - MEDICATION INSTRUCTIONS -   Does not apply Once     multivitamin, therapeutic  1 tablet Oral Daily     mycophenolic acid  360 mg Oral TID     polyethylene glycol  17 g Oral BID     senna-docusate  1 tablet Oral BID    Or     senna-docusate  2 tablet Oral BID     sodium chloride (PF)  10 mL Intracatheter Q8H     sulfamethoxazole-trimethoprim  1 tablet Oral Daily     tacrolimus  2.5 mg Oral BID     valACYclovir  1,000 mg Oral BID       Physical Exam:     Admit Weight: 51.7 kg (113 lb 15.7 oz) (pt says this is her \"dry\" weight)    Current vitals:   /68  Pulse 93  Temp 98.4  F (36.9  C) (Oral)  Resp 14  Ht 1.6 m (5' 3\")  Wt 54.6 kg (120 lb 6.4 oz)  SpO2 97%  BMI 21.33 kg/m2    CVP (mmHg): 12 mmHg    Vital sign ranges:    Temp:  [98.2  F (36.8  C)-99.1  F (37.3  C)] 98.4  F (36.9  C)  Heart Rate:  [81-88] 82  Resp:  [14-16] 14  BP: (131-140)/(62-78) 140/68  SpO2:  [97 %-100 %] 97 %  Patient Vitals for the past 24 hrs:   BP Temp Temp src Heart Rate Resp SpO2 Weight   11/25/18 0800 140/68 98.4  F (36.9  C) Oral 82 14 97 % -   11/25/18 0656 - - - - - - 54.6 kg (120 lb 6.4 oz)   11/25/18 0556 - - - - - - 56.7 kg (124 lb 14.4 oz)   11/25/18 " 0344 132/78 99.1  F (37.3  C) Oral 85 15 97 % -   11/24/18 2336 139/62 98.6  F (37  C) Oral 81 15 97 % -   11/1960 132/65 98.3  F (36.8  C) Oral 85 15 97 % -   11/24/18 1547 131/67 98.2  F (36.8  C) Oral 85 15 100 % -   11/24/18 1218 132/70 98.2  F (36.8  C) Oral 88 16 100 % -     General Appearance: in no apparent distress.   Skin: normal  Heart: regular rate and rhythm  Lungs: without wheezes, without crackles  Abdomen: The abdomen is appropriately tender, taut. The wound is Healing well, well approximated and without signs of infection. CAMDEN left abdomen, serous   : tanner is present. Urine has small gross hematuria.   Extremities: edema: minimal, no pitting, right thigh feels tight   Neurologic: awake, alert and oriented. Tremor absent.    Data:   CMP  Recent Labs  Lab 11/25/18  0646 11/24/18  0534  11/20/18  1329 11/20/18  1204  11/19/18  1036    140  < > 134 129*  < > 129*   POTASSIUM 3.9 3.7  < > 3.3* 2.8*  < > 3.1*   CHLORIDE 107 107  < >  --   --   --  93*   CO2 24 27  < >  --   --   --  29   * 99  < > 122* 144*  < > 123*   BUN 23 32*  < >  --   --   --  78*   CR 1.15* 1.29*  < >  --   --   --  5.26*   GFRESTIMATED 48* 42*  < >  --   --   --  8*   GFRESTBLACK 58* 51*  < >  --   --   --  10*   PERRI 8.1* 7.8*  < >  --   --   --  8.7   ICAW  --   --   --  4.0* 4.3*  < >  --    MAG 1.9 2.0  < >  --   --   --   --    PHOS 2.6 2.1*  < >  --   --   --   --    ALBUMIN  --   --   --   --   --   --  3.6   BILITOTAL  --   --   --   --   --   --  0.4   ALKPHOS  --   --   --   --   --   --  166*   AST  --   --   --   --   --   --  24   ALT  --   --   --   --   --   --  35   < > = values in this interval not displayed.  CBC  Recent Labs  Lab 11/25/18  0646 11/24/18  0534  11/20/18  0614   HGB 8.3* 7.7*  < >  --    WBC 4.4 2.8*  < >  --    * 108*  < >  --    A1C  --   --   --  5.5   < > = values in this interval not displayed.  COAGSNo lab results found in last 7 days.    Invalid input(s):  XA   Urinalysis  Recent Labs   Lab Test  11/19/18   1312  10/03/16   0651   COLOR  Yellow  Yellow   APPEARANCE  Clear  Clear   URINEGLC  Negative  Negative   URINEBILI  Negative  Negative   URINEKETONE  Negative  Negative   SG  1.012  1.012   UBLD  Small*  Negative   URINEPH  5.0  7.0   PROTEIN  30*  >500*   NITRITE  Negative  Negative   LEUKEST  Negative  Negative   RBCU  1  1   WBCU  <1  2     Virology:  Hepatitis C Antibody   Date Value Ref Range Status   11/19/2018 Nonreactive NR^Nonreactive Final     Comment:     Assay performance characteristics have not been established for newborns,   infants, and children       BK Virus Result   Date Value Ref Range Status   10/03/2016 BK Virus DNA Not Detected BKNEG copies/mL Final

## 2018-11-25 NOTE — PROGRESS NOTES
Diabetes Consult Daily  Progress Note          Assessment/Plan:     Letty Benavidez  is a 57 yo female with longstanding T1DM with triopathy, ESRD 2/2 DN s/p LDKT 1998, required home hemodialysis 4/2017, CADs/p CABG, gastroparesis, pancreatic exocrine dysfunction who was admitted for LDKT with ureteral stent placement 11/20/2018 with Dr. Guzman.    BG mostly 90s to mid 100s with no changes in ambulatory pump settings since yesterday.    Plan:  - dose for 20g CHO with vanilla greek yogurt tomorrow rather than 10g.    Medtronic Paradigm 751, with Humalog  Basal rate:  0.5 units/h from 4036-8278  I:C 1 unit per 12 grams of CHO for meals and snacks  Sensitivity: 1 unit per 75  Target:  Active insulin 4 hours 100  Monitor glucose before meals, HS, 200 and 0500  Will continue to follow     Outpatient diabetes follow up: with endocrinologist in [place].  Plan discussed with patient.           Interval History:   The last 24 hours progress and nursing notes reviewed.  Letty is feeling better today- more stools, tanner catheter now out and voiding without problem.  Appetite remains low.  She thinks portion size for vanilla greek yogurt is off, and so carbs likely more ~20g than 10g.  This has lead to some morning hyperglycemia (mild) yesterday and today (she administered extra correction bolus midmorning for this).  Otherwise BG in the 90s to mid 100s mostly in past 24h.  She has not changed settings on pump. Plan for discharge to [facility] tomorrow.      Recent Labs  Lab 11/25/18  1321 11/25/18  0748 11/25/18  0646 11/25/18  0203 11/24/18  2116 11/24/18  1640 11/24/18  1222  11/24/18  0534  11/23/18  0612  11/22/18  0523  11/21/18  0434  11/20/18  1915   GLC  --   --  129*  --   --   --   --   --  99  --  163*  --  143*  --  82  --  197*   * 121*  --  101* 92 148* 244*  < >  --   < >  --   < >  --   < >  --   < >  --    < > = values in this interval not displayed.            Review of Systems:  "  See interval hx          Medications:       Active Diet Order      Advance Diet as Tolerated: Regular Diet Adult     Physical Exam:  Gen: Alert, sitting up in bed, NAD. Family present.  HEENT:  mucous membranes are moist  Resp: unlabored  Ext: moving all extremties  Neuro:oriented x3, communicating clearly  /69 (BP Location: Left arm)  Pulse 93  Temp 98.9  F (37.2  C) (Oral)  Resp 16  Ht 1.6 m (5' 3\")  Wt 54.6 kg (120 lb 6.4 oz)  SpO2 97%  BMI 21.33 kg/m2           Data:     Lab Results   Component Value Date    A1C 5.5 11/20/2018    A1C 5.9 05/11/2018    A1C 6.9 10/03/2016            Recent Labs   Lab Test  11/25/18   0646  11/24/18   0534   NA  139  140   POTASSIUM  3.9  3.7   CHLORIDE  107  107   CO2  24  27   ANIONGAP  9  6   GLC  129*  99   BUN  23  32*   CR  1.15*  1.29*   PERRI  8.1*  7.8*     CBC RESULTS:   Recent Labs   Lab Test  11/25/18   0646   WBC  4.4   RBC  2.68*   HGB  8.3*   HCT  26.4*   MCV  99   MCH  31.0   MCHC  31.4*   RDW  14.8   PLT  134*       Saniya Reynolds PA-C [phone]  Diabetes Management Team Job Code 0243          "

## 2018-11-25 NOTE — PROGRESS NOTES
[facility], [place]   Transplant Nephrology Progress Note  Date of Admission:  11/20/2018    Assessment & Plan    # ESRD secondary to T1DM s/p LDKT: second allograft . baseline Cr ~ TBD; trending down  Renal ultrasound unremarkable.                         - Proteinuria: Not checked recently                        - Latest DSA: 11/2018                      Latest DSA: No                        - BK Viremia: Not checked recently- at 1 month post txp                        - Kidney Tx Biopsy: No     # Immunosuppression: Tacrolimus immediate release (goal  8-10) and Mycophenolic acid (goal  1-3.5). Was induced withlower thymoglobulin as she was on immunosuppression prior to her second kidney                        - Changes: No-      # Hypertension/Volume Status: Controlled; Goal BP: < 140/90                        - Changes: No- no lasix needed today.      # Diabetes: being managed by ebdocrinology.       # Anemia in chronic renal disease: Hgb: stable now.  hgb 8.3.  Will monitor                        - Iron studies: ok to replete with venofer 100 mg IV daily while inpt      # Mineral Bone Disorder:                         - Secondary renal hyperparathyroidism; PTH level is: Mildly elevated at 212. Will be monitored as outpt . No rx for now.   - Vitamin D; level is: Normal  - Calcium; level is: Low but stable.    - Phosphorus; level is: Normal     # Electrolytes:   - Potassium; level: Normal  - Magnesium; level: Normal  - Bicarbonate; level: Normal     # PPX: PJP-on bactrim 1 tab daily adjusted for cr cl of > 30                        CMV: on valtrex 1gram BID she is CMV IgG + and EBV IgG -     # h/o CAD; s/p 2v CABG in 10/2017. Cleared by cardiology in July 2018. No CP or SOB.      # h/o gastroparesis: had bowel movement yesterday. Will defer rx to surgery team.     # leg swelling left greater than right, if persists consider doppler ultrasound      # Medical Compliance: Yes     #  "Transplant History:  Etiology of kidney failure: diabetes mellitus type 1  Tx: LDKT  Transplant: 2018 (Kidney), 1998 (Kidney)  Donor Type: Living                Donor Class:   Crossmatch at time of Tx: negative  DSA at time of Tx: No  History of BK viremia: No  History of CMV viremia: No  History of EBV viremia: No  Significant changes in immunosuppression: None  Significant transplant-related complications: None     Recommendations were communicated to the primary team verbally.    Colton Arce MD  Pager: [phone]    Interval History   Overall doing well. Had bowel movement.  Weight is down today by ~4kg. No other complaints. Denies chest pain or SOB.     Review of Systems   4 point ROS was obtained and negative except as noted in the interval history    Physical Exam   Temp  Av.6  F (37  C)  Min: 97.4  F (36.3  C)  Max: 99.7  F (37.6  C)  Arterial Line MAP (mmHg)  Av.9 mmHg  Min: 48 mmHg  Max: 91 mmHg  Arterial Line BP  Min: 87/33  Max: 148/64      Pulse  Av  Min: 75  Max: 90 Resp  Av.8  Min: 10  Max: 20  SpO2  Av.2 %  Min: 88 %  Max: 100 %    CVP (mmHg): 12 mmHgBP 120/62  Pulse 93  Temp 98.2  F (36.8  C) (Oral)  Resp 16  Ht 1.6 m (5' 3\")  Wt 54.6 kg (120 lb 6.4 oz)  SpO2 100%  BMI 21.33 kg/m2   Date 18 07 - 18 0659   Shift 3567-9593 2131-9133 2343-1006 24 Hour Total   I  N  T  A  K  E   Shift Total  (mL/kg)       O  U  T  P  U  T   Urine 600   600    Shift Total  (mL/kg) 600  (9.94)   600  (9.94)   Weight (kg) 60.37 60.37 60.37 60.37        Admit Weight: 51.7 kg (113 lb 15.7 oz) (pt says this is her \"dry\" weight)   GENERAL APPEARANCE: alert and no distress  HENT: mouth without ulcers or lesions  LYMPHATICS: no cervical or supraclavicular nodes  RESP: lungs clear to auscultation - no rales, rhonchi or wheezes  CV: regular rhythm, normal rate, no rub, no murmur  EDEMA: LE edema bilaterally  ABDOMEN: soft, nondistended, nontender, bowel sounds normal  MS: " extremities normal - no gross deformities noted, no evidence of inflammation in joints, no muscle tenderness  SKIN: no rash    Data   All labs reviewed by me.  CMP  Recent Labs  Lab 11/25/18  0646 11/24/18  0534 11/23/18 0612 11/22/18 0523 11/19/18  1036    140 140 140  < > 129*   POTASSIUM 3.9 3.7 4.4 4.2  < > 3.1*   CHLORIDE 107 107 108 108  < > 93*   CO2 24 27 24 25  < > 29   ANIONGAP 9 6 8 6  < > 6   * 99 163* 143*  < > 123*   BUN 23 32* 32* 26  < > 78*   CR 1.15* 1.29* 1.17* 1.36*  < > 5.26*   GFRESTIMATED 48* 42* 47* 40*  < > 8*   GFRESTBLACK 58* 51* 57* 48*  < > 10*   PERRI 8.1* 7.8* 8.5 8.5  < > 8.7   MAG 1.9 2.0 2.3 2.1  < >  --    PHOS 2.6 2.1* 3.0 2.9  < >  --    PROTTOTAL  --   --   --   --   --  7.2   ALBUMIN  --   --   --   --   --  3.6   BILITOTAL  --   --   --   --   --  0.4   ALKPHOS  --   --   --   --   --  166*   AST  --   --   --   --   --  24   ALT  --   --   --   --   --  35   < > = values in this interval not displayed.  CBC    Recent Labs  Lab 11/25/18  0646 11/24/18  0534 11/23/18 0612 11/22/18 0523   HGB 8.3* 7.7* 8.0* 7.7*   WBC 4.4 2.8* 4.2 8.5   RBC 2.68* 2.47* 2.54* 2.46*   HCT 26.4* 24.2* 24.5* 23.8*   MCV 99 98 97 97   MCH 31.0 31.2 31.5 31.3   MCHC 31.4* 31.8 32.7 32.4   RDW 14.8 15.1* 15.3* 15.2*   * 108* 95* 83*     INRNo lab results found in last 7 days.  ABG    Recent Labs  Lab 11/20/18  1329 11/20/18  1204 11/20/18  1056   PH 7.38 7.41 7.44   PCO2 43 39 39   PO2 211* 199* 193*   HCO3 25 24 26   O2PER 50.0 45.0 46.0      Urine Studies  Recent Labs   Lab Test  11/19/18   1312  10/03/16   0651   COLOR  Yellow  Yellow   APPEARANCE  Clear  Clear   URINEGLC  Negative  Negative   URINEBILI  Negative  Negative   URINEKETONE  Negative  Negative   SG  1.012  1.012   UBLD  Small*  Negative   URINEPH  5.0  7.0   PROTEIN  30*  >500*   NITRITE  Negative  Negative   LEUKEST  Negative  Negative   RBCU  1  1   WBCU  <1  2     No lab results found.  PTH  Recent Labs   Lab  Test  11/19/18   1036   PTHI  212*     Iron Studies  Recent Labs   Lab Test  11/19/18   1036   IRON  38   FEB  212*   IRONSAT  18   CORTEZ  952*       IMAGING:  All imaging studies reviewed by me.     MEDICATIONS:  Current Facility-Administered Medications   Medication     acetaminophen (TYLENOL) tablet 650 mg     [Rx hold ] aspirin chewable tablet 81 mg     atorvastatin (LIPITOR) tablet 20 mg     benzocaine-menthol (CEPACOL) 15-3.6 MG lozenge 1 lozenge     bisacodyl (DULCOLAX) EC tablet 10 mg     bisacodyl (DULCOLAX) Suppository 10 mg     dextrose 10 % 1,000 mL infusion     dextrose 5% in lactated ringers infusion     glucose gel 15-30 g    Or     dextrose 50 % injection 25-50 mL    Or     glucagon injection 1 mg     eszopiclone (LUNESTA) tablet 1 mg     gabapentin (NEURONTIN) capsule 100 mg     insulin basal rate from AMBULATORY PUMP     insulin bolus from AMBULATORY PUMP     insulin bolus from AMBULATORY PUMP     insulin bolus from AMBULATORY PUMP     insulin lispro (HumaLOG) 100 UNIT/ML vial for filling pump reservoir     linaclotide (LINZESS) capsule 290 mcg     magnesium hydroxide (MILK OF MAGNESIA) suspension 30 mL     magnesium oxide (MAG-OX) tablet 400 mg     Med Instruction - Transition from IV Insulin Infusion to Sub-Q Insulin     multivitamin, therapeutic (THERA-VIT) tablet 1 tablet     mycophenolic acid (MYFORTIC BRAND) EC tablet 540 mg     ondansetron (ZOFRAN-ODT) ODT tab 4 mg    Or     ondansetron (ZOFRAN) injection 4 mg     oxyCODONE (ROXICODONE) tablet 5 mg     polyethylene glycol (MIRALAX/GLYCOLAX) Packet 17 g     potassium chloride (KLOR-CON) Packet 20-40 mEq     potassium chloride 10 mEq in 100 mL intermittent infusion with 10 mg lidocaine     potassium chloride 10 mEq in 100 mL sterile water intermittent infusion (premix)     potassium chloride 20 mEq in 50 mL intermittent infusion     potassium chloride SA (K-DUR/KLOR-CON M) CR tablet 20-40 mEq     prochlorperazine (COMPAZINE) injection 10 mg     Or     prochlorperazine (COMPAZINE) tablet 10 mg     senna-docusate (SENOKOT-S;PERICOLACE) 8.6-50 MG per tablet 1 tablet    Or     senna-docusate (SENOKOT-S;PERICOLACE) 8.6-50 MG per tablet 2 tablet     sodium chloride (PF) 0.9% PF flush 10 mL     sodium chloride (PF) 0.9% PF flush 10-20 mL     sulfamethoxazole-trimethoprim (BACTRIM/SEPTRA) 400-80 MG per tablet 1 tablet     tacrolimus (GENERIC EQUIVALENT) capsule 2.5 mg     valACYclovir (VALTREX) tablet 1,000 mg         Patient was seen and evaluated by me, Ben Romano MD. I have reviewed the note and agree with the the plan of care as documented by the fellow.

## 2018-11-25 NOTE — PLAN OF CARE
Problem: Patient Care Overview  Goal: Plan of Care/Patient Progress Review  Outcome: Improving    RN:  Tmax.99.1(O), OVSS, Tylenol given x1 for abdominal pain, denies nausea. Blood glucose 92 and 101, personal Insulin pump basal rate @0.5unit/hr. Day catheter with good urine output, [place] with moderate amount of serosanguinous output. Loose BM x1. Up ad nciole, patient resting between cares, will continue to monitor.

## 2018-11-26 VITALS
SYSTOLIC BLOOD PRESSURE: 138 MMHG | OXYGEN SATURATION: 100 % | HEART RATE: 74 BPM | DIASTOLIC BLOOD PRESSURE: 67 MMHG | WEIGHT: 116.2 LBS | RESPIRATION RATE: 16 BRPM | BODY MASS INDEX: 20.59 KG/M2 | TEMPERATURE: 98.6 F | HEIGHT: 63 IN

## 2018-11-26 LAB
ANION GAP SERPL CALCULATED.3IONS-SCNC: 7 MMOL/L (ref 3–14)
BASOPHILS # BLD AUTO: 0 10E9/L (ref 0–0.2)
BASOPHILS NFR BLD AUTO: 0 %
BUN SERPL-MCNC: 19 MG/DL (ref 7–30)
CALCIUM SERPL-MCNC: 8.1 MG/DL (ref 8.5–10.1)
CHLORIDE SERPL-SCNC: 109 MMOL/L (ref 94–109)
CO2 SERPL-SCNC: 24 MMOL/L (ref 20–32)
CREAT SERPL-MCNC: 1.07 MG/DL (ref 0.52–1.04)
DIFFERENTIAL METHOD BLD: ABNORMAL
EOSINOPHIL # BLD AUTO: 0.2 10E9/L (ref 0–0.7)
EOSINOPHIL NFR BLD AUTO: 3.9 %
ERYTHROCYTE [DISTWIDTH] IN BLOOD BY AUTOMATED COUNT: 14.7 % (ref 10–15)
GFR SERPL CREATININE-BSD FRML MDRD: 52 ML/MIN/1.7M2
GLUCOSE SERPL-MCNC: 158 MG/DL (ref 70–99)
HCT VFR BLD AUTO: 24.7 % (ref 35–47)
HGB BLD-MCNC: 8.1 G/DL (ref 11.7–15.7)
IMM GRANULOCYTES # BLD: 0.2 10E9/L (ref 0–0.4)
IMM GRANULOCYTES NFR BLD: 3.6 %
LYMPHOCYTES # BLD AUTO: 0.3 10E9/L (ref 0.8–5.3)
LYMPHOCYTES NFR BLD AUTO: 6.1 %
MAGNESIUM SERPL-MCNC: 1.8 MG/DL (ref 1.6–2.3)
MCH RBC QN AUTO: 32.1 PG (ref 26.5–33)
MCHC RBC AUTO-ENTMCNC: 32.8 G/DL (ref 31.5–36.5)
MCV RBC AUTO: 98 FL (ref 78–100)
MONOCYTES # BLD AUTO: 0.2 10E9/L (ref 0–1.3)
MONOCYTES NFR BLD AUTO: 4.1 %
NEUTROPHILS # BLD AUTO: 3.4 10E9/L (ref 1.6–8.3)
NEUTROPHILS NFR BLD AUTO: 82.3 %
NRBC # BLD AUTO: 0 10*3/UL
NRBC BLD AUTO-RTO: 1 /100
PHOSPHATE SERPL-MCNC: 3.6 MG/DL (ref 2.5–4.5)
PLATELET # BLD AUTO: 130 10E9/L (ref 150–450)
POTASSIUM SERPL-SCNC: 4.2 MMOL/L (ref 3.4–5.3)
RBC # BLD AUTO: 2.52 10E12/L (ref 3.8–5.2)
SODIUM SERPL-SCNC: 140 MMOL/L (ref 133–144)
TACROLIMUS BLD-MCNC: 6.7 UG/L (ref 5–15)
TME LAST DOSE: NORMAL H
WBC # BLD AUTO: 4.1 10E9/L (ref 4–11)

## 2018-11-26 PROCEDURE — 25000132 ZZH RX MED GY IP 250 OP 250 PS 637: Performed by: PHYSICIAN ASSISTANT

## 2018-11-26 PROCEDURE — 25000132 ZZH RX MED GY IP 250 OP 250 PS 637: Performed by: STUDENT IN AN ORGANIZED HEALTH CARE EDUCATION/TRAINING PROGRAM

## 2018-11-26 PROCEDURE — 80048 BASIC METABOLIC PNL TOTAL CA: CPT | Performed by: STUDENT IN AN ORGANIZED HEALTH CARE EDUCATION/TRAINING PROGRAM

## 2018-11-26 PROCEDURE — 80197 ASSAY OF TACROLIMUS: CPT | Performed by: STUDENT IN AN ORGANIZED HEALTH CARE EDUCATION/TRAINING PROGRAM

## 2018-11-26 PROCEDURE — 25000131 ZZH RX MED GY IP 250 OP 636 PS 637: Performed by: TRANSPLANT SURGERY

## 2018-11-26 PROCEDURE — 83735 ASSAY OF MAGNESIUM: CPT | Performed by: STUDENT IN AN ORGANIZED HEALTH CARE EDUCATION/TRAINING PROGRAM

## 2018-11-26 PROCEDURE — 85025 COMPLETE CBC W/AUTO DIFF WBC: CPT | Performed by: STUDENT IN AN ORGANIZED HEALTH CARE EDUCATION/TRAINING PROGRAM

## 2018-11-26 PROCEDURE — 25000132 ZZH RX MED GY IP 250 OP 250 PS 637: Performed by: TRANSPLANT SURGERY

## 2018-11-26 PROCEDURE — 36415 COLL VENOUS BLD VENIPUNCTURE: CPT | Performed by: STUDENT IN AN ORGANIZED HEALTH CARE EDUCATION/TRAINING PROGRAM

## 2018-11-26 PROCEDURE — 25000131 ZZH RX MED GY IP 250 OP 636 PS 637: Performed by: SURGERY

## 2018-11-26 PROCEDURE — 84100 ASSAY OF PHOSPHORUS: CPT | Performed by: STUDENT IN AN ORGANIZED HEALTH CARE EDUCATION/TRAINING PROGRAM

## 2018-11-26 RX ORDER — VALACYCLOVIR HYDROCHLORIDE 1 G/1
1000 TABLET, FILM COATED ORAL 2 TIMES DAILY
Qty: 60 TABLET | Refills: 2 | Status: SHIPPED | OUTPATIENT
Start: 2018-11-26 | End: 2018-12-03

## 2018-11-26 RX ORDER — MULTIVITAMIN,THERAPEUTIC
1 TABLET ORAL DAILY
Qty: 30 TABLET | Refills: 2 | Status: SHIPPED | OUTPATIENT
Start: 2018-11-27

## 2018-11-26 RX ORDER — MYCOPHENOLIC ACID 180 MG/1
540 TABLET, DELAYED RELEASE ORAL 2 TIMES DAILY
Qty: 180 TABLET | Refills: 11 | Status: SHIPPED | OUTPATIENT
Start: 2018-11-26 | End: 2018-12-03

## 2018-11-26 RX ORDER — SULFAMETHOXAZOLE AND TRIMETHOPRIM 400; 80 MG/1; MG/1
1 TABLET ORAL DAILY
Qty: 30 TABLET | Refills: 11 | Status: SHIPPED | OUTPATIENT
Start: 2018-11-27 | End: 2018-12-03

## 2018-11-26 RX ORDER — TACROLIMUS 1 MG/1
3 CAPSULE ORAL 2 TIMES DAILY
Qty: 180 CAPSULE | Refills: 11 | Status: SHIPPED | OUTPATIENT
Start: 2018-11-26 | End: 2018-12-03

## 2018-11-26 RX ORDER — TACROLIMUS 0.5 MG/1
CAPSULE ORAL
Qty: 60 CAPSULE | Refills: 11 | Status: SHIPPED | OUTPATIENT
Start: 2018-11-26 | End: 2018-12-03

## 2018-11-26 RX ORDER — TACROLIMUS 1 MG/1
3 CAPSULE ORAL 2 TIMES DAILY
Status: DISCONTINUED | OUTPATIENT
Start: 2018-11-26 | End: 2018-11-26 | Stop reason: HOSPADM

## 2018-11-26 RX ORDER — MAGNESIUM OXIDE 400 MG/1
400 TABLET ORAL
Qty: 30 TABLET | Refills: 2 | Status: SHIPPED | OUTPATIENT
Start: 2018-11-26 | End: 2018-11-28

## 2018-11-26 RX ADMIN — VALACYCLOVIR HYDROCHLORIDE 1000 MG: 1 TABLET, FILM COATED ORAL at 07:44

## 2018-11-26 RX ADMIN — MAGNESIUM OXIDE TAB 400 MG (241.3 MG ELEMENTAL MG) 400 MG: 400 (241.3 MG) TAB at 11:58

## 2018-11-26 RX ADMIN — THERA TABS 1 TABLET: TAB at 07:45

## 2018-11-26 RX ADMIN — ACETAMINOPHEN 650 MG: 325 TABLET, FILM COATED ORAL at 07:50

## 2018-11-26 RX ADMIN — TACROLIMUS 2.5 MG: 1 CAPSULE ORAL at 07:44

## 2018-11-26 RX ADMIN — SULFAMETHOXAZOLE AND TRIMETHOPRIM 1 TABLET: 400; 80 TABLET ORAL at 07:44

## 2018-11-26 RX ADMIN — MYCOPHENOLIC ACID 540 MG: 360 TABLET, DELAYED RELEASE ORAL at 07:44

## 2018-11-26 RX ADMIN — LINACLOTIDE 290 MCG: 145 CAPSULE, GELATIN COATED ORAL at 07:45

## 2018-11-26 RX ADMIN — ACETAMINOPHEN 650 MG: 325 TABLET, FILM COATED ORAL at 00:41

## 2018-11-26 ASSESSMENT — ACTIVITIES OF DAILY LIVING (ADL)
ADLS_ACUITY_SCORE: 13

## 2018-11-26 ASSESSMENT — PAIN DESCRIPTION - DESCRIPTORS: DESCRIPTORS: ACHING

## 2018-11-26 NOTE — PLAN OF CARE
Problem: Patient Care Overview  Goal: Plan of Care/Patient Progress Review  Outcome: Improving    RN:  AVSS, Tylenol given x1 for abdominal, declines Oxycodone, denies nausea, on regular diet, fair PO intake. Blood glucose 85 and 100, personal Insulin pump @0.4unit/hr, no bolus per patient. Adequate urine output, no BM during the shift, passing gas. Possible discharge today?  Up ad nicole, will continue to monitor.

## 2018-11-26 NOTE — DISCHARGE SUMMARY
[facility], [place]    Discharge Summary  Transplant Surgery    Date of Admission:  11/20/2018  Date of Discharge:  11/26/2018  Discharging Provider: Lizzie Hernandez NP/Will Hernandez MD    Discharge Diagnoses   Active Problems:    Kidney transplanted      Procedure/Surgery Information   Procedure: Procedure(s):  Kidney Transplant Living Unrelated (Anonymous) Non Direct Recipient, Uretral Stent Placement.    Surgeon(s): Surgeon(s) and Role:     * Estelita Guzman MD - Primary     * Morena Mary MD - Assisting     * Viridiana Su MD - Resident - Assisting       Non-operative procedures: None performed     History of Present Illness   Letty Benavidez is an 58 year old female with a history of DM type 1, diabetic nephropathy, CAD s/p CABG x 2 (10/2017), skin cancer, gastroparesis, pancreatic exocrine dysfunction, diabetic enteropathy, and ESRD s/p LDKT in 1998 which failed on HD since 4/2017, and is now s/p LDKT with ureteral stent placement on 11/20/18.    Hospital Course   Kidney transplant 11/20/18: Patient also with a history of LDKT in 1998. Graft function is good, and creatinine has trended down since transplant (1.07 on day of discharge). Good UOP. Renal US on POD#4 showed patent graft vasculature and resolution of perinehpric fluid collection. Ureteral stent in place, scheduled for removal on 12/17 in clinic.    Immunosuppression management: Patient received Thymoglobulin and steroids per protocol for induction. Received a total of 300mg of Thymo for a course of 5.8 mg/kg. For maintenance immunosuppression, the patient was on Tacrolimus PTA. Tacrolimus goal range 8-10, level on 11/26 was 6.7 (12-hour trough). Will discharge on 3mg BID. On Bactrim and Valtrex for prophylaxis.    Transplant coordinator: Tania Ho, [phone]  DSA at time of transplant:  no  Ureteral stent: yes  CMV:  Donor + / Recipient +  EBV:  Donor + / Recipient -  Thymoglobulin: 300 mg  "(5.8 mg/kg)     Anemia in chronic renal disease: Required 2u PRBCs on POD#0. Hemoglobin has been stable ~8.0. Renal US without evidence of perinephric fluid or hematoma. CAMDEN drain x 1 with sanguinous drainage, with decreasing output prior to discharge. ASA 81mg held after transplant, and restarted at time of discharge.    CAD s/p CABG 10/2017: Restarted ASA 81mg prior to discharge, continues on Lipitor.    Irritable bowel syndrome: Patient with constipation at baseline, but alternates with diarrhea at time. Required enemas after transplant, and then had diarrhea with abdominal cramping. Improved after Myfortic dose adjustments and holding bowel regimen. Patient to self-adjust IBS meds (Linzess, Miralax) while adjusting to new medication regimen.    Diabetes mellitus type 1: Patient on ambulatory insulin pump prior to admission, and was on an insulin drip after transplant. Transitioned back to her ambulatory pump on POD#3, endocrine followed for management. BG well-controlled at time of discharge, and patient will follow-up with her endocrinologist in [place] 2-4 weeks after discharge.    Consultations This Hospital Stay   PHARMACY IP CONSULT  SOCIAL WORK IP CONSULT  PHARMACY IP CONSULT  NUTRITION SERVICES ADULT IP CONSULT  NEPHROLOGY KIDNEY/PANCREAS TRANSPLANT ADULT IP CONSULT  PHARMACY IP CONSULT  MEDICATION HISTORY IP PHARMACY CONSULT  MEDICATION HISTORY IP PHARMACY CONSULT  ENDOCRINE DIABETES ADULT IP CONSULT  VASCULAR ACCESS CARE ADULT IP CONSULT    Pending Results   None    Physical Exam:  Blood pressure 136/69, pulse 74, temperature 98.6  F (37  C), temperature source Oral, resp. rate 16, height 1.6 m (5' 3\"), weight 52.7 kg (116 lb 3.2 oz), SpO2 100 %.  General Appearance: in no apparent distress.   Skin: normal, warm, and dry  Heart: regular rate and rhythm  Lungs: nonlabored breathing on room air  Abdomen: The abdomen is nondistended and nontender. The left-sided surgical incision is healing well with " dermabond intact. Left CAMDEN drain in place with serosanguinous drainage.  : tanner is not present, voiding without retention issues.  Extremities: no peripheral edema  Neurologic: awake, alert and oriented. Tremor absent.    Data   Most Recent 3 CBC's:  Recent Labs   Lab Test  11/26/18   0558  11/25/18   0646  11/24/18   0534   WBC  4.1  4.4  2.8*   HGB  8.1*  8.3*  7.7*   MCV  98  99  98   PLT  130*  134*  108*      Most Recent 3 BMP's:  Recent Labs   Lab Test  11/26/18   0558  11/25/18   0646  11/24/18   0534   NA  140  139  140   POTASSIUM  4.2  3.9  3.7   CHLORIDE  109  107  107   CO2  24  24  27   BUN  19  23  32*   CR  1.07*  1.15*  1.29*   ANIONGAP  7  9  6   PERRI  8.1*  8.1*  7.8*   GLC  158*  129*  99     Most Recent 2 LFT's:  Recent Labs   Lab Test  11/19/18   1036  10/03/16   0653   AST  24  22   ALT  35  22   ALKPHOS  166*  103   BILITOTAL  0.4  0.4     Most Recent INR's and Anticoagulation Dosing History:  Anticoagulation Dose History     Recent Dosing and Labs Latest Ref Rng & Units 10/3/2016    INR 0.86 - 1.14 0.96        Most Recent 3 Troponin's:No lab results found.  Most Recent Cholesterol Panel:  Recent Labs   Lab Test 05/11/18   CHOL  159   LDL  71   HDL  59   TRIG  146     Most Recent 6 Bacteria Isolates From Any Culture (See EPIC Reports for Culture Details):  Recent Labs   Lab Test  11/19/18   1312   CULT  No growth     Most Recent TSH, T4 and A1c Labs:  Recent Labs   Lab Test  11/20/18   0614   A1C  5.5     Results for orders placed or performed during the hospital encounter of 11/20/18   XR Chest Port 1 View    Narrative    EXAM: XR CHEST PORT 1 VW  11/20/2018 2:24 PM     HISTORY:  central line placement;     COMPARISON: Chest radiograph dated 11/19/2018    FINDINGS: A single AP view of the chest is obtained. Median sternotomy  wires. Right IJ central venous catheter tip projects over the mid SVC.  Surgical clips project over the cardiac silhouette and right hilum.  Trachea is midline. Cardiac  silhouette is within normal limits.  Pulmonary vasculature is distinct. Low lung volumes accentuate  pulmonary markings. No pleural effusion or pneumothorax. Patchy  bibasilar opacities.      Impression    IMPRESSION:   1. Right IJ central venous catheter tip projects over the mid SVC.  2. Low lung volumes with patchy bibasilar atelectasis.    I have personally reviewed the examination and initial interpretation  and I agree with the findings.    KALIE WOOD MD   US Renal Transplant    Narrative    EXAMINATION: US RENAL TRANSPLANT on 11/20/2018 3:30 PM.    INDICATION: Left lower quadrant kidney transplant.    COMPARISON: CT abdomen and pelvis and ultrasound abdomen dated  8/28/2018.    TECHNIQUE:  Grey-scale, color Doppler and spectral flow analysis.     FINDINGS:  The transplant kidney is located in the left lower quadrant, and  measures 9.6 cm length. Parenchyma is of normal thickness and  echogenicity. No focal lesions. No hydronephrosis. Perinephric fluid  collection superior and medial to the transplanted kidney measuring  4.5 x 1.5 x 1.4 cm.    Renal artery flow:   86 cm/sec peak systolic at hilum.  280 cm/sec peak systolic at anastomosis.    Lower Pole Arcuate artery:  0.76 resistive index.     Mid pole Arcuate artery:  0.73 resistive index.     Upper Pole Arcuate artery:  0.76 resistive index.    Renal Vein Flow:  58 cm/sec at hilum.   58 cm/sec at anastomosis.    Iliac artery flow:  172 cm/sec peak systolic above anastomosis.  144 cm/sec peak systolic below anastomosis.    Iliac vein flow:  Patent above and below the anastomosis.      Impression    IMPRESSION:  1.  Perinephric fluid collection superomedial to the left lower  quadrant transplant kidney.  2.  Elevated renal artery peak systolic velocity at the anastomosis  which could be normal related to edematous changes in this early  postoperative study. Attention on follow-up studies.  3.  No hydronephrosis.    I have personally reviewed the  examination and initial interpretation  and I agree with the findings.    ADRIENNE REY MD   US Renal Transplant    Narrative    EXAMINATION: US RENAL TRANSPLANT  11/24/2018 10:02 AM      CLINICAL HISTORY: abdominal pain, CAMDEN with sanguinous output; . Left  lower quadrant transplant kidney postop day 400    COMPARISON: Ultrasound 11/20/2018 and a 20/8/2018.      FINDINGS:   There is a left lower quadrant renal transplant which measures 10.1  cm. The transplant kidney demonstrates normal echogenicity. There is  no peritransplant fluid collection, with resolution of previously  noted tiny fluid collection seen on ultrasound 11/20/2018. There is no  urinary tract dilation.     The urinary bladder is decompressed with Day catheter in place.     The arcuate artery resistive indices range from 0.81-0.82, previously  0.73-0.76.   The renal artery anastomosis peak systolic velocity is 211 cm/sec,  previously 280 cm/s.  The renal artery hilum peak systolic velocity is 102 cm/s. There are  no abnormal waveforms in the renal artery as well as in the arcuate  arteries.   The renal vein is patent.   The iliac artery and vein are patent above and below the anastomosis.      Impression    IMPRESSION:   1. Normal grayscale ultrasound evaluation of the left lower quadrant  renal transplant. Resolution of previously noted tiny perinephric  fluid collection.  2a. Patent doppler evaluation of the renal transplant. Decreased peak  systolic velocity at the transplant renal artery anastomosis compared  to 11/20/2018.   2b. Slightly increased resistive indices of the arcuate arteries  compared to prior exam, likely related to recent postoperative state.  Attention on follow-up studies.    I have personally reviewed the examination and initial interpretation  and I agree with the findings.    ADRIENNE REY MD       Code Status   Full    Primary Care Physician   Anita Jurado    Allergies   Allergies   Allergen Reactions      Erythromycin      Not a drug allergy- Patient told to avoid while taking tacrolimus due to drug interaction. Ok to have if not taking tacrolimus     Metronidazole Nausea and Vomiting     Flagyl     Mycophenolate Nausea and Vomiting and Other (See Comments)     Nausea / Vomiting / Tremors     Neomycin Other (See Comments)     Not a drug allergy- Patient told to avoid while taking tacrolimus due to drug interaction. Ok to have if not taking tacrolimus     Medications Prior to Admission:  Prescriptions Prior to Admission   Medication Sig Dispense Refill Last Dose     Amino Acids (LIQUACEL) LIQD Take 30 mLs by mouth 2 times daily Amino acids 16 grams, 2.5 g of arginine per pacakge -90 kcal/30mL liquid   11/19/2018 at 2000     aspirin 81 MG EC tablet Take 81 mg by mouth daily   2 weeks ago, stopped for the surgery at Unknown time     atorvastatin (LIPITOR) 20 MG tablet Take 20 mg by mouth At Bedtime    11/19/2018 at HS     B Complex-C-Zn-Folic Acid (DIALYVITE 800-ZINC 15) 0.8 MG TABS Take 0.8 mg by mouth every evening After dinner. Take after dialysis   11/19/2018 at 2000     Cholecalciferol (VITAMIN D3) 2000 units CAPS Take 1 capsule by mouth every evening With dinner   11/19/2018 at 0800     CO-ENZYME Q-10 PO Take 400 mg by mouth daily For cramps from lipitor   11/19/2018 at 0800     eszopiclone (LUNESTA) 1 MG TABS tablet Take 1 mg by mouth At Bedtime   11/19/2018 at 2200     gabapentin (NEURONTIN) 100 MG capsule Take 100 mg by mouth At Bedtime    11/19/2018 at 2000     insulin lispro (HUMALOG) 100 UNIT/ML VIAL Use with insulin pump   11/20/2018     INSULIN PUMP - OUTPATIENT Inject 0.5 Units/hr Subcutaneous Carb ratio: 1:15g/hr  Sensitivity factor correction: 1 unit for every 75mg/dL over 100mg/dL   11/20/2018     lidocaine-prilocaine (EMLA) cream Apply 5 mg topically three times a week   11/19/2018 at 1800     linaclotide (LINZESS) 290 MCG capsule Take 290 mcg by mouth every morning (before breakfast)    11/19/2018 at  0800     Magnesium Citrate 125 MG CAPS Take 1 capsule by mouth daily as needed For post dialysis nausea   Past Month     Methoxy PEG-Epoetin Beta (MIRCERA) 50 MCG/0.3ML SOSY One shot every 28 days   Taking     Pancrelipase, Lip-Prot-Amyl, (ZENPEP PO) Take 2 capsules by mouth 3 times daily L-15, A-82, P-51   11/19/2018 at 1800     polyethylene glycol (MIRALAX/GLYCOLAX) powder Take 5.7 g by mouth every morning 1/3 of 17g, mix with Liquidcel and water (use as liquid to take with her other morning meds)   11/18/2018 at 0800     tacrolimus (PROGRAF - GENERIC EQUIVALENT) 1 MG capsule Take 2 mg by mouth 2 times daily    11/20/2018 at 0400     triamcinolone (KENALOG) 0.1 % ointment APPLY TO AFFECTED AREA DAILY FOR 2 WEEKS THEN EVERY 3 DAYS FOR 1 WEEK AS NEEDED  0      Discharge Medications    Current Discharge Medication List      START taking these medications    Details   magnesium oxide (MAG-OX) 400 MG tablet Take 1 tablet (400 mg) by mouth daily (with lunch)  Qty: 30 tablet, Refills: 2    Associated Diagnoses: Kidney transplanted      multivitamin, therapeutic (THERA-VIT) TABS tablet Take 1 tablet by mouth daily  Qty: 30 tablet, Refills: 2    Associated Diagnoses: Kidney transplanted      MYFORTIC (BRAND) 180 MG EC TABLET Take 3 tablets (540 mg) by mouth 2 times daily  Qty: 180 tablet, Refills: 11    Associated Diagnoses: Kidney transplanted      sulfamethoxazole-trimethoprim (BACTRIM/SEPTRA) 400-80 MG per tablet Take 1 tablet by mouth daily  Qty: 30 tablet, Refills: 11    Associated Diagnoses: Kidney transplanted      tacrolimus (GENERIC EQUIVALENT) 0.5 MG capsule Take one tablet twice daily as directed for dose adjustments.  Qty: 60 capsule, Refills: 11    Associated Diagnoses: Kidney transplanted      valACYclovir (VALTREX) 1000 mg tablet Take 1 tablet (1,000 mg) by mouth 2 times daily  Qty: 60 tablet, Refills: 2    Associated Diagnoses: Kidney transplanted         CONTINUE these medications which have CHANGED     Details   tacrolimus (GENERIC EQUIVALENT) 1 MG capsule Take 3 capsules (3 mg) by mouth 2 times daily  Qty: 180 capsule, Refills: 11    Associated Diagnoses: Kidney transplanted         CONTINUE these medications which have NOT CHANGED    Details   Amino Acids (LIQUACEL) LIQD Take 30 mLs by mouth 2 times daily Amino acids 16 grams, 2.5 g of arginine per pacakge -90 kcal/30mL liquid      aspirin 81 MG EC tablet Take 81 mg by mouth daily      atorvastatin (LIPITOR) 20 MG tablet Take 20 mg by mouth At Bedtime       Cholecalciferol (VITAMIN D3) 2000 units CAPS Take 1 capsule by mouth every evening With dinner      CO-ENZYME Q-10 PO Take 400 mg by mouth daily For cramps from lipitor      eszopiclone (LUNESTA) 1 MG TABS tablet Take 1 mg by mouth At Bedtime      gabapentin (NEURONTIN) 100 MG capsule Take 100 mg by mouth At Bedtime       insulin lispro (HUMALOG) 100 UNIT/ML VIAL Use with insulin pump      INSULIN PUMP - OUTPATIENT Inject 0.5 Units/hr Subcutaneous Carb ratio: 1:15g/hr  Sensitivity factor correction: 1 unit for every 75mg/dL over 100mg/dL      linaclotide (LINZESS) 290 MCG capsule Take 290 mcg by mouth every morning (before breakfast)       Pancrelipase, Lip-Prot-Amyl, (ZENPEP PO) Take 2 capsules by mouth 3 times daily L-15, A-82, P-51      polyethylene glycol (MIRALAX/GLYCOLAX) powder Take 5.7 g by mouth every morning 1/3 of 17g, mix with Liquidcel and water (use as liquid to take with her other morning meds)      triamcinolone (KENALOG) 0.1 % ointment APPLY TO AFFECTED AREA DAILY FOR 2 WEEKS THEN EVERY 3 DAYS FOR 1 WEEK AS NEEDED  Refills: 0         STOP taking these medications       B Complex-C-Zn-Folic Acid (DIALYVITE 800-ZINC 15) 0.8 MG TABS Comments:   Reason for Stopping:         lidocaine-prilocaine (EMLA) cream Comments:   Reason for Stopping:         Magnesium Citrate 125 MG CAPS Comments:   Reason for Stopping:         Methoxy PEG-Epoetin Beta (MIRCERA) 50 MCG/0.3ML SOSY Comments:   Reason for  Stopping:             Discharge Orders     Reason for your hospital stay   You were hospitalized to undergo kidney transplantation.     [facility]ult [facility] Follow-up and recommended labs and tests   Over the next 3-5 days [facility] will be [facility] at 7 am (ph. [phone], option 7) .  Your labs will be drawn at the beginning of your appointment at 7:00 am.  DO NOT take your medications prior to having labs drawn. Please bring all your medications with you from home to take after labs are drawn.    LABS:  CBC, BMP, Mg, Phos and  to be drawn daily while in [facility], then every Monday, Wednesday, Friday by home health care nurse if arranged, or at an outpatient lab.   Tacrolimus levels (12 hours after administration) daily while in [facility] then 3 times weekly.     FOLLOW UP APPOINTMENTS:  Remember to always bring an updated medication list to all appointments.     An appointment with your transplant surgeon is scheduled for 12/6.  Your transplant surgeon is: Dr. Guzman.  You will follow up with transplant nephrology in clinic at 1 and 3 months and then annually.   Follow up with primary care provider in 4-8 weeks. (Pt to schedule)  Follow up with your endocrinologist, Dr. Palomares at [facility] [place], 2-4 weeks after discharge.  You have a ureteral stent in place which needs to be removed in 4-6 weeks.  You are scheduled for stent removal at the [facility] (clinic) on 12/17.     Call scheduling at [phone] if you have not heard about your appointments within 48 hours after discharge.    WHEN TO CONTACT YOUR  COORDINATOR:  Transplant Coordinator [phone]  Notify your coordinator if you have pain over your kidney, increased redness or drainage from your incision, fever greater than 100.5F, or decreased urine output.  Notify your coordinator immediately if you are ever unable to take your immunosuppressive medications for any reason.  If it is outside of office hours, please call the hospital  switchboard at [phone] and ask to have the kidney transplant surgery fellow paged for urgent medical questions, or present to the emergency department.    OTHER DISCHARGE INSTRUCTIONS:  CAMDEN plan: Please monitor color and amount of output. Bring these records with you to clinic, and the decision on when to remove will be based on the amount of drainage.   Monitor blood pressure and weight daily initially post transplant.  Monitor blood glucose levels 4 times a day, and adjust your insulin pump as needed.    Activity: Walk at least four times a day, lift no greater than 10 pounds for 6-8 weeks from the time of surgery.  No driving while taking narcotics or 3 weeks after surgery.  Diet recommendations post-transplant: Heart healthy dietary habits long term (low saturated/trans fat, low sodium). High protein diet x 8 weeks. Practice food safety precautions.    Appointments on [place] and/or [facility] (with [facility] or [facility] provider or service). Call [phone] if you haven't heard regarding these appointments within 7 days of discharge.     Full Code       Discharge Disposition   Discharged to home  Condition at discharge: Stable    Attestation:  I have reviewed today's vital signs, notes, medications, labs and imaging. Amount of time performed on this discharge summary was > 30 minutes.    GIUSEPPE Small CNP  [phone]

## 2018-11-26 NOTE — PLAN OF CARE
Problem: Patient Care Overview  Goal: Plan of Care/Patient Progress Review  Outcome: Adequate for Discharge Date Met: 11/26/18  Orders to discharge patient received this afternoon. Paperwork printed and reviewed with patient,  and mother at bedside. Questions answered and follow up appointments/future medications reviewed. Belongings packed by family. PIV removed. Tolerating regular diet with good appetite. -274 corrected per self on ambulatory insulin pump. Patient states that deodorant clogged insertion site for insulin which resulted in false hyperglycemia. Patient swabbed with alcohol and rotated sites. Patient is vitally stable on RA. Voiding adequate amounts. BM x3 this shift per patient report. Left CAMDEN with 35 mL output. Drain education completed at bedside and handout given with information. Supplies also given for drain cares. Up independently. Will continue to facilitate remainder of discharge.     Report called to [facility]. Med card/lab book updated.

## 2018-11-26 NOTE — PROGRESS NOTES
CLINICAL NUTRITION SERVICES - BRIEF/DISCHARGE NOTE     Nutrition Prescription    Future/Additional Recommendations:    High protein food choices with meals (and continue Liquicel 2 oz/day)  to help meet high needs post-transplant over the next 6-8 weeks.     Heart-healthy diet (low saturated fat, low sodium, high fiber) and food safety precautions long term due to immunosuppression regimen post-transplant         EVALUATION OF THE PROGRESS TOWARD GOALS   Diet: Regular diet    Intake: Patient reports fair appetite.  She feels well educated on post-transplant diet.  She follows a Low FODMAP diet and tolerates yogurt, cottage cheese, egg whites and small amounts of chicken and fish well.  We did review post-transplant diet recommendations (protein needs, food safety recommendations, heart healthy diet recommendations).  She notes a history of elevated potassium being on Tacrolimus, so she avoids excessive potassium intake.      NEW FINDINGS   Normal electrolytes, improving function.      Patient s discharge needs assessed and discharge planning has been conducted with the multidisciplinary transplant care team including physicians, pharmacy, social work and transplant coordinator.     Monitoring/Evaluation  Progress toward goals will be monitored and evaluated per protocol.    Once discharged, place outpatient nutrition consult via the transplant team if nutrition concerns arise.    Leda Hunt MS, RD, LD  Pager [phone]

## 2018-11-26 NOTE — PHARMACY-TRANSPLANT NOTE
Solid Organ Transplant Recipient Prior to Discharge Note    58 year old female s/p LDKT transplant on 11/20/18.    Pharmacy has monitored for medication interactions and immunosuppression levels in conjunction with the multidisciplinary team. In anticipation for discharge, medication therapy needs have been addressed daily throughout the current admission via multidisciplinary rounds and/or discussions, order verification, daily clinical pharmacy review, and communication with prescribers.  Kala Judge, PharmD

## 2018-11-26 NOTE — PROGRESS NOTES
[facility], [place]   Transplant Nephrology Progress Note  Date of Admission:  11/20/2018    Assessment & Plan    # ESRD secondary to T1DM s/p LDKT: second allograft . baseline Cr ~ TBD; trending down  Renal ultrasound unremarkable.                         - Proteinuria: Not checked recently                        - Latest DSA: 11/2018                      Latest DSA: No                        - BK Viremia: Not checked recently- at 1 month post txp                        - Kidney Tx Biopsy: No     # Immunosuppression: Tacrolimus immediate release (goal  8-10) and Mycophenolic acid (goal  1-3.5). Was induced withlower thymoglobulin as she was on immunosuppression prior to her second kidney                        - Changes: yes- increase tacrolimus to 3mg BID for low trough levels of 6.7 this morning.      # Hypertension/Volume Status: Controlled; Goal BP: < 140/90                        - Changes: No- no lasix needed on discharge.      # Diabetes: being managed by ebdocrinology.       # Anemia in chronic renal disease: Hgb: stable now.  hgb 8.1  Will monitor                        - Iron studies: ok to replete with venofer 100 mg IV daily while inpt      # Mineral Bone Disorder:                         - Secondary renal hyperparathyroidism; PTH level is: Mildly elevated at 212. Will be monitored as outpt . No rx for now.   - Vitamin D; level is: Normal  - Calcium; level is: Low but stable.    - Phosphorus; level is: Normal     # Electrolytes:   - Potassium; level: Normal  - Magnesium; level: Normal  - Bicarbonate; level: Normal     # PPX: PJP-on bactrim 1 tab daily adjusted for cr cl of > 30                        CMV: on valtrex 1gram BID she is CMV IgG + and EBV IgG -     # h/o CAD; s/p 2v CABG in 10/2017. Cleared by cardiology in July 2018. No CP or SOB.      # h/o gastroparesis:  Whas been having diarrhea. Patient adjusting her IBS meds- linaclotide     # leg swelling left greater  "than right, if persists consider doppler ultrasound      # Medical Compliance: Yes     # Transplant History:  Etiology of kidney failure: diabetes mellitus type 1  Tx: LDKT  Transplant: 2018 (Kidney), 1998 (Kidney)  Donor Type: Living                Donor Class:   Crossmatch at time of Tx: negative  DSA at time of Tx: No  History of BK viremia: No  History of CMV viremia: No  History of EBV viremia: No  Significant changes in immunosuppression: None  Significant transplant-related complications: None     Recommendations were communicated to the primary team verbally.    Colton Arce MD  Pager: [phone]    Interval History   Overall doing well. Had bowel movement.  Weight is down today by ~4kg. No other complaints. Denies chest pain or SOB.     Review of Systems   4 point ROS was obtained and negative except as noted in the interval history    Physical Exam   Temp  Av.6  F (37  C)  Min: 97.4  F (36.3  C)  Max: 99.7  F (37.6  C)  Arterial Line MAP (mmHg)  Av.9 mmHg  Min: 48 mmHg  Max: 91 mmHg  Arterial Line BP  Min: 87/33  Max: 148/64      Pulse  Av  Min: 75  Max: 90 Resp  Av.8  Min: 10  Max: 20  SpO2  Av.2 %  Min: 88 %  Max: 100 %    CVP (mmHg): 12 mmHgBP 138/67 (BP Location: Left arm)  Pulse 74  Temp 98.6  F (37  C) (Oral)  Resp 16  Ht 1.6 m (5' 3\")  Wt 52.7 kg (116 lb 3.2 oz)  SpO2 100%  BMI 20.58 kg/m2   Date 18 07 - 18 0659   Shift 1335-9931 0544-7495 6357-9991 24 Hour Total   I  N  T  A  K  E   Shift Total  (mL/kg)       O  U  T  P  U  T   Urine 600   600    Shift Total  (mL/kg) 600  (9.94)   600  (9.94)   Weight (kg) 60.37 60.37 60.37 60.37        Admit Weight: 51.7 kg (113 lb 15.7 oz) (pt says this is her \"dry\" weight)   GENERAL APPEARANCE: alert and no distress  HENT: mouth without ulcers or lesions  LYMPHATICS: no cervical or supraclavicular nodes  RESP: lungs clear to auscultation - no rales, rhonchi or wheezes  CV: regular rhythm, normal rate, no rub, " no murmur  EDEMA: no LE edema bilaterally  ABDOMEN: soft, nondistended, nontender, bowel sounds normal  MS: extremities normal - no gross deformities noted, no evidence of inflammation in joints, no muscle tenderness  SKIN: no rash    Data   All labs reviewed by me.  CMP    Recent Labs  Lab 11/26/18  0558 11/25/18  0646 11/24/18  0534 11/23/18  0612    139 140 140   POTASSIUM 4.2 3.9 3.7 4.4   CHLORIDE 109 107 107 108   CO2 24 24 27 24   ANIONGAP 7 9 6 8   * 129* 99 163*   BUN 19 23 32* 32*   CR 1.07* 1.15* 1.29* 1.17*   GFRESTIMATED 52* 48* 42* 47*   GFRESTBLACK 63 58* 51* 57*   PERRI 8.1* 8.1* 7.8* 8.5   MAG 1.8 1.9 2.0 2.3   PHOS 3.6 2.6 2.1* 3.0     CBC    Recent Labs  Lab 11/26/18  0558 11/25/18  0646 11/24/18  0534 11/23/18  0612   HGB 8.1* 8.3* 7.7* 8.0*   WBC 4.1 4.4 2.8* 4.2   RBC 2.52* 2.68* 2.47* 2.54*   HCT 24.7* 26.4* 24.2* 24.5*   MCV 98 99 98 97   MCH 32.1 31.0 31.2 31.5   MCHC 32.8 31.4* 31.8 32.7   RDW 14.7 14.8 15.1* 15.3*   * 134* 108* 95*     INRNo lab results found in last 7 days.  ABG    Recent Labs  Lab 11/20/18  1329 11/20/18  1204 11/20/18  1056   PH 7.38 7.41 7.44   PCO2 43 39 39   PO2 211* 199* 193*   HCO3 25 24 26   O2PER 50.0 45.0 46.0      Urine Studies  Recent Labs   Lab Test  11/19/18   1312  10/03/16   0651   COLOR  Yellow  Yellow   APPEARANCE  Clear  Clear   URINEGLC  Negative  Negative   URINEBILI  Negative  Negative   URINEKETONE  Negative  Negative   SG  1.012  1.012   UBLD  Small*  Negative   URINEPH  5.0  7.0   PROTEIN  30*  >500*   NITRITE  Negative  Negative   LEUKEST  Negative  Negative   RBCU  1  1   WBCU  <1  2     No lab results found.  PTH  Recent Labs   Lab Test  11/19/18   1036   PTHI  212*     Iron Studies  Recent Labs   Lab Test  11/19/18   1036   IRON  38   FEB  212*   IRONSAT  18   CORTEZ  952*       IMAGING:  All imaging studies reviewed by me.     MEDICATIONS:  Current Facility-Administered Medications   Medication     acetaminophen (TYLENOL)  tablet 650 mg     [Rx hold ] aspirin chewable tablet 81 mg     atorvastatin (LIPITOR) tablet 20 mg     benzocaine-menthol (CEPACOL) 15-3.6 MG lozenge 1 lozenge     bisacodyl (DULCOLAX) EC tablet 10 mg     bisacodyl (DULCOLAX) Suppository 10 mg     dextrose 10 % 1,000 mL infusion     glucose gel 15-30 g    Or     dextrose 50 % injection 25-50 mL    Or     glucagon injection 1 mg     eszopiclone (LUNESTA) tablet 1 mg     gabapentin (NEURONTIN) capsule 100 mg     insulin basal rate from AMBULATORY PUMP     insulin bolus from AMBULATORY PUMP     insulin bolus from AMBULATORY PUMP     insulin bolus from AMBULATORY PUMP     insulin lispro (HumaLOG) 100 UNIT/ML vial for filling pump reservoir     linaclotide (LINZESS) capsule 290 mcg     magnesium hydroxide (MILK OF MAGNESIA) suspension 30 mL     magnesium oxide (MAG-OX) tablet 400 mg     Med Instruction - Transition from IV Insulin Infusion to Sub-Q Insulin     multivitamin, therapeutic (THERA-VIT) tablet 1 tablet     mycophenolic acid (MYFORTIC BRAND) EC tablet 540 mg     ondansetron (ZOFRAN-ODT) ODT tab 4 mg    Or     ondansetron (ZOFRAN) injection 4 mg     oxyCODONE (ROXICODONE) tablet 5 mg     polyethylene glycol (MIRALAX/GLYCOLAX) Packet 17 g     potassium chloride (KLOR-CON) Packet 20-40 mEq     potassium chloride 10 mEq in 100 mL intermittent infusion with 10 mg lidocaine     potassium chloride 10 mEq in 100 mL sterile water intermittent infusion (premix)     potassium chloride 20 mEq in 50 mL intermittent infusion     potassium chloride SA (K-DUR/KLOR-CON M) CR tablet 20-40 mEq     prochlorperazine (COMPAZINE) injection 10 mg    Or     prochlorperazine (COMPAZINE) tablet 10 mg     senna-docusate (SENOKOT-S;PERICOLACE) 8.6-50 MG per tablet 1 tablet    Or     senna-docusate (SENOKOT-S;PERICOLACE) 8.6-50 MG per tablet 2 tablet     sodium chloride (PF) 0.9% PF flush 10 mL     sodium chloride (PF) 0.9% PF flush 10-20 mL     sulfamethoxazole-trimethoprim  (BACTRIM/SEPTRA) 400-80 MG per tablet 1 tablet     tacrolimus (GENERIC EQUIVALENT) capsule 3 mg     valACYclovir (VALTREX) tablet 1,000 mg         I have seen and examined this patient with the resident.  This note reflects our joint assessment and plan.     Bertha Charles MD

## 2018-11-26 NOTE — PLAN OF CARE
Problem: Patient Care Overview  Goal: Plan of Care/Patient Progress Review  Outcome: Improving  VSS on RA. Good UOP now that tanner was removed. Pain managed with tylenol. Good appetite, denies nausea. Pt up ad nicole, family bedside. Will continue POC. Plan for discharge tomorrow.

## 2018-11-26 NOTE — DISCHARGE INSTRUCTIONS
Diet recommendations post-transplant: High protein diet ( grams/day x 8 weeks).  Continue Liquacel 2 oz daily to help meet needs during this time frame. Heart healthy dietary habits long term (low saturated/trans fat, low sodium). Practice food safety precautions. See nutrition handout and food safety booklet for more information.

## 2018-11-26 NOTE — PROGRESS NOTES
Diabetes Consult Daily  Progress Note          Assessment/Plan:                          Letty Benavidez  is a 59 yo female with longstanding T1DM with triopathy, ESRD 2/2 DN s/p LDKT 1998, required home hemodialysis 4/2017, CADs/p CABG, gastroparesis, pancreatic exocrine dysfunction who was admitted for LDKT with ureteral stent placement 11/20/2018 with Dr. Guzman.          Plan:  Medtronic Paradigm 751, with Humalog  Basal rate:  0.5 units/h from 3821-6899  I:C 1 unit per 12 grams of CHO for meals and snacks  Sensitivity: 1 unit per 75  Target:  Active insulin 4 hours 100  Monitor glucose before meals, HS, 200 and 0500    -suggested that she set a lower basal from ~ 0000 to 0600: 0.4 units/hour  From 0600 to 2400: 0.5 units/hour     Outpatient diabetes follow up: Dr. Palomares at [facility], in 2-4 weeks after discharge  Plan discussed with patient and primary team.           Interval History:   The last 24 hours progress and nursing notes reviewed.  Ms. Benavidez is making adjustments to hr own pump settings.  Last evening, glucose 85, ate a 0 grams snacks, glucose 100 at ~ 0230 and am glucose 158.  Ms. Benavidez set her basal to 0.4 units overnight to prevent hypoglycemia    Will be making changes to her insulin regime when ordering the yogurt. Believes the  size is not 5 ounces and plans to dose for 20g CHO with vanilla greek yogurt tomorrow rather than 10g.    Up independently in the room, plan to discharge today      Recent Labs  Lab 11/26/18  1015 11/26/18  0753 11/26/18  0558 11/26/18  0226 11/25/18  2118 11/25/18  1705 11/25/18  1321  11/25/18  0646  11/24/18  0534  11/23/18  0612  11/22/18  0523  11/21/18  0434   GLC  --   --  158*  --   --   --   --   --  129*  --  99  --  163*  --  143*  --  82   * 141*  --  100* 85 100* 144*  < >  --   < >  --   < >  --   < >  --   < >  --    < > = values in this interval not displayed.            Review of Systems:   See interval hx    "       Medications:       Active Diet Order      Advance Diet as Tolerated: Regular Diet Adult     Physical Exam:  Gen: Alert,  NAD   HEENT:  mucous membranes are moist  Resp: non-labored  Ext: moving all extremties   Neuro:oriented x3, communicating clearly  /69 (BP Location: Left arm)  Pulse 74  Temp 98.6  F (37  C) (Oral)  Resp 16  Ht 1.6 m (5' 3\")  Wt 52.7 kg (116 lb 3.2 oz)  SpO2 100%  BMI 20.58 kg/m2           Data:     Lab Results   Component Value Date    A1C 5.5 11/20/2018    A1C 5.9 05/11/2018    A1C 6.9 10/03/2016              CBC RESULTS:   Recent Labs   Lab Test  11/26/18   0558   WBC  4.1   RBC  2.52*   HGB  8.1*   HCT  24.7*   MCV  98   MCH  32.1   MCHC  32.8   RDW  14.7   PLT  130*     Recent Labs   Lab Test  11/26/18   0558  11/25/18   0646   NA  140  139   POTASSIUM  4.2  3.9   CHLORIDE  109  107   CO2  24  24   ANIONGAP  7  9   GLC  158*  129*   BUN  19  23   CR  1.07*  1.15*   PERRI  8.1*  8.1*     Liver Function Studies -   Recent Labs   Lab Test  11/19/18   1036   PROTTOTAL  7.2   ALBUMIN  3.6   BILITOTAL  0.4   ALKPHOS  166*   AST  24   ALT  35     Lab Results   Component Value Date    INR 0.96 10/03/2016           Rachael Bustos -[id number]  Diabetes Management job code [id number]            "

## 2018-11-27 ENCOUNTER — OFFICE VISIT (OUTPATIENT)
Dept: INFUSION THERAPY | Facility: CLINIC | Age: 58
End: 2018-11-27
Attending: INTERNAL MEDICINE
Payer: COMMERCIAL

## 2018-11-27 VITALS
TEMPERATURE: 98 F | BODY MASS INDEX: 20.32 KG/M2 | OXYGEN SATURATION: 100 % | RESPIRATION RATE: 16 BRPM | WEIGHT: 114.7 LBS

## 2018-11-27 DIAGNOSIS — I95.1 ORTHOSTATIC HYPOTENSION: ICD-10-CM

## 2018-11-27 DIAGNOSIS — R19.7 DIARRHEA, UNSPECIFIED TYPE: ICD-10-CM

## 2018-11-27 DIAGNOSIS — Z94.0 KIDNEY REPLACED BY TRANSPLANT: Primary | ICD-10-CM

## 2018-11-27 DIAGNOSIS — Z94.0 KIDNEY TRANSPLANTED: Primary | ICD-10-CM

## 2018-11-27 LAB
ANION GAP SERPL CALCULATED.3IONS-SCNC: 9 MMOL/L (ref 3–14)
BASOPHILS # BLD AUTO: 0 10E9/L (ref 0–0.2)
BASOPHILS NFR BLD AUTO: 0.1 %
BUN SERPL-MCNC: 20 MG/DL (ref 7–30)
CALCIUM SERPL-MCNC: 8.2 MG/DL (ref 8.5–10.1)
CHLORIDE SERPL-SCNC: 106 MMOL/L (ref 94–109)
CO2 SERPL-SCNC: 22 MMOL/L (ref 20–32)
CREAT SERPL-MCNC: 1.11 MG/DL (ref 0.52–1.04)
DIFFERENTIAL METHOD BLD: ABNORMAL
EOSINOPHIL # BLD AUTO: 0.1 10E9/L (ref 0–0.7)
EOSINOPHIL NFR BLD AUTO: 1.8 %
ERYTHROCYTE [DISTWIDTH] IN BLOOD BY AUTOMATED COUNT: 14.3 % (ref 10–15)
GFR SERPL CREATININE-BSD FRML MDRD: 50 ML/MIN/1.7M2
GLUCOSE SERPL-MCNC: 181 MG/DL (ref 70–99)
HCT VFR BLD AUTO: 27.5 % (ref 35–47)
HGB BLD-MCNC: 9 G/DL (ref 11.7–15.7)
IMM GRANULOCYTES # BLD: 0.3 10E9/L (ref 0–0.4)
IMM GRANULOCYTES NFR BLD: 3.9 %
LYMPHOCYTES # BLD AUTO: 0.1 10E9/L (ref 0.8–5.3)
LYMPHOCYTES NFR BLD AUTO: 1.6 %
MAGNESIUM SERPL-MCNC: 1.8 MG/DL (ref 1.6–2.3)
MCH RBC QN AUTO: 32.1 PG (ref 26.5–33)
MCHC RBC AUTO-ENTMCNC: 32.7 G/DL (ref 31.5–36.5)
MCV RBC AUTO: 98 FL (ref 78–100)
MONOCYTES # BLD AUTO: 0.6 10E9/L (ref 0–1.3)
MONOCYTES NFR BLD AUTO: 7 %
NEUTROPHILS # BLD AUTO: 6.8 10E9/L (ref 1.6–8.3)
NEUTROPHILS NFR BLD AUTO: 85.6 %
NRBC # BLD AUTO: 0 10*3/UL
NRBC BLD AUTO-RTO: 0 /100
PHOSPHATE SERPL-MCNC: 3.6 MG/DL (ref 2.5–4.5)
PLATELET # BLD AUTO: 182 10E9/L (ref 150–450)
POTASSIUM SERPL-SCNC: 4.1 MMOL/L (ref 3.4–5.3)
RBC # BLD AUTO: 2.8 10E12/L (ref 3.8–5.2)
SODIUM SERPL-SCNC: 138 MMOL/L (ref 133–144)
TACROLIMUS BLD-MCNC: 7.7 UG/L (ref 5–15)
TME LAST DOSE: NORMAL H
WBC # BLD AUTO: 8 10E9/L (ref 4–11)

## 2018-11-27 PROCEDURE — 80180 DRUG SCRN QUAN MYCOPHENOLATE: CPT | Performed by: INTERNAL MEDICINE

## 2018-11-27 PROCEDURE — 85025 COMPLETE CBC W/AUTO DIFF WBC: CPT | Performed by: INTERNAL MEDICINE

## 2018-11-27 PROCEDURE — 83735 ASSAY OF MAGNESIUM: CPT | Performed by: INTERNAL MEDICINE

## 2018-11-27 PROCEDURE — G0463 HOSPITAL OUTPT CLINIC VISIT: HCPCS | Mod: 25

## 2018-11-27 PROCEDURE — 36415 COLL VENOUS BLD VENIPUNCTURE: CPT

## 2018-11-27 PROCEDURE — 25000128 H RX IP 250 OP 636: Mod: ZF | Performed by: INTERNAL MEDICINE

## 2018-11-27 PROCEDURE — 80048 BASIC METABOLIC PNL TOTAL CA: CPT | Performed by: INTERNAL MEDICINE

## 2018-11-27 PROCEDURE — 96360 HYDRATION IV INFUSION INIT: CPT

## 2018-11-27 PROCEDURE — 84100 ASSAY OF PHOSPHORUS: CPT | Performed by: INTERNAL MEDICINE

## 2018-11-27 PROCEDURE — 80197 ASSAY OF TACROLIMUS: CPT | Performed by: INTERNAL MEDICINE

## 2018-11-27 RX ADMIN — SODIUM CHLORIDE 1000 ML: 9 INJECTION, SOLUTION INTRAVENOUS at 09:33

## 2018-11-27 ASSESSMENT — PAIN DESCRIPTION - DESCRIPTORS: DESCRIPTORS: ACHING

## 2018-11-27 NOTE — PROGRESS NOTES
"Letty Benavidez came to [facility] today for a lab and assess following a living-unrelated kidney donor transplant on 11/20/2018. History of previous living donor kidney transplant 1998.    Discharge date: 11/26/2018  Transplant coordinator: Morena YUNG RN  Phone number patient can be reached at: [phone] cell- okay to leave detailed message.     Physical Assessment:  See physical assessment located under \"Document Flowsheets\".  Incision site: L abdomen with dermabond, WNL.   Lines: CAMDEN output of 50 ml since discharge- serosanguinous. Dressing CDI. Exta supplies and site care education given. Confirmed patient has MicroKlenz. CAMDEN emptied and amount recorded prior to patient leaving today (30cc).   Day: N/A  Urine clarity: Frequency. Light yellow. Denies cloudiness, irritation.   Hydration: Drinking at least 3 L of water daily.    Nutrition: Decreased appetite, eating small frequent meals. Good understanding of dietary requirements.   Last BM: having frequent loose stools. Patient states current magnesium dose (400 mg) may be contributing to symptoms. Prefers to switch to 125 mg mag citrate she has at home.  Pain: 4/10 to incision site. Taking Tylenol Q 4 hrs PRN.      Labs drawn by [facility] staff Yes. Copy of results given to patient.     Plan of care for today: Labs and assessment reviewed with Dr. Romano/ Dr. Velez.  -Home blood pressure machine checked for accuracy.   -Discharge medication list reviewed with patient. Patient prefers to use her Wee Web spreadsheet method for tracking medications. Very knowledgeable of medications.  -Patient seen by Nephrology team.  -Checklist completed.  -Transplant coordinator and  Specialty Pharmacy to see patient tomorrow.   -Test stool for c.diff- patient unable to give specimen. Took kit with her to hotel.    Medication changes:   1) Discontinue magnesium.   2) Metamucil per label directions for loose stools.     Medications administered:   1) 1 L NS    Patient education:  The " following teaching topics were addressed: Importance of drinking 2L of non-caffeinated fluids daily, Incisional care, Signs/symptoms of infection, Good handwashing, Medications (purposes, doses and times of administration), Phone numbers to call with concenrs (Transplant coordinator, [facility] [address] and [facility]), Plan of care and Drain care   Patient and Spouse verbalized understanding and all questions answered.    Drug level:  Tacrolimus level today (7.7) reviewed with Dr. Romano who gave orders to continue current dose of 3 mg Q12 hrs.  Patient was updated with this information and verbalized understanding.    Patient takes Tacrolimus at 7 AM/ 7 PM.    Face to face time: 45 minutes    Discharge Plan  AVS given to patient.   Pt will follow up with [facility] 2 tomorrow at 7 AM.   Discharge instructions reviewed with patient: YES  Patient/Representative verbalized understanding, all questions answered: YES    Discharged from unit at 1055 with whom:  to [employer].    Kassy Proctor RN     Administrations This Visit     0.9% sodium chloride BOLUS     Admin Date Action Dose Rate Route Administered By          11/27/2018 New Bag 1000 mL 1,000 mL/hr Intravenous Kassy Proctor, EAMON                       Temp 98  F (36.7  C) (Oral)  Resp 16  Wt 52 kg (114 lb 11.2 oz)  SpO2 100%  BMI 20.32 kg/m2

## 2018-11-27 NOTE — MR AVS SNAPSHOT
After Visit Summary   11/27/2018    Letty Benavidez    MRN: [id number]           Patient Information     Date Of Birth          1960        Visit Information        Provider Department      11/27/2018 8:00 AM Ben Romano MD [facility]        Today's Diagnoses     Kidney transplanted    -  1    Orthostatic hypotension        Diarrhea, unspecified type           Follow-ups after your visit        Your next 10 appointments already scheduled     Nov 29, 2018  7:00 AM CST   (Arrive by 6:45 AM)   New Transplant Visit with UC SPEC INFUSION, UC 42 ATC   [facility] Procedure ([facility])    [address]  [address]  [place] [address]   [phone]            Nov 29, 2018  8:00 AM CST   (Arrive by 7:45 AM)   Kidney Transplant Discharge with Ben Romano MD   [facility] Procedure ([facility])    [address]  [address]  [place] [address]   [phone]            Dec 06, 2018  1:00 PM CST   (Arrive by 12:45 PM)   Post-Op with Estelita Guzman MD   [facility] Solid Organ Transplant ([facility])    [address]  [address]  [place] [address]   [phone]            Dec 11, 2018 11:30 AM CST   (Arrive by 11:00 AM)   Return Kidney Transplant with Uc Early Post Transplant   [facility] Nephrology ([facility])    [address]  [address]  [place] [address]   [phone]            Dec 17, 2018  9:00 AM CST   (Arrive by 8:45 AM)   Cystoscopy with Julianna Thornton NP   [facility] Solid Organ Transplant ([facility])    [address]  [address]  [place] [address]   [phone]            Gary 15, 2019 10:30 AM CST   (Arrive by 10:00 AM)   Return Kidney Transplant with Uc Early Post  Transplant   [facility] Nephrology ([facility])    [address]  [address]  [place] [address]   [phone]            Mar 11, 2019 10:00 AM CDT   (Arrive by 9:30 AM)   Return Kidney Transplant with Uc Early Post Transplant   [facility] Nephrology ([facility])    [address]  [address]  [place] [address]   [phone]            May 06, 2019 10:00 AM CDT   (Arrive by 9:30 AM)   Return Kidney Transplant with Uc Early Post Transplant   [facility] Nephrology ([facility])    [address]  [address]  [place] [address]   [phone]              Future tests that were ordered for you today     Open Future Orders        Priority Expected Expires Ordered    Clostridium difficile toxin B PCR Routine  12/27/2018 11/27/2018            Who to contact     If you have questions or need follow up information about today's clinic visit or your schedule please contact [facility] directly at [phone].  Normal or non-critical lab and imaging results will be communicated to you by MyoKardiahart, letter or phone within 4 business days after the clinic has received the results. If you do not hear from us within 7 days, please contact the clinic through Fnboxt or phone. If you have a critical or abnormal lab result, we will notify you by phone as soon as possible.  Submit refill requests through MyoScience or call your pharmacy and they will forward the refill request to us. Please allow 3 business days for your refill to be completed.          Additional Information About Your Visit        MyoKardiahart Information     MyoScience gives you secure access to your electronic health record. If you see a primary care provider, you can also send messages to your care team and make appointments. If you have questions, please call your primary care clinic.  If you do not have a primary care  provider, please call [phone] and they will assist you.        Care EveryWhere ID     This is your Care EveryWhere ID. This could be used by other organizations to access your [place] medical records  [id number]         Blood Pressure from Last 3 Encounters:   11/28/18 138/71   11/26/18 138/67   11/19/18 132/64    Weight from Last 3 Encounters:   11/28/18 52.2 kg (115 lb 1.6 oz)   11/27/18 52 kg (114 lb 11.2 oz)   11/26/18 52.7 kg (116 lb 3.2 oz)               Primary Care Provider Office Phone # Fax #    Anita Jurado [phone]       [address] [address]        Equal Access to Services     KEYONA MCBRIDE : Hadii valentino duarte hadasho Somary, waaxda luqadaha, qaybta kaalmada adeegyada, jimmy sims . So [facility] [phone].    ATENCIÓN: Si habla español, tiene a guerin disposición servicios gratuitos de asistencia lingüística. Joaquina al [phone].    We comply with applicable federal civil rights laws and [place] laws. We do not discriminate on the basis of race, color, national origin, age, disability, sex, sexual orientation, or gender identity.            Thank you!     Thank you for choosing [facility]  for your care. Our goal is always to provide you with excellent care. Hearing back from our patients is one way we can continue to improve our services. Please take a few minutes to complete the written survey that you may receive in the mail after your visit with us. Thank you!             Your Updated Medication List - Protect others around you: Learn how to safely use, store and throw away your medicines at www.disposemymeds.org.          This list is accurate as of 11/27/18 11:59 PM.  Always use your most recent med list.                   Brand Name Dispense Instructions for use Diagnosis    aspirin 81 MG EC tablet      Take 81 mg by mouth daily        atorvastatin 20 MG  tablet    LIPITOR     Take 20 mg by mouth At Bedtime        CO-ENZYME Q-10 PO      Take 400 mg by mouth daily For cramps from lipitor        eszopiclone 1 MG tablet    LUNESTA     Take 1 mg by mouth At Bedtime        humaLOG 100 UNIT/ML vial   Generic drug:  insulin lispro      Use with insulin pump        insulin pump infusion      Inject 0.5 Units/hr Subcutaneous Carb ratio: 1:15g/hr Sensitivity factor correction: 1 unit for every 75mg/dL over 100mg/dL        LINZESS 290 MCG capsule   Generic drug:  linaclotide      Take 290 mcg by mouth every morning (before breakfast)        LIQUACEL Liqd      Take 30 mLs by mouth 2 times daily Amino acids 16 grams, 2.5 g of arginine per pacakge -90 kcal/30mL liquid        magnesium oxide 400 MG tablet    MAG-OX    30 tablet    Take 1 tablet (400 mg) by mouth daily (with lunch)    Kidney transplanted       multivitamin, therapeutic Tabs tablet     30 tablet    Take 1 tablet by mouth daily    Kidney transplanted       mycophenolic acid 180 MG EC tablet     180 tablet    Take 3 tablets (540 mg) by mouth 2 times daily    Kidney transplanted       NEURONTIN 100 MG capsule   Generic drug:  gabapentin      Take 100 mg by mouth At Bedtime        polyethylene glycol powder    MIRALAX/GLYCOLAX     Take 5.7 g by mouth every morning 1/3 of 17g, mix with Liquidcel and water (use as liquid to take with her other morning meds)        sulfamethoxazole-trimethoprim 400-80 MG tablet    BACTRIM/SEPTRA    30 tablet    Take 1 tablet by mouth daily    Kidney transplanted       * tacrolimus 1 MG capsule    GENERIC EQUIVALENT    180 capsule    Take 3 capsules (3 mg) by mouth 2 times daily    Kidney transplanted       * tacrolimus 0.5 MG capsule    GENERIC EQUIVALENT    60 capsule    Take one tablet twice daily as directed for dose adjustments.    Kidney transplanted       triamcinolone 0.1 % external ointment    KENALOG     APPLY TO AFFECTED AREA DAILY FOR 2 WEEKS THEN EVERY 3 DAYS FOR 1 WEEK AS  NEEDED        valACYclovir 1000 mg tablet    VALTREX    60 tablet    Take 1 tablet (1,000 mg) by mouth 2 times daily    Kidney transplanted       vitamin D3 2000 units Caps      Take 1 capsule by mouth every evening With dinner        ZENPEP PO      Take 2 capsules by mouth 3 times daily L-15, A-82, P-51        * Notice:  This list has 2 medication(s) that are the same as other medications prescribed for you. Read the directions carefully, and ask your doctor or other care provider to review them with you.

## 2018-11-27 NOTE — PROGRESS NOTES
ACUTE TRANSPLANT NEPHROLOGY VISIT    Assessment & Plan   # LDKT: baseline Cr ~ TBD; Stable   - Proteinuria: Not checked recently   - Latest DSA: No Date of DSA last checked: 11/2018   - BK: Not checked recently-at one month post transplant   - Kidney Tx Biopsy: No      # Immunosuppression: Tacrolimus immediate release 3 mg BID (dose increased on 11/26 , tac goal  8-10) and Mycophenolic acid 540 mg BID (goal  1-3.5). Tac level is 7.7 this am   - Changes: No    # Prophylaxis:   - PJP: TMP/Sulfa (Bactrim)   - CMV: She is CMV IgG+. On Valtrex 1g BID.    # Hypertension/Volume status: Controlled; Goal BP: < 130/80. Patient is not on antihypertensive medications. Positive orthostatic VS but the patient is asymptomatic. Patient looks euvolemic on exam. Patient states that her EDW is 110 lbs. Her weight at home as 111 lbs this am. Patient's weight here is 114 lbs   - we will give 1L of NS bolus and recheck orthostatic VS    # Diabetes: Controlled. On insulin pump    # Anemia in chronic renal disease: Hgb: Increased   - Iron studies: Replete    # Mineral Bone Disorder:    - Secondary renal hyperparathyroidism; PTH level is: Mildly elevated at 212. We will follow  - Vitamin D; level is: Normal  - Calcium; level is: Low at 8.2 but stable. We will follow  - Phosphorus; level is: Normal    # Electrolytes:   - Potassium; level: Normal  - Magnesium; level: Normal. We will discontinue magnesium oxide given persistent diarrhea  - Bicarbonate, level: Normal    # Diarrhea-likely secondary to medications.  -we will check stool for C.diff by PCR  -recommend to try trail  metamucil  1 capsul daily and probiotic 1 capsul daily    # Medical Compliance: Yes    Return visit: Follow-up tomorrow    # Transplant History:  Etiology of kidney failure: diabetes mellitus type 1  Tx: LDKT  Transplant: 11/20/2018 (Kidney), 11/4/1998 (Kidney)  Donor Type: Living Donor Class:   Crossmatch at time of Tx: negative  DSA at time of Tx: No  Significant  changes in immunosuppression: None  CMV IgG Ab Discordance (D+/R-): No  EBV IgG Ab Discordance (D+/R-): No  Significant transplant-related complications: None    Transplant Office Phone Number: [phone]    Assessment and plan was discussed with the patient and she voiced her understanding and agreement.  Patient was staffed with Dr.Riad Aileen Velez MD   Nephrology fellow    Chief Complaint   Ms. Benavidez is a 58 year old female here for kidney transplant and immunosuppression management    History of Present Illness    Letty Benavidez is an 58 year old female with a PMHx significant for ESRD secondary to diabetic nephropathy and s/p LDKT in 11/1998 which failed on HD since 4/2017, and is now s/p LDKT with ureteral stent placement on 11/20/18, type I DM on insulin pump, pancreatic exocrine dysfunction, gastroparesis, diabetic enteropathy, CAD s/p CABG x 2 (10/2017), skin cancer. Patient received Thymoglobulin and steroids per protocol for induction. Received a total of 300mg of Thymo for a course of 5.8 mg/kg. Graft function is good, and creatinine has trended down since transplant. Patient was discharged home yesterday. Patient complaining of persistent nausea and watery painless diarrhea up to 10 bowel movements per day for last 3 days attributing to medications ( tacrolimus, Myfortic and magnesium oxide). Patient states that she had similar symptoms in the past after her first kidney transplant in 1998 secondary to medications (myfortic, tacrolimus and magnesium supplements). Patient states that she is drinking ~ 3 Liters of fluids daily. Patient denies: dizziness, lightheadedness, dysuria, urinary frequency.       Recent Hospitalizations:  [] No [x] Yes LDKT   New Medical Issues: [x] No [] Yes    Decreased energy: [] No [x] Yes    Chest pain or SOB with exertion:  [x] No [] Yes    Appetite change or weight change: [x] No [] Yes    Nausea, vomiting or diarrhea:  [] No [x] Yes Nausea in the  morning, persistent watery diarrhea 10 BM daily for last 3 days   Fever, sweats or chills: [x] No [] Yes    Leg swelling: [x] No [] Yes      Other medical issues:  No    Home BP: Not checked    Review of Systems   A comprehensive review of systems was obtained and negative, except as noted in the HPI or PMH.    Problem List   Patient Active Problem List   Diagnosis     Secondary hyperparathyroidism (H)     Anemia of chronic kidney failure     Dyslipidemia     Hypertension     Gastroparesis     Diabetic peripheral neuropathy (H)     Diabetic retinopathy (H)     Type 1 diabetes (H)     Pancreatic insufficiency     History of skin cancer     End stage kidney disease (H)     Immunosuppressed status (H)     Kidney transplanted       Social History   Social History   Substance Use Topics     Smoking status: Never Smoker     Smokeless tobacco: Never Used     Alcohol use No       Allergies   Allergies   Allergen Reactions     Erythromycin      Not a drug allergy- Patient told to avoid while taking tacrolimus due to drug interaction. Ok to have if not taking tacrolimus     Metronidazole Nausea and Vomiting     Flagyl     Mycophenolate Nausea and Vomiting and Other (See Comments)     Nausea / Vomiting / Tremors     Neomycin Other (See Comments)     Not a drug allergy- Patient told to avoid while taking tacrolimus due to drug interaction. Ok to have if not taking tacrolimus       Medications   Current Outpatient Prescriptions   Medication Sig     Amino Acids (LIQUACEL) LIQD Take 30 mLs by mouth 2 times daily Amino acids 16 grams, 2.5 g of arginine per pacakge -90 kcal/30mL liquid     aspirin 81 MG EC tablet Take 81 mg by mouth daily     atorvastatin (LIPITOR) 20 MG tablet Take 20 mg by mouth At Bedtime      Cholecalciferol (VITAMIN D3) 2000 units CAPS Take 1 capsule by mouth every evening With dinner     CO-ENZYME Q-10 PO Take 400 mg by mouth daily For cramps from lipitor     eszopiclone (LUNESTA) 1 MG TABS tablet Take 1 mg by  mouth At Bedtime     gabapentin (NEURONTIN) 100 MG capsule Take 100 mg by mouth At Bedtime      insulin lispro (HUMALOG) 100 UNIT/ML VIAL Use with insulin pump     INSULIN PUMP - OUTPATIENT Inject 0.5 Units/hr Subcutaneous Carb ratio: 1:15g/hr  Sensitivity factor correction: 1 unit for every 75mg/dL over 100mg/dL     linaclotide (LINZESS) 290 MCG capsule Take 290 mcg by mouth every morning (before breakfast)      magnesium oxide (MAG-OX) 400 MG tablet Take 1 tablet (400 mg) by mouth daily (with lunch)     multivitamin, therapeutic (THERA-VIT) TABS tablet Take 1 tablet by mouth daily     MYFORTIC (BRAND) 180 MG EC TABLET Take 3 tablets (540 mg) by mouth 2 times daily     Pancrelipase, Lip-Prot-Amyl, (ZENPEP PO) Take 2 capsules by mouth 3 times daily L-15, A-82, P-51     polyethylene glycol (MIRALAX/GLYCOLAX) powder Take 5.7 g by mouth every morning 1/3 of 17g, mix with Liquidcel and water (use as liquid to take with her other morning meds)     sulfamethoxazole-trimethoprim (BACTRIM/SEPTRA) 400-80 MG per tablet Take 1 tablet by mouth daily     tacrolimus (GENERIC EQUIVALENT) 0.5 MG capsule Take one tablet twice daily as directed for dose adjustments. (Patient not taking: Reported on 11/27/2018)     tacrolimus (GENERIC EQUIVALENT) 1 MG capsule Take 3 capsules (3 mg) by mouth 2 times daily     triamcinolone (KENALOG) 0.1 % ointment APPLY TO AFFECTED AREA DAILY FOR 2 WEEKS THEN EVERY 3 DAYS FOR 1 WEEK AS NEEDED     valACYclovir (VALTREX) 1000 mg tablet Take 1 tablet (1,000 mg) by mouth 2 times daily     No current facility-administered medications for this visit.      There are no discontinued medications.    Physical Exam   Vital Signs: There were no vitals taken for this visit.    GENERAL APPEARANCE: alert and no distress  HENT: mouth without ulcers or lesions  LYMPHATICS: no cervical or supraclavicular nodes  RESP: lungs clear to auscultation - no rales, rhonchi or wheezes  CV: regular rhythm, normal rate, no rub, no  murmur  EDEMA: no LE edema bilaterally  ABDOMEN: soft, nondistended, nontender, bowel sounds normal  MS: extremities normal - no gross deformities noted, no evidence of inflammation in joints, no muscle tenderness  SKIN: no rash  Tx kidney: surgical incision is intact, CAMDEN drain in LLQ is draining serosanguineous fluid    Data     Renal Latest Ref Rng & Units 11/27/2018 11/26/2018 11/25/2018   Na 133 - 144 mmol/L 138 140 139   Na (external) 136 - 145 mEq/L - - -   K 3.4 - 5.3 mmol/L 4.1 4.2 3.9   K (external) 3.5 - 5.1 mEq/L - - -   Cl 94 - 109 mmol/L 106 109 107   Cl (external) 96 - 108 mEq/L - - -   CO2 20 - 32 mmol/L 22 24 24   CO2 (external) 22 - 29 mEq/L - - -   BUN 7 - 30 mg/dL 20 19 23   BUN (external) 6 - 19 mg/dL - - -   Cr 0.52 - 1.04 mg/dL 1.11(H) 1.07(H) 1.15(H)   Cr (external) 0.60 - 1.30 mg/dL - - -   Glucose 70 - 99 mg/dL 181(H) 158(H) 129(H)   Glucose (external) 90 - 180 mg/dL - - -   Ca  8.5 - 10.1 mg/dL 8.2(L) 8.1(L) 8.1(L)   Ca (external) 8.4 - 10.2 mg/dL - - -   Mg 1.6 - 2.3 mg/dL 1.8 1.8 1.9   Mg (external) 1.6 - 2.6 mg/dL - - -     Bone Health Latest Ref Rng & Units 11/27/2018 11/26/2018 11/25/2018   Phos 2.5 - 4.5 mg/dL 3.6 3.6 2.6   Phos (external) 2.6 - 4.5 mg/dL - - -   PTHi 18 - 80 pg/mL - - -   PTHi (external) 16 - 80 pg/mL - - -   Vit D Def 20 - 75 ug/L - - -   Vit D Def (external) 30.0 - 100.0 ng/mL - - -     Heme Latest Ref Rng & Units 11/27/2018 11/26/2018 11/25/2018   WBC 4.0 - 11.0 10e9/L 8.0 4.1 4.4   WBC (external) 4.80 - 10.80 1000/cmm - - -   Hgb 11.7 - 15.7 g/dL 9.0(L) 8.1(L) 8.3(L)   Hgb (external) 12.0 - 16.0 g/dL - - -   Plt 150 - 450 10e9/L 182 130(L) 134(L)   Plt (external) 1000/uL - - -     Liver Latest Ref Rng & Units 11/19/2018 10/2/2018 9/4/2018   AP 40 - 150 U/L 166(H) - -   AP (external) 35 - 104 U/L - 169(H) -   TBili 0.2 - 1.3 mg/dL 0.4 - -   TBili (external) 0.2 - 1.0 MG/DL - - -   ALT 0 - 50 U/L 35 - -   ALT (external) 12 - 78 U/L - - -   AST 0 - 45 U/L 24 - -    AST (external) 13 - 39 U/L - 24 -   Tot Protein 6.8 - 8.8 g/dL 7.2 - -   Tot Protein (external) 6.0 - 8.5 g/dL - 6.8 6.6   Albumin 3.4 - 5.0 g/dL 3.6 - -   Albumin (external) 3.5 - 5.2 g/dL - 4.3 4.1     Pancreas Latest Ref Rng & Units 11/20/2018 10/2/2018 7/19/2018   A1C 0 - 5.6 % 5.5 - -   A1C (external) 4.8 - 5.9 % - 5.6 5.4     Iron studies Latest Ref Rng & Units 11/19/2018 10/2/2018 7/19/2018   Iron 35 - 180 ug/dL 38 - -   Iron sat 15 - 46 % 18 - -   Ferritin 8 - 252 ng/mL 952(H) - -   Ferritin (external) 10 - 291 ng/mL - 1155(H) 1181     [facility] Txp Virology Latest Ref Rng & Units 11/19/2018 10/2/2018 5/8/2018   BK Spec - - - -   BK Res BKNEG copies/mL - - -   BK Log <2.7 Log copies/mL - - -   Hep B Core NR:Nonreactive Nonreactive - -   Hep B Core Ext Negative - Negative Negative   Hep B Surf Ext  See comment mIU/mL - - <10        Recent Labs   Lab Test  11/22/18   0523  11/24/18   0534  11/26/18   0558   DOSTAC  Not Provided  Not Provided  Not Provided   TACROL  6.6  6.1  6.7         Patient was seen and evaluated by me, Ben Romano MD. I have reviewed the note and agree with the the plan of care as documented by the fellow.

## 2018-11-27 NOTE — MR AVS SNAPSHOT
After Visit Summary   11/27/2018    Letty Benavidez    MRN: [id number]           Patient Information     Date Of Birth          1960        Visit Information        Provider Department      11/27/2018 7:00 AM UC 41 ATC; UC SPEC INFUSION [facility] Specialty and Procedure        Today's Diagnoses     Kidney replaced by transplant    -  1      Care Instructions    Dear Letty Benavidez    Thank you for choosing [facility] Specialty Infusion [facility] Procedure [facility] ([facility]) for your transplant cares.  The following information is a summary of our appointment as well as important reminders.      Please make sure your phone is available today because I will call to update you with your anti-rejection drug levels and possibly make changes to your anti-rejection dosages.    Additional information:   1) Discontinue magnesium.     2) Start Metamucil as directed on container for loose stools.   3) Okay to use dialysis vitamins.    We look forward in seeing you on your next appointment here at [facility].  Please don t hesitate to call us at [phone] to reschedule any of your appointments or to speak with one of the [facility] registered nurses.  It was a pleasure taking care of you today.    Sincerely,    [facility]  Specialty Infusion & Procedure Center  [address] [place] [address]  [address]  Phone:  [phone]            Follow-ups after your visit        Your next 10 appointments already scheduled     Nov 28, 2018  7:00 AM CST   (Arrive by 6:45 AM)   New Transplant Visit with UC SPEC INFUSION, UC 42 ATC   [facility] Specialty and Procedure ([facility])    [address] [place] [address] [phone]            Nov 28, 2018  8:00 AM CST   (Arrive by 7:45 AM)   Kidney Transplant Discharge with Ben Romano MD   [facility]  Specialty and Procedure ([facility])    [address]  [address]  [place] [facility] [address]   [phone]            Nov 28, 2018  8:00 AM CST   (Arrive by 7:45 AM)   Office Visit with Wil Becker RPH   [facility] Medication Therapy Management ([facility])    [address]  3rd Floor  [place] [facility] [address] [phone]           Bring a current list of meds and any records pertaining to this visit. For Physicals, please bring immunization records and any forms needing to be filled out. Please arrive 10 minutes early to complete paperwork.            Nov 29, 2018  7:00 AM CST   (Arrive by 6:45 AM)   New Transplant Visit with ESTEFANIA SPEC INFUSION, UC 42 ATC   [facility] Specialty and Procedure ([facility])    [address]  [address]  [place] [facility] [place] [address]   [phone]            Nov 29, 2018  8:00 AM CST   (Arrive by 7:45 AM)   Kidney Transplant Discharge with Ben Romano MD   [facility] Specialty and Procedure ([facility])    [address]  [address]  [place] [facility] [place] [address]   [phone]            Dec 06, 2018  1:00 PM CST   (Arrive by 12:45 PM)   Post-Op with Estelita Guzman MD   [facility] Solid Organ Transplant ([facility])    [address]  [address]  [place] [facility] [place] [address]   [phone]            Dec 11, 2018 11:30 AM CST   (Arrive by 11:00 AM)   Return Kidney Transplant with Uc Early Post Transplant   [facility] Nephrology ([facility])    [address]  [address]  [place] [facility] [place] [address]   [phone]            Dec 17, 2018  9:00 AM CST   (Arrive by 8:45 AM)   Cystoscopy with Julianna Thornton NP   [facility] Solid Organ Transplant ([facility])    [address]  [address]  [place] [facility] [facility] [address]    [phone]              Future tests that were ordered for you today     Open Future Orders        Priority Expected Expires Ordered    Clostridium difficile toxin B PCR Routine  12/27/2018 11/27/2018            Who to contact     If you have questions or need follow up information about today's clinic visit or your schedule please contact [facility] SPECIALTY AND PROCEDURE directly at [phone].  Normal or non-critical lab and imaging results will be communicated to you by ClickBushart, letter or phone within 4 business days after the clinic has received the results. If you do not hear from us within 7 days, please contact the clinic through Whoist or phone. If you have a critical or abnormal lab result, we will notify you by phone as soon as possible.  Submit refill requests through BitTorrent or call your pharmacy and they will forward the refill request to us. Please allow 3 business days for your refill to be completed.          Additional Information About Your Visit        ClickBushart Information     BitTorrent gives you secure access to your electronic health record. If you see a primary care provider, you can also send messages to your care team and make appointments. If you have questions, please call your primary care clinic.  If you do not have a primary care provider, please call [phone] and they will assist you.        Care EveryWhere ID     This is your Care EveryWhere ID. This could be used by other organizations to access your [place] medical records  [id number]        Your Vitals Were     Temperature Respirations Pulse Oximetry BMI (Body Mass Index)          98  F (36.7  C) (Oral) 16 100% 20.32 kg/m2         Blood Pressure from Last 3 Encounters:   11/26/18 138/67   11/19/18 132/64   08/28/18 148/78    Weight from Last 3 Encounters:   11/27/18 52 kg (114 lb 11.2 oz)   11/26/18 52.7 kg (116 lb 3.2 oz)   11/19/18 52.5 kg (115 lb 12.8 oz)              We Performed the  Following     Basic metabolic panel     CBC with platelets differential     Magnesium     Mycophenolic acid     Phosphorus     Tacrolimus level        Primary Care Provider Office Phone # Fax #    Anita Jurado [phone]       [place] [id number] [facility] [address]        Equal Access to Services     KEYONA REED : Hadii aad ku hadasho Soomaali, waaxda luqadaha, qaybta kaalmada adeegyada, waxines witt zigabe myersshadejonas sims . So [facility] [phone].    ATENCIÓN: Si habla español, tiene a guerin disposición servicios gratuitos de asistencia lingüística. [facility] [phone].    We comply with applicable federal civil rights laws and [place] laws. We do not discriminate on the basis of race, color, national origin, age, disability, sex, sexual orientation, or gender identity.            Thank you!     Thank you for choosing [facility] SPECIALTY AND PROCEDURE  for your care. Our goal is always to provide you with excellent care. Hearing back from our patients is one way we can continue to improve our services. Please take a few minutes to complete the written survey that you may receive in the mail after your visit with us. Thank you!             Your Updated Medication List - Protect others around you: Learn how to safely use, store and throw away your medicines at www.disposemymeds.org.          This list is accurate as of 11/27/18 10:52 AM.  Always use your most recent med list.                   Brand Name Dispense Instructions for use Diagnosis    aspirin 81 MG EC tablet      Take 81 mg by mouth daily        atorvastatin 20 MG tablet    LIPITOR     Take 20 mg by mouth At Bedtime        CO-ENZYME Q-10 PO      Take 400 mg by mouth daily For cramps from lipitor        eszopiclone 1 MG Tabs tablet    LUNESTA     Take 1 mg by mouth At Bedtime        humaLOG 100 UNIT/ML vial   Generic drug:  insulin lispro      Use with insulin pump        insulin  pump infusion      Inject 0.5 Units/hr Subcutaneous Carb ratio: 1:15g/hr Sensitivity factor correction: 1 unit for every 75mg/dL over 100mg/dL        LINZESS 290 MCG capsule   Generic drug:  linaclotide      Take 290 mcg by mouth every morning (before breakfast)        LIQUACEL Liqd      Take 30 mLs by mouth 2 times daily Amino acids 16 grams, 2.5 g of arginine per pacakge -90 kcal/30mL liquid        magnesium oxide 400 MG tablet    MAG-OX    30 tablet    Take 1 tablet (400 mg) by mouth daily (with lunch)    Kidney transplanted       multivitamin, therapeutic Tabs tablet     30 tablet    Take 1 tablet by mouth daily    Kidney transplanted       mycophenolic acid 180 MG EC tablet     180 tablet    Take 3 tablets (540 mg) by mouth 2 times daily    Kidney transplanted       NEURONTIN 100 MG capsule   Generic drug:  gabapentin      Take 100 mg by mouth At Bedtime        polyethylene glycol powder    MIRALAX/GLYCOLAX     Take 5.7 g by mouth every morning 1/3 of 17g, mix with Liquidcel and water (use as liquid to take with her other morning meds)        sulfamethoxazole-trimethoprim 400-80 MG per tablet    BACTRIM/SEPTRA    30 tablet    Take 1 tablet by mouth daily    Kidney transplanted       * tacrolimus 1 MG capsule    GENERIC EQUIVALENT    180 capsule    Take 3 capsules (3 mg) by mouth 2 times daily    Kidney transplanted       * tacrolimus 0.5 MG capsule    GENERIC EQUIVALENT    60 capsule    Take one tablet twice daily as directed for dose adjustments.    Kidney transplanted       triamcinolone 0.1 % ointment    KENALOG     APPLY TO AFFECTED AREA DAILY FOR 2 WEEKS THEN EVERY 3 DAYS FOR 1 WEEK AS NEEDED        valACYclovir 1000 mg tablet    VALTREX    60 tablet    Take 1 tablet (1,000 mg) by mouth 2 times daily    Kidney transplanted       vitamin D3 2000 units Caps      Take 1 capsule by mouth every evening With dinner        ZENPEP PO      Take 2 capsules by mouth 3 times daily L-15, A-82, P-51        * Notice:   This list has 2 medication(s) that are the same as other medications prescribed for you. Read the directions carefully, and ask your doctor or other care provider to review them with you.

## 2018-11-27 NOTE — PROGRESS NOTES
Transplant Social Work Service Discharge Note      Patient Name:  Letty Benavidez     Anticipated Discharge Date:  11/26/2018    Discharge Disposition:   Home with assist.    Plan for 24 hour care for immediate post transplant period: Pts  and mother.    If not local, plans for short term stay:  [place].    Additional Services/Equipment Arranged:  No SW services arranged. See RNCC note.     Persons notified of above discharge plan:  Pt and family, medical team.    Patient / Family response to discharge plan:  In agreement.    Education and resources provided by SW at discharge: role of transplant  in out patient setting and provided contact info for , availability of support groups.    Discussed anticipated pharmacy out of pocket costs: YES    Provided [employer] Donor Letter Writing packet : NO. Pt had a living donor.    ZACHARY Murguia, LGSW  7A   Ph: [phone], Pager: [phone]

## 2018-11-27 NOTE — PATIENT INSTRUCTIONS
Dear Letty Benavidez    Thank you for choosing [facility] Specialty Infusion [facility] Procedure Center ([facility]) for your transplant cares.  The following information is a summary of our appointment as well as important reminders.      Please make sure your phone is available today because I will call to update you with your anti-rejection drug levels and possibly make changes to your anti-rejection dosages.    Additional information:   1) Discontinue magnesium.     2) Start Metamucil as directed on container for loose stools.   3) Okay to use dialysis vitamins.    We look forward in seeing you on your next appointment here at [facility].  Please don t hesitate to call us at [phone] to reschedule any of your appointments or to speak with one of the [facility] registered nurses.  It was a pleasure taking care of you today.    Sincerely,    [facility]  Specialty Infusion & Procedure Center  [address] [place] [address]  [address]  Phone:  [phone]

## 2018-11-28 ENCOUNTER — TELEPHONE (OUTPATIENT)
Dept: TRANSPLANT | Facility: CLINIC | Age: 58
End: 2018-11-28

## 2018-11-28 ENCOUNTER — INFUSION THERAPY VISIT (OUTPATIENT)
Dept: INFUSION THERAPY | Facility: CLINIC | Age: 58
End: 2018-11-28
Attending: INTERNAL MEDICINE
Payer: COMMERCIAL

## 2018-11-28 ENCOUNTER — OFFICE VISIT (OUTPATIENT)
Dept: PHARMACY | Facility: CLINIC | Age: 58
End: 2018-11-28
Payer: COMMERCIAL

## 2018-11-28 ENCOUNTER — RADIANT APPOINTMENT (OUTPATIENT)
Dept: ULTRASOUND IMAGING | Facility: CLINIC | Age: 58
End: 2018-11-28
Attending: INTERNAL MEDICINE
Payer: COMMERCIAL

## 2018-11-28 ENCOUNTER — CARE COORDINATION (OUTPATIENT)
Dept: TRANSPLANT | Facility: CLINIC | Age: 58
End: 2018-11-28

## 2018-11-28 VITALS
WEIGHT: 115.1 LBS | OXYGEN SATURATION: 100 % | TEMPERATURE: 98.2 F | BODY MASS INDEX: 20.39 KG/M2 | SYSTOLIC BLOOD PRESSURE: 138 MMHG | DIASTOLIC BLOOD PRESSURE: 71 MMHG

## 2018-11-28 DIAGNOSIS — M79.89 LEG SWELLING: ICD-10-CM

## 2018-11-28 DIAGNOSIS — Z48.298 AFTERCARE FOLLOWING ORGAN TRANSPLANT: Primary | ICD-10-CM

## 2018-11-28 DIAGNOSIS — R11.0 NAUSEA: ICD-10-CM

## 2018-11-28 DIAGNOSIS — R30.0 DYSURIA: ICD-10-CM

## 2018-11-28 DIAGNOSIS — Z94.0 KIDNEY TRANSPLANTED: Primary | ICD-10-CM

## 2018-11-28 DIAGNOSIS — E10.22 TYPE 1 DIABETES MELLITUS WITH DIABETIC CHRONIC KIDNEY DISEASE, UNSPECIFIED CKD STAGE (H): ICD-10-CM

## 2018-11-28 DIAGNOSIS — R19.7 DIARRHEA, UNSPECIFIED TYPE: ICD-10-CM

## 2018-11-28 DIAGNOSIS — E78.2 MIXED HYPERLIPIDEMIA: ICD-10-CM

## 2018-11-28 LAB
ALBUMIN UR-MCNC: NEGATIVE MG/DL
ANION GAP SERPL CALCULATED.3IONS-SCNC: 9 MMOL/L (ref 3–14)
APPEARANCE UR: CLEAR
BACTERIA #/AREA URNS HPF: ABNORMAL /HPF
BASOPHILS # BLD AUTO: 0 10E9/L (ref 0–0.2)
BASOPHILS NFR BLD AUTO: 0.2 %
BILIRUB UR QL STRIP: NEGATIVE
BUN SERPL-MCNC: 17 MG/DL (ref 7–30)
CALCIUM SERPL-MCNC: 7.9 MG/DL (ref 8.5–10.1)
CHLORIDE SERPL-SCNC: 109 MMOL/L (ref 94–109)
CO2 SERPL-SCNC: 23 MMOL/L (ref 20–32)
COLOR UR AUTO: YELLOW
CREAT SERPL-MCNC: 1.05 MG/DL (ref 0.52–1.04)
DIFFERENTIAL METHOD BLD: ABNORMAL
EOSINOPHIL # BLD AUTO: 0.1 10E9/L (ref 0–0.7)
EOSINOPHIL NFR BLD AUTO: 2.6 %
ERYTHROCYTE [DISTWIDTH] IN BLOOD BY AUTOMATED COUNT: 15.2 % (ref 10–15)
GFR SERPL CREATININE-BSD FRML MDRD: 53 ML/MIN/1.7M2
GLUCOSE SERPL-MCNC: 136 MG/DL (ref 70–99)
GLUCOSE UR STRIP-MCNC: NEGATIVE MG/DL
HCT VFR BLD AUTO: 26.7 % (ref 35–47)
HGB BLD-MCNC: 8.6 G/DL (ref 11.7–15.7)
HGB UR QL STRIP: ABNORMAL
IMM GRANULOCYTES # BLD: 0.1 10E9/L (ref 0–0.4)
IMM GRANULOCYTES NFR BLD: 2.6 %
KETONES UR STRIP-MCNC: NEGATIVE MG/DL
LEUKOCYTE ESTERASE UR QL STRIP: ABNORMAL
LYMPHOCYTES # BLD AUTO: 0.1 10E9/L (ref 0.8–5.3)
LYMPHOCYTES NFR BLD AUTO: 2.6 %
MAGNESIUM SERPL-MCNC: 1.8 MG/DL (ref 1.6–2.3)
MCH RBC QN AUTO: 31.9 PG (ref 26.5–33)
MCHC RBC AUTO-ENTMCNC: 32.2 G/DL (ref 31.5–36.5)
MCV RBC AUTO: 99 FL (ref 78–100)
MONOCYTES # BLD AUTO: 0.5 10E9/L (ref 0–1.3)
MONOCYTES NFR BLD AUTO: 9.4 %
MUCOUS THREADS #/AREA URNS LPF: PRESENT /LPF
MYCOPHENOLATE SERPL LC/MS/MS-MCNC: 1.23 MG/L (ref 1–3.5)
MYCOPHENOLATE-G SERPL LC/MS/MS-MCNC: 56.7 MG/L (ref 30–95)
NEUTROPHILS # BLD AUTO: 4.5 10E9/L (ref 1.6–8.3)
NEUTROPHILS NFR BLD AUTO: 82.6 %
NITRATE UR QL: NEGATIVE
NRBC # BLD AUTO: 0 10*3/UL
NRBC BLD AUTO-RTO: 0 /100
PH UR STRIP: 6 PH (ref 5–7)
PHOSPHATE SERPL-MCNC: 3.4 MG/DL (ref 2.5–4.5)
PLATELET # BLD AUTO: 206 10E9/L (ref 150–450)
POTASSIUM SERPL-SCNC: 4.3 MMOL/L (ref 3.4–5.3)
RBC # BLD AUTO: 2.7 10E12/L (ref 3.8–5.2)
RBC #/AREA URNS AUTO: 26 /HPF (ref 0–2)
SODIUM SERPL-SCNC: 141 MMOL/L (ref 133–144)
SOURCE: ABNORMAL
SP GR UR STRIP: 1.01 (ref 1–1.03)
SQUAMOUS #/AREA URNS AUTO: 1 /HPF (ref 0–1)
TACROLIMUS BLD-MCNC: 8.6 UG/L (ref 5–15)
TME LAST DOSE: NORMAL H
TME LAST DOSE: NORMAL H
TRANS CELLS #/AREA URNS HPF: <1 /HPF
UROBILINOGEN UR STRIP-MCNC: 0 MG/DL (ref 0–2)
WBC # BLD AUTO: 5.5 10E9/L (ref 4–11)
WBC #/AREA URNS AUTO: 13 /HPF (ref 0–5)

## 2018-11-28 PROCEDURE — 99605 MTMS BY PHARM NP 15 MIN: CPT | Performed by: PHARMACIST

## 2018-11-28 PROCEDURE — G0463 HOSPITAL OUTPT CLINIC VISIT: HCPCS

## 2018-11-28 PROCEDURE — 85025 COMPLETE CBC W/AUTO DIFF WBC: CPT | Performed by: INTERNAL MEDICINE

## 2018-11-28 PROCEDURE — 81001 URINALYSIS AUTO W/SCOPE: CPT | Performed by: INTERNAL MEDICINE

## 2018-11-28 PROCEDURE — 99607 MTMS BY PHARM ADDL 15 MIN: CPT | Performed by: PHARMACIST

## 2018-11-28 PROCEDURE — 83735 ASSAY OF MAGNESIUM: CPT | Performed by: INTERNAL MEDICINE

## 2018-11-28 PROCEDURE — 87088 URINE BACTERIA CULTURE: CPT | Performed by: INTERNAL MEDICINE

## 2018-11-28 PROCEDURE — 84100 ASSAY OF PHOSPHORUS: CPT | Performed by: INTERNAL MEDICINE

## 2018-11-28 PROCEDURE — 87086 URINE CULTURE/COLONY COUNT: CPT | Performed by: INTERNAL MEDICINE

## 2018-11-28 PROCEDURE — 36415 COLL VENOUS BLD VENIPUNCTURE: CPT

## 2018-11-28 PROCEDURE — 80048 BASIC METABOLIC PNL TOTAL CA: CPT | Performed by: INTERNAL MEDICINE

## 2018-11-28 PROCEDURE — 87186 SC STD MICRODIL/AGAR DIL: CPT | Performed by: INTERNAL MEDICINE

## 2018-11-28 PROCEDURE — 80197 ASSAY OF TACROLIMUS: CPT | Performed by: INTERNAL MEDICINE

## 2018-11-28 RX ORDER — ASCORBIC ACID 125 MG
1 TABLET,CHEWABLE ORAL DAILY
COMMUNITY
End: 2018-12-18

## 2018-11-28 RX ORDER — AMOXICILLIN AND CLAVULANATE POTASSIUM 500; 125 MG/1; MG/1
1 TABLET, FILM COATED ORAL 2 TIMES DAILY
Qty: 20 TABLET | Refills: 0 | Status: SHIPPED | OUTPATIENT
Start: 2018-11-28 | End: 2018-12-06

## 2018-11-28 RX ORDER — ONDANSETRON 4 MG/1
4 TABLET, FILM COATED ORAL EVERY 8 HOURS PRN
Qty: 24 TABLET | Refills: 1 | Status: SHIPPED | OUTPATIENT
Start: 2018-11-28 | End: 2018-12-06

## 2018-11-28 NOTE — TELEPHONE ENCOUNTER
Post Kidney and Pancreas Transplant Team Conference  Date: 11/28/2018  Transplant Coordinator: Tania Khan     Attendees:  [x]  Dr. Villalta [] Che Latham RN  [] Lisbet Holley LPN     []  Dr. Romano [x] Shasha Arreaga RN [] Norma Egan LPN   []  Dr. Fuentes [] Ofe Mujica RN    []  Dr. Anthony [] Selam Hoskins RN    [] Dr. Hernandez [x] Morena Khan RN    [] Dr. Hankins [] Chito Holley RN    [] Surgery Fellow [] Maddi Zaldivar RN    [] Julianna Thornton, NP [] Apurva Soria RN    [] Wil Becker, PharmD [] Ofe Chairez, RN    Dr Starks attend  [] Ezequiel Javier RN     [] Che Villalba RN        Verbal Plan Read Back:   8 days post kidney transplant   No changes at this time     Routed to RN Coordinator

## 2018-11-28 NOTE — Clinical Note
MYRANDAI, pt will be using Magnesium citrate for nausea and hypomagnesemia as oxide gives her diarrhea/ GI issues. Mag Citrate has been effective for nausea in the past for her.

## 2018-11-28 NOTE — MR AVS SNAPSHOT
After Visit Summary   11/28/2018    Letty Benavidez    MRN: [id number]           Patient Information     Date Of Birth          1960        Visit Information        Provider Department      11/28/2018 8:00 AM Wil Becker, [facility] Medication Therapy Management        Care Instructions    Recommendations from today's MTM visit:                                                    MTM (medication therapy management) is a service provided by a clinical pharmacist designed to help you get the most of out of your medicines.   Today we reviewed what your medicines are for, how to know if they are working, that your medicines are safe and how to make your medicine regimen as easy as possible.     1. Try taking Magnesium Citrate 125mg capsule to help with your nausea and keep your magnesium levels up. Separate this 2 hours from Myfortic to avoid absorption issues.     Next MTM visit: at your next transplant visit 3-6 months out.     To schedule another MTM appointment, please call the clinic directly or you may call the MTM scheduling line at [phone] or toll-free at [phone].     My Clinical Pharmacist's contact information:                                                      It was a pleasure talking with you today!  Please feel free to contact me with any questions or concerns you have.      Wil Becker, PharmD  MTM Pharmacist    Phone: [phone]    You may receive a survey about the MTM services you received.  I would appreciate your feedback to help me serve you better in the future. Please fill it out and return it when you can. Your comments will be anonymous.              Follow-ups after your visit        Your next 10 appointments already scheduled     Nov 29, 2018  7:00 AM CST   (Arrive by 6:45 AM)   New Transplant Visit with [facility], ESTEFANIA [facility] Specialty and Procedure ([facility])    [address] [place] [address]  [address]  [address]  [facility] [address]   [phone]            Nov 29, 2018  8:00 AM CST   (Arrive by 7:45 AM)   Kidney Transplant Discharge with Ben Romano MD   [facility] Specialty and Procedure ([facility])    [address]  [address]  [facility] [address]   [phone]            Dec 06, 2018  1:00 PM CST   (Arrive by 12:45 PM)   Post-Op with Estelita Guzman MD   [facility] Solid Organ Transplant ([facility])    [address]  [address]  [facility] [address]   [phone]            Dec 11, 2018 11:30 AM CST   (Arrive by 11:00 AM)   Return Kidney Transplant with Uc Early Post Transplant   [facility] Nephrology ([facility])    [address]  [address]  [facility] [address]   [phone]            Dec 17, 2018  9:00 AM CST   (Arrive by 8:45 AM)   Cystoscopy with Julianna Thornton NP   [facility] Solid Organ Transplant ([facility])    [address]  [address]  [facility] [address]   [phone]            Gary 15, 2019 10:30 AM CST   (Arrive by 10:00 AM)   Return Kidney Transplant with Uc Early Post Transplant   [facility] Nephrology ([facility])    [address]  [address]  [facility] [address]   [phone]            Mar 11, 2019 10:00 AM CDT   (Arrive by 9:30 AM)   Return Kidney Transplant with Uc Early Post Transplant   [facility] Nephrology ([facility])    [address]  [address]  [facility] [address]   [phone]            May 06, 2019 10:00 AM CDT   (Arrive by 9:30 AM)   Return Kidney Transplant with Uc Early Post Transplant   [facility] Nephrology ([facility])    [address]  [address]  [facility] [address]   [phone]              Future tests that were ordered for you today     Open Future Orders         Priority Expected Expires Ordered    Clostridium difficile toxin B PCR Routine  12/27/2018 11/27/2018            Who to contact     If you have questions or need follow up information about today's clinic visit or your schedule please contact  [facility] directly at [phone].  Normal or non-critical lab and imaging results will be communicated to you by Govtoday[employer], letter or phone within 4 business days after the clinic has received the results. If you do not hear from us within 7 days, please contact the clinic through [employer] or phone. If you have a critical or abnormal lab result, we will notify you by phone as soon as possible.  Submit refill requests through [employer] or call your pharmacy and they will forward the refill request to us. Please allow 3 business days for your refill to be completed.          Additional Information About Your Visit        Govtodayhart Information     [employer] gives you secure access to your electronic health record. If you see a primary care provider, you can also send messages to your care team and make appointments. If you have questions, please call your primary care clinic.  If you do not have a primary care provider, please call [phone] and they will assist you.        Care EveryWhere ID     This is your Care EveryWhere ID. This could be used by other organizations to access your [facility] medical records  [id number]         Blood Pressure from Last 3 Encounters:   11/28/18 138/71   11/26/18 138/67   11/19/18 132/64    Weight from Last 3 Encounters:   11/28/18 115 lb 1.6 oz (52.2 kg)   11/27/18 114 lb 11.2 oz (52 kg)   11/26/18 116 lb 3.2 oz (52.7 kg)              Today, you had the following     No orders found for display         Today's Medication Changes          These changes are accurate as of 11/28/18  8:47 AM.  If you have any questions, ask your nurse or doctor.               Stop taking these medicines if you haven't already. Please contact  your care team if you have questions.     magnesium oxide 400 MG tablet   Commonly known as:  MAG-OX   Stopped by:  Wil Becker, [facility]                    Primary Care Provider Office Phone # Fax #    Anita Jurado [phone]       [place] [address] [facility] [address]        Equal Access to Services     [facility]: Hadii aad ku hadasho Soomaali, waaxda luqadaha, qaybta kaalmada adeegyada, waxay idiin hayaan adeeg kharash laRuslanaan . So [facility] [phone].    ATENCIÓN: Si habla español, tiene a guerin disposición servicios gratuitos de asistencia lingüística. [facility] [phone].    We comply with applicable federal civil rights laws and [place] laws. We do not discriminate on the basis of race, color, national origin, age, disability, sex, sexual orientation, or gender identity.            Thank you!     Thank you for choosing [facility] MEDICATION THERAPY MANAGEMENT  for your care. Our goal is always to provide you with excellent care. Hearing back from our patients is one way we can continue to improve our services. Please take a few minutes to complete the written survey that you may receive in the mail after your visit with us. Thank you!             Your Updated Medication List - Protect others around you: Learn how to safely use, store and throw away your medicines at www.disposemymeds.org.          This list is accurate as of 11/28/18  8:47 AM.  Always use your most recent med list.                   Brand Name Dispense Instructions for use Diagnosis    aspirin 81 MG EC tablet      Take 81 mg by mouth daily        atorvastatin 20 MG tablet    LIPITOR     Take 20 mg by mouth At Bedtime        CO-ENZYME Q-10 PO      Take 400 mg by mouth daily For cramps from lipitor        eszopiclone 1 MG tablet    LUNESTA     Take 1 mg by mouth At Bedtime        humaLOG 100 UNIT/ML vial   Generic drug:  insulin lispro      Use with insulin pump        insulin pump infusion       Inject 0.5 Units/hr Subcutaneous Carb ratio: 1:15g/hr Sensitivity factor correction: 1 unit for every 75mg/dL over 100mg/dL        LINZESS 290 MCG capsule   Generic drug:  linaclotide      Take 290 mcg by mouth every morning (before breakfast)        LIQUACEL Liqd      Take 30 mLs by mouth 2 times daily Amino acids 16 grams, 2.5 g of arginine per pacakge -90 kcal/30mL liquid        Magnesium Citrate 125 MG Caps      Take 1 capsule by mouth daily        multivitamin, therapeutic Tabs tablet     30 tablet    Take 1 tablet by mouth daily    Kidney transplanted       mycophenolic acid 180 MG EC tablet     180 tablet    Take 3 tablets (540 mg) by mouth 2 times daily    Kidney transplanted       NEURONTIN 100 MG capsule   Generic drug:  gabapentin      Take 100 mg by mouth At Bedtime        polyethylene glycol powder    MIRALAX/GLYCOLAX     Take 5.7 g by mouth every morning 1/3 of 17g, mix with Liquidcel and water (use as liquid to take with her other morning meds)        sulfamethoxazole-trimethoprim 400-80 MG tablet    BACTRIM/SEPTRA    30 tablet    Take 1 tablet by mouth daily    Kidney transplanted       * tacrolimus 1 MG capsule    GENERIC EQUIVALENT    180 capsule    Take 3 capsules (3 mg) by mouth 2 times daily    Kidney transplanted       * tacrolimus 0.5 MG capsule    GENERIC EQUIVALENT    60 capsule    Take one tablet twice daily as directed for dose adjustments.    Kidney transplanted       triamcinolone 0.1 % external ointment    KENALOG     APPLY TO AFFECTED AREA DAILY FOR 2 WEEKS THEN EVERY 3 DAYS FOR 1 WEEK AS NEEDED        valACYclovir 1000 mg tablet    VALTREX    60 tablet    Take 1 tablet (1,000 mg) by mouth 2 times daily    Kidney transplanted       vitamin D3 2000 units Caps      Take 1 capsule by mouth every evening With dinner        ZENPEP PO      Take 2 capsules by mouth 3 times daily L-15, A-82, P-51        * Notice:  This list has 2 medication(s) that are the same as other medications  prescribed for you. Read the directions carefully, and ask your doctor or other care provider to review them with you.

## 2018-11-28 NOTE — MR AVS SNAPSHOT
After Visit Summary   11/28/2018    Letty Benavidez    MRN: [id number]           Patient Information     Date Of Birth          1960        Visit Information        Provider Department      11/28/2018 8:00 AM Ben Romano MD [facility] [place] Specialty and Procedure        Today's Diagnoses     Aftercare following organ transplant    -  1    Nausea        Dysuria        Leg swelling           Follow-ups after your visit        Your next 10 appointments already scheduled     Dec 06, 2018  1:00 PM CST   (Arrive by 12:45 PM)   Post-Op with Estelita Guzman MD   [facility] Solid Organ Transplant ([facility])    [address]  [address]  [facility] [address]   [phone]            Dec 11, 2018 11:30 AM CST   (Arrive by 11:00 AM)   Return Kidney Transplant with Uc Early Post Transplant   [facility] Nephrology ([facility])    [address]  [address]  [facility] [address]   [phone]            Dec 17, 2018  9:00 AM CST   (Arrive by 8:45 AM)   Cystoscopy with Julianna Thornton NP   [facility] Solid Organ Transplant ([facility])    [address]  [address]  [facility] [address]   [phone]            Gary 15, 2019 10:30 AM CST   (Arrive by 10:00 AM)   Return Kidney Transplant with Uc Early Post Transplant   [facility] Nephrology ([facility])    [address]  [address]  [facility] [address]   [phone]            Mar 11, 2019 10:00 AM CDT   (Arrive by 9:30 AM)   Return Kidney Transplant with Uc Early Post Transplant   [facility] Nephrology ([facility])    [address]  [address]  [facility] [address]   [phone]            May 06, 2019 10:00 AM CDT   (Arrive by 9:30 AM)   Return Kidney Transplant with Uc Early Post Transplant   [facility] Nephrology ([facility])    [address]  [address]  [facility] [address] [phone]              Who to contact     If you have questions or need follow up information about today's clinic visit or your schedule please contact [facility] SPECIALTY AND PROCEDURE directly at [phone].  Normal or non-critical lab and imaging results will be communicated to you by MyChart, letter or phone within 4 business days after the clinic has received the results. If you do not hear from us within 7 days, please contact the clinic through [email or web address] or phone. If you have a critical or abnormal lab result, we will notify you by phone as soon as possible.  Submit refill requests through [employer] or call your pharmacy and they will forward the refill request to us. Please allow 3 business days for your refill to be completed.          Additional Information About Your Visit        MyChart Information     [employer] gives you secure access to your electronic health record. If you see a primary care provider, you can also send messages to your care team and make appointments. If you have questions, please call your primary care clinic.  If you do not have a primary care provider, please call [phone] and they will assist you.        Care EveryWhere ID     This is your Care EveryWhere ID. This could be used by other organizations to access your [place] medical records  [id number]         Blood Pressure from Last 3 Encounters:   12/04/18 116/55   11/30/18 123/73   11/28/18 138/71    Weight from Last 3 Encounters:   11/29/18 51.2 kg (112 lb 12.8 oz)   11/28/18 52.2 kg (115 lb 1.6 oz)   11/27/18 52 kg (114 lb 11.2 oz)              We Performed the Following     US renal transplant          Today's Medication Changes          These changes are accurate as of 11/28/18 11:59 PM.  If you have any questions, ask your nurse or doctor.               Start taking these medicines.        Dose/Directions    amoxicillin-clavulanate  500-125 MG tablet   Commonly known as:  AUGMENTIN   Used for:  Dysuria   Started by:  Ben Romano MD        Dose:  1 tablet   Take 1 tablet by mouth 2 times daily   Quantity:  20 tablet   Refills:  0       ondansetron 4 MG tablet   Commonly known as:  ZOFRAN   Used for:  Nausea   Started by:  Ben Romano MD        Dose:  4 mg   Take 1 tablet (4 mg) by mouth every 8 hours as needed for nausea   Quantity:  24 tablet   Refills:  1         Stop taking these medicines if you haven't already. Please contact your care team if you have questions.     magnesium oxide 400 MG tablet   Commonly known as:  MAG-OX   Stopped by:  Wil Becker, [facility]                Where to get your medicines      These medications were sent to [address]  [address] [place] [address] [address] [place] [address]    Hours:  TRANSPLANT PHONE NUMBER [phone] Phone:  [phone]     amoxicillin-clavulanate 500-125 MG tablet    ondansetron 4 MG tablet                Primary Care Provider Office Phone # Fax #    Anita Jurado [phone]       [place] [address] [place] [address]        Equal Access to Services     [facility]: Hadii valentino ku hadasho Soomaali, waaxda luqadaha, qaybta kaalmada adeegyada, waxay idiin haywildern bianka aguillon. So [facility] [phone].    ATENCIÓN: Si habla español, tiene a guerin disposición servicios gratuitos de asistencia lingüística. Llame al [phone].    We comply with applicable federal civil rights laws and [place] laws. We do not discriminate on the basis of race, color, national origin, age, disability, sex, sexual orientation, or gender identity.            Thank you!     Thank you for choosing [facility] SPECIALTY AND PROCEDURE  for your care. Our goal is always to provide you with excellent care. Hearing back from our patients is one way we can  continue to improve our services. Please take a few minutes to complete the written survey that you may receive in the mail after your visit with us. Thank you!             Your Updated Medication List - Protect others around you: Learn how to safely use, store and throw away your medicines at www.disposemymeds.org.          This list is accurate as of 11/28/18 11:59 PM.  Always use your most recent med list.                   Brand Name Dispense Instructions for use Diagnosis    amoxicillin-clavulanate 500-125 MG tablet    AUGMENTIN    20 tablet    Take 1 tablet by mouth 2 times daily    Dysuria       aspirin 81 MG EC tablet      Take 81 mg by mouth daily        atorvastatin 20 MG tablet    LIPITOR     Take 20 mg by mouth At Bedtime        CO-ENZYME Q-10 PO      Take 400 mg by mouth daily For cramps from lipitor        eszopiclone 1 MG tablet    LUNESTA     Take 1 mg by mouth At Bedtime        humaLOG 100 UNIT/ML vial   Generic drug:  insulin lispro      Use with insulin pump        insulin pump infusion      Inject 0.5 Units/hr Subcutaneous Carb ratio: 1:15g/hr Sensitivity factor correction: 1 unit for every 75mg/dL over 100mg/dL        LINZESS 290 MCG capsule   Generic drug:  linaclotide      Take 290 mcg by mouth every morning (before breakfast)        LIQUACEL Liqd      Take 30 mLs by mouth 2 times daily Amino acids 16 grams, 2.5 g of arginine per pacakge -90 kcal/30mL liquid        Magnesium Citrate 125 MG Caps      Take 1 capsule by mouth daily        multivitamin, therapeutic Tabs tablet     30 tablet    Take 1 tablet by mouth daily    Kidney transplanted       NEURONTIN 100 MG capsule   Generic drug:  gabapentin      Take 100 mg by mouth At Bedtime        ondansetron 4 MG tablet    ZOFRAN    24 tablet    Take 1 tablet (4 mg) by mouth every 8 hours as needed for nausea    Nausea       polyethylene glycol powder    MIRALAX/GLYCOLAX     Take 5.7 g by mouth every morning 1/3 of 17g, mix with Liquidcel and water  (use as liquid to take with her other morning meds)        triamcinolone 0.1 % external ointment    KENALOG     APPLY TO AFFECTED AREA DAILY FOR 2 WEEKS THEN EVERY 3 DAYS FOR 1 WEEK AS NEEDED        vitamin D3 2000 units Caps      Take 1 capsule by mouth every evening With dinner        ZENPEP PO      Take 2 capsules by mouth 3 times daily L-15, A-82, P-51

## 2018-11-28 NOTE — PROGRESS NOTES
SUBJECTIVE/OBJECTIVE:                           Letty Benavidez is a 58 year old female coming in for an initial visit for Medication Therapy Management.  She was referred to me [facility] team[facility]    Chief Complaint: 9 days post txp. Pt has questions about her nausea/ dairrhea.     Allergies/ADRs: Reviewed in Epic  Tobacco: No tobacco use  Alcohol: not currently using  Caffeine: 1 cups/day of coffee  Activity: weak since txp. Is used to being very active.   PMH: Reviewed in Epic    Medication Adherence/Access:  Patient uses pill box(es).  Patient takes medications 2 time(s) per day. 7:30am  Per patient, misses medication 0 times per week.   The patient fills medications at [facility]: NO, fills medications at [facility] Mail order per insurance. .  Refills:    Renal Transplant:  Current immunosuppressants include Tacrolimus 3mg BID (0-6 months post tx, goal 8-10) and Myfortic 540mg BID.  Pt reports Diarrhea and Nausea.   Transplant date: 11/20/18, second txp, first was 1998.  Magnesium   Date Value Ref Range Status   11/28/2018 1.8 1.6 - 2.3 mg/dL Final     Estimated Creatinine Clearance: 47 mL/min (based on Cr of 1.05).  CMV prophylaxis:  Valacyclvoir 1000mg BID   PCP prophylaxis: Bactrim S S daily  Antifungal Prophylaxis: none  PPI use: none  Current supplements for electrolyte replacement: Mag Oxide stopped yesterday.  ( 2 hours from MMF) . MVI daily  Tx Coordinator: Morena Hankins Tx MD: Dr. Romano today, Using Med Card: Yes,  Recent Infections:  none  Recent Hospitalizations: 9 day post txp  Home BPs: 120/60s normally, a little elevated this morning 132/78  Immunizations: annual flu shot yes, [id number]:  Due once >66 yo; Prevnar 13: UTD, TDaP:  Due 2026  BMs:   Fluid intake: 3 L daily.     Nausea: Pt is not taking anything for this. Woke up at midnight last night and was nauseated, but denies emesis. Takes pills after food. Took Ondasetron in the past, but this did cause some constipation. Had  promethazine before too, both were effective for the nausea. She has taken Magnesium Citrate 125mg as well, and this improved her nausea and did not cause diarrhea.     Diarrhea: Having digestive issues in the past, initially had severe diarrhea up through yesterday, overnight nausea.  Added Metamucil to diet, stopped magnesium. Overnight nausea was even worse last night, but diarrhea has improved as of this morning..     Diabetes:  Pt currently using an insulin pump. Pt is not experiencing side effects.    Hyperlipidemia: Current therapy includes Artorvastatin 20mg once daily, Aspirin 81mg daily.  Pt reports no significant myalgias or other side effects.. Hx of CABG.  The 10-year ASCVD risk score (Gurinder JIE Jr, et al., 2013) is: 5.8%    Values used to calculate the score:      Age: 58 years      Sex: Female      Is Non- : No      Diabetic: Yes      Tobacco smoker: No      Systolic Blood Pressure: 138 mmHg      Is BP treated: No      HDL Cholesterol: 59 mg/dL      Total Cholesterol: 159 mg/dL     Today's Vitals: There were no vitals taken for this visit.      ASSESSMENT:                             Current medications were reviewed today. Pt knows her medications very well and appears to be very adherent.     Medication Adherence: excellent, no issues identified    Renal Transplant:  Stable. Patient will use Magnesium Citrate in place of magnesium oxide.     Nausea: Magnesium Citrate has been effective for her in the past, patient will use this both for her nausea and as her magnesium supplement. If this is ineffective, could consider Ondansetron or promethazine for her nausea as well.    Diarrhea: Improved currently on Metamucil and holding magnesium oxide.      Diabetes:  Stable.     Hyperlipidemia: Stable.     PLAN:                            Pt to...  1. Take Magnesium Citrate 125mg daily before bed (2 hours  from Myfortic) for nausea/ hypomagnesemia.     I spent 40 minutes with  this patient today. I offer these suggestions for consideration by txp team. A copy of the visit note was provided to the patient's referring provider.    Will follow up at next txp appt.     The patient was given a summary of these recommendations as an after visit summary.     Chart documentation was completed in part with Dragon voice-recognition software. Even though reviewed, some grammatical, spelling, and word errors may remain.    Wil Becker, [place]  [facility] Pharmacist    Phone: [phone]

## 2018-11-28 NOTE — PROGRESS NOTES
Kidney and Pancreas Transplant Coordinator Transition to Outpatient Discharge Note    Pt Name: Letty Benavidez  : 1960    Transplant Date: 18  Hospital Discharge Date: 18  Initial date of Encounter: 18    Surgeon: Megan  Transplant Coordinator: Tania Saenz DRISS Pramod LNRRT d/t DM 1, left side side   Stent WAS placed.    CMV: D+/R+ : Valtrex 3 months; renal dosing  EBV: D+/R-    High Risk DSA: No.  PHS Increased Risk: No.    Immunosuppression: Myfortic 540 BID  Tacrolimus per protocol    Prophylaxis:   Bactrim  Valtrex (study)    Lines / drains in place at time of discharge:        Pharmacy after discharge: while local[place]. When back home - [facility] / [facility]  Lab after discharge: Divya Mohamud    Post-op course / additional monitoring:  Patient utilizes insulin pump.  On dialysis dry weight = 110lbs  Patient experiences significant autonomic neuropathy and resulting orthostatic hypotension.     Patient Active Problem List   Diagnosis     Secondary hyperparathyroidism (H)     Anemia of chronic kidney failure     Dyslipidemia     Hypertension     Gastroparesis     Diabetic peripheral neuropathy (H)     Diabetic retinopathy (H)     Type 1 diabetes (H)     Pancreatic insufficiency     History of skin cancer     End stage kidney disease (H)     Immunosuppressed status (H)     Kidney transplanted       Education:  Writer met with Letty Benavidez and Layton, her . We discussed immunosuppression medications, indications, side effects, dose adjustments, and lab monitoring. Lab draw schedule was given to pt and fully explained - Mailers not given or given; no questions. Further education was provided on typical post transplant course, labs examined with thresholds, biopsy, infection prevention, office contact numbers, and when to call.     Pt questions for follow up: Scheduled nephrology follow up - patient would like to follow with Dr. Vance when  possible as Layton will be having orthopedic surgery after the first of the year.     Discharge Transition Plan:   Pt will transition home, with assistance from Layton. Pt will not have home care.      Stent removal date: 12/17/18 with Julianna Thornton NP (Location: clinic)      Patient has viewed [facility], and has no additional questions at this time. RNCC encouraged on-going review.  Patient has voiced understanding of ability to utilize MyChart for self-review of lab values.    Special/Additional needs: No.    Tania Khan  Post-Kidney Transplant Coordinator  (ph) [phone]

## 2018-11-28 NOTE — PROGRESS NOTES
UA is being followed by nephrology for abx coverage, but patient is scheduled for cysto 12/17/18. Please let me know if this needs to be expedited.

## 2018-11-28 NOTE — PATIENT INSTRUCTIONS
Recommendations from today's MTM visit:                                                    MTM (medication therapy management) is a service provided by a clinical pharmacist designed to help you get the most of out of your medicines.   Today we reviewed what your medicines are for, how to know if they are working, that your medicines are safe and how to make your medicine regimen as easy as possible.     1. Try taking Magnesium Citrate 125mg capsule to help with your nausea and keep your magnesium levels up. Separate this 2 hours from Myfortic to avoid absorption issues.     Next MTM visit: at your next transplant visit 3-6 months out.     To schedule another MTM appointment, please call the clinic directly or you may call the MTM scheduling line at [phone] or toll-free at [phone].     My Clinical Pharmacist's contact information:                                                      It was a pleasure talking with you today!  Please feel free to contact me with any questions or concerns you have.      Wil Becker, PharmD  MTM Pharmacist    Phone: [phone]    You may receive a survey about the MTM services you received.  I would appreciate your feedback to help me serve you better in the future. Please fill it out and return it when you can. Your comments will be anonymous.

## 2018-11-28 NOTE — PROGRESS NOTES
"Letty Benavidez came to [facility] today for a lab and assess following a living-unrelated kidney donor transplant on 11/20/2018. History of previous living donor kidney transplant 1998.    Discharge date: 11/26/2018  Transplant coordinator: Morena YUNG RN  Phone number patient can be reached at: [phone] mobile    Physical Assessment:  See physical assessment located under \"Document Flowsheets\".  Incision site: c/d/i dermabond, no s/s infection  Lines: CAMDEN output, pt reports >90 mls output in last 24 hours.   Day: N/A  Urine clarity: clear, yellow, good UOP per pt  Hydration: Pt reports drinking 3 L/day clear liquids   Nutrition: Appetite suppressed, no N/V   Last BM: Pt reports a loose \"like mud\" but not liquid stool this morning.    Pain: Lower abd and at site of CAMDEN drain insertion       Labs drawn by [facility] staff Yes.    Plan of care for today: Labs drawn and pt assessed. Pt reports confidence with medications regimen and demonstrates good understanding. Pt denies any questions, only new concern is increased nausea throughout the night (no vomiting) and lower abd pressure/intermittent sensation of 'vibration' She thinks nausea may be due to a higher Myfortic dose than she had previously been taking; level pending. Pt seen by specialty pharmacy, coordinator, and . Orders received for UA/UC and ult of renal transplant and BLE.     Medication changes: new script: Ondansetron PRN nausea, amoxicillin-clavulanate twice daily x 10 days for UTI (abx was ordered after pt had been discharged for the day, she will  morning if 11/29 and start then)      Medications administered: none      Patient education:  The following teaching topics were addressed: Importance of drinking 2L of non-caffeinated fluids daily, Incisional care, Signs/symptoms of infection, Good handwashing, Medications (purposes, doses and times of administration), Phone numbers to call with concenrs (Transplant coordinator, Unit [facility] and [facility]), Plan of care and Drain care   Patient and Spouse verbalized understanding and all questions answered.    Drug level:  Tacrolimus level today (8.6) reviewed with Dr. Romano who gave orders to remain on current dose of 3 mg Q12 hrs.  Patient was updated with this information and verbalized understanding.        Face to face time:20 minutes    Discharge Plan  Pt will follow up with [facility] tomorrow for LBA  Discharge instructions reviewed with patient: YES  Patient/Representative verbalized understanding, all questions answered: YES    Discharged from unit at 1040 with whom:  to bruce Stapleton RN 11/28/18         /71  Temp 98.2  F (36.8  C) (Oral)  Wt 52.2 kg (115 lb 1.6 oz)  SpO2 100%  BMI 20.39 kg/m2

## 2018-11-28 NOTE — MR AVS SNAPSHOT
After Visit Summary   11/28/2018    Letty Benavidez    MRN: [id number]           Patient Information     Date Of Birth          1960        Visit Information        Provider Department      11/28/2018 7:00 AM UC 42 ATC; UC SPEC INFUSION [facility] Specialty and Procedure        Today's Diagnoses     Kidney transplanted    -  1    Dysuria           Follow-ups after your visit        Your next 10 appointments already scheduled     Nov 29, 2018  7:00 AM CST   (Arrive by 6:45 AM)   New Transplant Visit with UC SPEC INFUSION, UC 42 ATC   [facility] Specialty and Procedure ([facility])    [address]  [address]  [place] [address]   [phone]            Nov 29, 2018  8:00 AM CST   (Arrive by 7:45 AM)   Kidney Transplant Discharge with Ben Romano MD   [facility] Specialty and Procedure ([facility])    [address]  [address]  [place] [address]   [phone]            Dec 06, 2018  1:00 PM CST   (Arrive by 12:45 PM)   Post-Op with Estelita Guzman MD   [facility] Solid Organ Transplant ([facility])    [address]  [address]  [place] [address]   [phone]            Dec 11, 2018 11:30 AM CST   (Arrive by 11:00 AM)   Return Kidney Transplant with Uc Early Post Transplant   [facility] Nephrology ([facility])    [address]  [address]  [place] [address]   [phone]            Dec 17, 2018  9:00 AM CST   (Arrive by 8:45 AM)   Cystoscopy with Julianna Thornton NP   [facility] Solid Organ Transplant ([facility])    [address]  [address]  [place] [address]   [phone]            Gary 15, 2019 10:30 AM CST   (Arrive by 10:00 AM)   Return Kidney Transplant with Uc Early Post Transplant   [facility] Nephrology ([facility]  [facility] [address])    [address]  [address]  [place] [address]   [phone]            Mar 11, 2019 10:00 AM CDT   (Arrive by 9:30 AM)   Return Kidney Transplant with Uc Early Post Transplant   [facility] Nephrology ([facility])    [address]  [address]  [place] [address]   [phone]            May 06, 2019 10:00 AM CDT   (Arrive by 9:30 AM)   Return Kidney Transplant with Uc Early Post Transplant   [facility] Nephrology ([facility])    [address]  [address]  [place] [address]   [phone]              Future tests that were ordered for you today     Open Future Orders        Priority Expected Expires Ordered    Clostridium difficile toxin B PCR Routine  12/27/2018 11/27/2018            Who to contact     If you have questions or need follow up information about today's clinic visit or your schedule please contact [facility] SPECIALTY AND PROCEDURE directly at [phone].  Normal or non-critical lab and imaging results will be communicated to you by HireAHelperhart, letter or phone within 4 business days after the clinic has received the results. If you do not hear from us within 7 days, please contact the clinic through Plethorat or phone. If you have a critical or abnormal lab result, we will notify you by phone as soon as possible.  Submit refill requests through ControlCircle or call your pharmacy and they will forward the refill request to us. Please allow 3 business days for your refill to be completed.          Additional Information About Your Visit        HireAHelperhart Information     ControlCircle gives you secure access to your electronic health record. If you see a primary care provider, you can also send messages to your care team and make appointments. If you have questions, please call your primary care clinic.  If you do not have a primary care provider, please call [phone] and they  will assist you.        Care EveryWhere ID     This is your Care EveryWhere ID. This could be used by other organizations to access your [facility] medical records  [id number]        Your Vitals Were     Temperature Pulse Oximetry BMI (Body Mass Index)             98.2  F (36.8  C) (Oral) 100% 20.39 kg/m2          Blood Pressure from Last 3 Encounters:   11/28/18 138/71   11/26/18 138/67   11/19/18 132/64    Weight from Last 3 Encounters:   11/28/18 52.2 kg (115 lb 1.6 oz)   11/27/18 52 kg (114 lb 11.2 oz)   11/26/18 52.7 kg (116 lb 3.2 oz)              We Performed the Following     Basic metabolic panel     CBC with platelets differential     Magnesium     Phosphorus     Routine UA with microscopic     Tacrolimus level     Urine Culture Aerobic Bacterial          Today's Medication Changes          These changes are accurate as of 11/28/18  4:41 PM.  If you have any questions, ask your nurse or doctor.               Start taking these medicines.        Dose/Directions    amoxicillin-clavulanate 500-125 MG tablet   Commonly known as:  AUGMENTIN   Used for:  Dysuria   Started by:  Ben Romano MD        Dose:  1 tablet   Take 1 tablet by mouth 2 times daily   Quantity:  20 tablet   Refills:  0       ondansetron 4 MG tablet   Commonly known as:  ZOFRAN   Used for:  Nausea   Started by:  Ben Romano MD        Dose:  4 mg   Take 1 tablet (4 mg) by mouth every 8 hours as needed for nausea   Quantity:  24 tablet   Refills:  1         Stop taking these medicines if you haven't already. Please contact your care team if you have questions.     magnesium oxide 400 MG tablet   Commonly known as:  MAG-OX   Stopped by:  Wil Becker, [facility]                Where to get your medicines      These medications were sent to [facility] [place], [place] - [address]  [address] [place] [address] 1-[facility], [facility] [address]    Hours:  TRANSPLANT PHONE NUMBER [phone] Phone:   [phone]     amoxicillin-clavulanate 500-125 MG tablet    ondansetron 4 MG tablet                Primary Care Provider Office Phone # Fax #    Anita Jurado [phone]       [facility] [address] [address]        Equal Access to Services     [facility] Hospital[facility]UST[facility]HA : Hadii aad ku hadasho Soomaali, waaxda luqadaha, qaybta kaalmada adeegyada, waxay silvioin haywildern ademarcie keshia levi . So [facility] [phone].    ATENCIÓN: Si habla español, tiene a guerin disposición servicios gratuitos de asistencia lingüística. Joaquina al [phone].    We comply with applicable federal civil rights laws and [place] laws. We do not discriminate on the basis of race, color, national origin, age, disability, sex, sexual orientation, or gender identity.            Thank you!     Thank you for choosing [facility] SPECIALTY AND PROCEDURE  for your care. Our goal is always to provide you with excellent care. Hearing back from our patients is one way we can continue to improve our services. Please take a few minutes to complete the written survey that you may receive in the mail after your visit with us. Thank you!             Your Updated Medication List - Protect others around you: Learn how to safely use, store and throw away your medicines at www.disposemymeds.org.          This list is accurate as of 11/28/18  4:41 PM.  Always use your most recent med list.                   Brand Name Dispense Instructions for use Diagnosis    amoxicillin-clavulanate 500-125 MG tablet    AUGMENTIN    20 tablet    Take 1 tablet by mouth 2 times daily    Dysuria       aspirin 81 MG EC tablet      Take 81 mg by mouth daily        atorvastatin 20 MG tablet    LIPITOR     Take 20 mg by mouth At Bedtime        CO-ENZYME Q-10 PO      Take 400 mg by mouth daily For cramps from lipitor        eszopiclone 1 MG tablet    LUNESTA     Take 1 mg by mouth At Bedtime        humaLOG 100 UNIT/ML vial    Generic drug:  insulin lispro      Use with insulin pump        insulin pump infusion      Inject 0.5 Units/hr Subcutaneous Carb ratio: 1:15g/hr Sensitivity factor correction: 1 unit for every 75mg/dL over 100mg/dL        LINZESS 290 MCG capsule   Generic drug:  linaclotide      Take 290 mcg by mouth every morning (before breakfast)        LIQUACEL Liqd      Take 30 mLs by mouth 2 times daily Amino acids 16 grams, 2.5 g of arginine per pacakge -90 kcal/30mL liquid        Magnesium Citrate 125 MG Caps      Take 1 capsule by mouth daily        multivitamin, therapeutic Tabs tablet     30 tablet    Take 1 tablet by mouth daily    Kidney transplanted       mycophenolic acid 180 MG EC tablet     180 tablet    Take 3 tablets (540 mg) by mouth 2 times daily    Kidney transplanted       NEURONTIN 100 MG capsule   Generic drug:  gabapentin      Take 100 mg by mouth At Bedtime        ondansetron 4 MG tablet    ZOFRAN    24 tablet    Take 1 tablet (4 mg) by mouth every 8 hours as needed for nausea    Nausea       polyethylene glycol powder    MIRALAX/GLYCOLAX     Take 5.7 g by mouth every morning 1/3 of 17g, mix with Liquidcel and water (use as liquid to take with her other morning meds)        sulfamethoxazole-trimethoprim 400-80 MG tablet    BACTRIM/SEPTRA    30 tablet    Take 1 tablet by mouth daily    Kidney transplanted       * tacrolimus 1 MG capsule    GENERIC EQUIVALENT    180 capsule    Take 3 capsules (3 mg) by mouth 2 times daily    Kidney transplanted       * tacrolimus 0.5 MG capsule    GENERIC EQUIVALENT    60 capsule    Take one tablet twice daily as directed for dose adjustments.    Kidney transplanted       triamcinolone 0.1 % external ointment    KENALOG     APPLY TO AFFECTED AREA DAILY FOR 2 WEEKS THEN EVERY 3 DAYS FOR 1 WEEK AS NEEDED        valACYclovir 1000 mg tablet    VALTREX    60 tablet    Take 1 tablet (1,000 mg) by mouth 2 times daily    Kidney transplanted       vitamin D3 2000 units Caps       Take 1 capsule by mouth every evening With dinner        ZENPEP PO      Take 2 capsules by mouth 3 times daily L-15, A-82, P-51        * Notice:  This list has 2 medication(s) that are the same as other medications prescribed for you. Read the directions carefully, and ask your doctor or other care provider to review them with you.

## 2018-11-29 ENCOUNTER — OFFICE VISIT (OUTPATIENT)
Dept: INFUSION THERAPY | Facility: CLINIC | Age: 58
End: 2018-11-29
Attending: INTERNAL MEDICINE
Payer: COMMERCIAL

## 2018-11-29 ENCOUNTER — TELEPHONE (OUTPATIENT)
Dept: TRANSPLANT | Facility: CLINIC | Age: 58
End: 2018-11-29

## 2018-11-29 VITALS
TEMPERATURE: 98.2 F | WEIGHT: 112.8 LBS | OXYGEN SATURATION: 100 % | BODY MASS INDEX: 19.98 KG/M2 | RESPIRATION RATE: 16 BRPM

## 2018-11-29 DIAGNOSIS — D84.9 IMMUNOSUPPRESSED STATUS (H): ICD-10-CM

## 2018-11-29 DIAGNOSIS — Z48.298 AFTERCARE FOLLOWING ORGAN TRANSPLANT: Primary | ICD-10-CM

## 2018-11-29 DIAGNOSIS — E10.22 TYPE 1 DIABETES MELLITUS WITH STAGE 5 CHRONIC KIDNEY DISEASE NOT ON CHRONIC DIALYSIS (H): ICD-10-CM

## 2018-11-29 DIAGNOSIS — Z11.59 ENCOUNTER FOR SCREENING FOR OTHER VIRAL DISEASES: ICD-10-CM

## 2018-11-29 DIAGNOSIS — Z94.0 KIDNEY REPLACED BY TRANSPLANT: ICD-10-CM

## 2018-11-29 DIAGNOSIS — N18.5 TYPE 1 DIABETES MELLITUS WITH STAGE 5 CHRONIC KIDNEY DISEASE NOT ON CHRONIC DIALYSIS (H): ICD-10-CM

## 2018-11-29 DIAGNOSIS — Z94.0 KIDNEY TRANSPLANTED: Primary | ICD-10-CM

## 2018-11-29 DIAGNOSIS — N39.0 URINARY TRACT INFECTION WITHOUT HEMATURIA, SITE UNSPECIFIED: ICD-10-CM

## 2018-11-29 LAB
ANION GAP SERPL CALCULATED.3IONS-SCNC: 10 MMOL/L (ref 3–14)
BASOPHILS # BLD AUTO: 0 10E9/L (ref 0–0.2)
BASOPHILS NFR BLD AUTO: 0.1 %
BUN SERPL-MCNC: 22 MG/DL (ref 7–30)
CALCIUM SERPL-MCNC: 8.1 MG/DL (ref 8.5–10.1)
CHLORIDE SERPL-SCNC: 110 MMOL/L (ref 94–109)
CO2 SERPL-SCNC: 20 MMOL/L (ref 20–32)
CREAT SERPL-MCNC: 1.01 MG/DL (ref 0.52–1.04)
DIFFERENTIAL METHOD BLD: ABNORMAL
EOSINOPHIL # BLD AUTO: 0.2 10E9/L (ref 0–0.7)
EOSINOPHIL NFR BLD AUTO: 2 %
ERYTHROCYTE [DISTWIDTH] IN BLOOD BY AUTOMATED COUNT: 15.8 % (ref 10–15)
GFR SERPL CREATININE-BSD FRML MDRD: 56 ML/MIN/1.7M2
GLUCOSE SERPL-MCNC: 181 MG/DL (ref 70–99)
HCT VFR BLD AUTO: 26.7 % (ref 35–47)
HGB BLD-MCNC: 8.7 G/DL (ref 11.7–15.7)
IMM GRANULOCYTES # BLD: 0.1 10E9/L (ref 0–0.4)
IMM GRANULOCYTES NFR BLD: 1.8 %
LYMPHOCYTES # BLD AUTO: 0.2 10E9/L (ref 0.8–5.3)
LYMPHOCYTES NFR BLD AUTO: 2.7 %
MAGNESIUM SERPL-MCNC: 1.9 MG/DL (ref 1.6–2.3)
MCH RBC QN AUTO: 32.1 PG (ref 26.5–33)
MCHC RBC AUTO-ENTMCNC: 32.6 G/DL (ref 31.5–36.5)
MCV RBC AUTO: 99 FL (ref 78–100)
MONOCYTES # BLD AUTO: 0.7 10E9/L (ref 0–1.3)
MONOCYTES NFR BLD AUTO: 8.2 %
NEUTROPHILS # BLD AUTO: 6.7 10E9/L (ref 1.6–8.3)
NEUTROPHILS NFR BLD AUTO: 85.2 %
NRBC # BLD AUTO: 0 10*3/UL
NRBC BLD AUTO-RTO: 0 /100
PHOSPHATE SERPL-MCNC: 3.6 MG/DL (ref 2.5–4.5)
PLATELET # BLD AUTO: 257 10E9/L (ref 150–450)
POTASSIUM SERPL-SCNC: 4.4 MMOL/L (ref 3.4–5.3)
RBC # BLD AUTO: 2.71 10E12/L (ref 3.8–5.2)
SODIUM SERPL-SCNC: 139 MMOL/L (ref 133–144)
TACROLIMUS BLD-MCNC: 9 UG/L (ref 5–15)
TME LAST DOSE: NORMAL H
WBC # BLD AUTO: 7.9 10E9/L (ref 4–11)

## 2018-11-29 PROCEDURE — G0463 HOSPITAL OUTPT CLINIC VISIT: HCPCS

## 2018-11-29 PROCEDURE — 36415 COLL VENOUS BLD VENIPUNCTURE: CPT

## 2018-11-29 PROCEDURE — 84100 ASSAY OF PHOSPHORUS: CPT | Performed by: INTERNAL MEDICINE

## 2018-11-29 PROCEDURE — 83735 ASSAY OF MAGNESIUM: CPT | Performed by: INTERNAL MEDICINE

## 2018-11-29 PROCEDURE — 80197 ASSAY OF TACROLIMUS: CPT | Performed by: INTERNAL MEDICINE

## 2018-11-29 PROCEDURE — 80048 BASIC METABOLIC PNL TOTAL CA: CPT | Performed by: INTERNAL MEDICINE

## 2018-11-29 PROCEDURE — 85025 COMPLETE CBC W/AUTO DIFF WBC: CPT | Performed by: INTERNAL MEDICINE

## 2018-11-29 ASSESSMENT — PAIN DESCRIPTION - DESCRIPTORS: DESCRIPTORS: ACHING

## 2018-11-29 NOTE — LETTER
"PHYSICIAN ORDERS      DATE & TIME ISSUED: 2018 7:39 AM  PATIENT NAME: Letty Benavidez   : 1960     [facility] MR# [if applicable]: [id number]     DIAGNOSIS:  Kidney replaced by transplant.  ICD-10 CODE: Z94.0     RN skilled nursing visits--to begin after the ATC clinic ([phone]) visits are completed.    RN to assess vital signs and weight, respiratory and cardiac status, patients ability to take and record daily blood pressure, temp and weight, pain level and activity tolerance, incision for signs/symptoms of infection, hydration, nutrition and bowel status and home safety.    RN to teach medication management.  Assist / teach patient to obtain and record lab results in handbook     RN to provide morning lab draws, every Cldjlx-Sejfsofnp-Ratfdo and report results to the Outpatient Care Coordinator:  Tania \"Morena\" Mark  Ph: [phone]  Fax: [phone]      MD face to face encounter       Documentation of Face to Face and Certification for Home Health Services     I certify that patient: Letty Benavidez is under my care and that I, or a nurse practitioner or physician's assistant working with me, had a face-to-face encounter that meets the physician face-to-face encounter requirements with this patient on: 2018     This encounter with the patient was in whole, or in part, for the following medical condition, which is the primary reason for home health care:  Transplant  Date 2018    I certify that, based on my findings, the following services are medically necessary home health services: Nursing.     My clinical findings support the need for the above services because: Nurse is needed: To assess   changes in medications or other medical regimen, frequent assessments and lab draws.     Further, I certify that my clinical findings support that this patient is homebound (i.e. absences from home require considerable and taxing effort and are for medical reasons or Mandaeism " services or infrequently or of short duration when for other reasons) because: Leaving home is medically contraindicated for the following reason(s): Infection risk / immunocompromised state where it is safer for them to receive services in the home...     Based on the above findings. I certify that this patient is confined to the home and needs intermittent skilled nursing care, physical therapy and/or speech therapy.  The patient is under my care, and I have initiated the establishment of the plan of care.  This patient will be followed by a physician who will periodically review the plan of care.       Any questions please call: Morena Khan, RN, BSN                                             Transplant Care Coordinator                                             [phone]        Ben Romano

## 2018-11-29 NOTE — PROGRESS NOTES
"Letty Benavidez came to [facility] today for a lab and assess following a living-unrelated kidney donor transplant on 11/20/2018. History of previous living donor kidney transplant 1998.     Discharge date: 11/26/2018  Transplant coordinator: Morena YUNG RN  Phone number patient can be reached at: [phone] mobile    Physical Assessment:  See physical assessment located under \"Document Flowsheets\".  Incision site: LLQ with dermabond, WNL.  Lines: CAMDEN serosanguinous with 90 ml output in last 24 hours.   Day: N/A  Urine clarity: light yellow, clear. Continued frequency  Hydration: 2 L of water in last 24 hours  Nutrition: decreased appetite d/t nausea   Last BM: multiple loose BM's   Pain: 3/10 with Tylenol Q4 hrs PRN     Labs drawn by [facility] staff Yes. Copy of results given to patient.     Plan of care for today: Labs and assessment reviewed with Dr. Romano.  Patient complaining of ongoing nausea and diarrhea. Has not started Zofran yet as she states she gets very drowsy from it. Will start at hotel today. States she feels Metamucil is causing her bloating and is interested in an alternative. Patient also took 125 mg of mag citrate last night, as recommended by PharmD, which she feels contributed to her loose stools. Patient has not yet started Augmentin for UTI; will start today.   -New lab letter given to patient.   -Patient discharged from [facility] today. To start [facility] lab draws in [facility] starting Friday.     Medication changes: N/A    Medications administered: N/A    Patient education:  The following teaching topics were addressed: Importance of drinking 2L of non-caffeinated fluids daily, Incisional care, Signs/symptoms of infection, Good handwashing, Medications (purposes, doses and times of administration), Phone numbers to call with concenrs (Transplant coordinator, Unit 6-D and [facility]) and Drain care   Patient and  verbalized understanding and all questions answered.    Drug level:  Tacrolimus level today (9) " reviewed with Dr. Romano who gave orders to continue current dose.  Patient was updated with this information and verbalized understanding.  Patient now taking tac at 7:30 am/pm.    Face to face time: 20 minutes    Discharge Plan  Pt will follow up with: Morena transplant coordinator for [facility] lab draws going forward.   Discharge instructions reviewed with patient: YES  Patient/Representative verbalized understanding, all questions answered: YES    Discharged from unit at 1010 with whom:  to home.    Kassy Proctor RN

## 2018-11-29 NOTE — PATIENT INSTRUCTIONS
Dear Letty Benavidez    Thank you for choosing [facility] ([facility]) for your transplant cares.  The following information is a summary of our appointment as well as important reminders.      Please make sure your phone is available today because I will call to update you with your anti-rejection drug levels and possibly make changes to your anti-rejection dosages.        We look forward in seeing you on your next appointment here at [facility].  Please don t hesitate to call us at [phone] to reschedule any of your appointments or to speak with one of the [facility] registered nurses.  It was a pleasure taking care of you today.    Sincerely,    [facility]  Specialty Infusion [facility] Procedure Center  [address] [place] [address]  [address]  Phone:  [phone]

## 2018-11-29 NOTE — TELEPHONE ENCOUNTER
Letty voiced that she no longer would like home care. Will have labs drawn at [facility] notified.  Letty is still having 90ccs output daily. Has enough drain supplies through Monday. Will call [facility] on Monday with update on drain output.

## 2018-11-29 NOTE — TELEPHONE ENCOUNTER
Julianna Thornton, Tania Wolf RN                     Cannot come out now stent in place for 2-3 weeks            Previous Messages       ----- Message -----      From: Tania Khan RN      Sent: 11/28/2018  11:55 AM        To: Julianna Thornton NP     UA is being followed by nephrology for abx coverage, but patient is scheduled for cysto 12/17/18. Please let me know if this needs to be expedited.                         Associated Results        Routine UA with microscopic   Status:  Final result   Visible to patient:  Yes (MyChart) Dx:  Dysuria Order: [id number]       Notes Recorded by Tania Khan RN on 11/28/2018 at 11:55 AM  UA is being followed by nephrology for abx coverage, but patient is scheduled for cysto 12/17/18. Please let me know if this needs to be expedited.

## 2018-11-29 NOTE — MR AVS SNAPSHOT
After Visit Summary   11/29/2018    Letty Benavidez    MRN: [id number]           Patient Information     Date Of Birth          1960        Visit Information        Provider Department      11/29/2018 8:00 AM Ben Romano MD [facility] Specialty and Procedure        Today's Diagnoses     Aftercare following organ transplant    -  1    Type 1 diabetes mellitus with stage 5 chronic kidney disease not on chronic dialysis (H)        Immunosuppressed status (H)        Urinary tract infection without hematuria, site unspecified           Follow-ups after your visit        Your next 10 appointments already scheduled     Dec 06, 2018  1:00 PM CST   (Arrive by 12:45 PM)   Post-Op with Estelita Guzman MD   [facility] Solid Organ Transplant ([facility])    [address]  [address]  [facility] [address]   [phone]            Dec 11, 2018 11:30 AM CST   (Arrive by 11:00 AM)   Return Kidney Transplant with Uc Early Post Transplant   [facility] Nephrology ([facility])    [address]  [address]  [facility] [address]   [phone]            Dec 17, 2018  9:00 AM CST   (Arrive by 8:45 AM)   Cystoscopy with Julianna Thornton NP   [facility] Solid Organ Transplant ([facility])    [address]  [address]  [facility] [address]   [phone]            Gary 15, 2019 10:30 AM CST   (Arrive by 10:00 AM)   Return Kidney Transplant with Uc Early Post Transplant   [facility] Nephrology ([facility])    [address]  [address]  [facility] [address]   [phone]            Mar 11, 2019 10:00 AM CDT   (Arrive by 9:30 AM)   Return Kidney Transplant with Uc Early Post Transplant   [facility] Nephrology ([facility])    [address]  [address]  [facility] [address]   [phone]            May 06, 2019 10:00 AM  CDT   (Arrive by 9:30 AM)   Return Kidney Transplant with  Early Post Transplant   [facility] Nephrology ([facility])    [address]  [address]  [facility] [address] [phone]              Who to contact     If you have questions or need follow up information about today's clinic visit or your schedule please contact [facility] SPECIALTY AND PROCEDURE directly at [phone].  Normal or non-critical lab and imaging results will be communicated to you by [employer], letter or phone within 4 business days after the clinic has received the results. If you do not hear from us within 7 days, please contact the clinic through [employer] or phone. If you have a critical or abnormal lab result, we will notify you by phone as soon as possible.  Submit refill requests through [employer] or call your pharmacy and they will forward the refill request to us. Please allow 3 business days for your refill to be completed.          Additional Information About Your Visit        [employer] Information     [employer] gives you secure access to your electronic health record. If you see a primary care provider, you can also send messages to your care team and make appointments. If you have questions, please call your primary care clinic.  If you do not have a primary care provider, please call [phone] and they will assist you.        Care EveryWhere ID     This is your Care EveryWhere ID. This could be used by other organizations to access your [place] medical records  [id number]         Blood Pressure from Last 3 Encounters:   12/04/18 116/55   11/30/18 123/73   11/28/18 138/71    Weight from Last 3 Encounters:   11/29/18 51.2 kg (112 lb 12.8 oz)   11/28/18 52.2 kg (115 lb 1.6 oz)   11/27/18 52 kg (114 lb 11.2 oz)              Today, you had the following     No orders found for display       Primary Care Provider Office Phone # Fax #    Anita Jurado [phone]  [phone]       [facility] [address] [address]        Equal Access to Services     JIM MCBRIDE : Hadii aad ku hadmarizaasa Isabell, wajiada luqliv, santiagota kakhoada lloyd, jimmy silvioin hayaagabe florentinomarcie schmitt laRuslanandrez aguillon. So [facility] [place] [phone].    ATENCIÓN: Si habla español, tiene a guerin disposición servicios gratuitos de asistencia lingüística. Laureanoame al [phone].    We comply with applicable federal civil rights laws and [place] laws. We do not discriminate on the basis of race, color, national origin, age, disability, sex, sexual orientation, or gender identity.            Thank you!     Thank you for choosing [facility] SPECIALTY AND PROCEDURE  for your care. Our goal is always to provide you with excellent care. Hearing back from our patients is one way we can continue to improve our services. Please take a few minutes to complete the written survey that you may receive in the mail after your visit with us. Thank you!             Your Updated Medication List - Protect others around you: Learn how to safely use, store and throw away your medicines at www.disposemymeds.org.          This list is accurate as of 11/29/18 11:59 PM.  Always use your most recent med list.                   Brand Name Dispense Instructions for use Diagnosis    amoxicillin-clavulanate 500-125 MG tablet    AUGMENTIN    20 tablet    Take 1 tablet by mouth 2 times daily    Dysuria       aspirin 81 MG EC tablet      Take 81 mg by mouth daily        atorvastatin 20 MG tablet    LIPITOR     Take 20 mg by mouth At Bedtime        CO-ENZYME Q-10 PO      Take 400 mg by mouth daily For cramps from lipitor        eszopiclone 1 MG tablet    LUNESTA     Take 1 mg by mouth At Bedtime        humaLOG 100 UNIT/ML vial   Generic drug:  insulin lispro      Use with insulin pump        insulin pump infusion      Inject 0.5 Units/hr Subcutaneous Carb ratio: 1:15g/hr Sensitivity factor correction: 1 unit  for every 75mg/dL over 100mg/dL        LINZESS 290 MCG capsule   Generic drug:  linaclotide      Take 290 mcg by mouth every morning (before breakfast)        LIQUACEL Liqd      Take 30 mLs by mouth 2 times daily Amino acids 16 grams, 2.5 g of arginine per pacakge -90 kcal/30mL liquid        Magnesium Citrate 125 MG Caps      Take 1 capsule by mouth daily        multivitamin, therapeutic Tabs tablet     30 tablet    Take 1 tablet by mouth daily    Kidney transplanted       NEURONTIN 100 MG capsule   Generic drug:  gabapentin      Take 100 mg by mouth At Bedtime        ondansetron 4 MG tablet    ZOFRAN    24 tablet    Take 1 tablet (4 mg) by mouth every 8 hours as needed for nausea    Nausea       polyethylene glycol powder    MIRALAX/GLYCOLAX     Take 5.7 g by mouth every morning 1/3 of 17g, mix with Liquidcel and water (use as liquid to take with her other morning meds)        triamcinolone 0.1 % external ointment    KENALOG     APPLY TO AFFECTED AREA DAILY FOR 2 WEEKS THEN EVERY 3 DAYS FOR 1 WEEK AS NEEDED        vitamin D3 2000 units Caps      Take 1 capsule by mouth every evening With dinner        ZENPEP PO      Take 2 capsules by mouth 3 times daily L-15, A-82, P-51

## 2018-11-29 NOTE — TELEPHONE ENCOUNTER
Patient Call: General    Reason for call: patient is no longer needing the home care nurse patient is willing to go over to the [facility] for her labs draws. Patient also would like to know how she can get supply for her drain she will need them by next week when she gets labs.    Call back needed? Yes    Return Call Needed  Same as documented in contacts section  When to return call?: Greater than one day: Route standard priority

## 2018-11-29 NOTE — MR AVS SNAPSHOT
After Visit Summary   11/29/2018    Letty Benavidez    MRN: [id number]           Patient Information     Date Of Birth          1960        Visit Information        Provider Department      11/29/2018 7:00 AM UC 42 ATC; UC SPEC INFUSION [facility] [place] Specialty and Procedure        Today's Diagnoses     Kidney transplanted    -  1      Care Instructions    Dear Letty Benavidez    Thank you for choosing [facility] ([facility]) for your transplant cares.  The following information is a summary of our appointment as well as important reminders.      Please make sure your phone is available today because I will call to update you with your anti-rejection drug levels and possibly make changes to your anti-rejection dosages.        We look forward in seeing you on your next appointment here at [facility].  Please don t hesitate to call us at [phone] to reschedule any of your appointments or to speak with one of the [facility] registered nurses.  It was a pleasure taking care of you today.    Sincerely,    [facility]  Specialty Infusion & Procedure Center  [address] [place] [address]  [address]  Phone:  [phone]            Follow-ups after your visit        Your next 10 appointments already scheduled     Dec 06, 2018  1:00 PM CST   (Arrive by 12:45 PM)   Post-Op with Estelita Guzman MD   [facility] Solid Organ Transplant ([facility])    [address] [place] [address]  [address] [place] [id number] [phone]            Dec 11, 2018 11:30 AM CST   (Arrive by 11:00 AM)   Return Kidney Transplant with Uc Early Post Transplant   [facility] Nephrology ([facility])    [address] [place] [address]  [address] [place] [id number] [phone]            Dec 17, 2018  9:00 AM CST   (Arrive by 8:45 AM)   Cystoscopy with Julianna Thornton NP   [facility]  Solid Organ Transplant ([facility])    [address]  [address]  [facility] [address]   [phone]            Gary 15, 2019 10:30 AM CST   (Arrive by 10:00 AM)   Return Kidney Transplant with Uc Early Post Transplant   [facility] Nephrology ([facility])    [address]  [address]  [facility] [address]   [phone]            Mar 11, 2019 10:00 AM CDT   (Arrive by 9:30 AM)   Return Kidney Transplant with Uc Early Post Transplant   [facility] Nephrology ([facility])    [address]  [address]  [facility] [address]   [phone]            May 06, 2019 10:00 AM CDT   (Arrive by 9:30 AM)   Return Kidney Transplant with Uc Early Post Transplant   [facility] Nephrology ([facility])    [address]  [address]  [facility] [address]   [phone]              Future tests that were ordered for you today     Open Standing Orders        Priority Remaining Interval Expires Ordered    EBV DNA PCR Quantitative Whole Blood Routine 12/12 Monthly 11/29/2019 11/29/2018    Basic metabolic panel Routine 6/6 Every Other Week 9/5/2019 11/29/2018    CBC with platelets Routine 4/4 Every 3 Weeks 11/29/2019 11/29/2018    Basic metabolic panel Routine 4/4 Every 3 Weeks 11/29/2019 11/29/2018    Phosphorus Routine 12/12 Weekly 3/29/2019 11/29/2018    Magnesium Routine 12/12 Weekly 3/29/2019 11/29/2018    Mycophenolic acid Routine 12/12 Weekly 3/29/2019 11/29/2018    WBC Differential Routine 12/12 Weekly 3/29/2019 11/29/2018    Hepatic panel Routine 2/2 Every 6 Months 11/29/2019 11/29/2018    Lipid Profile Routine 2/2 Every 6 Months 11/29/2019 11/29/2018    Protein  random urine with Creat Ratio Routine 2/2 Every 6 Months 11/29/2019 11/29/2018    Tacrolimus level Routine 12/12 3X Week 1/3/2019 11/29/2018    Tacrolimus level Routine 16/16 2X Week 5/8/2019 11/29/2018    Tacrolimus level Routine 12/12  Weekly 6/7/2019 11/29/2018    Tacrolimus level Routine 6/6 Every Other Week 9/5/2019 11/29/2018    Tacrolimus level Routine 4/4 Every 3 Weeks 11/29/2019 11/29/2018    PRA Donor Specific Antibody Routine 6/6  11/29/2019 11/29/2018    BK virus PCR quantitative Routine 12/12 Monthly 11/29/2019 11/29/2018    PRA Donor Specific Antibody Routine 6/6  11/29/2019 11/29/2018    CBC with platelets Routine 12/12 3X Week 1/3/2019 11/29/2018    Basic metabolic panel Routine 12/12 3X Week 1/3/2019 11/29/2018    CBC with platelets Routine 16/16 2X Week 5/8/2019 11/29/2018    Basic metabolic panel Routine 16/16 2X Week 5/8/2019 11/29/2018    CBC with platelets Routine 12/12 Weekly 6/7/2019 11/29/2018    Basic metabolic panel Routine 12/12 Weekly 6/7/2019 11/29/2018    CBC with platelets Routine 6/6 Every Other Week 9/5/2019 11/29/2018          Open Future Orders        Priority Expected Expires Ordered    Hepatitis B Surface Antibody Routine 5/28/2019 6/11/2019 11/29/2018    Hepatitis B core antibody Routine 5/28/2019 6/11/2019 11/29/2018    Hepatitis C antibody Routine 5/28/2019 6/11/2019 11/29/2018            Who to contact     If you have questions or need follow up information about today's clinic visit or your schedule please contact [facility] SPECIALTY AND PROCEDURE directly at [phone].  Normal or non-critical lab and imaging results will be communicated to you by MyChart, letter or phone within 4 business days after the clinic has received the results. If you do not hear from us within 7 days, please contact the clinic through [employer] or phone. If you have a critical or abnormal lab result, we will notify you by phone as soon as possible.  Submit refill requests through [employer] or call your pharmacy and they will forward the refill request to us. Please allow 3 business days for your refill to be completed.          Additional Information About Your Visit        MyChart Information     [employer]  gives you secure access to your electronic health record. If you see a primary care provider, you can also send messages to your care team and make appointments. If you have questions, please call your primary care clinic.  If you do not have a primary care provider, please call [phone] and they will assist you.        Care EveryWhere ID     This is your Care EveryWhere ID. This could be used by other organizations to access your [place] medical records  [id number]        Your Vitals Were     Temperature Respirations Pulse Oximetry BMI (Body Mass Index)          98.2  F (36.8  C) (Oral) 16 100% 19.98 kg/m2         Blood Pressure from Last 3 Encounters:   11/28/18 138/71   11/26/18 138/67   11/19/18 132/64    Weight from Last 3 Encounters:   11/29/18 51.2 kg (112 lb 12.8 oz)   11/28/18 52.2 kg (115 lb 1.6 oz)   11/27/18 52 kg (114 lb 11.2 oz)              We Performed the Following     Basic metabolic panel     CBC with platelets differential     Magnesium     Phosphorus     Tacrolimus level        Primary Care Provider Office Phone # Fax #    Anita Jurado [phone]       [place] [address]        Equal Access to Services     JIM MCBRIDE : Hadii aad ku hadasho Soomaali, waaxda luqadaha, qaybta kaalmada adeegyada, jimmy aguillon. So [facility] [phone].    ATENCIÓN: Si habla español, tiene a guerin disposición servicios gratuitos de asistencia lingüística. Llame al [phone].    We comply with applicable federal civil rights laws and [place] laws. We do not discriminate on the basis of race, color, national origin, age, disability, sex, sexual orientation, or gender identity.            Thank you!     Thank you for choosing [facility] SPECIALTY AND PROCEDURE  for your care. Our goal is always to provide you with excellent care. Hearing back from our patients is one way we can continue to  improve our services. Please take a few minutes to complete the written survey that you may receive in the mail after your visit with us. Thank you!             Your Updated Medication List - Protect others around you: Learn how to safely use, store and throw away your medicines at www.disposemymeds.org.          This list is accurate as of 11/29/18  4:28 PM.  Always use your most recent med list.                   Brand Name Dispense Instructions for use Diagnosis    amoxicillin-clavulanate 500-125 MG tablet    AUGMENTIN    20 tablet    Take 1 tablet by mouth 2 times daily    Dysuria       aspirin 81 MG EC tablet      Take 81 mg by mouth daily        atorvastatin 20 MG tablet    LIPITOR     Take 20 mg by mouth At Bedtime        CO-ENZYME Q-10 PO      Take 400 mg by mouth daily For cramps from lipitor        eszopiclone 1 MG tablet    LUNESTA     Take 1 mg by mouth At Bedtime        humaLOG 100 UNIT/ML vial   Generic drug:  insulin lispro      Use with insulin pump        insulin pump infusion      Inject 0.5 Units/hr Subcutaneous Carb ratio: 1:15g/hr Sensitivity factor correction: 1 unit for every 75mg/dL over 100mg/dL        LINZESS 290 MCG capsule   Generic drug:  linaclotide      Take 290 mcg by mouth every morning (before breakfast)        LIQUACEL Liqd      Take 30 mLs by mouth 2 times daily Amino acids 16 grams, 2.5 g of arginine per pacakge -90 kcal/30mL liquid        Magnesium Citrate 125 MG Caps      Take 1 capsule by mouth daily        multivitamin, therapeutic Tabs tablet     30 tablet    Take 1 tablet by mouth daily    Kidney transplanted       mycophenolic acid 180 MG EC tablet     180 tablet    Take 3 tablets (540 mg) by mouth 2 times daily    Kidney transplanted       NEURONTIN 100 MG capsule   Generic drug:  gabapentin      Take 100 mg by mouth At Bedtime        ondansetron 4 MG tablet    ZOFRAN    24 tablet    Take 1 tablet (4 mg) by mouth every 8 hours as needed for nausea    Nausea        polyethylene glycol powder    MIRALAX/GLYCOLAX     Take 5.7 g by mouth every morning 1/3 of 17g, mix with Liquidcel and water (use as liquid to take with her other morning meds)        sulfamethoxazole-trimethoprim 400-80 MG tablet    BACTRIM/SEPTRA    30 tablet    Take 1 tablet by mouth daily    Kidney transplanted       * tacrolimus 1 MG capsule    GENERIC EQUIVALENT    180 capsule    Take 3 capsules (3 mg) by mouth 2 times daily    Kidney transplanted       * tacrolimus 0.5 MG capsule    GENERIC EQUIVALENT    60 capsule    Take one tablet twice daily as directed for dose adjustments.    Kidney transplanted       triamcinolone 0.1 % external ointment    KENALOG     APPLY TO AFFECTED AREA DAILY FOR 2 WEEKS THEN EVERY 3 DAYS FOR 1 WEEK AS NEEDED        valACYclovir 1000 mg tablet    VALTREX    60 tablet    Take 1 tablet (1,000 mg) by mouth 2 times daily    Kidney transplanted       vitamin D3 2000 units Caps      Take 1 capsule by mouth every evening With dinner        ZENPEP PO      Take 2 capsules by mouth 3 times daily L-15, A-82, P-51        * Notice:  This list has 2 medication(s) that are the same as other medications prescribed for you. Read the directions carefully, and ask your doctor or other care provider to review them with you.

## 2018-11-30 ENCOUNTER — TELEPHONE (OUTPATIENT)
Dept: TRANSPLANT | Facility: CLINIC | Age: 58
End: 2018-11-30

## 2018-11-30 ENCOUNTER — INFUSION THERAPY VISIT (OUTPATIENT)
Dept: INFUSION THERAPY | Facility: CLINIC | Age: 58
End: 2018-11-30
Attending: INTERNAL MEDICINE
Payer: COMMERCIAL

## 2018-11-30 VITALS
OXYGEN SATURATION: 100 % | SYSTOLIC BLOOD PRESSURE: 123 MMHG | RESPIRATION RATE: 16 BRPM | TEMPERATURE: 97.8 F | DIASTOLIC BLOOD PRESSURE: 73 MMHG

## 2018-11-30 DIAGNOSIS — Z11.59 ENCOUNTER FOR SCREENING FOR OTHER VIRAL DISEASES: ICD-10-CM

## 2018-11-30 DIAGNOSIS — Z94.0 KIDNEY REPLACED BY TRANSPLANT: ICD-10-CM

## 2018-11-30 DIAGNOSIS — D84.9 IMMUNOSUPPRESSED STATUS (H): ICD-10-CM

## 2018-11-30 DIAGNOSIS — Z94.0 KIDNEY TRANSPLANTED: ICD-10-CM

## 2018-11-30 DIAGNOSIS — E86.0 DEHYDRATION: Primary | ICD-10-CM

## 2018-11-30 DIAGNOSIS — Z48.298 AFTERCARE FOLLOWING ORGAN TRANSPLANT: ICD-10-CM

## 2018-11-30 LAB
ANION GAP SERPL CALCULATED.3IONS-SCNC: 7 MMOL/L (ref 3–14)
BACTERIA SPEC CULT: ABNORMAL
BASOPHILS # BLD AUTO: 0 10E9/L (ref 0–0.2)
BASOPHILS NFR BLD AUTO: 0.1 %
BUN SERPL-MCNC: 25 MG/DL (ref 7–30)
CALCIUM SERPL-MCNC: 8.1 MG/DL (ref 8.5–10.1)
CHLORIDE SERPL-SCNC: 109 MMOL/L (ref 94–109)
CO2 SERPL-SCNC: 21 MMOL/L (ref 20–32)
CREAT SERPL-MCNC: 1.03 MG/DL (ref 0.52–1.04)
DIFFERENTIAL METHOD BLD: ABNORMAL
EOSINOPHIL # BLD AUTO: 0.2 10E9/L (ref 0–0.7)
EOSINOPHIL NFR BLD AUTO: 1.8 %
ERYTHROCYTE [DISTWIDTH] IN BLOOD BY AUTOMATED COUNT: 16.1 % (ref 10–15)
GFR SERPL CREATININE-BSD FRML MDRD: 55 ML/MIN/1.7M2
GLUCOSE SERPL-MCNC: 175 MG/DL (ref 70–99)
HCT VFR BLD AUTO: 27 % (ref 35–47)
HGB BLD-MCNC: 8.6 G/DL (ref 11.7–15.7)
IMM GRANULOCYTES # BLD: 0.2 10E9/L (ref 0–0.4)
IMM GRANULOCYTES NFR BLD: 1.6 %
LYMPHOCYTES # BLD AUTO: 0.3 10E9/L (ref 0.8–5.3)
LYMPHOCYTES NFR BLD AUTO: 2.5 %
Lab: ABNORMAL
MAGNESIUM SERPL-MCNC: 2 MG/DL (ref 1.6–2.3)
MCH RBC QN AUTO: 32.7 PG (ref 26.5–33)
MCHC RBC AUTO-ENTMCNC: 31.9 G/DL (ref 31.5–36.5)
MCV RBC AUTO: 103 FL (ref 78–100)
MONOCYTES # BLD AUTO: 0.7 10E9/L (ref 0–1.3)
MONOCYTES NFR BLD AUTO: 6.8 %
NEUTROPHILS # BLD AUTO: 9 10E9/L (ref 1.6–8.3)
NEUTROPHILS NFR BLD AUTO: 87.2 %
NRBC # BLD AUTO: 0 10*3/UL
NRBC BLD AUTO-RTO: 0 /100
PHOSPHATE SERPL-MCNC: 4.4 MG/DL (ref 2.5–4.5)
PLATELET # BLD AUTO: 271 10E9/L (ref 150–450)
POTASSIUM SERPL-SCNC: 4.8 MMOL/L (ref 3.4–5.3)
RBC # BLD AUTO: 2.63 10E12/L (ref 3.8–5.2)
SODIUM SERPL-SCNC: 138 MMOL/L (ref 133–144)
SPECIMEN SOURCE: ABNORMAL
TACROLIMUS BLD-MCNC: 8.3 UG/L (ref 5–15)
TME LAST DOSE: NORMAL H
WBC # BLD AUTO: 10.3 10E9/L (ref 4–11)

## 2018-11-30 PROCEDURE — 84100 ASSAY OF PHOSPHORUS: CPT | Performed by: TRANSPLANT SURGERY

## 2018-11-30 PROCEDURE — 86833 HLA CLASS II HIGH DEFIN QUAL: CPT | Performed by: TRANSPLANT SURGERY

## 2018-11-30 PROCEDURE — 86832 HLA CLASS I HIGH DEFIN QUAL: CPT | Performed by: TRANSPLANT SURGERY

## 2018-11-30 PROCEDURE — 83735 ASSAY OF MAGNESIUM: CPT | Performed by: TRANSPLANT SURGERY

## 2018-11-30 PROCEDURE — 25000128 H RX IP 250 OP 636: Mod: ZF | Performed by: INTERNAL MEDICINE

## 2018-11-30 PROCEDURE — 80180 DRUG SCRN QUAN MYCOPHENOLATE: CPT | Performed by: TRANSPLANT SURGERY

## 2018-11-30 PROCEDURE — 86828 HLA CLASS I&II ANTIBODY QUAL: CPT | Performed by: TRANSPLANT SURGERY

## 2018-11-30 PROCEDURE — 96360 HYDRATION IV INFUSION INIT: CPT

## 2018-11-30 PROCEDURE — 80197 ASSAY OF TACROLIMUS: CPT | Performed by: TRANSPLANT SURGERY

## 2018-11-30 PROCEDURE — 36415 COLL VENOUS BLD VENIPUNCTURE: CPT | Performed by: TRANSPLANT SURGERY

## 2018-11-30 PROCEDURE — 87799 DETECT AGENT NOS DNA QUANT: CPT | Performed by: TRANSPLANT SURGERY

## 2018-11-30 PROCEDURE — 80048 BASIC METABOLIC PNL TOTAL CA: CPT | Performed by: TRANSPLANT SURGERY

## 2018-11-30 PROCEDURE — 85027 COMPLETE CBC AUTOMATED: CPT | Performed by: TRANSPLANT SURGERY

## 2018-11-30 PROCEDURE — 85004 AUTOMATED DIFF WBC COUNT: CPT | Performed by: TRANSPLANT SURGERY

## 2018-11-30 RX ADMIN — SODIUM CHLORIDE 1000 ML: 9 INJECTION, SOLUTION INTRAVENOUS at 16:23

## 2018-11-30 RX ADMIN — SODIUM CHLORIDE 1000 ML: 9 INJECTION, SOLUTION INTRAVENOUS at 15:45

## 2018-11-30 NOTE — MR AVS SNAPSHOT
After Visit Summary   11/30/2018    Letty Benavidez    MRN: [id number]           Patient Information     Date Of Birth          1960        Visit Information        Provider Department      11/30/2018 3:30 PM UC 48 ATC; UC SPEC INFUSION [facility] Treatment [place] Specialty and Procedure        Today's Diagnoses     Dehydration    -  1    Kidney transplanted        Immunosuppressed status (H)           Follow-ups after your visit        Your next 10 appointments already scheduled     Dec 06, 2018  1:00 PM CST   (Arrive by 12:45 PM)   Post-Op with Estelita Guzman MD   [facility] Solid Organ Transplant ([facility])    [address]  [address]  [place] [address]   [phone]            Dec 11, 2018 11:30 AM CST   (Arrive by 11:00 AM)   Return Kidney Transplant with Uc Early Post Transplant   [facility] Nephrology ([facility])    [address]  [address]  [place] [address]   [phone]            Dec 17, 2018  9:00 AM CST   (Arrive by 8:45 AM)   Cystoscopy with Julianna Thornton NP   [facility] Solid Organ Transplant ([facility])    [address] [place] [address]  [address]  [place] [address]   [phone]            Gary 15, 2019 10:30 AM CST   (Arrive by 10:00 AM)   Return Kidney Transplant with Uc Early Post Transplant   [facility] Nephrology ([facility])    [address]  [address]  [place] [address]   [phone]            Mar 11, 2019 10:00 AM CDT   (Arrive by 9:30 AM)   Return Kidney Transplant with Uc Early Post Transplant   [facility] Nephrology ([facility])    [address] [place] [address]  [address]  [place] [address]   [phone]            May 06, 2019 10:00 AM CDT   (Arrive by 9:30 AM)   Return Kidney Transplant with Uc Early Post Transplant   [facility] Nephrology ([facility])    [address] [place] [address]  [facility]  [address]  [facility] [address] [phone]              Future tests that were ordered for you today     Open Standing Orders        Priority Remaining Interval Expires Ordered    EBV DNA PCR Quantitative Whole Blood Routine 11/12 Monthly 11/29/2019 11/29/2018    Basic metabolic panel Routine 6/6 Every Other Week 9/5/2019 11/29/2018    CBC with platelets Routine 4/4 Every 3 Weeks 11/29/2019 11/29/2018    Basic metabolic panel Routine 4/4 Every 3 Weeks 11/29/2019 11/29/2018    Phosphorus Routine 11/12 Weekly 3/29/2019 11/29/2018    Magnesium Routine 11/12 Weekly 3/29/2019 11/29/2018    Mycophenolic acid Routine 11/12 Weekly 3/29/2019 11/29/2018    WBC Differential Routine 11/12 Weekly 3/29/2019 11/29/2018    Hepatic panel Routine 2/2 Every 6 Months 11/29/2019 11/29/2018    Lipid Profile Routine 2/2 Every 6 Months 11/29/2019 11/29/2018    Protein  random urine with Creat Ratio Routine 2/2 Every 6 Months 11/29/2019 11/29/2018    Tacrolimus level Routine 11/12 3X Week 1/3/2019 11/29/2018    Tacrolimus level Routine 16/16 2X Week 5/8/2019 11/29/2018    Tacrolimus level Routine 12/12 Weekly 6/7/2019 11/29/2018    Tacrolimus level Routine 6/6 Every Other Week 9/5/2019 11/29/2018    Tacrolimus level Routine 4/4 Every 3 Weeks 11/29/2019 11/29/2018    PRA Donor Specific Antibody Routine 6/6  11/29/2019 11/29/2018    BK virus PCR quantitative Routine 12/12 Monthly 11/29/2019 11/29/2018    PRA Donor Specific Antibody Routine 5/6  11/29/2019 11/29/2018    CBC with platelets Routine 11/12 3X Week 1/3/2019 11/29/2018    Basic metabolic panel Routine 11/12 3X Week 1/3/2019 11/29/2018    CBC with platelets Routine 16/16 2X Week 5/8/2019 11/29/2018    Basic metabolic panel Routine 16/16 2X Week 5/8/2019 11/29/2018    CBC with platelets Routine 12/12 Weekly 6/7/2019 11/29/2018    Basic metabolic panel Routine 12/12 Weekly 6/7/2019 11/29/2018    CBC with platelets Routine 6/6 Every Other Week 9/5/2019 11/29/2018           Open Future Orders        Priority Expected Expires Ordered    Hepatitis B Surface Antibody Routine 5/28/2019 6/11/2019 11/29/2018    Hepatitis B core antibody Routine 5/28/2019 6/11/2019 11/29/2018    Hepatitis C antibody Routine 5/28/2019 6/11/2019 11/29/2018            Who to contact     If you have questions or need follow up information about today's clinic visit or your schedule please contact [facility] SPECIALTY AND PROCEDURE directly at [phone].  Normal or non-critical lab and imaging results will be communicated to you by Schoohart, letter or phone within 4 business days after the clinic has received the results. If you do not hear from us within 7 days, please contact the clinic through FilterEasy or phone. If you have a critical or abnormal lab result, we will notify you by phone as soon as possible.  Submit refill requests through FilterEasy or call your pharmacy and they will forward the refill request to us. Please allow 3 business days for your refill to be completed.          Additional Information About Your Visit        FilterEasy Information     FilterEasy gives you secure access to your electronic health record. If you see a primary care provider, you can also send messages to your care team and make appointments. If you have questions, please call your primary care clinic.  If you do not have a primary care provider, please call [phone] and they will assist you.        Care EveryWhere ID     This is your Care EveryWhere ID. This could be used by other organizations to access your [place] medical records  [id number]        Your Vitals Were     Temperature Respirations Pulse Oximetry             97.8  F (36.6  C) (Oral) 16 100%          Blood Pressure from Last 3 Encounters:   11/30/18 107/67   11/28/18 138/71   11/26/18 138/67    Weight from Last 3 Encounters:   11/29/18 51.2 kg (112 lb 12.8 oz)   11/28/18 52.2 kg (115 lb 1.6 oz)   11/27/18 52 kg (114 lb 11.2 oz)               Today, you had the following     No orders found for display       Primary Care Provider Office Phone # Fax #    Anita Jurado [phone]       [place] [address] [facility] [address]        Equal Access to Services     TALONKEYONA REED : Hadii valentino duarte hadmarizao Soomaali, waaxda luqadaha, qaybta kaalmada adeegyada, waxines eduar haywildern bianka jose rjonas aguillon. So [facility] [phone].    ATENCIÓN: Si habla español, tiene a guerin disposición servicios gratuitos de asistencia lingüística. [facility] [phone].    We comply with applicable federal civil rights laws and [place] laws. We do not discriminate on the basis of race, color, national origin, age, disability, sex, sexual orientation, or gender identity.            Thank you!     Thank you for choosing [facility] SPECIALTY AND PROCEDURE  for your care. Our goal is always to provide you with excellent care. Hearing back from our patients is one way we can continue to improve our services. Please take a few minutes to complete the written survey that you may receive in the mail after your visit with us. Thank you!             Your Updated Medication List - Protect others around you: Learn how to safely use, store and throw away your medicines at www.disposemymeds.org.          This list is accurate as of 11/30/18  3:57 PM.  Always use your most recent med list.                   Brand Name Dispense Instructions for use Diagnosis    amoxicillin-clavulanate 500-125 MG tablet    AUGMENTIN    20 tablet    Take 1 tablet by mouth 2 times daily    Dysuria       aspirin 81 MG EC tablet      Take 81 mg by mouth daily        atorvastatin 20 MG tablet    LIPITOR     Take 20 mg by mouth At Bedtime        CO-ENZYME Q-10 PO      Take 400 mg by mouth daily For cramps from lipitor        eszopiclone 1 MG tablet    LUNESTA     Take 1 mg by mouth At Bedtime        humaLOG 100 UNIT/ML vial   Generic drug:  insulin  lispro      Use with insulin pump        insulin pump infusion      Inject 0.5 Units/hr Subcutaneous Carb ratio: 1:15g/hr Sensitivity factor correction: 1 unit for every 75mg/dL over 100mg/dL        LINZESS 290 MCG capsule   Generic drug:  linaclotide      Take 290 mcg by mouth every morning (before breakfast)        LIQUACEL Liqd      Take 30 mLs by mouth 2 times daily Amino acids 16 grams, 2.5 g of arginine per pacakge -90 kcal/30mL liquid        Magnesium Citrate 125 MG Caps      Take 1 capsule by mouth daily        multivitamin, therapeutic Tabs tablet     30 tablet    Take 1 tablet by mouth daily    Kidney transplanted       mycophenolic acid 180 MG EC tablet     180 tablet    Take 3 tablets (540 mg) by mouth 2 times daily    Kidney transplanted       NEURONTIN 100 MG capsule   Generic drug:  gabapentin      Take 100 mg by mouth At Bedtime        ondansetron 4 MG tablet    ZOFRAN    24 tablet    Take 1 tablet (4 mg) by mouth every 8 hours as needed for nausea    Nausea       polyethylene glycol powder    MIRALAX/GLYCOLAX     Take 5.7 g by mouth every morning 1/3 of 17g, mix with Liquidcel and water (use as liquid to take with her other morning meds)        sulfamethoxazole-trimethoprim 400-80 MG tablet    BACTRIM/SEPTRA    30 tablet    Take 1 tablet by mouth daily    Kidney transplanted       * tacrolimus 1 MG capsule    GENERIC EQUIVALENT    180 capsule    Take 3 capsules (3 mg) by mouth 2 times daily    Kidney transplanted       * tacrolimus 0.5 MG capsule    GENERIC EQUIVALENT    60 capsule    Take one tablet twice daily as directed for dose adjustments.    Kidney transplanted       triamcinolone 0.1 % external ointment    KENALOG     APPLY TO AFFECTED AREA DAILY FOR 2 WEEKS THEN EVERY 3 DAYS FOR 1 WEEK AS NEEDED        valACYclovir 1000 mg tablet    VALTREX    60 tablet    Take 1 tablet (1,000 mg) by mouth 2 times daily    Kidney transplanted       vitamin D3 2000 units Caps      Take 1 capsule by mouth  every evening With dinner        ZENPEP PO      Take 2 capsules by mouth 3 times daily L-15, A-82, P-51        * Notice:  This list has 2 medication(s) that are the same as other medications prescribed for you. Read the directions carefully, and ask your doctor or other care provider to review them with you.

## 2018-11-30 NOTE — TELEPHONE ENCOUNTER
BP sitting 105 /59. Positive for dizziness / dehydration.  No changes in output, good oral intake and good urine output.   Looks hydrated on labs, attributes it to zofran use, but no zofran use for about 24h.    Will discuss 1L NS with team.

## 2018-11-30 NOTE — PROGRESS NOTES
Nursing Note  Letty Benavidez [facility]:   Chief Complaint   Patient presents with     Infusion     IV Fluids     During [facility]'s [facility] appointment, orders from Dr KP Fuentes were completed.  Frequency: once    Progress note:  Patient identification verified by name and date of birth.  Assessment completed.  Vitals recorded in Doc Flowsheets.  Patient was provided with education regarding infusion and possible side effects.  Patient verbalized understanding.      needed: No  Premedications: were not ordered.  Infusion Rates: infusion given over approximately one hour.  Labs: were not ordered for this appointment.  Vascular access: peripheral IV placed today.  Treatment Conditions: non-applicable.  Patient tolerated infusion: well.    Report given to Jennifer Joseph RN at 1600 and patient care transferred at that time.    Discharge Plan:   Follow up plan of care with: transplant coordinator.  Discharge instructions were reviewed with patient.  Patient/representative verbalized understanding of discharge instructions and all questions answered.  [facility] discharged [facility] in stable condition.    Lucie Garrett RN    Administrations This Visit     0.9% sodium chloride BOLUS     Admin Date Action Dose Route Administered By             11/30/2018 New Bag 1000 mL Intravenous Lucie Garrett RN                          /67 (BP Location: Left arm)  Temp 97.8  F (36.6  C) (Oral)  Resp 16  SpO2 100%

## 2018-11-30 NOTE — TELEPHONE ENCOUNTER
Yanick Fuentes MD Schindelholz, Alanna, RN                     Sure. 1 L     Thanks,     d            Previous Messages       ----- Message -----      From: Tania Khan RN      Sent: 11/30/2018   1:16 PM        To: Yanick Fuentes MD     BP sitting 105 /59 (no orthostatic pressure as she's always had orthostatic hypotension). Positive for dizziness / dehydration.   No changes in output, good oral intake and good urine output.   Looks hydrated on labs, attributes it to zofran use, but no zofran use for about 24h.     Do you want fluids?  1L or 500mL??     Thanks!   Morena

## 2018-12-01 LAB
MYCOPHENOLATE SERPL LC/MS/MS-MCNC: 2.71 MG/L (ref 1–3.5)
MYCOPHENOLATE-G SERPL LC/MS/MS-MCNC: 56.5 MG/L (ref 30–95)
TME LAST DOSE: NORMAL H

## 2018-12-02 LAB
EBV DNA # SPEC NAA+PROBE: NORMAL {COPIES}/ML
EBV DNA SPEC NAA+PROBE-LOG#: NORMAL {LOG_COPIES}/ML

## 2018-12-03 ENCOUNTER — TELEPHONE (OUTPATIENT)
Dept: TRANSPLANT | Facility: CLINIC | Age: 58
End: 2018-12-03

## 2018-12-03 DIAGNOSIS — B07.8 FLAT WART: ICD-10-CM

## 2018-12-03 DIAGNOSIS — Z48.298 AFTERCARE FOLLOWING ORGAN TRANSPLANT: ICD-10-CM

## 2018-12-03 DIAGNOSIS — Z94.0 KIDNEY REPLACED BY TRANSPLANT: Primary | ICD-10-CM

## 2018-12-03 DIAGNOSIS — R19.7 DIARRHEA: Primary | ICD-10-CM

## 2018-12-03 DIAGNOSIS — Z94.0 KIDNEY REPLACED BY TRANSPLANT: ICD-10-CM

## 2018-12-03 DIAGNOSIS — Z92.25 PERSONAL HISTORY OF IMMUNOSUPRESSION THERAPY: ICD-10-CM

## 2018-12-03 DIAGNOSIS — Z94.0 KIDNEY TRANSPLANTED: ICD-10-CM

## 2018-12-03 DIAGNOSIS — R76.9 ABNORMAL IMMUNOLOGICAL FINDING IN SERUM: ICD-10-CM

## 2018-12-03 DIAGNOSIS — E86.0 DEHYDRATION: ICD-10-CM

## 2018-12-03 LAB
ANION GAP SERPL CALCULATED.3IONS-SCNC: 8 MMOL/L (ref 3–14)
BUN SERPL-MCNC: 26 MG/DL (ref 7–30)
CALCIUM SERPL-MCNC: 8.6 MG/DL (ref 8.5–10.1)
CHLORIDE SERPL-SCNC: 108 MMOL/L (ref 94–109)
CO2 SERPL-SCNC: 20 MMOL/L (ref 20–32)
CREAT SERPL-MCNC: 1.22 MG/DL (ref 0.52–1.04)
ERYTHROCYTE [DISTWIDTH] IN BLOOD BY AUTOMATED COUNT: 17.2 % (ref 10–15)
GFR SERPL CREATININE-BSD FRML MDRD: 45 ML/MIN/1.7M2
GLUCOSE SERPL-MCNC: 197 MG/DL (ref 70–99)
HCT VFR BLD AUTO: 29.8 % (ref 35–47)
HGB BLD-MCNC: 9.4 G/DL (ref 11.7–15.7)
MCH RBC QN AUTO: 32.8 PG (ref 26.5–33)
MCHC RBC AUTO-ENTMCNC: 31.5 G/DL (ref 31.5–36.5)
MCV RBC AUTO: 104 FL (ref 78–100)
PLATELET # BLD AUTO: 383 10E9/L (ref 150–450)
POTASSIUM SERPL-SCNC: 4.7 MMOL/L (ref 3.4–5.3)
RBC # BLD AUTO: 2.87 10E12/L (ref 3.8–5.2)
SODIUM SERPL-SCNC: 136 MMOL/L (ref 133–144)
TACROLIMUS BLD-MCNC: 10.2 UG/L (ref 5–15)
TME LAST DOSE: NORMAL H
WBC # BLD AUTO: 7.6 10E9/L (ref 4–11)

## 2018-12-03 RX ORDER — TACROLIMUS 0.5 MG/1
CAPSULE ORAL
Qty: 60 CAPSULE | Refills: 11 | Status: SHIPPED | OUTPATIENT
Start: 2018-12-03 | End: 2018-12-07

## 2018-12-03 RX ORDER — TACROLIMUS 1 MG/1
3 CAPSULE ORAL 2 TIMES DAILY
Qty: 180 CAPSULE | Refills: 11 | Status: SHIPPED | OUTPATIENT
Start: 2018-12-03 | End: 2018-12-07

## 2018-12-03 RX ORDER — MYCOPHENOLIC ACID 180 MG/1
360 TABLET, DELAYED RELEASE ORAL 2 TIMES DAILY
Qty: 180 TABLET | Refills: 11 | Status: SHIPPED | OUTPATIENT
Start: 2018-12-03 | End: 2018-12-07

## 2018-12-03 RX ORDER — MYCOPHENOLIC ACID 180 MG/1
540 TABLET, DELAYED RELEASE ORAL 2 TIMES DAILY
Qty: 180 TABLET | Refills: 11 | Status: SHIPPED | OUTPATIENT
Start: 2018-12-03 | End: 2018-12-03

## 2018-12-03 RX ORDER — SULFAMETHOXAZOLE AND TRIMETHOPRIM 400; 80 MG/1; MG/1
1 TABLET ORAL DAILY
Qty: 30 TABLET | Refills: 11 | Status: SHIPPED | OUTPATIENT
Start: 2018-12-03 | End: 2018-12-19

## 2018-12-03 RX ORDER — VALACYCLOVIR HYDROCHLORIDE 1 G/1
1000 TABLET, FILM COATED ORAL 2 TIMES DAILY
Qty: 60 TABLET | Refills: 2 | Status: SHIPPED | OUTPATIENT
Start: 2018-12-03 | End: 2018-12-19

## 2018-12-03 NOTE — TELEPHONE ENCOUNTER
Post Kidney and Pancreas Transplant Team Conference  Date: 12/3/2018  Transplant Coordinator: Tania Khan     Attendees:  [x]  Dr. Villalta [] Che Latham, RN  [] Lisbet Holley LPN     []  Dr. Romano [] Shasha Arreaga RN [] Norma Egan LPN   []  Dr. Fuentes [] Ofe Mujica RN    []  Dr. Anthony [] Selam Hoskins RN    [] Dr. Hernandez [x] Morena Khan RN    [] Dr. Hankins [] Chito Holley RN    [] Surgery Fellow [] Maddi Zaldivar RN    [] Julianna Thornton, HUNTER [] Apurva Soria RN    [] Wil Becker, PharmD [] Ofe Chairez RN     [] Ezequiel Javier RN     [] Che Villalba RN        Verbal Plan Read Back:   Okay to reduce MPA to 360 BID if tacrolimus remains therapeutic.  Patient to increase oral hydration, okay to give 1L NS if patient unable to hydrate orally.     Routed to RN Coordinator   Tania Khan    Tac 12/3/2018 = 10.2. Will reduce MPA.  Letty voiced understanding.  Will obtain stool sample as diarrhea has worsened.  Will get 1L NS tomorrow AM.

## 2018-12-03 NOTE — TELEPHONE ENCOUNTER
Patient called to report her diarrhea and bloating has increased since talking with coordinator this morning. Patient would like to know if she should schedule an infusion for tomorrow to avoid dehydration. Please call patient to discuss.

## 2018-12-03 NOTE — TELEPHONE ENCOUNTER
Letty continues to have nausea and bloating.   PD drain has been 30cc for the last 2 days - follow up with Dr. Guzman on Thursday. Letty has enough drain supply through that appointment.   Letty would like to reduce her myfortic if approved by the transplant team.   She is not on a probiotic as this has not helped her in the past.  She is not utilizing the zofran as she feels that it makes her bloating worse.    Letty had flat warts on her hand that were removed at outside facility prior to transplant. She is now noticing their reappearance post-IS induction.  Will refer to derm here.    Letty again discussed follow up with nephrologist locally. Will discuss with team.

## 2018-12-04 ENCOUNTER — INFUSION THERAPY VISIT (OUTPATIENT)
Dept: INFUSION THERAPY | Facility: CLINIC | Age: 58
End: 2018-12-04
Attending: INTERNAL MEDICINE
Payer: COMMERCIAL

## 2018-12-04 VITALS
TEMPERATURE: 98 F | SYSTOLIC BLOOD PRESSURE: 116 MMHG | HEART RATE: 81 BPM | DIASTOLIC BLOOD PRESSURE: 55 MMHG | RESPIRATION RATE: 18 BRPM

## 2018-12-04 DIAGNOSIS — D84.9 IMMUNOSUPPRESSED STATUS (H): ICD-10-CM

## 2018-12-04 DIAGNOSIS — Z94.0 KIDNEY TRANSPLANTED: ICD-10-CM

## 2018-12-04 DIAGNOSIS — E86.0 DEHYDRATION: Primary | ICD-10-CM

## 2018-12-04 LAB
DONOR IDENTIFICATION: NORMAL
DSA COMMENTS: NORMAL
DSA PRESENT: NO
DSA TEST METHOD: NORMAL
INTERFERING SUBST TEST METHOD: NORMAL
INTERFERING SUBSTANCE COMMENT: NORMAL
INTERFERING SUBSTANCE RESULT: NORMAL
INTERFERING SUBSTANCE: NORMAL
ORGAN: NORMAL
SA1 CELL: NORMAL
SA1 COMMENTS: NORMAL
SA1 HI RISK ABY: NORMAL
SA1 MOD RISK ABY: NORMAL
SA1 TEST METHOD: NORMAL
SA2 CELL: NORMAL
SA2 COMMENTS: NORMAL
SA2 HI RISK ABY UA: NORMAL
SA2 MOD RISK ABY: NORMAL
SA2 TEST METHOD: NORMAL
UNACCEPTABLE ANTIGEN: NORMAL
UNOS CPRA: 38

## 2018-12-04 PROCEDURE — 25000128 H RX IP 250 OP 636: Mod: ZF | Performed by: INTERNAL MEDICINE

## 2018-12-04 PROCEDURE — 96360 HYDRATION IV INFUSION INIT: CPT

## 2018-12-04 RX ADMIN — SODIUM CHLORIDE 1000 ML: 9 INJECTION, SOLUTION INTRAVENOUS at 12:21

## 2018-12-04 ASSESSMENT — PAIN DESCRIPTION - DESCRIPTORS: DESCRIPTORS: CRAMPING

## 2018-12-04 NOTE — MR AVS SNAPSHOT
After Visit Summary   12/4/2018    Letty Benavidez    MRN: [id number]           Patient Information     Date Of Birth          1960        Visit Information        Provider Department      12/4/2018 12:00 PM UC 41 ATC; UC SPEC INFUSION [facility] Treatment [place] Specialty and Procedure        Today's Diagnoses     Dehydration    -  1    Kidney transplanted        Immunosuppressed status (H)           Follow-ups after your visit        Your next 10 appointments already scheduled     Dec 06, 2018  1:00 PM CST   (Arrive by 12:45 PM)   Post-Op with Estelita Guzman MD   [facility] Solid Organ Transplant ([facility])    [address]  [address]  [facility] [facility] [address]   [phone]            Dec 11, 2018 11:30 AM CST   (Arrive by 11:00 AM)   Return Kidney Transplant with Uc Early Post Transplant   [facility] Nephrology ([facility])    [address]  [address]  [place] [address]   [phone]            Dec 17, 2018  9:00 AM CST   (Arrive by 8:45 AM)   Cystoscopy with Julianna Thornton NP   [facility] Solid Organ Transplant ([facility])    [address] [place] [address]  [address]  [facility] [address]   [phone]            Gary 15, 2019 10:30 AM CST   (Arrive by 10:00 AM)   Return Kidney Transplant with Uc Early Post Transplant   [facility] Nephrology ([facility])    [address]  [address]  [place] [address]   [phone]            Mar 11, 2019 10:00 AM CDT   (Arrive by 9:30 AM)   Return Kidney Transplant with Uc Early Post Transplant   [facility] Nephrology ([facility])    [address]  [address]  [place] [address]   [phone]            May 06, 2019 10:00 AM CDT   (Arrive by 9:30 AM)   Return Kidney Transplant with Uc Early Post Transplant   [facility] Nephrology ([facility])    [address] [place] [address]  Street   [address]  [facility] [address] [phone]              Future tests that were ordered for you today     Open Future Orders        Priority Expected Expires Ordered    Clostridium difficile toxin B PCR Routine  1/2/2019 12/3/2018    Enteric Bacteria and Virus Panel by DAYDAY Stool Routine  1/2/2019 12/3/2018    Mycophenolic acid Add-On 12/3/2018 1/2/2019 12/3/2018            Who to contact     If you have questions or need follow up information about today's clinic visit or your schedule please contact [facility] SPECIALTY AND PROCEDURE directly at [phone].  Normal or non-critical lab and imaging results will be communicated to you by IronPearlhart, letter or phone within 4 business days after the clinic has received the results. If you do not hear from us within 7 days, please contact the clinic through IronPearlhart or phone. If you have a critical or abnormal lab result, we will notify you by phone as soon as possible.  Submit refill requests through CompleteSet or call your pharmacy and they will forward the refill request to us. Please allow 3 business days for your refill to be completed.          Additional Information About Your Visit        IronPearlhart Information     CompleteSet gives you secure access to your electronic health record. If you see a primary care provider, you can also send messages to your care team and make appointments. If you have questions, please call your primary care clinic.  If you do not have a primary care provider, please call [phone] and they will assist you.        Care EveryWhere ID     This is your Care EveryWhere ID. This could be used by other organizations to access your [place] medical records  [id number]        Your Vitals Were     Pulse Temperature Respirations             81 98  F (36.7  C) (Oral) 18          Blood Pressure from Last 3 Encounters:   12/04/18 116/55   11/30/18 123/73   11/28/18 138/71    Weight from Last 3 Encounters:    11/29/18 51.2 kg (112 lb 12.8 oz)   11/28/18 52.2 kg (115 lb 1.6 oz)   11/27/18 52 kg (114 lb 11.2 oz)              Today, you had the following     No orders found for display       Primary Care Provider Office Phone # Fax #    Anita Jurado [phone]       [address] [address]        Equal Access to Services     KEYONA MCBRIDE : Hadii aad ku hadasho Soomaali, waaxda luqadaha, qaybta kaalmada adeegyada, waxay idiin hayaan adeeg kharash laadriana . So [facility] [phone].    ATENCIÓN: Si habla español, tiene a guerin disposición servicios gratuitos de asistencia lingüística. [facility] [phone].    We comply with applicable federal civil rights laws and [place] laws. We do not discriminate on the basis of race, color, national origin, age, disability, sex, sexual orientation, or gender identity.            Thank you!     Thank you for choosing [facility] SPECIALTY AND PROCEDURE  for your care. Our goal is always to provide you with excellent care. Hearing back from our patients is one way we can continue to improve our services. Please take a few minutes to complete the written survey that you may receive in the mail after your visit with us. Thank you!             Your Updated Medication List - Protect others around you: Learn how to safely use, store and throw away your medicines at www.disposemymeds.org.          This list is accurate as of 12/4/18  1:33 PM.  Always use your most recent med list.                   Brand Name Dispense Instructions for use Diagnosis    amoxicillin-clavulanate 500-125 MG tablet    AUGMENTIN    20 tablet    Take 1 tablet by mouth 2 times daily    Dysuria       aspirin 81 MG EC tablet      Take 81 mg by mouth daily        atorvastatin 20 MG tablet    LIPITOR     Take 20 mg by mouth At Bedtime        CO-ENZYME Q-10 PO      Take 400 mg by mouth daily For cramps from lipitor        eszopiclone 1 MG tablet     LUNESTA     Take 1 mg by mouth At Bedtime        humaLOG 100 UNIT/ML vial   Generic drug:  insulin lispro      Use with insulin pump        insulin pump infusion      Inject 0.5 Units/hr Subcutaneous Carb ratio: 1:15g/hr Sensitivity factor correction: 1 unit for every 75mg/dL over 100mg/dL        LINZESS 290 MCG capsule   Generic drug:  linaclotide      Take 290 mcg by mouth every morning (before breakfast)        LIQUACEL Liqd      Take 30 mLs by mouth 2 times daily Amino acids 16 grams, 2.5 g of arginine per pacakge -90 kcal/30mL liquid        Magnesium Citrate 125 MG Caps      Take 1 capsule by mouth daily        multivitamin, therapeutic Tabs tablet     30 tablet    Take 1 tablet by mouth daily    Kidney transplanted       mycophenolic acid 180 MG EC tablet     180 tablet    Take 2 tablets (360 mg) by mouth 2 times daily    Kidney transplanted       NEURONTIN 100 MG capsule   Generic drug:  gabapentin      Take 100 mg by mouth At Bedtime        ondansetron 4 MG tablet    ZOFRAN    24 tablet    Take 1 tablet (4 mg) by mouth every 8 hours as needed for nausea    Nausea       polyethylene glycol powder    MIRALAX/GLYCOLAX     Take 5.7 g by mouth every morning 1/3 of 17g, mix with Liquidcel and water (use as liquid to take with her other morning meds)        sulfamethoxazole-trimethoprim 400-80 MG tablet    BACTRIM/SEPTRA    30 tablet    Take 1 tablet by mouth daily    Kidney transplanted       * tacrolimus 0.5 MG capsule    GENERIC EQUIVALENT    60 capsule    Take one tablet twice daily as directed for dose adjustments.    Kidney transplanted       * tacrolimus 1 MG capsule    GENERIC EQUIVALENT    180 capsule    Take 3 capsules (3 mg) by mouth 2 times daily    Kidney transplanted       triamcinolone 0.1 % external ointment    KENALOG     APPLY TO AFFECTED AREA DAILY FOR 2 WEEKS THEN EVERY 3 DAYS FOR 1 WEEK AS NEEDED        valACYclovir 1000 mg tablet    VALTREX    60 tablet    Take 1 tablet (1,000 mg) by mouth  2 times daily    Kidney transplanted       vitamin D3 2000 units Caps      Take 1 capsule by mouth every evening With dinner        ZENPEP PO      Take 2 capsules by mouth 3 times daily L-15, A-82, P-51        * Notice:  This list has 2 medication(s) that are the same as other medications prescribed for you. Read the directions carefully, and ask your doctor or other care provider to review them with you.

## 2018-12-04 NOTE — PROGRESS NOTES
Nursing Note  Letty Benavidez presents today to [facility] for:   Chief Complaint   Patient presents with     Infusion     1 liter of fluids     During today's [facility] appointment, orders from Dr. Villalta were completed.  Frequency: once    Progress note:  Patient identification verified by name and date of birth.  Assessment completed.  Vitals recorded in Doc Flowsheets.  Patient was provided with education regarding infusion and possible side effects.  Patient verbalized understanding.     Orthostatics pre infusion:  Sit:   116/55  P 81    Stand: 91/51 P 90     needed: No  Premedications: were not ordered.  Infusion Rates: infusion given over approximately 1 hour.  Labs: Patient given specimen cup to collect stool at home. Patient has a lab appointment in the morning and she will bring the sample in with her then. Patient verbalized knowing how to collect sample.  Vascular access: peripheral IV placed today.  Treatment Conditions: non-applicable.  Patient tolerated infusion: well.    Orthostatics post infusion  Sit: 127/59 P 78  Stand: 125/61 P 83    Patient reports feeling better (less dizzy, lightheaded, weak) post infusion.      Discharge Plan:   Follow up plan of care with: ongoing infusions at [facility].  Discharge instructions were reviewed with patient.  Patient/representative verbalized understanding of discharge instructions and all questions answered.  Patient discharged from [facility] in stable condition.    Ericka Severson, RN    Administrations This Visit     0.9% sodium chloride BOLUS     Admin Date Action Dose Route Administered By             12/04/2018 New Bag 1000 mL Intravenous Severson, Ericka, RN                          /55  Pulse 81  Temp 98  F (36.7  C) (Oral)  Resp 18

## 2018-12-05 ENCOUNTER — TELEPHONE (OUTPATIENT)
Dept: TRANSPLANT | Facility: CLINIC | Age: 58
End: 2018-12-05

## 2018-12-05 DIAGNOSIS — R19.7 DIARRHEA: ICD-10-CM

## 2018-12-05 DIAGNOSIS — R30.0 DYSURIA: ICD-10-CM

## 2018-12-05 DIAGNOSIS — Z94.0 KIDNEY REPLACED BY TRANSPLANT: ICD-10-CM

## 2018-12-05 DIAGNOSIS — Z48.298 AFTERCARE FOLLOWING ORGAN TRANSPLANT: ICD-10-CM

## 2018-12-05 DIAGNOSIS — Z92.25 PERSONAL HISTORY OF IMMUNOSUPRESSION THERAPY: ICD-10-CM

## 2018-12-05 LAB
ANION GAP SERPL CALCULATED.3IONS-SCNC: 11 MMOL/L (ref 3–14)
BASOPHILS # BLD AUTO: 0 10E9/L (ref 0–0.2)
BASOPHILS NFR BLD AUTO: 0.3 %
BUN SERPL-MCNC: 18 MG/DL (ref 7–30)
C COLI+JEJUNI+LARI FUSA STL QL NAA+PROBE: NOT DETECTED
C DIFF TOX B STL QL: NEGATIVE
CALCIUM SERPL-MCNC: 8.7 MG/DL (ref 8.5–10.1)
CHLORIDE SERPL-SCNC: 108 MMOL/L (ref 94–109)
CO2 SERPL-SCNC: 21 MMOL/L (ref 20–32)
CREAT SERPL-MCNC: 1.02 MG/DL (ref 0.52–1.04)
DIFFERENTIAL METHOD BLD: ABNORMAL
EC STX1 GENE STL QL NAA+PROBE: NOT DETECTED
EC STX2 GENE STL QL NAA+PROBE: NOT DETECTED
ENTERIC PATHOGEN COMMENT: NORMAL
EOSINOPHIL # BLD AUTO: 0.1 10E9/L (ref 0–0.7)
EOSINOPHIL NFR BLD AUTO: 1.9 %
ERYTHROCYTE [DISTWIDTH] IN BLOOD BY AUTOMATED COUNT: 18.2 % (ref 10–15)
GFR SERPL CREATININE-BSD FRML MDRD: 55 ML/MIN/1.7M2
GLUCOSE SERPL-MCNC: 133 MG/DL (ref 70–99)
HCT VFR BLD AUTO: 29.4 % (ref 35–47)
HGB BLD-MCNC: 9.3 G/DL (ref 11.7–15.7)
IMM GRANULOCYTES # BLD: 0.1 10E9/L (ref 0–0.4)
IMM GRANULOCYTES NFR BLD: 0.9 %
LYMPHOCYTES # BLD AUTO: 0.4 10E9/L (ref 0.8–5.3)
LYMPHOCYTES NFR BLD AUTO: 6.8 %
MAGNESIUM SERPL-MCNC: 1.9 MG/DL (ref 1.6–2.3)
MCH RBC QN AUTO: 33.2 PG (ref 26.5–33)
MCHC RBC AUTO-ENTMCNC: 31.6 G/DL (ref 31.5–36.5)
MCV RBC AUTO: 105 FL (ref 78–100)
MONOCYTES # BLD AUTO: 0.6 10E9/L (ref 0–1.3)
MONOCYTES NFR BLD AUTO: 10.6 %
MYCOPHENOLATE SERPL LC/MS/MS-MCNC: 2.86 MG/L (ref 1–3.5)
MYCOPHENOLATE-G SERPL LC/MS/MS-MCNC: 68.1 MG/L (ref 30–95)
NEUTROPHILS # BLD AUTO: 4.7 10E9/L (ref 1.6–8.3)
NEUTROPHILS NFR BLD AUTO: 79.5 %
NOROV GI+II ORF1-ORF2 JNC STL QL NAA+PR: NOT DETECTED
NRBC # BLD AUTO: 0 10*3/UL
NRBC BLD AUTO-RTO: 0 /100
PHOSPHATE SERPL-MCNC: 3.9 MG/DL (ref 2.5–4.5)
PLATELET # BLD AUTO: 421 10E9/L (ref 150–450)
POTASSIUM SERPL-SCNC: 4.8 MMOL/L (ref 3.4–5.3)
RBC # BLD AUTO: 2.8 10E12/L (ref 3.8–5.2)
RVA NSP5 STL QL NAA+PROBE: NOT DETECTED
SALMONELLA SP RPOD STL QL NAA+PROBE: NOT DETECTED
SHIGELLA SP+EIEC IPAH STL QL NAA+PROBE: NOT DETECTED
SODIUM SERPL-SCNC: 139 MMOL/L (ref 133–144)
SPECIMEN SOURCE: NORMAL
TACROLIMUS BLD-MCNC: 9.3 UG/L (ref 5–15)
TME LAST DOSE: NORMAL H
TME LAST DOSE: NORMAL H
V CHOL+PARA RFBL+TRKH+TNAA STL QL NAA+PR: NOT DETECTED
WBC # BLD AUTO: 5.7 10E9/L (ref 4–11)
Y ENTERO RECN STL QL NAA+PROBE: NOT DETECTED

## 2018-12-05 NOTE — TELEPHONE ENCOUNTER
Post Kidney and Pancreas Transplant Team Conference  Date: 12/5/2018  Transplant Coordinator: Tania Khan     Attendees:  [x]  Dr. Villalta [] Che Latham, EAMON  [x] Lisbet Holley LPN     [x]  Dr. Romano [x] Shasha Arreaga, EAMON [] Norma Egan LPN   []  Dr. Fuentes [] Ofe Mujica RN    [x]  Dr. Anthony [] Selam Hoskins RN    [] Dr. Kang [x] Morena Khan RN    [] Dr. Hernandez [] Chito Holley RN    [x] Dr. Hankins [] Maddi Zaldivar RN    [] Surgery Fellow [] Apurva Soria RN    [] Julianna Thornton NP              Verbal Plan Read Back:   Continue Augmentin through stent removal.  Reschedule stent removal to this week.    Routed to RN Coordinator   Lisbet Holley     Will keep cysto for 12/17 and extend augmentin.     Estelita Guzman MD Schindelholz, Alanna, RN; Julianna Thornton NP                     No, she is diabetic and retransplant.  Keep stent for 4 weeks            Previous Messages       ----- Message -----      From: Tania Khan RN      Sent: 12/5/2018   9:42 AM        To: Estelita Guzman MD, Julianna Thornton NP     Light pink drainage from intraperitoneal drain (left in due to large, bloody output). Output has now been <30cc x3 days. She has an appt with Dr. Guzman tomorrow and is eager to have the drain removed.     C/o diarrhea and bloating / nausea while on augmentin for UTI (with improvement after MPA dose reductin). Cysto for 12/17 remains scheduled as per Julianna (who checked with Dr. Helm). However, I'd like to get her off of the augmentin to improve GI symptoms as soon as possible. Okay to bump up cysto to this week or next??

## 2018-12-05 NOTE — TELEPHONE ENCOUNTER
Letty reports drain is putting out <30ccs daily and it is light pink in color.  GI upset is improved with lower dose MPA, would like to stop augmentin as soon as possible to improve GI symptoms even more.

## 2018-12-06 ENCOUNTER — OFFICE VISIT (OUTPATIENT)
Dept: TRANSPLANT | Facility: CLINIC | Age: 58
End: 2018-12-06
Attending: TRANSPLANT SURGERY
Payer: COMMERCIAL

## 2018-12-06 VITALS
SYSTOLIC BLOOD PRESSURE: 123 MMHG | TEMPERATURE: 97.9 F | HEART RATE: 83 BPM | OXYGEN SATURATION: 100 % | RESPIRATION RATE: 16 BRPM | BODY MASS INDEX: 20.13 KG/M2 | HEIGHT: 63 IN | WEIGHT: 113.6 LBS | DIASTOLIC BLOOD PRESSURE: 70 MMHG

## 2018-12-06 DIAGNOSIS — N39.0 URINARY TRACT INFECTION WITHOUT HEMATURIA, SITE UNSPECIFIED: Primary | ICD-10-CM

## 2018-12-06 DIAGNOSIS — G47.00 INSOMNIA, UNSPECIFIED TYPE: ICD-10-CM

## 2018-12-06 LAB
MYCOPHENOLATE SERPL LC/MS/MS-MCNC: 4.11 MG/L (ref 1–3.5)
MYCOPHENOLATE-G SERPL LC/MS/MS-MCNC: 73.3 MG/L (ref 30–95)
TME LAST DOSE: ABNORMAL H

## 2018-12-06 PROCEDURE — G0463 HOSPITAL OUTPT CLINIC VISIT: HCPCS | Mod: ZF

## 2018-12-06 RX ORDER — ESZOPICLONE 1 MG/1
2 TABLET, FILM COATED ORAL AT BEDTIME
Qty: 30 TABLET | Refills: 1 | Status: SHIPPED | OUTPATIENT
Start: 2018-12-06

## 2018-12-06 RX ORDER — AMOXICILLIN AND CLAVULANATE POTASSIUM 500; 125 MG/1; MG/1
1 TABLET, FILM COATED ORAL 2 TIMES DAILY
Qty: 24 TABLET | Refills: 0 | Status: SHIPPED | OUTPATIENT
Start: 2018-12-06 | End: 2019-02-08

## 2018-12-06 ASSESSMENT — PAIN SCALES - GENERAL: PAINLEVEL: MILD PAIN (2)

## 2018-12-06 NOTE — PROGRESS NOTES
ACUTE TRANSPLANT NEPHROLOGY VISIT    Assessment & Plan   # LDKT: baseline Cr ~ TBD; Stable   - Proteinuria: Not checked recently   - Latest DSA: No Date of DSA last checked: 11/2018   - BK: Not checked recently-at one month post transplant   - Kidney Tx Biopsy: No      # Immunosuppression: Tacrolimus immediate release (tac goal  8-10) and Mycophenolic acid 540 mg BID (goal  1-3.5). Tac level is 7.7 this am   - Changes: No    # Prophylaxis:   - PJP: TMP/Sulfa (Bactrim)   - CMV: She is CMV IgG+. On Valtrex 1g BID.    # Hypertension/Volume status: Controlled; Goal BP: < 130/80. Patient is not on antihypertensive medications.    # Diabetes: Controlled. On insulin pump    # Anemia in chronic renal disease: Hgb: Increased   - Iron studies: Replete    # Mineral Bone Disorder:    - Secondary renal hyperparathyroidism; PTH level is: Mildly elevated at 212. We will follow  - Vitamin D; level is: Normal  - Calcium; level is: Low at 8.2 but stable. We will follow  - Phosphorus; level is: Normal    # Electrolytes:   - Potassium; level: Normal  - Magnesium; level: Normal. We will discontinue magnesium oxide given persistent diarrhea  - Bicarbonate, level: Normal    # Diarrhea-likely secondary to medications.  - better with meatmucil    # Medical Compliance: Yes    - leg swelling: will check ultrasound (negative)  - Dysuria: will send ua and UC    Return visit: Follow-up tomorrow    # Transplant History:  Etiology of kidney failure: diabetes mellitus type 1  Tx: LDKT  Transplant: 11/20/2018 (Kidney), 11/4/1998 (Kidney)  Donor Type: Living Donor Class:   Crossmatch at time of Tx: negative  DSA at time of Tx: No  Significant changes in immunosuppression: None  CMV IgG Ab Discordance (D+/R-): No  EBV IgG Ab Discordance (D+/R-): No  Significant transplant-related complications: None    Transplant Office Phone Number: [phone]    Assessment and plan was discussed with the patient and she voiced her understanding and  agreement.    Ben Romano MD       Chief Complaint   Ms. Benavidez is a 58 year old female here for kidney transplant and immunosuppression management    History of Present Illness    Letty Benavidez is an 58 year old female with a PMHx significant for ESRD secondary to diabetic nephropathy and s/p LDKT in 11/1998 which failed on HD since 4/2017, and is now s/p LDKT with ureteral stent placement on 11/20/18, type I DM on insulin pump, pancreatic exocrine dysfunction, gastroparesis, diabetic enteropathy, CAD s/p CABG x 2 (10/2017), skin cancer. Patient received Thymoglobulin and steroids per protocol for induction. Received a total of 300mg of Thymo for a course of 5.8 mg/kg. Graft function is good, and creatinine has trended down since transplant. Feels better. Drain output>30 mL.      Recent Hospitalizations:  [] No [x] Yes LDKT   New Medical Issues: [x] No [] Yes    Decreased energy: [] No [x] Yes    Chest pain or SOB with exertion:  [x] No [] Yes    Appetite change or weight change: [x] No [] Yes    Nausea, vomiting or diarrhea:  [] No [x] Yes improved   Fever, sweats or chills: [x] No [] Yes    Leg swelling: [x] No [] Yes      Other medical issues:  No    Home BP: Not checked    Review of Systems   A comprehensive review of systems was obtained and negative, except as noted in the HPI or PMH.    Problem List   Patient Active Problem List   Diagnosis     Secondary hyperparathyroidism (H)     Anemia of chronic kidney failure     Dyslipidemia     Hypertension     Gastroparesis     Diabetic peripheral neuropathy (H)     Diabetic retinopathy (H)     Type 1 diabetes (H)     Pancreatic insufficiency     History of skin cancer     End stage kidney disease (H)     Immunosuppressed status (H)     Kidney transplanted     Dehydration       Social History   Social History   Substance Use Topics     Smoking status: Never Smoker     Smokeless tobacco: Never Used     Alcohol use No       Allergies   Allergies   Allergen  Reactions     Erythromycin      Not a drug allergy- Patient told to avoid while taking tacrolimus due to drug interaction. Ok to have if not taking tacrolimus     Metronidazole Nausea and Vomiting     Flagyl     Mycophenolate Nausea and Vomiting and Other (See Comments)     Nausea / Vomiting / Tremors     Neomycin Other (See Comments)     Not a drug allergy- Patient told to avoid while taking tacrolimus due to drug interaction. Ok to have if not taking tacrolimus       Medications   Current Outpatient Prescriptions   Medication Sig     amoxicillin-clavulanate (AUGMENTIN) 500-125 MG tablet Take 1 tablet by mouth 2 times daily     ondansetron (ZOFRAN) 4 MG tablet Take 1 tablet (4 mg) by mouth every 8 hours as needed for nausea (Patient not taking: Reported on 11/29/2018)     Amino Acids (LIQUACEL) LIQD Take 30 mLs by mouth 2 times daily Amino acids 16 grams, 2.5 g of arginine per pacakge -90 kcal/30mL liquid     aspirin 81 MG EC tablet Take 81 mg by mouth daily     atorvastatin (LIPITOR) 20 MG tablet Take 20 mg by mouth At Bedtime      Cholecalciferol (VITAMIN D3) 2000 units CAPS Take 1 capsule by mouth every evening With dinner     CO-ENZYME Q-10 PO Take 400 mg by mouth daily For cramps from lipitor     eszopiclone (LUNESTA) 1 MG TABS tablet Take 1 mg by mouth At Bedtime     gabapentin (NEURONTIN) 100 MG capsule Take 100 mg by mouth At Bedtime      insulin lispro (HUMALOG) 100 UNIT/ML VIAL Use with insulin pump     INSULIN PUMP - OUTPATIENT Inject 0.5 Units/hr Subcutaneous Carb ratio: 1:15g/hr  Sensitivity factor correction: 1 unit for every 75mg/dL over 100mg/dL     linaclotide (LINZESS) 290 MCG capsule Take 290 mcg by mouth every morning (before breakfast)      Magnesium Citrate 125 MG CAPS Take 1 capsule by mouth daily     multivitamin, therapeutic (THERA-VIT) TABS tablet Take 1 tablet by mouth daily     MYFORTIC (BRAND) 180 MG EC tablet Take 2 tablets (360 mg) by mouth 2 times daily     Pancrelipase,  Lip-Prot-Amyl, (ZENPEP PO) Take 2 capsules by mouth 3 times daily L-15, A-82, P-51     polyethylene glycol (MIRALAX/GLYCOLAX) powder Take 5.7 g by mouth every morning 1/3 of 17g, mix with Liquidcel and water (use as liquid to take with her other morning meds)     sulfamethoxazole-trimethoprim (BACTRIM/SEPTRA) 400-80 MG tablet Take 1 tablet by mouth daily     tacrolimus (GENERIC EQUIVALENT) 0.5 MG capsule Take one tablet twice daily as directed for dose adjustments.     tacrolimus (GENERIC EQUIVALENT) 1 MG capsule Take 3 capsules (3 mg) by mouth 2 times daily     triamcinolone (KENALOG) 0.1 % ointment APPLY TO AFFECTED AREA DAILY FOR 2 WEEKS THEN EVERY 3 DAYS FOR 1 WEEK AS NEEDED     valACYclovir (VALTREX) 1000 mg tablet Take 1 tablet (1,000 mg) by mouth 2 times daily     No current facility-administered medications for this visit.      There are no discontinued medications.    Physical Exam   Vital Signs: reviewed    GENERAL APPEARANCE: alert and no distress  HENT: mouth without ulcers or lesions  LYMPHATICS: no cervical or supraclavicular nodes  RESP: lungs clear to auscultation - no rales, rhonchi or wheezes  CV: regular rhythm, normal rate, no rub, no murmur  EDEMA: no LE edema bilaterally  ABDOMEN: soft, nondistended, nontender, bowel sounds normal  MS: extremities normal - no gross deformities noted, no evidence of inflammation in joints, no muscle tenderness  SKIN: no rash  Tx kidney: surgical incision is intact, CAMDEN drain in LLQ is draining serosanguineous fluid    Data     Renal Latest Ref Rng & Units 12/5/2018 12/3/2018 11/30/2018   Na 133 - 144 mmol/L 139 136 138   Na (external) 136 - 145 mEq/L - - -   K 3.4 - 5.3 mmol/L 4.8 4.7 4.8   K (external) 3.5 - 5.1 mEq/L - - -   Cl 94 - 109 mmol/L 108 108 109   Cl (external) 96 - 108 mEq/L - - -   CO2 20 - 32 mmol/L 21 20 21   CO2 (external) 22 - 29 mEq/L - - -   BUN 7 - 30 mg/dL 18 26 25   BUN (external) 6 - 19 mg/dL - - -   Cr 0.52 - 1.04 mg/dL 1.02 1.22(H) 1.03    Cr (external) 0.60 - 1.30 mg/dL - - -   Glucose 70 - 99 mg/dL 133(H) 197(H) 175(H)   Glucose (external) 90 - 180 mg/dL - - -   Ca  8.5 - 10.1 mg/dL 8.7 8.6 8.1(L)   Ca (external) 8.4 - 10.2 mg/dL - - -   Mg 1.6 - 2.3 mg/dL 1.9 - 2.0   Mg (external) 1.6 - 2.6 mg/dL - - -     Bone Health Latest Ref Rng & Units 12/5/2018 11/30/2018 11/29/2018   Phos 2.5 - 4.5 mg/dL 3.9 4.4 3.6   Phos (external) 2.6 - 4.5 mg/dL - - -   PTHi 18 - 80 pg/mL - - -   PTHi (external) 16 - 80 pg/mL - - -   Vit D Def 20 - 75 ug/L - - -   Vit D Def (external) 30.0 - 100.0 ng/mL - - -     Heme Latest Ref Rng & Units 12/5/2018 12/3/2018 11/30/2018   WBC 4.0 - 11.0 10e9/L 5.7 7.6 10.3   WBC (external) 4.80 - 10.80 1000/cmm - - -   Hgb 11.7 - 15.7 g/dL 9.3(L) 9.4(L) 8.6(L)   Hgb (external) 12.0 - 16.0 g/dL - - -   Plt 150 - 450 10e9/L 421 383 271   Plt (external) 1000/uL - - -     Liver Latest Ref Rng & Units 11/19/2018 10/2/2018 9/4/2018   AP 40 - 150 U/L 166(H) - -   AP (external) 35 - 104 U/L - 169(H) -   TBili 0.2 - 1.3 mg/dL 0.4 - -   TBili (external) 0.2 - 1.0 MG/DL - - -   ALT 0 - 50 U/L 35 - -   ALT (external) 12 - 78 U/L - - -   AST 0 - 45 U/L 24 - -   AST (external) 13 - 39 U/L - 24 -   Tot Protein 6.8 - 8.8 g/dL 7.2 - -   Tot Protein (external) 6.0 - 8.5 g/dL - 6.8 6.6   Albumin 3.4 - 5.0 g/dL 3.6 - -   Albumin (external) 3.5 - 5.2 g/dL - 4.3 4.1     Pancreas Latest Ref Rng & Units 11/20/2018 10/2/2018 7/19/2018   A1C 0 - 5.6 % 5.5 - -   A1C (external) 4.8 - 5.9 % - 5.6 5.4     Iron studies Latest Ref Rng & Units 11/19/2018 10/2/2018 7/19/2018   Iron 35 - 180 ug/dL 38 - -   Iron sat 15 - 46 % 18 - -   Ferritin 8 - 252 ng/mL 952(H) - -   Ferritin (external) 10 - 291 ng/mL - 1155(H) 1181     [facility] Txp Virology Latest Ref Rng & Units 11/19/2018 10/2/2018 5/8/2018   BK Spec - - - -   BK Res BKNEG copies/mL - - -   BK Log <2.7 Log copies/mL - - -   Hep B Core NR:Nonreactive Nonreactive - -   Hep B Core Ext Negative - Negative Negative   Hep B  Surf Ext  See comment mIU/mL - - <10        Recent Labs   Lab Test  11/30/18   0821  12/03/18   0753  12/05/18   0749   DOSTAC  2000 11/29/18 2000 12/02/2018 2000 12/04/2018   TACROL  8.3  10.2  9.3     Recent Labs   Lab Test  11/27/18   0725  11/30/18   0821  12/03/18   0753   DOSMPA  Not Provided  2000 11/29/18  Not Provided   MPACID  1.23  2.71  2.86   MPAG  56.7  56.5  68.1

## 2018-12-06 NOTE — MR AVS SNAPSHOT
After Visit Summary   12/6/2018    Letty Benavidez    MRN: [id number]           Patient Information     Date Of Birth          1960        Visit Information        Provider Department      12/6/2018 1:00 PM Julianna Thornton NP [facility] Solid Organ Transplant        Today's Diagnoses     Urinary tract infection without hematuria, site unspecified    -  1    Insomnia, unspecified type           Follow-ups after your visit        Your next 10 appointments already scheduled     Dec 11, 2018 11:30 AM CST   (Arrive by 11:00 AM)   Return Kidney Transplant with Uc Early Post Transplant   [facility] Nephrology ([facility])    [address]  [address]  [place] [address]   [phone]            Dec 17, 2018  9:00 AM CST   (Arrive by 8:45 AM)   Cystoscopy with Julianna Thornton NP   [facility] Solid Organ Transplant ([facility])    [address]  [address]  [place] [address]   [phone]            Gary 15, 2019 10:30 AM CST   (Arrive by 10:00 AM)   Return Kidney Transplant with Uc Early Post Transplant   [facility] ([facility])    [address]  [address]  [place] [address]   [phone]            Mar 11, 2019 10:00 AM CDT   (Arrive by 9:30 AM)   Return Kidney Transplant with Uc Early Post Transplant   [facility] ([facility])    [address]  [address]  [place] [address]   [phone]            May 06, 2019 10:00 AM CDT   (Arrive by 9:30 AM)   Return Kidney Transplant with Uc Early Post Transplant   [facility] ([facility])    [address]  [address]  [place] [address]   [phone]              Future tests that were ordered for you today     Open Future Orders        Priority Expected Expires Ordered    Routine UA with micro reflex to culture Routine  1/5/2019 12/6/2018     "Urine Culture Aerobic Bacterial Routine  1/5/2019 12/6/2018            Who to contact     If you have questions or need follow up information about today's clinic visit or your schedule please contact [facility] directly at [phone].  Normal or non-critical lab and imaging results will be communicated to you by Azoihart, letter or phone within 4 business days after the clinic has received the results. If you do not hear from us within 7 days, please contact the clinic through [employer] or phone. If you have a critical or abnormal lab result, we will notify you by phone as soon as possible.  Submit refill requests through [employer] or call your pharmacy and they will forward the refill request to us. Please allow 3 business days for your refill to be completed.          Additional Information About Your Visit        [employer] Information     [employer] gives you secure access to your electronic health record. If you see a primary care provider, you can also send messages to your care team and make appointments. If you have questions, please call your primary care clinic.  If you do not have a primary care provider, please call [phone] and they will assist you.        Care EveryWhere ID     This is your Care EveryWhere ID. This could be used by other organizations to access your [facility] medical records  [id number]        Your Vitals Were     Pulse Temperature Respirations Height Pulse Oximetry BMI (Body Mass Index)    83 97.9  F (36.6  C) (Oral) 16 1.6 m (5' 3\") 100% 20.12 kg/m2       Blood Pressure from Last 3 Encounters:   12/06/18 123/70   12/04/18 116/55   11/30/18 123/73    Weight from Last 3 Encounters:   12/06/18 51.5 kg (113 lb 9.6 oz)   11/29/18 51.2 kg (112 lb 12.8 oz)   11/28/18 52.2 kg (115 lb 1.6 oz)                 Today's Medication Changes          These changes are accurate as of 12/6/18  1:21 PM.  If you have any questions, ask your nurse or doctor.               These " medicines have changed or have updated prescriptions.        Dose/Directions    eszopiclone 1 MG tablet   Commonly known as:  LUNESTA   This may have changed:  how much to take   Used for:  Insomnia, unspecified type   Changed by:  Julianna Thornton, NP        Dose:  2 mg   Take 2 tablets (2 mg) by mouth At Bedtime   Quantity:  30 tablet   Refills:  1            Where to get your medicines      Some of these will need a paper prescription and others can be bought over the counter.  Ask your nurse if you have questions.     Bring a paper prescription for each of these medications     eszopiclone 1 MG tablet                Primary Care Provider Office Phone # Fax #    Anita Jurado [phone]       [place] [id number] [facility] [id number]        Equal Access to Services     [facility] : Kemi currano Somary, waaxda luqadaha, qaybta kaalmada adeegyada, jimmy sims . So [facility] [phone].    ATENCIÓN: Si habla español, tiene a guerin disposición servicios gratuitos de asistencia lingüística. [facility] [phone].    We comply with applicable federal civil rights laws and [place] laws. We do not discriminate on the basis of race, color, national origin, age, disability, sex, sexual orientation, or gender identity.            Thank you!     Thank you for choosing [facility]  for your care. Our goal is always to provide you with excellent care. Hearing back from our patients is one way we can continue to improve our services. Please take a few minutes to complete the written survey that you may receive in the mail after your visit with us. Thank you!             Your Updated Medication List - Protect others around you: Learn how to safely use, store and throw away your medicines at www.disposemymeds.org.          This list is accurate as of 12/6/18  1:21 PM.  Always use your most recent med list.                   Brand  Name Dispense Instructions for use Diagnosis    amoxicillin-clavulanate 500-125 MG tablet    AUGMENTIN    24 tablet    Take 1 tablet by mouth 2 times daily    Dysuria       aspirin 81 MG EC tablet      Take 81 mg by mouth daily        atorvastatin 20 MG tablet    LIPITOR     Take 20 mg by mouth At Bedtime        CO-ENZYME Q-10 PO      Take 400 mg by mouth daily For cramps from lipitor        eszopiclone 1 MG tablet    LUNESTA    30 tablet    Take 2 tablets (2 mg) by mouth At Bedtime    Insomnia, unspecified type       humaLOG 100 UNIT/ML vial   Generic drug:  insulin lispro      Use with insulin pump        insulin pump infusion      Inject 0.5 Units/hr Subcutaneous Carb ratio: 1:15g/hr Sensitivity factor correction: 1 unit for every 75mg/dL over 100mg/dL        LINZESS 290 MCG capsule   Generic drug:  linaclotide      Take 290 mcg by mouth every morning (before breakfast)        LIQUACEL Liqd      Take 30 mLs by mouth 2 times daily Amino acids 16 grams, 2.5 g of arginine per pacakge -90 kcal/30mL liquid        Magnesium Citrate 125 MG Caps      Take 1 capsule by mouth daily        multivitamin, therapeutic Tabs tablet     30 tablet    Take 1 tablet by mouth daily    Kidney transplanted       mycophenolic acid 180 MG EC tablet     180 tablet    Take 2 tablets (360 mg) by mouth 2 times daily    Kidney transplanted       NEURONTIN 100 MG capsule   Generic drug:  gabapentin      Take 100 mg by mouth At Bedtime        polyethylene glycol powder    MIRALAX/GLYCOLAX     Take 5.7 g by mouth every morning 1/3 of 17g, mix with Liquidcel and water (use as liquid to take with her other morning meds)        sulfamethoxazole-trimethoprim 400-80 MG tablet    BACTRIM/SEPTRA    30 tablet    Take 1 tablet by mouth daily    Kidney transplanted       * tacrolimus 0.5 MG capsule    GENERIC EQUIVALENT    60 capsule    Take one tablet twice daily as directed for dose adjustments.    Kidney transplanted       * tacrolimus 1 MG capsule     GENERIC EQUIVALENT    180 capsule    Take 3 capsules (3 mg) by mouth 2 times daily    Kidney transplanted       valACYclovir 1000 mg tablet    VALTREX    60 tablet    Take 1 tablet (1,000 mg) by mouth 2 times daily    Kidney transplanted       vitamin D3 2000 units Caps      Take 1 capsule by mouth every evening With dinner        ZENPEP PO      Take 2 capsules by mouth 3 times daily L-15, A-82, P-51        * Notice:  This list has 2 medication(s) that are the same as other medications prescribed for you. Read the directions carefully, and ask your doctor or other care provider to review them with you.

## 2018-12-06 NOTE — LETTER
"12/6/2018       RE: Letty Benavidez  [address]  [place] [address]     Dear Colleague,    Thank you for referring your patient, Letty Benavidez, to the [facility] SOLID ORGAN TRANSPLANT at [facility]. Please see a copy of my visit note below.        Agai  Chief Complaint   Patient presents with     RECHECK     16 day post op kidney tx       /70 (BP Location: Left arm, Patient Position: Sitting, Cuff Size: Adult Small)  Pulse 83  Temp 97.9  F (36.6  C) (Oral)  Resp 16  Ht 1.6 m (5' 3\")  Wt 51.5 kg (113 lb 9.6 oz)  SpO2 100%  BMI 20.12 kg/m2    Cele Avilez [facility]  12/6/2018 12:17 PM          Transplant Surgery Progress Note    Transplants:  11/20/2018 (Kidney), 11/4/1998 (Kidney); Postoperative day:  7341 (Kidney), 20 (Kidney)  S: Letty Benavidez is an 58 year old female with a history of DM type 1, diabetic nephropathy, CAD s/p CABG x 2 (10/2017), skin cancer, gastroparesis, pancreatic exocrine dysfunction, diabetic enteropathy, and ESRD s/p LDKT in 1998 which failed on HD since 4/2017, and is now s/p LDKT with ureteral stent placement on 11/20/18.      Overall doing well.  No fevers, chills, nausea or vomiting.  No hematuria, dysuria or frequency.  Finished her course of abx and states she feels better.    Her nausea is resolving with decreasing her myfortic.    Transplant History:    Transplant Type:  LDKT  Donor Type: Living   Transplant Date:  11/20/2018 (Kidney), 11/4/1998 (Kidney)   Ureteral Stent:  Yes   Crossmatch:  negative   DSA at Tx:  No  Baseline Cr: 1.1   DeNovo DSA: No    Acute Rejection Hx:  No    Present Maintenance Immunosuppression:  Tacrolimus and Mycophenolic acid    CMV IgG Ab Discordance:  Yes  EBV IgG Ab Discordance:  No    BK Viremia:  No  EBV Viremia:  No    Transplant Coordinator: Tania Khan     Transplant Office Phone Number: [phone]     Immunsupresent Medications     Immunosuppressive Agents Disp Start End    " MYFORTIC (BRAND) 180 MG EC tablet 180 tablet 12/7/2018     Sig - Route: Take 2 tablets (360 mg) by mouth 2 times daily - Oral    Class: E-Prescribe    tacrolimus (GENERIC EQUIVALENT) 0.5 MG capsule 60 capsule 12/7/2018     Sig: Take one tablet twice daily as directed for dose adjustments.    Class: E-Prescribe    tacrolimus (GENERIC EQUIVALENT) 1 MG capsule 180 capsule 12/7/2018     Sig - Route: Take 3 capsules (3 mg) by mouth 2 times daily - Oral    Class: E-Prescribe          Possible Immunosuppression-related side effects:   []             headache  []             vivid dreams  []             irritability  []             cognitive difficuties  []             fine tremor  []             nausea  []             diarrhea  []             neuropathy      []             edema  []             renal calcineurin toxicity  []             hyperkalemia  []             post-transplant diabetes  []             decreased appetite  []             increased appetite  []             other:  []             none    Prescription Medications as of 12/10/2018       Rx Number Disp Refills Start End Last Dispensed Date Next Fill Date Owning Pharmacy    Amino Acids (LIQUACEL) LIQD            Sig: Take 30 mLs by mouth 2 times daily Amino acids 16 grams, 2.5 g of arginine per pacakge -90 kcal/30mL liquid    Class: Historical    Route: Oral    amoxicillin-clavulanate (AUGMENTIN) 500-125 MG tablet  24 tablet 0 12/6/2018    [address]    Sig: Take 1 tablet by mouth 2 times daily    Class: E-Prescribe    Notes to Pharmacy: Continue through 12/19/18    Route: Oral    aspirin 81 MG EC tablet            Sig: Take 81 mg by mouth daily    Class: Historical    Route: Oral    atorvastatin (LIPITOR) 20 MG tablet    9/17/2018        Sig: Take 20 mg by mouth At Bedtime     Class: Historical    Route: Oral    Cholecalciferol (VITAMIN D3) 2000 units CAPS    6/10/2018        Sig: Take 1  capsule by mouth every evening With dinner    Class: Historical    Route: Oral    CO-ENZYME Q-10 PO            Sig: Take 400 mg by mouth daily For cramps from lipitor    Class: Historical    Route: Oral    eszopiclone (LUNESTA) 1 MG tablet  30 tablet 1 12/6/2018        Sig: Take 2 tablets (2 mg) by mouth At Bedtime    Class: Local Print    Route: Oral    gabapentin (NEURONTIN) 100 MG capsule    9/26/2016        Sig: Take 100 mg by mouth At Bedtime     Class: Historical    Route: Oral    insulin lispro (HUMALOG) 100 UNIT/ML VIAL    6/21/2016        Sig: Use with insulin pump    Class: Historical    Route: Subcutaneous    INSULIN PUMP - OUTPATIENT    2/17/1995        Sig: Inject 0.5 Units/hr Subcutaneous Carb ratio: 1:15g/hr  Sensitivity factor correction: 1 unit for every 75mg/dL over 100mg/dL    Class: Historical    Route: Subcutaneous    linaclotide (LINZESS) 290 MCG capsule    5/28/2017        Sig: Take 290 mcg by mouth every morning (before breakfast)     Class: Historical    Route: Oral    Magnesium Citrate 125 MG CAPS            Sig: Take 1 capsule by mouth daily    Class: Historical    Route: Oral    multivitamin, therapeutic (THERA-VIT) TABS tablet  30 tablet 2 11/27/2018    [facility] [place], [place] - [address] [facility]    Sig: Take 1 tablet by mouth daily    Class: E-Prescribe    Route: Oral    MYFORTIC (BRAND) 180 MG EC tablet  180 tablet 11 12/7/2018    [facility] SPECIAL[facility] [place], [place] - [address] [place]    Sig: Take 2 tablets (360 mg) by mouth 2 times daily    Class: E-Prescribe    Route: Oral    Pancrelipase, Lip-Prot-Amyl, (ZENPEP PO)            Sig: Take 2 capsules by mouth 3 times daily L-15, A-82, P-51    Class: Historical    Route: Oral    polyethylene glycol (MIRALAX/GLYCOLAX) powder            Sig: Take 5.7 g by mouth every morning 1/3 of 17g, mix with Liquidcel and water (use as liquid to take with her other morning meds)    Class: Historical    Route: Oral     sulfamethoxazole-trimethoprim (BACTRIM/SEPTRA) 400-80 MG tablet  30 tablet 11 12/3/2018    [facility] [address] AT [address] to [facility]    Sig: Take 1 tablet by mouth daily    Class: E-Prescribe    Route: Oral    tacrolimus (GENERIC EQUIVALENT) 0.5 MG capsule  60 capsule 11 12/7/2018    [address]    Sig: Take one tablet twice daily as directed for dose adjustments.    Class: E-Prescribe    tacrolimus (GENERIC EQUIVALENT) 1 MG capsule  180 capsule 11 12/7/2018    [address]    Sig: Take 3 capsules (3 mg) by mouth 2 times daily    Class: E-Prescribe    Route: Oral    valACYclovir (VALTREX) 1000 mg tablet  60 tablet 2 12/3/2018    [facility] [address] AT [place] to [facility]    Sig: Take 1 tablet (1,000 mg) by mouth 2 times daily    Class: E-Prescribe    Route: Oral          O:      General Appearance: in no apparent distress.   Skin: Normal, no rashes or jaundice  Heart: regular rate and rhythm, normal S1 and S2  Lungs: easy respirations, no audible wheezing.  Abdomen: flat, The wound is dry and intact, without hernia. The abdomen is non-tender. The kidney graft is not tender.  There is no ascites.  Extremities: Tremor absent.   Edema: absent.     Transplant Immunosuppression Labs Latest Ref Rng & Units 12/10/2018 12/7/2018 12/5/2018 12/3/2018 11/30/2018   Tacro Level 5.0 - 15.0 ug/L - 9.3 9.3 10.2 8.3   Tacro Level - - 8:00PM 12/06/18 2000 12/04/2018 2000 12/02/2018 2000 11/29/18   Creat 0.52 - 1.04 mg/dL 1.05(H) 1.14(H) 1.02 1.22(H) 1.03   BUN 7 - 30 mg/dL 23 24 18 26 25   WBC 4.0 - 11.0 10e9/L 3.8(L) 5.2 5.7 7.6 10.3   Neutrophil % 66.4 - 79.5 - 87.2   ANEU 1.6 - 8.3 10e9/L 2.5 - 4.7 - 9.0(H)       Chemistries:   Recent Labs   Lab Test 12/10/18  0805   BUN 23   CR 1.05*    GFRESTIMATED 53*   *     Lab Results   Component Value Date    A1C 5.5 11/20/2018    CPEPT <0.1 10/03/2016     Recent Labs   Lab Test 11/19/18  1036   ALBUMIN 3.6   BILITOTAL 0.4   ALKPHOS 166*   AST 24   ALT 35     Urine Studies:  Recent Labs   Lab Test 12/07/18  0742   COLOR Yellow   APPEARANCE Slightly Cloudy   URINEGLC 50*   URINEBILI Negative   URINEKETONE Negative   SG 1.019   UBLD Large*   URINEPH 5.0   PROTEIN Negative   NITRITE Negative   LEUKEST Negative   RBCU >182*   WBCU 18*     No lab results found.  Hematology:   Recent Labs   Lab Test 12/10/18  0805 12/07/18  0745 12/05/18  0749   HGB 9.4* 9.9* 9.3*    453* 421   WBC 3.8* 5.2 5.7     Coags:   Recent Labs   Lab Test 10/03/16  0653   INR 0.96     HLA antibodies:   SA1 Hi Risk Martha   Date Value Ref Range Status   11/30/2018 None  Final     SA1 Mod Risk Martha   Date Value Ref Range Status   11/30/2018 None  Final     SA2 Hi Risk Martha   Date Value Ref Range Status   11/30/2018 None  Final     SA2 Mod Risk Martha   Date Value Ref Range Status   11/30/2018 None  Final       Assessment: Letty Benavidez is doing well s/p LDKT:  Issues we addressed during her visit include:    Plan:    1. Graft function: cr is stable.  2. Immunosuppression Management: No change continue prograf and myfortic 360mg BID .  Complexity of management:Medium.  Contributing factors: nausea  3. George drain:  Removed.  Signs and symptoms of infection removed.   4. Insomnia:  Increase lunesta 2mg   Followup: as directed    Total Time: 35 min,   Counselling Time: 25 min.       n, thank you for allowing me to participate in the care of your patient.      Sincerely,    Julianna Thornton NP

## 2018-12-06 NOTE — TELEPHONE ENCOUNTER
Letty called with report from follow up with surgical NP who discussed UA / plan for cysto with Dr. Guzman -   NP to recheck UA, if UTI still present, will schedule cysto for 12/13 (current abx coverage to be completed 12/8/18). No need to extend abx coverage. If UA now negative, will keep cysto for 12/17/18.    Will complete plan per Julianna. Letty voiced understanding. Will NOT  reordered abx at [facility] unless directed by julianna.

## 2018-12-06 NOTE — LETTER
"12/6/2018      RE: Letty Benavidez  [address]  [place] [address]           [facility]  Chief Complaint   Patient presents with     RECHECK     16 day post op kidney tx       /70 (BP Location: Left arm, Patient Position: Sitting, Cuff Size: Adult Small)  Pulse 83  Temp 97.9  F (36.6  C) (Oral)  Resp 16  Ht 1.6 m (5' 3\")  Wt 51.5 kg (113 lb 9.6 oz)  SpO2 100%  BMI 20.12 kg/m2    Cele Avilez [facility]  12/6/2018 12:17 PM          Transplant Surgery Progress Note    Transplants:  11/20/2018 (Kidney), 11/4/1998 (Kidney); Postoperative day:  7341 (Kidney), 20 (Kidney)  S: Letty Benavidez is an 58 year old female with a history of DM type 1, diabetic nephropathy, CAD s/p CABG x 2 (10/2017), skin cancer, gastroparesis, pancreatic exocrine dysfunction, diabetic enteropathy, and ESRD s/p LDKT in 1998 which failed on HD since 4/2017, and is now s/p LDKT with ureteral stent placement on 11/20/18.      Overall doing well.  No fevers, chills, nausea or vomiting.  No hematuria, dysuria or frequency.  Finished her course of abx and states she feels better.    Her nausea is resolving with decreasing her myfortic.    Transplant History:    Transplant Type:  LDKT  Donor Type: Living   Transplant Date:  11/20/2018 (Kidney), 11/4/1998 (Kidney)   Ureteral Stent:  Yes   Crossmatch:  negative   DSA at Tx:  No  Baseline Cr: 1.1   DeNovo DSA: No    Acute Rejection Hx:  No    Present Maintenance Immunosuppression:  Tacrolimus and Mycophenolic acid    CMV IgG Ab Discordance:  Yes  EBV IgG Ab Discordance:  No    BK Viremia:  No  EBV Viremia:  No    Transplant Coordinator: Tania Khan     Transplant Office Phone Number: [phone]     Immunsupresent Medications     Immunosuppressive Agents Disp Start End    MYFORTIC (BRAND) 180 MG EC tablet 180 tablet 12/7/2018     Sig - Route: Take 2 tablets (360 mg) by mouth 2 times daily - Oral    Class: E-Prescribe    tacrolimus (GENERIC EQUIVALENT) 0.5 MG capsule 60 capsule " 12/7/2018     Sig: Take one tablet twice daily as directed for dose adjustments.    Class: E-Prescribe    tacrolimus (GENERIC EQUIVALENT) 1 MG capsule 180 capsule 12/7/2018     Sig - Route: Take 3 capsules (3 mg) by mouth 2 times daily - Oral    Class: E-Prescribe          Possible Immunosuppression-related side effects:   []             headache  []             vivid dreams  []             irritability  []             cognitive difficuties  []             fine tremor  []             nausea  []             diarrhea  []             neuropathy      []             edema  []             renal calcineurin toxicity  []             hyperkalemia  []             post-transplant diabetes  []             decreased appetite  []             increased appetite  []             other:  []             none    Prescription Medications as of 12/10/2018       Rx Number Disp Refills Start End Last Dispensed Date Next Fill Date Owning Pharmacy    Amino Acids (LIQUACEL) LIQD            Sig: Take 30 mLs by mouth 2 times daily Amino acids 16 grams, 2.5 g of arginine per pacakge -90 kcal/30mL liquid    Class: Historical    Route: Oral    amoxicillin-clavulanate (AUGMENTIN) 500-125 MG tablet  24 tablet 0 12/6/2018    [address]    Sig: Take 1 tablet by mouth 2 times daily    Class: E-Prescribe    Notes to Pharmacy: Continue through 12/19/18    Route: Oral    aspirin 81 MG EC tablet            Sig: Take 81 mg by mouth daily    Class: Historical    Route: Oral    atorvastatin (LIPITOR) 20 MG tablet    9/17/2018        Sig: Take 20 mg by mouth At Bedtime     Class: Historical    Route: Oral    Cholecalciferol (VITAMIN D3) 2000 units CAPS    6/10/2018        Sig: Take 1 capsule by mouth every evening With dinner    Class: Historical    Route: Oral    CO-ENZYME Q-10 PO            Sig: Take 400 mg by mouth daily For cramps from lipitor    Class: Historical    Route: Oral     eszopiclone (LUNESTA) 1 MG tablet  30 tablet 1 12/6/2018        Sig: Take 2 tablets (2 mg) by mouth At Bedtime    Class: Local Print    Route: Oral    gabapentin (NEURONTIN) 100 MG capsule    9/26/2016        Sig: Take 100 mg by mouth At Bedtime     Class: Historical    Route: Oral    insulin lispro (HUMALOG) 100 UNIT/ML VIAL    6/21/2016        Sig: Use with insulin pump    Class: Historical    Route: Subcutaneous    INSULIN PUMP - OUTPATIENT    2/17/1995        Sig: Inject 0.5 Units/hr Subcutaneous Carb ratio: 1:15g/hr  Sensitivity factor correction: 1 unit for every 75mg/dL over 100mg/dL    Class: Historical    Route: Subcutaneous    linaclotide (LINZESS) 290 MCG capsule    5/28/2017        Sig: Take 290 mcg by mouth every morning (before breakfast)     Class: Historical    Route: Oral    Magnesium Citrate 125 MG CAPS            Sig: Take 1 capsule by mouth daily    Class: Historical    Route: Oral    multivitamin, therapeutic (THERA-VIT) TABS tablet  30 tablet 2 11/27/2018    [facility] [place], [place] - [address] [place] [address]    Sig: Take 1 tablet by mouth daily    Class: E-Prescribe    Route: Oral    MYFORTIC (BRAND) 180 MG EC tablet  180 tablet 11 12/7/2018    [facility] [place] [address]    Sig: Take 2 tablets (360 mg) by mouth 2 times daily    Class: E-Prescribe    Route: Oral    Pancrelipase, Lip-Prot-Amyl, (ZENPEP PO)            Sig: Take 2 capsules by mouth 3 times daily L-15, A-82, P-51    Class: Historical    Route: Oral    polyethylene glycol (MIRALAX/GLYCOLAX) powder            Sig: Take 5.7 g by mouth every morning 1/3 of 17g, mix with Liquidcel and water (use as liquid to take with her other morning meds)    Class: Historical    Route: Oral    sulfamethoxazole-trimethoprim (BACTRIM/SEPTRA) 400-80 MG tablet  30 tablet 11 12/3/2018    [facility] - [place], [place] - [address] AT [address] to Registered [facility] Sites     Sig: Take 1 tablet by mouth daily    Class: E-Prescribe    Route: Oral    tacrolimus (GENERIC EQUIVALENT) 0.5 MG capsule  60 capsule 11 12/7/2018    [facility], [place] - [address] [facility] [address] [facility] [address]    Sig: Take one tablet twice daily as directed for dose adjustments.    Class: E-Prescribe    tacrolimus (GENERIC EQUIVALENT) 1 MG capsule  180 capsule 11 12/7/2018    [facility] [address] [facility] [address]    Sig: Take 3 capsules (3 mg) by mouth 2 times daily    Class: E-Prescribe    Route: Oral    valACYclovir (VALTREX) 1000 mg tablet  60 tablet 2 12/3/2018    [facility] - [place], [place] - [address] AT [address] to Registered [facility] Sites    Sig: Take 1 tablet (1,000 mg) by mouth 2 times daily    Class: E-Prescribe    Route: Oral          O:      General Appearance: in no apparent distress.   Skin: Normal, no rashes or jaundice  Heart: regular rate and rhythm, normal S1 and S2  Lungs: easy respirations, no audible wheezing.  Abdomen: flat, The wound is dry and intact, without hernia. The abdomen is non-tender. The kidney graft is not tender.  There is no ascites.  Extremities: Tremor absent.   Edema: absent.     Transplant Immunosuppression Labs Latest Ref Rng & Units 12/10/2018 12/7/2018 12/5/2018 12/3/2018 11/30/2018   Tacro Level 5.0 - 15.0 ug/L - 9.3 9.3 10.2 8.3   Tacro Level - - 8:00PM 12/06/18 2000 12/04/2018 2000 12/02/2018 2000 11/29/18   Creat 0.52 - 1.04 mg/dL 1.05(H) 1.14(H) 1.02 1.22(H) 1.03   BUN 7 - 30 mg/dL 23 24 18 26 25   WBC 4.0 - 11.0 10e9/L 3.8(L) 5.2 5.7 7.6 10.3   Neutrophil % 66.4 - 79.5 - 87.2   ANEU 1.6 - 8.3 10e9/L 2.5 - 4.7 - 9.0(H)       Chemistries:   Recent Labs   Lab Test 12/10/18  0805   BUN 23   CR 1.05*   GFRESTIMATED 53*   *     Lab Results   Component Value Date    A1C 5.5 11/20/2018    CPEPT <0.1 10/03/2016     Recent Labs   Lab Test 11/19/18  1036   ALBUMIN 3.6   BILITOTAL 0.4   ALKPHOS 166*   AST 24    ALT 35     Urine Studies:  Recent Labs   Lab Test 12/07/18  0742   COLOR Yellow   APPEARANCE Slightly Cloudy   URINEGLC 50*   URINEBILI Negative   URINEKETONE Negative   SG 1.019   UBLD Large*   URINEPH 5.0   PROTEIN Negative   NITRITE Negative   LEUKEST Negative   RBCU >182*   WBCU 18*     No lab results found.  Hematology:   Recent Labs   Lab Test 12/10/18  0805 12/07/18  0745 12/05/18  0749   HGB 9.4* 9.9* 9.3*    453* 421   WBC 3.8* 5.2 5.7     Coags:   Recent Labs   Lab Test 10/03/16  0653   INR 0.96     HLA antibodies:   SA1 Hi Risk Martha   Date Value Ref Range Status   11/30/2018 None  Final     SA1 Mod Risk Martha   Date Value Ref Range Status   11/30/2018 None  Final     SA2 Hi Risk Martha   Date Value Ref Range Status   11/30/2018 None  Final     SA2 Mod Risk Martha   Date Value Ref Range Status   11/30/2018 None  Final       Assessment: Letty Benavidez is doing well s/p LDKT:  Issues we addressed during her visit include:    Plan:    1. Graft function: cr is stable.  2. Immunosuppression Management: No change continue prograf and myfortic 360mg BID .  Complexity of management:Medium.  Contributing factors: nausea  3. George drain:  Removed.  Signs and symptoms of infection removed.   4. Insomnia:  Increase lunesta 2mg   Followup: as directed    Total Time: 35 min,   Counselling Time: 25 min.      kartik Thornton NP

## 2018-12-06 NOTE — PROGRESS NOTES
ACUTE TRANSPLANT NEPHROLOGY VISIT    Assessment & Plan   # LDKT: baseline Cr ~ TBD; Stable   - Proteinuria: Not checked recently   - Latest DSA: No Date of DSA last checked: 11/2018   - BK: Not checked recently-at one month post transplant   - Kidney Tx Biopsy: No      # Immunosuppression: Tacrolimus immediate release tac goal  (8-10) and Mycophenolic acid 540 mg BID (goal  1-3.5). Tac level is 7.7 this am   - Changes: No    # Prophylaxis:   - PJP: TMP/Sulfa (Bactrim)   - CMV: She is CMV IgG+. On Valtrex 1g BID.    # Hypertension/Volume status: Controlled; Goal BP: < 130/80. Patient is not on antihypertensive medications. Suggested 1 liter of NS but patient will try to hydrate and let us know if she needs additional help     # Diabetes: Controlled. On insulin pump    # Anemia in chronic renal disease: Hgb: Increased   - Iron studies: Replete    # Mineral Bone Disorder:    - Secondary renal hyperparathyroidism; PTH level is: Mildly elevated at 212. We will follow  - Vitamin D; level is: Normal  - Calcium; level is: Low at 8.2 but stable. We will follow  - Phosphorus; level is: Normal    # Electrolytes:   - Potassium; level: Normal  - Magnesium; level: Normal. We will discontinue magnesium oxide given persistent diarrhea  - Bicarbonate, level: Normal    # Diarrhea-likely secondary to medications.  - improved     # Medical Compliance: Yes    - leg swelling: ultrasound was negative for clot   - UA with possible infection will start Augmentin 500-125 mg po bid     Return visit: Follow-up tomorrow    # Transplant History:  Etiology of kidney failure: diabetes mellitus type 1  Tx: LDKT  Transplant: 11/20/2018 (Kidney), 11/4/1998 (Kidney)  Donor Type: Living Donor Class:   Crossmatch at time of Tx: negative  DSA at time of Tx: No  Significant changes in immunosuppression: None  CMV IgG Ab Discordance (D+/R-): No  EBV IgG Ab Discordance (D+/R-): No  Significant transplant-related complications: None    Transplant Office  Phone Number: [phone]    Assessment and plan was discussed with the patient and she voiced her understanding and agreement.    Ben Romano MD       Chief Complaint   Ms. Benavidez is a 58 year old female here for kidney transplant and immunosuppression management    History of Present Illness    Letty Benavidez is an 58 year old female with a PMHx significant for ESRD secondary to diabetic nephropathy and s/p LDKT in 11/1998 which failed on HD since 4/2017, and is now s/p LDKT with ureteral stent placement on 11/20/18, type I DM on insulin pump, pancreatic exocrine dysfunction, gastroparesis, diabetic enteropathy, CAD s/p CABG x 2 (10/2017), skin cancer. Patient received Thymoglobulin and steroids per protocol for induction. Received a total of 300mg of Thymo for a course of 5.8 mg/kg. Graft function is good, and creatinine has trended down since transplant.   No new complaints. She had completed the work up yesterday and the results were reviewed.      Recent Hospitalizations:  [] No [x] Yes LDKT   New Medical Issues: [x] No [] Yes    Decreased energy: [] No [x] Yes    Chest pain or SOB with exertion:  [x] No [] Yes    Appetite change or weight change: [x] No [] Yes    Nausea, vomiting or diarrhea:  [] No [x] Yes Improved    Fever, sweats or chills: [x] No [] Yes    Leg swelling: [x] No [] Yes      Other medical issues:  No    Home BP: Not checked    Review of Systems   A comprehensive review of systems was obtained and negative, except as noted in the HPI or PMH.    Problem List   Patient Active Problem List   Diagnosis     Secondary hyperparathyroidism (H)     Anemia of chronic kidney failure     Dyslipidemia     Hypertension     Gastroparesis     Diabetic peripheral neuropathy (H)     Diabetic retinopathy (H)     Type 1 diabetes (H)     Pancreatic insufficiency     History of skin cancer     End stage kidney disease (H)     Immunosuppressed status (H)     Kidney transplanted     Dehydration       Social  History   Social History   Substance Use Topics     Smoking status: Never Smoker     Smokeless tobacco: Never Used     Alcohol use No       Allergies   Allergies   Allergen Reactions     Erythromycin      Not a drug allergy- Patient told to avoid while taking tacrolimus due to drug interaction. Ok to have if not taking tacrolimus     Metronidazole Nausea and Vomiting     Flagyl     Mycophenolate Nausea and Vomiting and Other (See Comments)     Nausea / Vomiting / Tremors     Neomycin Other (See Comments)     Not a drug allergy- Patient told to avoid while taking tacrolimus due to drug interaction. Ok to have if not taking tacrolimus       Medications   Current Outpatient Prescriptions   Medication Sig     Amino Acids (LIQUACEL) LIQD Take 30 mLs by mouth 2 times daily Amino acids 16 grams, 2.5 g of arginine per pacakge -90 kcal/30mL liquid     amoxicillin-clavulanate (AUGMENTIN) 500-125 MG tablet Take 1 tablet by mouth 2 times daily     aspirin 81 MG EC tablet Take 81 mg by mouth daily     atorvastatin (LIPITOR) 20 MG tablet Take 20 mg by mouth At Bedtime      Cholecalciferol (VITAMIN D3) 2000 units CAPS Take 1 capsule by mouth every evening With dinner     CO-ENZYME Q-10 PO Take 400 mg by mouth daily For cramps from lipitor     eszopiclone (LUNESTA) 1 MG TABS tablet Take 1 mg by mouth At Bedtime     gabapentin (NEURONTIN) 100 MG capsule Take 100 mg by mouth At Bedtime      insulin lispro (HUMALOG) 100 UNIT/ML VIAL Use with insulin pump     INSULIN PUMP - OUTPATIENT Inject 0.5 Units/hr Subcutaneous Carb ratio: 1:15g/hr  Sensitivity factor correction: 1 unit for every 75mg/dL over 100mg/dL     linaclotide (LINZESS) 290 MCG capsule Take 290 mcg by mouth every morning (before breakfast)      Magnesium Citrate 125 MG CAPS Take 1 capsule by mouth daily     multivitamin, therapeutic (THERA-VIT) TABS tablet Take 1 tablet by mouth daily     MYFORTIC (BRAND) 180 MG EC tablet Take 2 tablets (360 mg) by mouth 2 times daily      ondansetron (ZOFRAN) 4 MG tablet Take 1 tablet (4 mg) by mouth every 8 hours as needed for nausea (Patient not taking: Reported on 11/29/2018)     Pancrelipase, Lip-Prot-Amyl, (ZENPEP PO) Take 2 capsules by mouth 3 times daily L-15, A-82, P-51     polyethylene glycol (MIRALAX/GLYCOLAX) powder Take 5.7 g by mouth every morning 1/3 of 17g, mix with Liquidcel and water (use as liquid to take with her other morning meds)     sulfamethoxazole-trimethoprim (BACTRIM/SEPTRA) 400-80 MG tablet Take 1 tablet by mouth daily     tacrolimus (GENERIC EQUIVALENT) 0.5 MG capsule Take one tablet twice daily as directed for dose adjustments.     tacrolimus (GENERIC EQUIVALENT) 1 MG capsule Take 3 capsules (3 mg) by mouth 2 times daily     triamcinolone (KENALOG) 0.1 % ointment APPLY TO AFFECTED AREA DAILY FOR 2 WEEKS THEN EVERY 3 DAYS FOR 1 WEEK AS NEEDED     valACYclovir (VALTREX) 1000 mg tablet Take 1 tablet (1,000 mg) by mouth 2 times daily     No current facility-administered medications for this visit.      There are no discontinued medications.    Physical Exam   Vital Signs: Reviewed     GENERAL APPEARANCE: alert and no distress  HENT: mouth without ulcers or lesions  LYMPHATICS: no cervical or supraclavicular nodes  RESP: lungs clear to auscultation - no rales, rhonchi or wheezes  CV: regular rhythm, normal rate, no rub, no murmur  EDEMA: no LE edema bilaterally  ABDOMEN: soft, nondistended, nontender, bowel sounds normal  MS: extremities normal - no gross deformities noted, no evidence of inflammation in joints, no muscle tenderness  SKIN: no rash  Tx kidney: surgical incision is intact, CAMDEN drain in LLQ is draining serosanguineous fluid    Data     Renal Latest Ref Rng & Units 12/5/2018 12/3/2018 11/30/2018   Na 133 - 144 mmol/L 139 136 138   Na (external) 136 - 145 mEq/L - - -   K 3.4 - 5.3 mmol/L 4.8 4.7 4.8   K (external) 3.5 - 5.1 mEq/L - - -   Cl 94 - 109 mmol/L 108 108 109   Cl (external) 96 - 108 mEq/L - - -   CO2 20 -  32 mmol/L 21 20 21   CO2 (external) 22 - 29 mEq/L - - -   BUN 7 - 30 mg/dL 18 26 25   BUN (external) 6 - 19 mg/dL - - -   Cr 0.52 - 1.04 mg/dL 1.02 1.22(H) 1.03   Cr (external) 0.60 - 1.30 mg/dL - - -   Glucose 70 - 99 mg/dL 133(H) 197(H) 175(H)   Glucose (external) 90 - 180 mg/dL - - -   Ca  8.5 - 10.1 mg/dL 8.7 8.6 8.1(L)   Ca (external) 8.4 - 10.2 mg/dL - - -   Mg 1.6 - 2.3 mg/dL 1.9 - 2.0   Mg (external) 1.6 - 2.6 mg/dL - - -     Bone Health Latest Ref Rng & Units 12/5/2018 11/30/2018 11/29/2018   Phos 2.5 - 4.5 mg/dL 3.9 4.4 3.6   Phos (external) 2.6 - 4.5 mg/dL - - -   PTHi 18 - 80 pg/mL - - -   PTHi (external) 16 - 80 pg/mL - - -   Vit D Def 20 - 75 ug/L - - -   Vit D Def (external) 30.0 - 100.0 ng/mL - - -     Heme Latest Ref Rng & Units 12/5/2018 12/3/2018 11/30/2018   WBC 4.0 - 11.0 10e9/L 5.7 7.6 10.3   WBC (external) 4.80 - 10.80 1000/cmm - - -   Hgb 11.7 - 15.7 g/dL 9.3(L) 9.4(L) 8.6(L)   Hgb (external) 12.0 - 16.0 g/dL - - -   Plt 150 - 450 10e9/L 421 383 271   Plt (external) 1000/uL - - -     Liver Latest Ref Rng & Units 11/19/2018 10/2/2018 9/4/2018   AP 40 - 150 U/L 166(H) - -   AP (external) 35 - 104 U/L - 169(H) -   TBili 0.2 - 1.3 mg/dL 0.4 - -   TBili (external) 0.2 - 1.0 MG/DL - - -   ALT 0 - 50 U/L 35 - -   ALT (external) 12 - 78 U/L - - -   AST 0 - 45 U/L 24 - -   AST (external) 13 - 39 U/L - 24 -   Tot Protein 6.8 - 8.8 g/dL 7.2 - -   Tot Protein (external) 6.0 - 8.5 g/dL - 6.8 6.6   Albumin 3.4 - 5.0 g/dL 3.6 - -   Albumin (external) 3.5 - 5.2 g/dL - 4.3 4.1     Pancreas Latest Ref Rng & Units 11/20/2018 10/2/2018 7/19/2018   A1C 0 - 5.6 % 5.5 - -   A1C (external) 4.8 - 5.9 % - 5.6 5.4     Iron studies Latest Ref Rng & Units 11/19/2018 10/2/2018 7/19/2018   Iron 35 - 180 ug/dL 38 - -   Iron sat 15 - 46 % 18 - -   Ferritin 8 - 252 ng/mL 952(H) - -   Ferritin (external) 10 - 291 ng/mL - 1155(H) 1181     [facility] Txp Virology Latest Ref Rng & Units 11/19/2018 10/2/2018 5/8/2018   BK Spec - - - -    BK Res BKNEG copies/mL - - -   BK Log <2.7 Log copies/mL - - -   Hep B Core NR:Nonreactive Nonreactive - -   Hep B Core Ext Negative - Negative Negative   Hep B Surf Ext  See comment mIU/mL - - <10        Recent Labs   Lab Test  11/30/18   0821 12/03/18   0753  12/05/18   0749   DOSTAC  2000 11/29/18 2000 12/02/2018 2000 12/04/2018   TACROL  8.3  10.2  9.3     Recent Labs   Lab Test  11/27/18   0725  11/30/18   0821 12/03/18   0753   DOSMPA  Not Provided  2000 11/29/18  Not Provided   MPACID  1.23  2.71  2.86   MPAG  56.7  56.5  68.1

## 2018-12-06 NOTE — PROGRESS NOTES
"  Chief Complaint   Patient presents with     RECHECK     16 day post op kidney tx       /70 (BP Location: Left arm, Patient Position: Sitting, Cuff Size: Adult Small)  Pulse 83  Temp 97.9  F (36.6  C) (Oral)  Resp 16  Ht 1.6 m (5' 3\")  Wt 51.5 kg (113 lb 9.6 oz)  SpO2 100%  BMI 20.12 kg/m2    Cele Avilez [facility]  12/6/2018 12:17 PM        "

## 2018-12-07 ENCOUNTER — TELEPHONE (OUTPATIENT)
Dept: TRANSPLANT | Facility: CLINIC | Age: 58
End: 2018-12-07

## 2018-12-07 DIAGNOSIS — Z48.298 AFTERCARE FOLLOWING ORGAN TRANSPLANT: ICD-10-CM

## 2018-12-07 DIAGNOSIS — Z94.0 KIDNEY REPLACED BY TRANSPLANT: ICD-10-CM

## 2018-12-07 DIAGNOSIS — N39.0 URINARY TRACT INFECTION WITHOUT HEMATURIA, SITE UNSPECIFIED: ICD-10-CM

## 2018-12-07 DIAGNOSIS — Z94.0 KIDNEY TRANSPLANTED: ICD-10-CM

## 2018-12-07 DIAGNOSIS — R30.0 DYSURIA: ICD-10-CM

## 2018-12-07 LAB
ALBUMIN UR-MCNC: NEGATIVE MG/DL
ANION GAP SERPL CALCULATED.3IONS-SCNC: 8 MMOL/L (ref 3–14)
APPEARANCE UR: ABNORMAL
BACTERIA #/AREA URNS HPF: ABNORMAL /HPF
BILIRUB UR QL STRIP: NEGATIVE
BKV DNA # SPEC NAA+PROBE: NORMAL COPIES/ML
BKV DNA SPEC NAA+PROBE-LOG#: NORMAL LOG COPIES/ML
BUN SERPL-MCNC: 24 MG/DL (ref 7–30)
CALCIUM SERPL-MCNC: 9.1 MG/DL (ref 8.5–10.1)
CHLORIDE SERPL-SCNC: 105 MMOL/L (ref 94–109)
CO2 SERPL-SCNC: 25 MMOL/L (ref 20–32)
COLOR UR AUTO: YELLOW
CREAT SERPL-MCNC: 1.14 MG/DL (ref 0.52–1.04)
ERYTHROCYTE [DISTWIDTH] IN BLOOD BY AUTOMATED COUNT: 19.2 % (ref 10–15)
GFR SERPL CREATININE-BSD FRML MDRD: 49 ML/MIN/1.7M2
GLUCOSE SERPL-MCNC: 58 MG/DL (ref 70–99)
GLUCOSE UR STRIP-MCNC: 50 MG/DL
HCT VFR BLD AUTO: 31.6 % (ref 35–47)
HGB BLD-MCNC: 9.9 G/DL (ref 11.7–15.7)
HGB UR QL STRIP: ABNORMAL
KETONES UR STRIP-MCNC: NEGATIVE MG/DL
LEUKOCYTE ESTERASE UR QL STRIP: NEGATIVE
MCH RBC QN AUTO: 32.8 PG (ref 26.5–33)
MCHC RBC AUTO-ENTMCNC: 31.3 G/DL (ref 31.5–36.5)
MCV RBC AUTO: 105 FL (ref 78–100)
NITRATE UR QL: NEGATIVE
PH UR STRIP: 5 PH (ref 5–7)
PLATELET # BLD AUTO: 453 10E9/L (ref 150–450)
POTASSIUM SERPL-SCNC: 4.7 MMOL/L (ref 3.4–5.3)
RBC # BLD AUTO: 3.02 10E12/L (ref 3.8–5.2)
RBC #/AREA URNS AUTO: >182 /HPF (ref 0–2)
SODIUM SERPL-SCNC: 138 MMOL/L (ref 133–144)
SOURCE: ABNORMAL
SP GR UR STRIP: 1.02 (ref 1–1.03)
SPECIMEN SOURCE: NORMAL
SQUAMOUS #/AREA URNS AUTO: 1 /HPF (ref 0–1)
TACROLIMUS BLD-MCNC: 9.3 UG/L (ref 5–15)
TME LAST DOSE: NORMAL H
UROBILINOGEN UR STRIP-MCNC: 0 MG/DL (ref 0–2)
WBC # BLD AUTO: 5.2 10E9/L (ref 4–11)
WBC #/AREA URNS AUTO: 18 /HPF (ref 0–5)

## 2018-12-07 RX ORDER — TACROLIMUS 1 MG/1
3 CAPSULE ORAL 2 TIMES DAILY
Qty: 180 CAPSULE | Refills: 11 | Status: SHIPPED | OUTPATIENT
Start: 2018-12-07 | End: 2018-12-17

## 2018-12-07 RX ORDER — MYCOPHENOLIC ACID 180 MG/1
360 TABLET, DELAYED RELEASE ORAL 2 TIMES DAILY
Qty: 180 TABLET | Refills: 11 | Status: SHIPPED | OUTPATIENT
Start: 2018-12-07 | End: 2018-12-20

## 2018-12-07 RX ORDER — TACROLIMUS 0.5 MG/1
CAPSULE ORAL
Qty: 60 CAPSULE | Refills: 11 | Status: SHIPPED | OUTPATIENT
Start: 2018-12-07 | End: 2018-12-17

## 2018-12-07 NOTE — TELEPHONE ENCOUNTER
12/6/ UA reviwed with Julianna Thornton NP.   Will extend current augmentin through 12/15 and bump stent removal to 12/13.    Letty voiced understanding.

## 2018-12-08 LAB
BACTERIA SPEC CULT: NORMAL
Lab: NORMAL
SPECIMEN SOURCE: NORMAL

## 2018-12-10 ENCOUNTER — TELEPHONE (OUTPATIENT)
Dept: TRANSPLANT | Facility: CLINIC | Age: 58
End: 2018-12-10

## 2018-12-10 DIAGNOSIS — Z48.298 AFTERCARE FOLLOWING ORGAN TRANSPLANT: ICD-10-CM

## 2018-12-10 DIAGNOSIS — Z94.0 KIDNEY REPLACED BY TRANSPLANT: ICD-10-CM

## 2018-12-10 LAB
ANION GAP SERPL CALCULATED.3IONS-SCNC: 8 MMOL/L (ref 3–14)
BASOPHILS # BLD AUTO: 0 10E9/L (ref 0–0.2)
BASOPHILS NFR BLD AUTO: 0.5 %
BUN SERPL-MCNC: 23 MG/DL (ref 7–30)
CALCIUM SERPL-MCNC: 8.7 MG/DL (ref 8.5–10.1)
CHLORIDE SERPL-SCNC: 105 MMOL/L (ref 94–109)
CO2 SERPL-SCNC: 24 MMOL/L (ref 20–32)
CREAT SERPL-MCNC: 1.05 MG/DL (ref 0.52–1.04)
DIFFERENTIAL METHOD BLD: ABNORMAL
EOSINOPHIL # BLD AUTO: 0.1 10E9/L (ref 0–0.7)
EOSINOPHIL NFR BLD AUTO: 3.4 %
ERYTHROCYTE [DISTWIDTH] IN BLOOD BY AUTOMATED COUNT: 20.1 % (ref 10–15)
GFR SERPL CREATININE-BSD FRML MDRD: 53 ML/MIN/1.7M2
GLUCOSE SERPL-MCNC: 172 MG/DL (ref 70–99)
HCT VFR BLD AUTO: 29.6 % (ref 35–47)
HGB BLD-MCNC: 9.4 G/DL (ref 11.7–15.7)
IMM GRANULOCYTES # BLD: 0 10E9/L (ref 0–0.4)
IMM GRANULOCYTES NFR BLD: 0.5 %
LYMPHOCYTES # BLD AUTO: 0.6 10E9/L (ref 0.8–5.3)
LYMPHOCYTES NFR BLD AUTO: 14.9 %
MAGNESIUM SERPL-MCNC: 1.7 MG/DL (ref 1.6–2.3)
MCH RBC QN AUTO: 33.6 PG (ref 26.5–33)
MCHC RBC AUTO-ENTMCNC: 31.8 G/DL (ref 31.5–36.5)
MCV RBC AUTO: 106 FL (ref 78–100)
MONOCYTES # BLD AUTO: 0.5 10E9/L (ref 0–1.3)
MONOCYTES NFR BLD AUTO: 14.3 %
NEUTROPHILS # BLD AUTO: 2.5 10E9/L (ref 1.6–8.3)
NEUTROPHILS NFR BLD AUTO: 66.4 %
NRBC # BLD AUTO: 0 10*3/UL
NRBC BLD AUTO-RTO: 0 /100
PHOSPHATE SERPL-MCNC: 4.3 MG/DL (ref 2.5–4.5)
PLATELET # BLD AUTO: 400 10E9/L (ref 150–450)
POTASSIUM SERPL-SCNC: 5 MMOL/L (ref 3.4–5.3)
RBC # BLD AUTO: 2.8 10E12/L (ref 3.8–5.2)
SODIUM SERPL-SCNC: 137 MMOL/L (ref 133–144)
TACROLIMUS BLD-MCNC: 8.6 UG/L (ref 5–15)
TME LAST DOSE: NORMAL H
WBC # BLD AUTO: 3.8 10E9/L (ref 4–11)

## 2018-12-10 NOTE — TELEPHONE ENCOUNTER
Patient Call: Medication Clarification  Pt has questions regarding medications in general and medication side effects    Patient Call: Transplant Lab/Orders  Route to LPN  Post Transplant Days: 7341 (Kidney), 20 (Kidney)  When patient is less than 60 days post-transplant, route high priority    Reason for Call: Discuss lab results; which results? General  Callback needed? Yes    Return Call Needed  Same as documented in contacts section  When to return call?: Greater than one day: Route standard priority

## 2018-12-10 NOTE — PROGRESS NOTES
Transplant Surgery Progress Note    Transplants:  11/20/2018 (Kidney), 11/4/1998 (Kidney); Postoperative day:  7341 (Kidney), 20 (Kidney)  S: Letty Benavidez is an 58 year old female with a history of DM type 1, diabetic nephropathy, CAD s/p CABG x 2 (10/2017), skin cancer, gastroparesis, pancreatic exocrine dysfunction, diabetic enteropathy, and ESRD s/p LDKT in 1998 which failed on HD since 4/2017, and is now s/p LDKT with ureteral stent placement on 11/20/18.      Overall doing well.  No fevers, chills, nausea or vomiting.  No hematuria, dysuria or frequency.  Finished her course of abx and states she feels better.    Her nausea is resolving with decreasing her myfortic.    Transplant History:    Transplant Type:  LDKT  Donor Type: Living   Transplant Date:  11/20/2018 (Kidney), 11/4/1998 (Kidney)   Ureteral Stent:  Yes   Crossmatch:  negative   DSA at Tx:  No  Baseline Cr: 1.1   DeNovo DSA: No    Acute Rejection Hx:  No    Present Maintenance Immunosuppression:  Tacrolimus and Mycophenolic acid    CMV IgG Ab Discordance:  Yes  EBV IgG Ab Discordance:  No    BK Viremia:  No  EBV Viremia:  No    Transplant Coordinator: Tania Khan     Transplant Office Phone Number: [phone]     Immunsupresent Medications     Immunosuppressive Agents Disp Start End    MYFORTIC (BRAND) 180 MG EC tablet 180 tablet 12/7/2018     Sig - Route: Take 2 tablets (360 mg) by mouth 2 times daily - Oral    Class: E-Prescribe    tacrolimus (GENERIC EQUIVALENT) 0.5 MG capsule 60 capsule 12/7/2018     Sig: Take one tablet twice daily as directed for dose adjustments.    Class: E-Prescribe    tacrolimus (GENERIC EQUIVALENT) 1 MG capsule 180 capsule 12/7/2018     Sig - Route: Take 3 capsules (3 mg) by mouth 2 times daily - Oral    Class: E-Prescribe          Possible Immunosuppression-related side effects:   []             headache  []             vivid dreams  []             irritability  []             cognitive difficuties  []              fine tremor  []             nausea  []             diarrhea  []             neuropathy      []             edema  []             renal calcineurin toxicity  []             hyperkalemia  []             post-transplant diabetes  []             decreased appetite  []             increased appetite  []             other:  []             none    Prescription Medications as of 12/10/2018       Rx Number Disp Refills Start End Last Dispensed Date Next Fill Date Owning Pharmacy    Amino Acids (LIQUACEL) LIQD            Sig: Take 30 mLs by mouth 2 times daily Amino acids 16 grams, 2.5 g of arginine per pacakge -90 kcal/30mL liquid    Class: Historical    Route: Oral    amoxicillin-clavulanate (AUGMENTIN) 500-125 MG tablet  24 tablet 0 12/6/2018    [place], [place] - [address] [place] [address] [phone]    Sig: Take 1 tablet by mouth 2 times daily    Class: E-Prescribe    Notes to Pharmacy: Continue through 12/19/18    Route: Oral    aspirin 81 MG EC tablet            Sig: Take 81 mg by mouth daily    Class: Historical    Route: Oral    atorvastatin (LIPITOR) 20 MG tablet    9/17/2018        Sig: Take 20 mg by mouth At Bedtime     Class: Historical    Route: Oral    Cholecalciferol (VITAMIN D3) 2000 units CAPS    6/10/2018        Sig: Take 1 capsule by mouth every evening With dinner    Class: Historical    Route: Oral    CO-ENZYME Q-10 PO            Sig: Take 400 mg by mouth daily For cramps from lipitor    Class: Historical    Route: Oral    eszopiclone (LUNESTA) 1 MG tablet  30 tablet 1 12/6/2018        Sig: Take 2 tablets (2 mg) by mouth At Bedtime    Class: Local Print    Route: Oral    gabapentin (NEURONTIN) 100 MG capsule    9/26/2016        Sig: Take 100 mg by mouth At Bedtime     Class: Historical    Route: Oral    insulin lispro (HUMALOG) 100 UNIT/ML VIAL    6/21/2016        Sig: Use with insulin pump    Class: Historical    Route: Subcutaneous    INSULIN PUMP - OUTPATIENT     2/17/1995        Sig: Inject 0.5 Units/hr Subcutaneous Carb ratio: 1:15g/hr  Sensitivity factor correction: 1 unit for every 75mg/dL over 100mg/dL    Class: Historical    Route: Subcutaneous    linaclotide (LINZESS) 290 MCG capsule    5/28/2017        Sig: Take 290 mcg by mouth every morning (before breakfast)     Class: Historical    Route: Oral    Magnesium Citrate 125 MG CAPS            Sig: Take 1 capsule by mouth daily    Class: Historical    Route: Oral    multivitamin, therapeutic (THERA-VIT) TABS tablet  30 tablet 2 11/27/2018    [place], [place] - [address] [place] [address]    Sig: Take 1 tablet by mouth daily    Class: E-Prescribe    Route: Oral    MYFORTIC (BRAND) 180 MG EC tablet  180 tablet 11 12/7/2018    [facility] [address] [place]    Sig: Take 2 tablets (360 mg) by mouth 2 times daily    Class: E-Prescribe    Route: Oral    Pancrelipase, Lip-Prot-Amyl, (ZENPEP PO)            Sig: Take 2 capsules by mouth 3 times daily L-15, A-82, P-51    Class: Historical    Route: Oral    polyethylene glycol (MIRALAX/GLYCOLAX) powder            Sig: Take 5.7 g by mouth every morning 1/3 of 17g, mix with Liquidcel and water (use as liquid to take with her other morning meds)    Class: Historical    Route: Oral    sulfamethoxazole-trimethoprim (BACTRIM/SEPTRA) 400-80 MG tablet  30 tablet 11 12/3/2018    [facility] - [place], [place] - [address] AT Portal to Registered [facility] Sites    Sig: Take 1 tablet by mouth daily    Class: E-Prescribe    Route: Oral    tacrolimus (GENERIC EQUIVALENT) 0.5 MG capsule  60 capsule 11 12/7/2018    [facility] [place] [address] [address]    Sig: Take one tablet twice daily as directed for dose adjustments.    Class: E-Prescribe    tacrolimus (GENERIC EQUIVALENT) 1 MG capsule  180 capsule 11 12/7/2018    [facility] [place] [address] [address]     Sig: Take 3 capsules (3 mg) by mouth 2 times daily    Class: E-Prescribe    Route: Oral    valACYclovir (VALTREX) 1000 mg tablet  60 tablet 2 12/3/2018    [facility] - [place], [place] - [address] AT Portal to Registered [facility] Sites    Sig: Take 1 tablet (1,000 mg) by mouth 2 times daily    Class: E-Prescribe    Route: Oral          O:      General Appearance: in no apparent distress.   Skin: Normal, no rashes or jaundice  Heart: regular rate and rhythm, normal S1 and S2  Lungs: easy respirations, no audible wheezing.  Abdomen: flat, The wound is dry and intact, without hernia. The abdomen is non-tender. The kidney graft is not tender.  There is no ascites.  Extremities: Tremor absent.   Edema: absent.     Transplant Immunosuppression Labs Latest Ref Rng & Units 12/10/2018 12/7/2018 12/5/2018 12/3/2018 11/30/2018   Tacro Level 5.0 - 15.0 ug/L - 9.3 9.3 10.2 8.3   Tacro Level - - 8:00PM 12/06/18 2000 12/04/2018 2000 12/02/2018 2000 11/29/18   Creat 0.52 - 1.04 mg/dL 1.05(H) 1.14(H) 1.02 1.22(H) 1.03   BUN 7 - 30 mg/dL 23 24 18 26 25   WBC 4.0 - 11.0 10e9/L 3.8(L) 5.2 5.7 7.6 10.3   Neutrophil % 66.4 - 79.5 - 87.2   ANEU 1.6 - 8.3 10e9/L 2.5 - 4.7 - 9.0(H)       Chemistries:   Recent Labs   Lab Test 12/10/18  0805   BUN 23   CR 1.05*   GFRESTIMATED 53*   *     Lab Results   Component Value Date    A1C 5.5 11/20/2018    CPEPT <0.1 10/03/2016     Recent Labs   Lab Test 11/19/18  1036   ALBUMIN 3.6   BILITOTAL 0.4   ALKPHOS 166*   AST 24   ALT 35     Urine Studies:  Recent Labs   Lab Test 12/07/18  0742   COLOR Yellow   APPEARANCE Slightly Cloudy   URINEGLC 50*   URINEBILI Negative   URINEKETONE Negative   SG 1.019   UBLD Large*   URINEPH 5.0   PROTEIN Negative   NITRITE Negative   LEUKEST Negative   RBCU >182*   WBCU 18*     No lab results found.  Hematology:   Recent Labs   Lab Test 12/10/18  0805 12/07/18  0745 12/05/18  0749   HGB 9.4* 9.9* 9.3*    453* 421   WBC 3.8* 5.2  5.7     Coags:   Recent Labs   Lab Test 10/03/16  0653   INR 0.96     HLA antibodies:   SA1 Hi Risk Martha   Date Value Ref Range Status   11/30/2018 None  Final     SA1 Mod Risk Martha   Date Value Ref Range Status   11/30/2018 None  Final     SA2 Hi Risk Martha   Date Value Ref Range Status   11/30/2018 None  Final     SA2 Mod Risk Martha   Date Value Ref Range Status   11/30/2018 None  Final       Assessment: Letty Benavidez is doing well s/p LDKT:  Issues we addressed during her visit include:    Plan:    1. Graft function: cr is stable.  2. Immunosuppression Management: No change continue prograf and myfortic 360mg BID .  Complexity of management:Medium.  Contributing factors: nausea  3. George drain:  Removed.  Signs and symptoms of infection removed.   4. Insomnia:  Increase lunesta 2mg   Followup: as directed    Total Time: 35 min,   Counselling Time: 25 min.

## 2018-12-11 ENCOUNTER — OFFICE VISIT (OUTPATIENT)
Dept: NEPHROLOGY | Facility: CLINIC | Age: 58
End: 2018-12-11
Attending: INTERNAL MEDICINE
Payer: COMMERCIAL

## 2018-12-11 VITALS
TEMPERATURE: 97.7 F | HEART RATE: 82 BPM | SYSTOLIC BLOOD PRESSURE: 119 MMHG | WEIGHT: 112.4 LBS | DIASTOLIC BLOOD PRESSURE: 69 MMHG | OXYGEN SATURATION: 100 % | BODY MASS INDEX: 19.91 KG/M2

## 2018-12-11 DIAGNOSIS — I15.1 HTN, KIDNEY TRANSPLANT RELATED: ICD-10-CM

## 2018-12-11 DIAGNOSIS — N25.81 SECONDARY RENAL HYPERPARATHYROIDISM (H): ICD-10-CM

## 2018-12-11 DIAGNOSIS — D84.9 IMMUNOSUPPRESSION (H): ICD-10-CM

## 2018-12-11 DIAGNOSIS — D64.89 ANEMIA DUE TO OTHER CAUSE, NOT CLASSIFIED: ICD-10-CM

## 2018-12-11 DIAGNOSIS — Z29.89 NEED FOR CMV IMMUNOTHERAPY: ICD-10-CM

## 2018-12-11 DIAGNOSIS — Z29.89 NEED FOR PNEUMOCYSTIS PROPHYLAXIS: ICD-10-CM

## 2018-12-11 DIAGNOSIS — Z94.0 HTN, KIDNEY TRANSPLANT RELATED: ICD-10-CM

## 2018-12-11 DIAGNOSIS — Z94.0 KIDNEY REPLACED BY TRANSPLANT: Primary | ICD-10-CM

## 2018-12-11 LAB
MYCOPHENOLATE SERPL LC/MS/MS-MCNC: 5.06 MG/L (ref 1–3.5)
MYCOPHENOLATE-G SERPL LC/MS/MS-MCNC: 76.7 MG/L (ref 30–95)
TME LAST DOSE: ABNORMAL H

## 2018-12-11 PROCEDURE — G0463 HOSPITAL OUTPT CLINIC VISIT: HCPCS | Mod: ZF

## 2018-12-11 ASSESSMENT — PAIN SCALES - GENERAL: PAINLEVEL: MILD PAIN (3)

## 2018-12-11 NOTE — LETTER
12/11/2018      RE: Letty Benavidez  [address]  [place] [address]       ACUTE TRANSPLANT NEPHROLOGY VISIT    Assessment & Plan   # LDKT: baseline Cr ~ 1; Stable   - Proteinuria: Not checked recently   - Latest DSA: No Date of DSA last checked: 11/30/18   - BK: No   - Kidney Tx Biopsy: No    # Immunosuppression: Tacrolimus immediate release (goal  8-10) and Mycophenolic acid (goal  1-3.5)   - Changes: No    # Prophylaxis:   - PJP: TMP/Sulfa (Bactrim)   - CMV: Valtrex    # Hypertension: Controlled; Goal BP: < 130/80   - Changes: No    # Diabetes: Controlled    # Anemia in chronic renal disease: Hgb: Stable; 9.4 g / dl.    # Mineral Bone Disorder:    - Secondary renal hyperparathyroidism; PTH level is: Not checked recently  - Vitamin D; level is: Not checked recently  - Calcium; level is: Normal  - Phosphorus; level is: Normal    # Medical Compliance: Yes     #EBV discordance: EBV monthly.     Return visit: f/u 1 month.     # Transplant History:  Etiology of kidney failure: diabetes mellitus type 1  Tx: LDKT  Transplant: 11/20/2018 (Kidney), 11/4/1998 (Kidney)  Donor Type: Living Donor Class:   Crossmatch at time of Tx: negative  DSA at time of Tx: No  Significant changes in immunosuppression: None  CMV IgG Ab Discordance (D+/R-): No  EBV IgG Ab Discordance (D+/R-): Yes ; monthly EBV PCR  Significant transplant-related complications: None    Transplant Office Phone Number: [phone]    Assessment and plan was discussed with the patient and she voiced her understanding and agreement.    Yanick Fuentes MD    Chief Complaint   Ms. Benavidez is a 58 year old here for routine follow up and kidney transplant    History of Present Illness      The patient is a 4-year-old female with history of type 1 diabetes status post kidney transplant x2.  Her most recent transplant was on 11/20/2018.  Her immediate postoperative course has been uncomplicated to date.    The patient is currently on chronic immunosuppression  therapy with immediate release tacrolimus and Myfortic 360 mg p.o. twice daily.  The Myfortic was dose reduced due to nausea and vomiting.  White blood cell count is slightly low as well.    Currently, the patient feels reasonably well.  She does note that she has early morning orthostatic hypotension.  She notes that this is improved if she drinks fluids during the morning.  She has been drinking Gatorade and feels that this is contributing to hyperkalemia and hyperphosphatemia.  Her most recent potassium was 5 mEq/L.  Her most recent phosphorus was in the 4 mg/dL range.    Today, the patient denies any chest pain or breathing difficulties.  She has no nausea or vomiting.  She has no fever shakes chills.  She is moving her bowels appropriately.    Recent Hospitalizations:  [x] No [] Yes    New Medical Issues: [x] No [] Yes    Decreased energy: [x] No [] Yes    Chest pain or SOB with exertion:  [x] No [] Yes    Appetite change or weight change: [x] No [] Yes    Nausea, vomiting or diarrhea:  [x] No [] Yes    Fever, sweats or chills: [x] No [] Yes    Leg swelling: [x] No [] Yes      Other medical issues:  No    Home BP: at goal    Review of Systems   A comprehensive review of systems was obtained and negative, except as noted in the HPI or PMH.    Problem List   Patient Active Problem List   Diagnosis     Secondary hyperparathyroidism (H)     Anemia of chronic kidney failure     Dyslipidemia     Hypertension     Gastroparesis     Diabetic peripheral neuropathy (H)     Diabetic retinopathy (H)     Type 1 diabetes (H)     Pancreatic insufficiency     History of skin cancer     End stage kidney disease (H)     Immunosuppressed status (H)     Kidney transplanted     Dehydration     Aftercare following organ transplant     Dysuria       Social History   Social History     Tobacco Use     Smoking status: Never Smoker     Smokeless tobacco: Never Used   Substance Use Topics     Alcohol use: No     Alcohol/week: 0.0 oz      Drug use: No       Allergies   Allergies   Allergen Reactions     Erythromycin      Not a drug allergy- Patient told to avoid while taking tacrolimus due to drug interaction. Ok to have if not taking tacrolimus     Metronidazole Nausea and Vomiting     Flagyl     Mycophenolate Nausea and Vomiting and Other (See Comments)     Nausea / Vomiting / Tremors     Neomycin Other (See Comments)     Not a drug allergy- Patient told to avoid while taking tacrolimus due to drug interaction. Ok to have if not taking tacrolimus       Medications   Current Outpatient Medications   Medication Sig     Amino Acids (LIQUACEL) LIQD Take 30 mLs by mouth 2 times daily Amino acids 16 grams, 2.5 g of arginine per pacakge -90 kcal/30mL liquid     amoxicillin-clavulanate (AUGMENTIN) 500-125 MG tablet Take 1 tablet by mouth 2 times daily     atorvastatin (LIPITOR) 20 MG tablet Take 20 mg by mouth At Bedtime      Cholecalciferol (VITAMIN D3) 2000 units CAPS Take 1 capsule by mouth every evening With dinner     CO-ENZYME Q-10 PO Take 400 mg by mouth daily For cramps from lipitor     eszopiclone (LUNESTA) 1 MG tablet Take 2 tablets (2 mg) by mouth At Bedtime     gabapentin (NEURONTIN) 100 MG capsule Take 100 mg by mouth At Bedtime      insulin lispro (HUMALOG) 100 UNIT/ML VIAL Use with insulin pump     INSULIN PUMP - OUTPATIENT Inject 0.5 Units/hr Subcutaneous Carb ratio: 1:15g/hr  Sensitivity factor correction: 1 unit for every 75mg/dL over 100mg/dL     linaclotide (LINZESS) 290 MCG capsule Take 290 mcg by mouth every morning (before breakfast)      Magnesium Citrate 125 MG CAPS Take 1 capsule by mouth daily     multivitamin, therapeutic (THERA-VIT) TABS tablet Take 1 tablet by mouth daily     MYFORTIC (BRAND) 180 MG EC tablet Take 2 tablets (360 mg) by mouth 2 times daily     polyethylene glycol (MIRALAX/GLYCOLAX) powder Take 5.7 g by mouth every morning 1/3 of 17g, mix with Liquidcel and water (use as liquid to take with her other morning  meds)     sulfamethoxazole-trimethoprim (BACTRIM/SEPTRA) 400-80 MG tablet Take 1 tablet by mouth daily     tacrolimus (GENERIC EQUIVALENT) 0.5 MG capsule Take one tablet twice daily as directed for dose adjustments.     tacrolimus (GENERIC EQUIVALENT) 1 MG capsule Take 3 capsules (3 mg) by mouth 2 times daily     valACYclovir (VALTREX) 1000 mg tablet Take 1 tablet (1,000 mg) by mouth 2 times daily     aspirin 81 MG EC tablet Take 81 mg by mouth daily     Pancrelipase, Lip-Prot-Amyl, (ZENPEP PO) Take 2 capsules by mouth 3 times daily L-15, A-82, P-51     No current facility-administered medications for this visit.      There are no discontinued medications.    Physical Exam   Vital Signs: /69 (BP Location: Left arm)   Pulse 82   Temp 97.7  F (36.5  C) (Oral)   Wt 51 kg (112 lb 6.4 oz)   SpO2 100%   BMI 19.91 kg/m       GENERAL APPEARANCE: alert and no distress  HENT: mouth without ulcers or lesions  LYMPHATICS: no cervical or supraclavicular nodes  RESP: lungs clear to auscultation - no rales, rhonchi or wheezes  CV: regular rhythm, normal rate, no rub, no murmur  EDEMA: no LE edema bilaterally  ABDOMEN: soft, nondistended, nontender, bowel sounds normal  MS: extremities normal - no gross deformities noted, no evidence of inflammation in joints, no muscle tenderness  SKIN: no rash    Data     Renal Latest Ref Rng & Units 12/10/2018 12/7/2018 12/5/2018   Na 133 - 144 mmol/L 137 138 139   Na (external) 136 - 145 mEq/L - - -   K 3.4 - 5.3 mmol/L 5.0 4.7 4.8   K (external) 3.5 - 5.1 mEq/L - - -   Cl 94 - 109 mmol/L 105 105 108   Cl (external) 96 - 108 mEq/L - - -   CO2 20 - 32 mmol/L 24 25 21   CO2 (external) 22 - 29 mEq/L - - -   BUN 7 - 30 mg/dL 23 24 18   BUN (external) 6 - 19 mg/dL - - -   Cr 0.52 - 1.04 mg/dL 1.05(H) 1.14(H) 1.02   Cr (external) 0.60 - 1.30 mg/dL - - -   Glucose 70 - 99 mg/dL 172(H) 58(L) 133(H)   Glucose (external) 90 - 180 mg/dL - - -   Ca  8.5 - 10.1 mg/dL 8.7 9.1 8.7   Ca (external)  8.4 - 10.2 mg/dL - - -   Mg 1.6 - 2.3 mg/dL 1.7 - 1.9   Mg (external) 1.6 - 2.6 mg/dL - - -     Bone Health Latest Ref Rng & Units 12/10/2018 12/5/2018 11/30/2018   Phos 2.5 - 4.5 mg/dL 4.3 3.9 4.4   Phos (external) 2.6 - 4.5 mg/dL - - -   PTHi 18 - 80 pg/mL - - -   PTHi (external) 16 - 80 pg/mL - - -   Vit D Def 20 - 75 ug/L - - -   Vit D Def (external) 30.0 - 100.0 ng/mL - - -     Heme Latest Ref Rng & Units 12/10/2018 12/7/2018 12/5/2018   WBC 4.0 - 11.0 10e9/L 3.8(L) 5.2 5.7   WBC (external) 4.80 - 10.80 1000/cmm - - -   Hgb 11.7 - 15.7 g/dL 9.4(L) 9.9(L) 9.3(L)   Hgb (external) 12.0 - 16.0 g/dL - - -   Plt 150 - 450 10e9/L 400 453(H) 421   Plt (external) 1000/uL - - -     Liver Latest Ref Rng & Units 11/19/2018 10/2/2018 9/4/2018   AP 40 - 150 U/L 166(H) - -   AP (external) 35 - 104 U/L - 169(H) -   TBili 0.2 - 1.3 mg/dL 0.4 - -   TBili (external) 0.2 - 1.0 MG/DL - - -   ALT 0 - 50 U/L 35 - -   ALT (external) 12 - 78 U/L - - -   AST 0 - 45 U/L 24 - -   AST (external) 13 - 39 U/L - 24 -   Tot Protein 6.8 - 8.8 g/dL 7.2 - -   Tot Protein (external) 6.0 - 8.5 g/dL - 6.8 6.6   Albumin 3.4 - 5.0 g/dL 3.6 - -   Albumin (external) 3.5 - 5.2 g/dL - 4.3 4.1     Pancreas Latest Ref Rng & Units 11/20/2018 10/2/2018 7/19/2018   A1C 0 - 5.6 % 5.5 - -   A1C (external) 4.8 - 5.9 % - 5.6 5.4     Iron studies Latest Ref Rng & Units 11/19/2018 10/2/2018 7/19/2018   Iron 35 - 180 ug/dL 38 - -   Iron sat 15 - 46 % 18 - -   Ferritin 8 - 252 ng/mL 952(H) - -   Ferritin (external) 10 - 291 ng/mL - 1,155(H) 1,181     [facility] Txp Virology Latest Ref Rng & Units 12/5/2018 11/19/2018 10/2/2018   BK Spec - Plasma - -   BK Res BKNEG:BK Virus DNA Not Detected copies/mL BK Virus DNA Not Detected - -   BK Log <2.7 Log copies/mL Not Calculated - -   Hep B Core NR:Nonreactive - Nonreactive -   Hep B Core Ext Negative - - Negative   Hep B Surf Ext  See comment mIU/mL - - -        Recent Labs   Lab Test 12/05/18  0749 12/07/18  0745 12/10/18  0805    DOSTAC 2000 12/04/2018 8:00PM 12/06/18 12/09/18 2000   TACROL 9.3 9.3 8.6     Recent Labs   Lab Test 12/03/18  0753 12/05/18  0749 12/10/18  0805   DOSMPA Not Provided 2000 12/04/2018 12/09/18 2000   MPACID [id number] 4.11* 5.06*   MPAG 68.1 73.3 76.7       Early Post Transplant

## 2018-12-11 NOTE — PROGRESS NOTES
ACUTE TRANSPLANT NEPHROLOGY VISIT    Assessment & Plan   # LDKT: baseline Cr ~ 1; Stable   - Proteinuria: Not checked recently   - Latest DSA: No Date of DSA last checked: 11/30/18   - BK: No   - Kidney Tx Biopsy: No    # Immunosuppression: Tacrolimus immediate release (goal  8-10) and Mycophenolic acid (goal  1-3.5)   - Changes: No    # Prophylaxis:   - PJP: TMP/Sulfa (Bactrim)   - CMV: Valtrex    # Hypertension: Controlled; Goal BP: < 130/80   - Changes: No    # Diabetes: Controlled    # Anemia in chronic renal disease: Hgb: Stable; 9.4 g / dl.    # Mineral Bone Disorder:    - Secondary renal hyperparathyroidism; PTH level is: Not checked recently  - Vitamin D; level is: Not checked recently  - Calcium; level is: Normal  - Phosphorus; level is: Normal    # Medical Compliance: Yes     #EBV discordance: EBV monthly.     Return visit: f/u 1 month.     # Transplant History:  Etiology of kidney failure: diabetes mellitus type 1  Tx: LDKT  Transplant: 11/20/2018 (Kidney), 11/4/1998 (Kidney)  Donor Type: Living Donor Class:   Crossmatch at time of Tx: negative  DSA at time of Tx: No  Significant changes in immunosuppression: None  CMV IgG Ab Discordance (D+/R-): No  EBV IgG Ab Discordance (D+/R-): Yes ; monthly EBV PCR  Significant transplant-related complications: None    Transplant Office Phone Number: [phone]    Assessment and plan was discussed with the patient and she voiced her understanding and agreement.    Yanick Fuentes MD    Chief Complaint   Ms. Benavidez is a 58 year old here for routine follow up and kidney transplant    History of Present Illness      The patient is a 4-year-old female with history of type 1 diabetes status post kidney transplant x2.  Her most recent transplant was on 11/20/2018.  Her immediate postoperative course has been uncomplicated to date.    The patient is currently on chronic immunosuppression therapy with immediate release tacrolimus and Myfortic 360 mg p.o. twice daily.  The  Myfortic was dose reduced due to nausea and vomiting.  White blood cell count is slightly low as well.    Currently, the patient feels reasonably well.  She does note that she has early morning orthostatic hypotension.  She notes that this is improved if she drinks fluids during the morning.  She has been drinking Gatorade and feels that this is contributing to hyperkalemia and hyperphosphatemia.  Her most recent potassium was 5 mEq/L.  Her most recent phosphorus was in the 4 mg/dL range.    Today, the patient denies any chest pain or breathing difficulties.  She has no nausea or vomiting.  She has no fever shakes chills.  She is moving her bowels appropriately.    Recent Hospitalizations:  [x] No [] Yes    New Medical Issues: [x] No [] Yes    Decreased energy: [x] No [] Yes    Chest pain or SOB with exertion:  [x] No [] Yes    Appetite change or weight change: [x] No [] Yes    Nausea, vomiting or diarrhea:  [x] No [] Yes    Fever, sweats or chills: [x] No [] Yes    Leg swelling: [x] No [] Yes      Other medical issues:  No    Home BP: at goal    Review of Systems   A comprehensive review of systems was obtained and negative, except as noted in the HPI or PMH.    Problem List   Patient Active Problem List   Diagnosis     Secondary hyperparathyroidism (H)     Anemia of chronic kidney failure     Dyslipidemia     Hypertension     Gastroparesis     Diabetic peripheral neuropathy (H)     Diabetic retinopathy (H)     Type 1 diabetes (H)     Pancreatic insufficiency     History of skin cancer     End stage kidney disease (H)     Immunosuppressed status (H)     Kidney transplanted     Dehydration     Aftercare following organ transplant     Dysuria       Social History   Social History     Tobacco Use     Smoking status: Never Smoker     Smokeless tobacco: Never Used   Substance Use Topics     Alcohol use: No     Alcohol/week: 0.0 oz     Drug use: No       Allergies   Allergies   Allergen Reactions     Erythromycin       Not a drug allergy- Patient told to avoid while taking tacrolimus due to drug interaction. Ok to have if not taking tacrolimus     Metronidazole Nausea and Vomiting     Flagyl     Mycophenolate Nausea and Vomiting and Other (See Comments)     Nausea / Vomiting / Tremors     Neomycin Other (See Comments)     Not a drug allergy- Patient told to avoid while taking tacrolimus due to drug interaction. Ok to have if not taking tacrolimus       Medications   Current Outpatient Medications   Medication Sig     Amino Acids (LIQUACEL) LIQD Take 30 mLs by mouth 2 times daily Amino acids 16 grams, 2.5 g of arginine per pacakge -90 kcal/30mL liquid     amoxicillin-clavulanate (AUGMENTIN) 500-125 MG tablet Take 1 tablet by mouth 2 times daily     atorvastatin (LIPITOR) 20 MG tablet Take 20 mg by mouth At Bedtime      Cholecalciferol (VITAMIN D3) 2000 units CAPS Take 1 capsule by mouth every evening With dinner     CO-ENZYME Q-10 PO Take 400 mg by mouth daily For cramps from lipitor     eszopiclone (LUNESTA) 1 MG tablet Take 2 tablets (2 mg) by mouth At Bedtime     gabapentin (NEURONTIN) 100 MG capsule Take 100 mg by mouth At Bedtime      insulin lispro (HUMALOG) 100 UNIT/ML VIAL Use with insulin pump     INSULIN PUMP - OUTPATIENT Inject 0.5 Units/hr Subcutaneous Carb ratio: 1:15g/hr  Sensitivity factor correction: 1 unit for every 75mg/dL over 100mg/dL     linaclotide (LINZESS) 290 MCG capsule Take 290 mcg by mouth every morning (before breakfast)      Magnesium Citrate 125 MG CAPS Take 1 capsule by mouth daily     multivitamin, therapeutic (THERA-VIT) TABS tablet Take 1 tablet by mouth daily     MYFORTIC (BRAND) 180 MG EC tablet Take 2 tablets (360 mg) by mouth 2 times daily     polyethylene glycol (MIRALAX/GLYCOLAX) powder Take 5.7 g by mouth every morning 1/3 of 17g, mix with Liquidcel and water (use as liquid to take with her other morning meds)     sulfamethoxazole-trimethoprim (BACTRIM/SEPTRA) 400-80 MG tablet Take 1 tablet  by mouth daily     tacrolimus (GENERIC EQUIVALENT) 0.5 MG capsule Take one tablet twice daily as directed for dose adjustments.     tacrolimus (GENERIC EQUIVALENT) 1 MG capsule Take 3 capsules (3 mg) by mouth 2 times daily     valACYclovir (VALTREX) 1000 mg tablet Take 1 tablet (1,000 mg) by mouth 2 times daily     aspirin 81 MG EC tablet Take 81 mg by mouth daily     Pancrelipase, Lip-Prot-Amyl, (ZENPEP PO) Take 2 capsules by mouth 3 times daily L-15, A-82, P-51     No current facility-administered medications for this visit.      There are no discontinued medications.    Physical Exam   Vital Signs: /69 (BP Location: Left arm)   Pulse 82   Temp 97.7  F (36.5  C) (Oral)   Wt 51 kg (112 lb 6.4 oz)   SpO2 100%   BMI 19.91 kg/m      GENERAL APPEARANCE: alert and no distress  HENT: mouth without ulcers or lesions  LYMPHATICS: no cervical or supraclavicular nodes  RESP: lungs clear to auscultation - no rales, rhonchi or wheezes  CV: regular rhythm, normal rate, no rub, no murmur  EDEMA: no LE edema bilaterally  ABDOMEN: soft, nondistended, nontender, bowel sounds normal  MS: extremities normal - no gross deformities noted, no evidence of inflammation in joints, no muscle tenderness  SKIN: no rash    Data     Renal Latest Ref Rng & Units 12/10/2018 12/7/2018 12/5/2018   Na 133 - 144 mmol/L 137 138 139   Na (external) 136 - 145 mEq/L - - -   K 3.4 - 5.3 mmol/L 5.0 4.7 4.8   K (external) 3.5 - 5.1 mEq/L - - -   Cl 94 - 109 mmol/L 105 105 108   Cl (external) 96 - 108 mEq/L - - -   CO2 20 - 32 mmol/L 24 25 21   CO2 (external) 22 - 29 mEq/L - - -   BUN 7 - 30 mg/dL 23 24 18   BUN (external) 6 - 19 mg/dL - - -   Cr 0.52 - 1.04 mg/dL 1.05(H) 1.14(H) 1.02   Cr (external) 0.60 - 1.30 mg/dL - - -   Glucose 70 - 99 mg/dL 172(H) 58(L) 133(H)   Glucose (external) 90 - 180 mg/dL - - -   Ca  8.5 - 10.1 mg/dL 8.7 9.1 8.7   Ca (external) 8.4 - 10.2 mg/dL - - -   Mg 1.6 - 2.3 mg/dL 1.7 - 1.9   Mg (external) 1.6 - 2.6 mg/dL - -  -     Bone Health Latest Ref Rng & Units 12/10/2018 12/5/2018 11/30/2018   Phos 2.5 - 4.5 mg/dL 4.3 3.9 4.4   Phos (external) 2.6 - 4.5 mg/dL - - -   PTHi 18 - 80 pg/mL - - -   PTHi (external) 16 - 80 pg/mL - - -   Vit D Def 20 - 75 ug/L - - -   Vit D Def (external) 30.0 - 100.0 ng/mL - - -     Heme Latest Ref Rng & Units 12/10/2018 12/7/2018 12/5/2018   WBC 4.0 - 11.0 10e9/L 3.8(L) 5.2 5.7   WBC (external) 4.80 - 10.80 1000/cmm - - -   Hgb 11.7 - 15.7 g/dL 9.4(L) 9.9(L) 9.3(L)   Hgb (external) 12.0 - 16.0 g/dL - - -   Plt 150 - 450 10e9/L 400 453(H) 421   Plt (external) 1000/uL - - -     Liver Latest Ref Rng & Units 11/19/2018 10/2/2018 9/4/2018   AP 40 - 150 U/L 166(H) - -   AP (external) 35 - 104 U/L - 169(H) -   TBili 0.2 - 1.3 mg/dL 0.4 - -   TBili (external) 0.2 - 1.0 MG/DL - - -   ALT 0 - 50 U/L 35 - -   ALT (external) 12 - 78 U/L - - -   AST 0 - 45 U/L 24 - -   AST (external) 13 - 39 U/L - 24 -   Tot Protein 6.8 - 8.8 g/dL 7.2 - -   Tot Protein (external) 6.0 - 8.5 g/dL - 6.8 6.6   Albumin 3.4 - 5.0 g/dL 3.6 - -   Albumin (external) 3.5 - 5.2 g/dL - 4.3 4.1     Pancreas Latest Ref Rng & Units 11/20/2018 10/2/2018 7/19/2018   A1C 0 - 5.6 % 5.5 - -   A1C (external) 4.8 - 5.9 % - 5.6 5.4     Iron studies Latest Ref Rng & Units 11/19/2018 10/2/2018 7/19/2018   Iron 35 - 180 ug/dL 38 - -   Iron sat 15 - 46 % 18 - -   Ferritin 8 - 252 ng/mL 952(H) - -   Ferritin (external) 10 - 291 ng/mL - 1,155(H) 1,181     [facility] Txp Virology Latest Ref Rng & Units 12/5/2018 11/19/2018 10/2/2018   BK Spec - Plasma - -   BK Res BKNEG:BK Virus DNA Not Detected copies/mL BK Virus DNA Not Detected - -   BK Log <2.7 Log copies/mL Not Calculated - -   Hep B Core NR:Nonreactive - Nonreactive -   Hep B Core Ext Negative - - Negative   Hep B Surf Ext  See comment mIU/mL - - -        Recent Labs   Lab Test 12/05/18  0749 12/07/18  0745 12/10/18  0805   DOSTAC 2000 12/04/2018 8:00PM 12/06/18 12/09/18 2000   TACROL 9.3 9.3 8.6     Recent Labs    Lab Test 12/03/18  0753 12/05/18  0749 12/10/18  0805   DOSMPA Not Provided 2000 12/04/2018 12/09/18 2000   MPACID 2.86 4.11* 5.06*   MPAG 68.1 73.3 76.7

## 2018-12-11 NOTE — NURSING NOTE
"Chief Complaint   Patient presents with     RECHECK     Follow Up 3 weeks Post Kidney TX     Vital signs:  Temp: 97.7  F (36.5  C) Temp src: Oral BP: 119/69 Pulse: 82     SpO2: 100 %       Weight: 51 kg (112 lb 6.4 oz)  Estimated body mass index is 19.91 kg/m  as calculated from the following:    Height as of 12/6/18: 1.6 m (5' 3\").    Weight as of this encounter: 51 kg (112 lb 6.4 oz).        Shonda Lim    "

## 2018-12-12 ENCOUNTER — TELEPHONE (OUTPATIENT)
Dept: TRANSPLANT | Facility: CLINIC | Age: 58
End: 2018-12-12

## 2018-12-12 DIAGNOSIS — Z94.0 KIDNEY REPLACED BY TRANSPLANT: ICD-10-CM

## 2018-12-12 DIAGNOSIS — Z48.298 AFTERCARE FOLLOWING ORGAN TRANSPLANT: ICD-10-CM

## 2018-12-12 LAB
ANION GAP SERPL CALCULATED.3IONS-SCNC: 10 MMOL/L (ref 3–14)
BUN SERPL-MCNC: 23 MG/DL (ref 7–30)
CALCIUM SERPL-MCNC: 8.7 MG/DL (ref 8.5–10.1)
CHLORIDE SERPL-SCNC: 102 MMOL/L (ref 94–109)
CO2 SERPL-SCNC: 24 MMOL/L (ref 20–32)
CREAT SERPL-MCNC: 1.1 MG/DL (ref 0.52–1.04)
ERYTHROCYTE [DISTWIDTH] IN BLOOD BY AUTOMATED COUNT: 20.2 % (ref 10–15)
GFR SERPL CREATININE-BSD FRML MDRD: 51 ML/MIN/1.7M2
GLUCOSE SERPL-MCNC: 101 MG/DL (ref 70–99)
HCT VFR BLD AUTO: 31.4 % (ref 35–47)
HGB BLD-MCNC: 10 G/DL (ref 11.7–15.7)
MCH RBC QN AUTO: 34 PG (ref 26.5–33)
MCHC RBC AUTO-ENTMCNC: 31.8 G/DL (ref 31.5–36.5)
MCV RBC AUTO: 107 FL (ref 78–100)
PLATELET # BLD AUTO: 348 10E9/L (ref 150–450)
POTASSIUM SERPL-SCNC: 4.9 MMOL/L (ref 3.4–5.3)
RBC # BLD AUTO: 2.94 10E12/L (ref 3.8–5.2)
SODIUM SERPL-SCNC: 136 MMOL/L (ref 133–144)
TACROLIMUS BLD-MCNC: 10.6 UG/L (ref 5–15)
TME LAST DOSE: NORMAL H
WBC # BLD AUTO: 3.7 10E9/L (ref 4–11)

## 2018-12-12 NOTE — TELEPHONE ENCOUNTER
Post Kidney and Pancreas Transplant Team Conference  Date: 12/12/2018  Transplant Coordinator: Tania Khan     Attendees:  [x]  Dr. Villalta [] Che Latham, RN  [x] Lisbet Holley LPN     []  Dr. Romano [x] Shasha Arreaga RN [] Norma Egan LPN   [x]  Dr. Fuentes [] Ofe Mujica RN    []  Dr. Anthony [] Selam Hoskins RN    [] Dr. Kang [x] Morena Khan RN    [] Dr. Hernandez [] Chito Holley RN    [x] Dr. Hankins [] Maddi Zaldivar, RN    [] Surgery Fellow [] Apurva Soria RN    [] Julianna Thornton NP              Verbal Plan Read Back:   No changes at this time.    Routed to RN Coordinator   Lisbet Holley

## 2018-12-13 ENCOUNTER — TELEPHONE (OUTPATIENT)
Dept: CARE COORDINATION | Facility: CLINIC | Age: 58
End: 2018-12-13

## 2018-12-13 ENCOUNTER — TELEPHONE (OUTPATIENT)
Dept: TRANSPLANT | Facility: CLINIC | Age: 58
End: 2018-12-13

## 2018-12-13 ENCOUNTER — OFFICE VISIT (OUTPATIENT)
Dept: TRANSPLANT | Facility: CLINIC | Age: 58
End: 2018-12-13
Attending: NURSE PRACTITIONER
Payer: COMMERCIAL

## 2018-12-13 VITALS
RESPIRATION RATE: 16 BRPM | HEART RATE: 89 BPM | BODY MASS INDEX: 20.13 KG/M2 | SYSTOLIC BLOOD PRESSURE: 108 MMHG | WEIGHT: 113.6 LBS | HEIGHT: 63 IN | DIASTOLIC BLOOD PRESSURE: 61 MMHG

## 2018-12-13 DIAGNOSIS — Z96.0 URETERAL STENT RETAINED: Primary | ICD-10-CM

## 2018-12-13 PROCEDURE — A9270 NON-COVERED ITEM OR SERVICE: HCPCS | Mod: ZF | Performed by: NURSE PRACTITIONER

## 2018-12-13 PROCEDURE — 52310 CYSTOSCOPY AND TREATMENT: CPT

## 2018-12-13 PROCEDURE — G0463 HOSPITAL OUTPT CLINIC VISIT: HCPCS | Mod: ZF

## 2018-12-13 PROCEDURE — 25000125 ZZHC RX 250: Mod: ZF | Performed by: NURSE PRACTITIONER

## 2018-12-13 PROCEDURE — 25000132 ZZH RX MED GY IP 250 OP 250 PS 637: Mod: ZF | Performed by: NURSE PRACTITIONER

## 2018-12-13 RX ORDER — LEVOFLOXACIN 250 MG/1
500 TABLET, FILM COATED ORAL ONCE
Status: COMPLETED | OUTPATIENT
Start: 2018-12-13 | End: 2018-12-13

## 2018-12-13 RX ADMIN — LIDOCAINE HYDROCHLORIDE 10 ML: 20 JELLY TOPICAL at 14:09

## 2018-12-13 RX ADMIN — Medication 500 MG: at 14:10

## 2018-12-13 ASSESSMENT — MIFFLIN-ST. JEOR: SCORE: 1054.42

## 2018-12-13 ASSESSMENT — PAIN SCALES - GENERAL: PAINLEVEL: NO PAIN (1)

## 2018-12-13 NOTE — TELEPHONE ENCOUNTER
Tac level slightly high without recent dose change. If no s/s of tac toxicity, will await next drug level before dose change.     Letty voiced understanding.    She experienced abd pain after eating spicy food yesterday but is still constipated. Will avoid aggravating foods and take dulcolax. Will hydrate well.

## 2018-12-13 NOTE — LETTER
12/13/2018      RE: Letty Benavidez  [address] [address]       See surgeon note     Julianna Thornton, NP

## 2018-12-13 NOTE — NURSING NOTE
"Chief Complaint   Patient presents with     RECHECK     stent removal     Prepped patient for procedure with no complications.  Assisted with stent removal.  Administered Levaquin after procedure.  Patient was discharged in stable condition.  Blood pressure 108/61, pulse 89, resp. rate 16, height 1.6 m (5' 3\"), weight 51.5 kg (113 lb 9.6 oz).    YURIDIA KHAN CMA    "

## 2018-12-13 NOTE — LETTER
12/13/2018       RE: Letty Benavidez  [address]  [place] [address]     Dear Colleague,    Thank you for referring your patient, Letty Benavidez, to the [facility] SOLID ORGAN TRANSPLANT at [facility]. Please see a copy of my visit note below.    See surgeon note     Again, thank you for allowing me to participate in the care of your patient.      Sincerely,    Julianna Thornton NP

## 2018-12-14 DIAGNOSIS — Z94.0 KIDNEY REPLACED BY TRANSPLANT: ICD-10-CM

## 2018-12-14 DIAGNOSIS — N18.6 END STAGE RENAL DISEASE (H): ICD-10-CM

## 2018-12-14 DIAGNOSIS — Z76.82 ORGAN TRANSPLANT CANDIDATE: ICD-10-CM

## 2018-12-14 DIAGNOSIS — Z48.298 AFTERCARE FOLLOWING ORGAN TRANSPLANT: ICD-10-CM

## 2018-12-14 LAB
ANION GAP SERPL CALCULATED.3IONS-SCNC: 8 MMOL/L (ref 3–14)
BASOPHILS # BLD AUTO: 0 10E9/L (ref 0–0.2)
BASOPHILS NFR BLD AUTO: 0.2 %
BUN SERPL-MCNC: 22 MG/DL (ref 7–30)
CALCIUM SERPL-MCNC: 8.9 MG/DL (ref 8.5–10.1)
CHLORIDE SERPL-SCNC: 101 MMOL/L (ref 94–109)
CO2 SERPL-SCNC: 25 MMOL/L (ref 20–32)
CREAT SERPL-MCNC: 1.11 MG/DL (ref 0.52–1.04)
DIFFERENTIAL METHOD BLD: ABNORMAL
EOSINOPHIL # BLD AUTO: 0.1 10E9/L (ref 0–0.7)
EOSINOPHIL NFR BLD AUTO: 2 %
ERYTHROCYTE [DISTWIDTH] IN BLOOD BY AUTOMATED COUNT: 20.3 % (ref 10–15)
GFR SERPL CREATININE-BSD FRML MDRD: 50 ML/MIN/1.7M2
GLUCOSE SERPL-MCNC: 97 MG/DL (ref 70–99)
HCT VFR BLD AUTO: 29.8 % (ref 35–47)
HGB BLD-MCNC: 9.3 G/DL (ref 11.7–15.7)
IMM GRANULOCYTES # BLD: 0 10E9/L (ref 0–0.4)
IMM GRANULOCYTES NFR BLD: 0.7 %
LYMPHOCYTES # BLD AUTO: 0.6 10E9/L (ref 0.8–5.3)
LYMPHOCYTES NFR BLD AUTO: 15.2 %
MAGNESIUM SERPL-MCNC: 1.7 MG/DL (ref 1.6–2.3)
MCH RBC QN AUTO: 33.5 PG (ref 26.5–33)
MCHC RBC AUTO-ENTMCNC: 31.2 G/DL (ref 31.5–36.5)
MCV RBC AUTO: 107 FL (ref 78–100)
MONOCYTES # BLD AUTO: 0.5 10E9/L (ref 0–1.3)
MONOCYTES NFR BLD AUTO: 12.5 %
NEUTROPHILS # BLD AUTO: 2.8 10E9/L (ref 1.6–8.3)
NEUTROPHILS NFR BLD AUTO: 69.4 %
NRBC # BLD AUTO: 0 10*3/UL
NRBC BLD AUTO-RTO: 0 /100
PHOSPHATE SERPL-MCNC: 4.3 MG/DL (ref 2.5–4.5)
PLATELET # BLD AUTO: 300 10E9/L (ref 150–450)
POTASSIUM SERPL-SCNC: 4.3 MMOL/L (ref 3.4–5.3)
RBC # BLD AUTO: 2.78 10E12/L (ref 3.8–5.2)
SODIUM SERPL-SCNC: 134 MMOL/L (ref 133–144)
TACROLIMUS BLD-MCNC: 7.9 UG/L (ref 5–15)
TME LAST DOSE: 2000 H
WBC # BLD AUTO: 4.1 10E9/L (ref 4–11)

## 2018-12-14 NOTE — PROCEDURES
Transplant Surgery  Operative Note    Preop dx: Status post Living Unrelated Donor kidney transplant.  Post op dx: same   Procedure: Flexible cystoscopy and ureteral stent removal   Surgeon: Estelita Guzman M.D.  Assistant: Julinana Thornton CNP   Anesthesia: local  EBL: 0   Specimens: none.  Findings: none abnormal. Stent inspected and noted to be intact.   Indication: The patient is status post kidney transplant and the ureteral stent is no longer needed.    Procedure: The patient was positioned supine on the table.  The groin was sterilely prepped and draped in the usual fashion. Time out was done. Urojet was applied to the urethra. A flexible cystoscope was inserted and advanced thru the urethra into the bladder. The stent was visualized and grasped. The cystoscope, grasper and stent were removed en-mass. The stent was visualized to be intact.  The bladder mucosa was normal in appearance. Antibiotics were not administered.  On antibiotics. The patient tolerated the procedure well and was asked to void before leaving the clinic.      Attestation:  I was present for the entire procedure.        Estelita Guzman MD

## 2018-12-17 ENCOUNTER — TELEPHONE (OUTPATIENT)
Dept: TRANSPLANT | Facility: CLINIC | Age: 58
End: 2018-12-17

## 2018-12-17 DIAGNOSIS — Z48.298 AFTERCARE FOLLOWING ORGAN TRANSPLANT: ICD-10-CM

## 2018-12-17 DIAGNOSIS — N39.0 URINARY TRACT INFECTION: Primary | ICD-10-CM

## 2018-12-17 DIAGNOSIS — Z94.0 KIDNEY REPLACED BY TRANSPLANT: ICD-10-CM

## 2018-12-17 DIAGNOSIS — Z79.899 ENCOUNTER FOR LONG-TERM CURRENT USE OF MEDICATION: ICD-10-CM

## 2018-12-17 DIAGNOSIS — Z94.0 KIDNEY TRANSPLANTED: Primary | ICD-10-CM

## 2018-12-17 DIAGNOSIS — N39.0 URINARY TRACT INFECTION: ICD-10-CM

## 2018-12-17 LAB
ALBUMIN UR-MCNC: NEGATIVE MG/DL
ANION GAP SERPL CALCULATED.3IONS-SCNC: 8 MMOL/L (ref 3–14)
APPEARANCE UR: ABNORMAL
BASOPHILS # BLD AUTO: 0 10E9/L (ref 0–0.2)
BASOPHILS NFR BLD AUTO: 0.2 %
BILIRUB UR QL STRIP: NEGATIVE
BUN SERPL-MCNC: 24 MG/DL (ref 7–30)
CALCIUM SERPL-MCNC: 8.4 MG/DL (ref 8.5–10.1)
CHLORIDE SERPL-SCNC: 103 MMOL/L (ref 94–109)
CO2 SERPL-SCNC: 23 MMOL/L (ref 20–32)
COLOR UR AUTO: YELLOW
CREAT SERPL-MCNC: 1.13 MG/DL (ref 0.52–1.04)
CREAT UR-MCNC: 86 MG/DL
DIFFERENTIAL METHOD BLD: ABNORMAL
DONOR IDENTIFICATION: NORMAL
DSA COMMENTS: NORMAL
DSA PRESENT: NO
DSA TEST METHOD: NORMAL
EOSINOPHIL # BLD AUTO: 0.2 10E9/L (ref 0–0.7)
EOSINOPHIL NFR BLD AUTO: 2.5 %
ERYTHROCYTE [DISTWIDTH] IN BLOOD BY AUTOMATED COUNT: 20.5 % (ref 10–15)
GFR SERPL CREATININE-BSD FRML MDRD: 49 ML/MIN/1.7M2
GLUCOSE SERPL-MCNC: 122 MG/DL (ref 70–99)
GLUCOSE UR STRIP-MCNC: NEGATIVE MG/DL
HCT VFR BLD AUTO: 29.6 % (ref 35–47)
HGB BLD-MCNC: 9.7 G/DL (ref 11.7–15.7)
HGB UR QL STRIP: NEGATIVE
IMM GRANULOCYTES # BLD: 0 10E9/L (ref 0–0.4)
IMM GRANULOCYTES NFR BLD: 0.6 %
INTERFERING SUBST TEST METHOD: NORMAL
INTERFERING SUBSTANCE COMMENT: NORMAL
INTERFERING SUBSTANCE RESULT: NORMAL
INTERFERING SUBSTANCE: NORMAL
KETONES UR STRIP-MCNC: NEGATIVE MG/DL
LEUKOCYTE ESTERASE UR QL STRIP: NEGATIVE
LYMPHOCYTES # BLD AUTO: 0.5 10E9/L (ref 0.8–5.3)
LYMPHOCYTES NFR BLD AUTO: 8.3 %
MAGNESIUM SERPL-MCNC: 1.8 MG/DL (ref 1.6–2.3)
MCH RBC QN AUTO: 34.4 PG (ref 26.5–33)
MCHC RBC AUTO-ENTMCNC: 32.8 G/DL (ref 31.5–36.5)
MCV RBC AUTO: 105 FL (ref 78–100)
MONOCYTES # BLD AUTO: 0.8 10E9/L (ref 0–1.3)
MONOCYTES NFR BLD AUTO: 11.7 %
MUCOUS THREADS #/AREA URNS LPF: PRESENT /LPF
NEUTROPHILS # BLD AUTO: 5 10E9/L (ref 1.6–8.3)
NEUTROPHILS NFR BLD AUTO: 76.7 %
NITRATE UR QL: NEGATIVE
NRBC # BLD AUTO: 0 10*3/UL
NRBC BLD AUTO-RTO: 0 /100
ORGAN: NORMAL
PH UR STRIP: 5 PH (ref 5–7)
PHOSPHATE SERPL-MCNC: 4.8 MG/DL (ref 2.5–4.5)
PLATELET # BLD AUTO: 243 10E9/L (ref 150–450)
POTASSIUM SERPL-SCNC: 4.4 MMOL/L (ref 3.4–5.3)
PROT UR-MCNC: 0.17 G/L
PROT/CREAT 24H UR: 0.2 G/G CR (ref 0–0.2)
RBC # BLD AUTO: 2.82 10E12/L (ref 3.8–5.2)
RBC #/AREA URNS AUTO: 1 /HPF (ref 0–2)
SA1 CELL: NORMAL
SA1 COMMENTS: NORMAL
SA1 HI RISK ABY: NORMAL
SA1 MOD RISK ABY: NORMAL
SA1 TEST METHOD: NORMAL
SA2 CELL: NORMAL
SA2 COMMENTS: NORMAL
SA2 HI RISK ABY UA: NORMAL
SA2 MOD RISK ABY: NORMAL
SA2 TEST METHOD: NORMAL
SODIUM SERPL-SCNC: 134 MMOL/L (ref 133–144)
SOURCE: ABNORMAL
SP GR UR STRIP: 1.01 (ref 1–1.03)
TACROLIMUS BLD-MCNC: 7.2 UG/L (ref 5–15)
TME LAST DOSE: NORMAL H
UNACCEPTABLE ANTIGEN: NORMAL
UNOS CPRA: 38
UROBILINOGEN UR STRIP-MCNC: 0 MG/DL (ref 0–2)
WBC # BLD AUTO: 6.5 10E9/L (ref 4–11)
WBC #/AREA URNS AUTO: 1 /HPF (ref 0–5)

## 2018-12-17 RX ORDER — TACROLIMUS 0.5 MG/1
0.5 CAPSULE ORAL 2 TIMES DAILY
Qty: 60 CAPSULE | Refills: 11 | Status: SHIPPED | OUTPATIENT
Start: 2018-12-17 | End: 2018-12-19

## 2018-12-17 RX ORDER — TACROLIMUS 1 MG/1
3 CAPSULE ORAL 2 TIMES DAILY
Qty: 180 CAPSULE | Refills: 11 | Status: SHIPPED | OUTPATIENT
Start: 2018-12-17 | End: 2018-12-19

## 2018-12-17 NOTE — TELEPHONE ENCOUNTER
ISSUE:   Tacrolimus level 7.2 on 12/17/18, goal 8-10, dose 3 mg BID    PLAN:   Please call pt and confirm this was a good 12-hour trough. Verify dose 3 mg BID. Confirm no new medications or illness (stacey. Diarrhea). If good trough, increase dose to 3.5 mg BID and recheck level as scheduled.

## 2018-12-17 NOTE — TELEPHONE ENCOUNTER
Spoke to patient who confirms current Tacrolimus dose and good 12 hour trough level.  Patient verbalizes understanding to increase Tacrolimus dose to 3.5 mg BID.

## 2018-12-17 NOTE — TELEPHONE ENCOUNTER
115/63 sitting  90 SBP standing.    No lightheadedness or dizziness.  Consuming 2-3L daily.    Will monitor.

## 2018-12-18 ENCOUNTER — MYC MEDICAL ADVICE (OUTPATIENT)
Dept: TRANSPLANT | Facility: CLINIC | Age: 58
End: 2018-12-18

## 2018-12-18 DIAGNOSIS — Z94.0 KIDNEY TRANSPLANTED: Primary | ICD-10-CM

## 2018-12-18 LAB
MYCOPHENOLATE SERPL LC/MS/MS-MCNC: 2.03 MG/L (ref 1–3.5)
MYCOPHENOLATE SERPL LC/MS/MS-MCNC: 2.61 MG/L (ref 1–3.5)
MYCOPHENOLATE-G SERPL LC/MS/MS-MCNC: 45.3 MG/L (ref 30–95)
MYCOPHENOLATE-G SERPL LC/MS/MS-MCNC: 50.1 MG/L (ref 30–95)
TME LAST DOSE: 2000 H
TME LAST DOSE: NORMAL H

## 2018-12-19 DIAGNOSIS — Z48.298 AFTERCARE FOLLOWING ORGAN TRANSPLANT: ICD-10-CM

## 2018-12-19 DIAGNOSIS — Z94.0 KIDNEY TRANSPLANTED: Primary | ICD-10-CM

## 2018-12-19 DIAGNOSIS — Z11.59 ENCOUNTER FOR SCREENING FOR OTHER VIRAL DISEASES: ICD-10-CM

## 2018-12-19 DIAGNOSIS — Z94.0 KIDNEY REPLACED BY TRANSPLANT: ICD-10-CM

## 2018-12-19 LAB
ANION GAP SERPL CALCULATED.3IONS-SCNC: 8 MMOL/L (ref 3–14)
BASOPHILS # BLD AUTO: 0 10E9/L (ref 0–0.2)
BASOPHILS NFR BLD AUTO: 0.2 %
BUN SERPL-MCNC: 20 MG/DL (ref 7–30)
CALCIUM SERPL-MCNC: 8.6 MG/DL (ref 8.5–10.1)
CHLORIDE SERPL-SCNC: 102 MMOL/L (ref 94–109)
CO2 SERPL-SCNC: 24 MMOL/L (ref 20–32)
CREAT SERPL-MCNC: 1.04 MG/DL (ref 0.52–1.04)
DIFFERENTIAL METHOD BLD: ABNORMAL
EOSINOPHIL # BLD AUTO: 0.1 10E9/L (ref 0–0.7)
EOSINOPHIL NFR BLD AUTO: 1.6 %
ERYTHROCYTE [DISTWIDTH] IN BLOOD BY AUTOMATED COUNT: 20.5 % (ref 10–15)
GFR SERPL CREATININE-BSD FRML MDRD: 58 ML/MIN/{1.73_M2}
GLUCOSE SERPL-MCNC: 68 MG/DL (ref 70–99)
HCT VFR BLD AUTO: 31.7 % (ref 35–47)
HGB BLD-MCNC: 10.4 G/DL (ref 11.7–15.7)
IMM GRANULOCYTES # BLD: 0 10E9/L (ref 0–0.4)
IMM GRANULOCYTES NFR BLD: 0.7 %
LYMPHOCYTES # BLD AUTO: 0.5 10E9/L (ref 0.8–5.3)
LYMPHOCYTES NFR BLD AUTO: 8.6 %
MAGNESIUM SERPL-MCNC: 1.8 MG/DL (ref 1.6–2.3)
MCH RBC QN AUTO: 35 PG (ref 26.5–33)
MCHC RBC AUTO-ENTMCNC: 32.8 G/DL (ref 31.5–36.5)
MCV RBC AUTO: 107 FL (ref 78–100)
MONOCYTES # BLD AUTO: 0.9 10E9/L (ref 0–1.3)
MONOCYTES NFR BLD AUTO: 14.3 %
NEUTROPHILS # BLD AUTO: 4.5 10E9/L (ref 1.6–8.3)
NEUTROPHILS NFR BLD AUTO: 74.6 %
NRBC # BLD AUTO: 0 10*3/UL
NRBC BLD AUTO-RTO: 0 /100
PHOSPHATE SERPL-MCNC: 4.2 MG/DL (ref 2.5–4.5)
PLATELET # BLD AUTO: 215 10E9/L (ref 150–450)
POTASSIUM SERPL-SCNC: 4.5 MMOL/L (ref 3.4–5.3)
RBC # BLD AUTO: 2.97 10E12/L (ref 3.8–5.2)
SODIUM SERPL-SCNC: 134 MMOL/L (ref 133–144)
TACROLIMUS BLD-MCNC: 11.9 UG/L (ref 5–15)
TME LAST DOSE: NORMAL H
WBC # BLD AUTO: 6.1 10E9/L (ref 4–11)

## 2018-12-19 RX ORDER — TACROLIMUS 1 MG/1
3 CAPSULE ORAL 2 TIMES DAILY
Qty: 540 CAPSULE | Refills: 3 | Status: SHIPPED | OUTPATIENT
Start: 2018-12-19 | End: 2018-12-31

## 2018-12-19 RX ORDER — TACROLIMUS 0.5 MG/1
CAPSULE ORAL
Qty: 60 CAPSULE | Refills: 11
Start: 2018-12-19 | End: 2018-12-31

## 2018-12-19 RX ORDER — VALACYCLOVIR HYDROCHLORIDE 1 G/1
1000 TABLET, FILM COATED ORAL 2 TIMES DAILY
Qty: 180 TABLET | Refills: 1 | Status: SHIPPED | OUTPATIENT
Start: 2018-12-19 | End: 2018-12-31

## 2018-12-19 RX ORDER — SULFAMETHOXAZOLE AND TRIMETHOPRIM 400; 80 MG/1; MG/1
1 TABLET ORAL DAILY
Qty: 90 TABLET | Refills: 3 | Status: SHIPPED | OUTPATIENT
Start: 2018-12-19 | End: 2018-12-31

## 2018-12-19 NOTE — TELEPHONE ENCOUNTER
ISSUE:   Tacrolimus level 11/9 on 12/19/18, goal 8-10, dose 3.5 mg BID    PLAN:   Please call pt and confirm this was a good 12-hour trough. Verify dose 3.5 mg BID. Confirm no new medications or illness (stacey. Diarrhea). If good trough, decrease dose BACK TO 3 mg BID and recheck level as scheduled.

## 2018-12-19 NOTE — TELEPHONE ENCOUNTER
Spoke to patient regarding elevated tac level.  Patient confirms current tacrolimus dose and good 12 hour trough level.  Patient verbalizes understanding of medication dose decrease of Tacrolimus to 3 mg BID.  Patient also requests 90 day Rx be sent for Valcyte and Bactrim to [facility].  Separate Rx refill submitted.

## 2018-12-20 DIAGNOSIS — Z00.6 EXAMINATION OF PARTICIPANT OR CONTROL IN CLINICAL RESEARCH: Primary | ICD-10-CM

## 2018-12-20 DIAGNOSIS — Z94.0 KIDNEY TRANSPLANTED: Primary | ICD-10-CM

## 2018-12-20 LAB
EBV DNA # SPEC NAA+PROBE: NORMAL {COPIES}/ML
EBV DNA SPEC NAA+PROBE-LOG#: NORMAL {LOG_COPIES}/ML
MYCOPHENOLATE SERPL LC/MS/MS-MCNC: <0.25 MG/L (ref 1–3.5)
MYCOPHENOLATE-G SERPL LC/MS/MS-MCNC: 47.8 MG/L (ref 30–95)
TME LAST DOSE: ABNORMAL H

## 2018-12-20 RX ORDER — MYCOPHENOLIC ACID 180 MG/1
360 TABLET, DELAYED RELEASE ORAL 2 TIMES DAILY
Qty: 360 TABLET | Refills: 3 | Status: SHIPPED | OUTPATIENT
Start: 2018-12-20 | End: 2018-12-21

## 2018-12-20 NOTE — TELEPHONE ENCOUNTER
Patient Call: Medication Refill  Route to LPN  Instruct the patient to first contact their pharmacy. If they have called their pharmacy and require further assistance, route to LPN.    Pharmacy Name: [facility]    Name of Medication: MYFORTIC (BRAND) 180 MG EC tablet    When will the patient be out of this medication?: Less than 3 days (Route high priority)     Please call and finish refill request- there is missing/error information regarding this Rx at the pts pharmacy

## 2018-12-21 ENCOUNTER — TELEPHONE (OUTPATIENT)
Dept: TRANSPLANT | Facility: CLINIC | Age: 58
End: 2018-12-21

## 2018-12-21 DIAGNOSIS — Z94.0 KIDNEY TRANSPLANTED: Primary | ICD-10-CM

## 2018-12-21 RX ORDER — MYCOPHENOLIC ACID 180 MG/1
360 TABLET, DELAYED RELEASE ORAL 2 TIMES DAILY
Qty: 360 TABLET | Refills: 3 | Status: SHIPPED | OUTPATIENT
Start: 2018-12-21 | End: 2019-02-22

## 2018-12-21 NOTE — TELEPHONE ENCOUNTER
ISSUE:  K 5.8    PLAN:  Call placed to pt. She states that she believes this is from elevated tac level earlier this week. She states that she is on a low K diet, and plans to obtain follow-up labs Monday. Repeat tac level pending from today. Letty has been educated to present to ED if she feels any chest discomfort/pain or palpitations.   Pt verbalizes understanding of plan

## 2018-12-21 NOTE — TELEPHONE ENCOUNTER
Patient Call: General  Route to LPN    Reason for call: Pt called she was able to get med sent out for this weekend  Only was for 0 day supply  Needs called in for 90 day for next refill    Call back needed? Yes    Return Call Needed  Same as documented in contacts section  When to return call?: Greater than one day: Route standard priority

## 2018-12-21 NOTE — TELEPHONE ENCOUNTER
Call returned to pt. [facility] told her they only have rx for 30 day supply myfortic (despite the fact that 90 day rx was sent yesterday). They are shipping month supply to get to pt by Saturday.     LPN TASK:   Please send rx again for 90 day supply of [facility]ic [facility].

## 2018-12-21 NOTE — TELEPHONE ENCOUNTER
Call placed to [facility], spoke w/ rep who states prior auth is NOT needed. Pt needs to set up med delivery w/ enrollment review. Needs to speak w/ rep each time a new rx is filled. Informed pharmacy that pt will be out of med 12/25 and needs delivery prior.     Updated pt, instructed her to call [facility] this afternoon to set up Saturday delivery. Pt v/u, will call tx coordinator back if there are any further issues.

## 2018-12-21 NOTE — TELEPHONE ENCOUNTER
Patient Call: Medication Refill  Needs ASAP will be out very soon Needs Prior Auth For Myfortic 180mg EC.  [facility] until Prior Auth goes through      [facility] TREVIN Avila needs prior auth for Myfortic 180mg EC  Pharmacy Location: [place]  Name of Medication: Myfortic Dose: 180mg  When will the patient be out of this medication?: Less than 3 days (Route high priority)

## 2018-12-26 ENCOUNTER — TELEPHONE (OUTPATIENT)
Dept: TRANSPLANT | Facility: CLINIC | Age: 58
End: 2018-12-26

## 2018-12-26 NOTE — TELEPHONE ENCOUNTER
Post Kidney and Pancreas Transplant Team Conference  Date: 12/26/2018  Transplant Coordinator: Tania Khan     Attendees:  []  Dr. Villalta [] Che Latham, EAMON  [x] Lisbet Holley LPN     []  Dr. Romano [x] Shasha Arreaga RN [] Norma Egan LPN   [x]  Dr. Fuentes [] Ofe Mujica, EAMON    [x]  Dr. Anthony [] Selam Hoskins RN    [] Dr. Kang [x] Morena Khan RN    [] Dr. Hernandez [] Chito Holley RN    [x] Dr. Hankins [] Maddi Zaldivar, RN    [] Surgery Fellow [] Apurva Soria RN    [] Julianna Thornton NP              Verbal Plan Read Back:   No changes at this time.    Routed to RN Coordinator   Lisbet Holley

## 2018-12-27 ENCOUNTER — RESULTS ONLY (OUTPATIENT)
Dept: OTHER | Facility: CLINIC | Age: 58
End: 2018-12-27

## 2018-12-27 ENCOUNTER — TELEPHONE (OUTPATIENT)
Dept: TRANSPLANT | Facility: CLINIC | Age: 58
End: 2018-12-27

## 2018-12-27 DIAGNOSIS — Z48.298 AFTERCARE FOLLOWING ORGAN TRANSPLANT: ICD-10-CM

## 2018-12-27 DIAGNOSIS — Z94.0 KIDNEY REPLACED BY TRANSPLANT: ICD-10-CM

## 2018-12-27 PROCEDURE — 86833 HLA CLASS II HIGH DEFIN QUAL: CPT | Performed by: TRANSPLANT SURGERY

## 2018-12-27 PROCEDURE — 86832 HLA CLASS I HIGH DEFIN QUAL: CPT | Performed by: TRANSPLANT SURGERY

## 2018-12-27 PROCEDURE — 86828 HLA CLASS I&II ANTIBODY QUAL: CPT | Performed by: TRANSPLANT SURGERY

## 2018-12-27 NOTE — TELEPHONE ENCOUNTER
Letty reports tac from 12/21 = 10.3. She has not yet received any other tacro levels.    Discussed creatinine baseline approximately 1, okay for flux up to 1.2, concern if >1.25.   Letty voiced understanding.    Letty continues to have constipation and bloating despite linzess and 1/2 dose miralax daily (history of gastroparesis). She will increase miralax to daily.    Overall, she is feeling well - energy is still less than prior to transplant, but slowly improving.  Will monitor.     RNCC obtained verbal for tac level from 12/24 =   RNCC asked that lab complete prompt release of tacrolimus levels to [facility] via fax.

## 2018-12-31 ENCOUNTER — TELEPHONE (OUTPATIENT)
Dept: TRANSPLANT | Facility: CLINIC | Age: 58
End: 2018-12-31

## 2018-12-31 DIAGNOSIS — Z94.0 KIDNEY TRANSPLANTED: Primary | ICD-10-CM

## 2018-12-31 RX ORDER — VALACYCLOVIR HYDROCHLORIDE 1 G/1
1000 TABLET, FILM COATED ORAL 2 TIMES DAILY
Qty: 60 TABLET | Refills: 0 | Status: SHIPPED | OUTPATIENT
Start: 2018-12-31 | End: 2019-02-22

## 2018-12-31 RX ORDER — TACROLIMUS 1 MG/1
2 CAPSULE ORAL 2 TIMES DAILY
Qty: 360 CAPSULE | Refills: 3 | Status: SHIPPED | OUTPATIENT
Start: 2018-12-31 | End: 2019-01-08

## 2018-12-31 RX ORDER — SULFAMETHOXAZOLE AND TRIMETHOPRIM 400; 80 MG/1; MG/1
1 TABLET ORAL DAILY
Qty: 90 TABLET | Refills: 3 | Status: SHIPPED | OUTPATIENT
Start: 2018-12-31 | End: 2019-02-08

## 2018-12-31 RX ORDER — TACROLIMUS 0.5 MG/1
0.5 CAPSULE ORAL 2 TIMES DAILY
Qty: 60 CAPSULE | Refills: 11 | Status: SHIPPED | OUTPATIENT
Start: 2018-12-31 | End: 2019-01-08

## 2018-12-31 NOTE — TELEPHONE ENCOUNTER
ISSUE:   Tacrolimus level 10.5 on 12/27/18, goal 8-10, dose 3 mg BID    PLAN:   Please call pt and confirm this was a good 12-hour trough. Verify dose 3 mg BID. Confirm no new medications or illness (stacey. Diarrhea). If good trough, decrease dose to 2.5 mg BID and recheck level as scheduled.

## 2018-12-31 NOTE — TELEPHONE ENCOUNTER
Spoke to patient regarding tacrolimus level = 10.5, above goal.  Patient confirms current Tacrolimus dose and good 12 hour trough level.  Patient denies any recent illness or new medications.  Patient verbalizes understanding to decrease Tacrolimus dose to 2.5 mg BID.

## 2018-12-31 NOTE — TELEPHONE ENCOUNTER
The patient called me back.  She is writing a letter to her anonymous donor and will send it to my email then I will print and send to the donor.  I reviewed that a card is going to arrive at her home on Thursday.  She reports feeling well, she said she is starting to feel better.  I explained the policy if she would like to share her identity, we would send her a waiver, and that both parties have to be willing.

## 2018-12-31 NOTE — TELEPHONE ENCOUNTER
I called the patient and left a voicemail message with the following detail:  I alerted her that a card was being shipped to her home from her anonymous donor and it will arrive Thursday.  I also gave her instructions if she would like to reciprocate and mail her donor a correspondence.

## 2019-01-02 ENCOUNTER — TELEPHONE (OUTPATIENT)
Dept: TRANSPLANT | Facility: CLINIC | Age: 59
End: 2019-01-02

## 2019-01-02 NOTE — TELEPHONE ENCOUNTER
Okay for prednisone injection in her shoulder - this is an ongoing issue that occurs yearly that Letty sees her PCP for and goes through 6wks PT.   Letty has had high blood sugars in the past with cortisone injections, okay to bolus with pump to control these.

## 2019-01-07 ENCOUNTER — TRANSFERRED RECORDS (OUTPATIENT)
Dept: HEALTH INFORMATION MANAGEMENT | Facility: CLINIC | Age: 59
End: 2019-01-07

## 2019-01-07 ENCOUNTER — TELEPHONE (OUTPATIENT)
Dept: TRANSPLANT | Facility: CLINIC | Age: 59
End: 2019-01-07

## 2019-01-07 NOTE — TELEPHONE ENCOUNTER
Dr. Vance called to report all looks well with Letty. Small amount of pelvic pain after starting abdominal exercises. Small elevation in creatinine (1.3), but tac level not yet resulted.    RNCC suggest UA/UC. Dr. Vance will order. No other changes at this time. Will follow labs.

## 2019-01-08 ENCOUNTER — TELEPHONE (OUTPATIENT)
Dept: TRANSPLANT | Facility: CLINIC | Age: 59
End: 2019-01-08

## 2019-01-08 DIAGNOSIS — Z94.0 KIDNEY TRANSPLANTED: ICD-10-CM

## 2019-01-08 RX ORDER — TACROLIMUS 0.5 MG/1
CAPSULE ORAL
Qty: 60 CAPSULE | Refills: 11 | Status: SHIPPED | OUTPATIENT
Start: 2019-01-08 | End: 2019-01-29

## 2019-01-08 RX ORDER — TACROLIMUS 1 MG/1
3 CAPSULE ORAL 2 TIMES DAILY
Qty: 540 CAPSULE | Refills: 3 | Status: SHIPPED | OUTPATIENT
Start: 2019-01-08 | End: 2019-01-29

## 2019-01-08 NOTE — TELEPHONE ENCOUNTER
Letty increased her tac to 3mg BID when her tac was 6.2 (she obtained this result from her outside facility over the weekend).    No change in UO.  Letty drinks 3+L daily.  Denies any fevers.   Denies N/V/D.  She feels well hydrated.    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     Will discuss with the team.    UPDATE:  Obtain pelvic ultrasound and kidney transplant US.

## 2019-01-08 NOTE — LETTER
PHYSICIAN ORDERS      DATE & TIME ISSUED: 2019 3:02 PM  PATIENT NAME: Letty Benavidez   : 1960     [facility] MR# [if applicable]: [id number]     DIAGNOSIS:  Kidney replaced by transplant.  ICD-10 CODE: Z94.0     Please obtain the following imaging, due to elevated serum creatinine and to rule out pelvic lymphocele:  Ultrasound of the pelvis.  Renal transplant ultrasound with doppler.    Any questions please call: Morena Khan RN, BSN                                             Transplant Care Coordinator                                             [phone]  Fax reports to:  [phone].    If possible, push images through PACS system to [facility].  If unable to push images, send images on disc to:  Solid Organ Transplant  [place] [id number] [address]  [address] [place] [address]        [address] [place] [address]  (p) [phone]  (f) [phone]  NPI: [id number]

## 2019-01-09 ENCOUNTER — MYC MEDICAL ADVICE (OUTPATIENT)
Dept: TRANSPLANT | Facility: CLINIC | Age: 59
End: 2019-01-09

## 2019-01-09 ENCOUNTER — TELEPHONE (OUTPATIENT)
Dept: TRANSPLANT | Facility: CLINIC | Age: 59
End: 2019-01-09

## 2019-01-09 NOTE — TELEPHONE ENCOUNTER
Post Kidney and Pancreas Transplant Team Conference  Date: 1/9/2019  Transplant Coordinator: Tania Khan     Attendees:  [x]  Dr. Villalta [] Che Latham, RN  [x] Lisbet Holley LPN     [x]  Dr. Romano [] Shasha Arreaga, EAMON [] Norma Egan LPN   [x]  Dr. Fuentes [] Ofe Mujica, EAMON    [x]  Dr. Anthony [] Selam Hoskins RN    [] Dr. Kang [x] Morena Khan RN    [] Dr. Hernandez [] Chito Holley RN    [x] Dr. Hankins [] Maddi Zaldivar, RN    [] Surgery Fellow [] Apurva Soria RN    [] Julianna Thornton NP              Verbal Plan Read Back:   No changes at this time.    Routed to RN Coordinator   Lisbet Holley

## 2019-01-16 ENCOUNTER — TELEPHONE (OUTPATIENT)
Dept: TRANSPLANT | Facility: CLINIC | Age: 59
End: 2019-01-16

## 2019-01-16 NOTE — TELEPHONE ENCOUNTER
RNCC spoke with Letty - tac level was given by [facility] staff verbally from 1/14 = 10.1. No med dose change.    RNCC also discussed K level - Letty is following low k diet, but K levels remain elevated. Will hold bactrim and discuss alternative PCP prophy with team.     RNCC also discussed creatinine baseline with Letty - baseline 1 - 1.15, will have concern if <25% increase from the 1.15.  Letty voiced understanding.     Letty will follow up with PCP or OBGYN for pelvic floor pain for which US showed no transplant concern.

## 2019-01-16 NOTE — TELEPHONE ENCOUNTER
Patient Call: General  Route to LPN    Reason for call: patient called regarding her Tacrolimus level patient is trying to get her level from the 1/14/19 from her lab and her local lab is stating it is in care everywhere.    Call back needed? Yes    Return Call Needed  Same as documented in contacts section  When to return call?: Greater than one day: Route standard priority

## 2019-01-17 ENCOUNTER — MYC MEDICAL ADVICE (OUTPATIENT)
Dept: OTHER | Age: 59
End: 2019-01-17

## 2019-01-18 ENCOUNTER — TELEPHONE (OUTPATIENT)
Dept: TRANSPLANT | Facility: CLINIC | Age: 59
End: 2019-01-18

## 2019-01-18 NOTE — TELEPHONE ENCOUNTER
Post Kidney and Pancreas Transplant Team Conference  Date: 1/18/2019  Transplant Coordinator: Tania Khan     Attendees:  []  Dr. Villalta [] Che Latham, RN  [] Lisbet Holley LPN     []  Dr. Romano [] Shasha Arreaga RN [] Norma Egan LPN   [x]  Dr. Fuentes [] Ofe Mujica RN    []  Dr. Anthony [] Selam Hoskins RN    [] Dr. Hernandez [x] Morena Khan RN    [] Dr. Hankins [] Chito Holley RN    [] Surgery Fellow [] Maddi Zaldivar RN    [] Julianna Thortnon, HUNTER [] Apurva Soria RN    [] Wil Becker, PharmD [] Ofe Chairez RN     [] Ezequiel Javier RN     [] Che Villalba RN        Verbal Plan Read Back:   Due to hyperkalemia, continue to hold bactrim.   PCP prophylaxis per patient preference - if dapsone, obtain G6PD prior to administration.  Pentamidine ok as well.    Routed to RN Coordinator   Tania Khan    Letty was given the options above with detailed conversation regarding modes of administration and side effects. Letty chose to try dapsone.    Will send orders for G6PD.  Will need to re-run EBV PCR quant, as it was resulted from her lab as and  IgG and IgM.  Will ask for tacro from 1/17 as well as all results from today.

## 2019-01-18 NOTE — LETTER
PHYSICIAN ORDERS      DATE & TIME ISSUED: 2019 11:49 AM  PATIENT NAME: Letty Benavidez   : 1960     [facility] MR# [if applicable]: [id number]     DIAGNOSIS:  Kidney Transplant  ICD-10 CODE: Z94.0     Please complete the following labs at the next routine transplant lab draw:  g6pd (glucose 6 phosphate dehydrogenase)  EBV PCR quant     Any questions please call: [phone]  Please fax lab results to [phone].

## 2019-01-21 ENCOUNTER — RESULTS ONLY (OUTPATIENT)
Dept: OTHER | Facility: CLINIC | Age: 59
End: 2019-01-21

## 2019-01-21 ENCOUNTER — TELEPHONE (OUTPATIENT)
Dept: TRANSPLANT | Facility: CLINIC | Age: 59
End: 2019-01-21

## 2019-01-21 DIAGNOSIS — Z48.298 AFTERCARE FOLLOWING ORGAN TRANSPLANT: ICD-10-CM

## 2019-01-21 DIAGNOSIS — Z94.0 KIDNEY REPLACED BY TRANSPLANT: ICD-10-CM

## 2019-01-21 PROCEDURE — 86828 HLA CLASS I&II ANTIBODY QUAL: CPT | Performed by: TRANSPLANT SURGERY

## 2019-01-21 PROCEDURE — 86833 HLA CLASS II HIGH DEFIN QUAL: CPT | Performed by: TRANSPLANT SURGERY

## 2019-01-21 PROCEDURE — 86832 HLA CLASS I HIGH DEFIN QUAL: CPT | Performed by: TRANSPLANT SURGERY

## 2019-01-21 NOTE — TELEPHONE ENCOUNTER
I called the patient to confirm that I received her signed waiver to share identity.  I have sent it to be scanned to media tab.  I called the patient to confirm the method she would like to communicate.  She agrees to exchanging emails.  They have planned to meet in April when Letty comes to [place] for her follow up appointment.  I will send an email to Letty with the donor's email and they will then communicate.  I have confirmed with the donor as well and the donor agrees to this plan and has signed identity sharing waiver.

## 2019-01-23 ENCOUNTER — TELEPHONE (OUTPATIENT)
Dept: TRANSPLANT | Facility: CLINIC | Age: 59
End: 2019-01-23

## 2019-01-23 NOTE — TELEPHONE ENCOUNTER
Post Kidney and Pancreas Transplant Team Conference  Date: 1/23/2019  Transplant Coordinator: Tania Khan     Attendees:  [x]  Dr. Villalta [] Che Latham, RN  [x] Lisbet Holley LPN     []  Dr. Romano [x] Shasha Arreaga RN [] Norma Egan LPN   [x]  Dr. Fuentes [] Ofe Mujica RN    []  Dr. Anthony [] Selam Hoskins RN    [] Dr. Kang [x] Morena Khan RN    [] Dr. Hernandez [] Chito Holley RN    [x] Dr. Hankins [] Maddi Zaldivar, RN    [] Surgery Fellow [] Auprva Soria RN    [] Julianna Thornton NP              Verbal Plan Read Back:   No changes at this time.    Routed to RN Coordinator   Lisbet Holley

## 2019-01-24 ENCOUNTER — TELEPHONE (OUTPATIENT)
Dept: TRANSPLANT | Facility: CLINIC | Age: 59
End: 2019-01-24

## 2019-01-24 NOTE — TELEPHONE ENCOUNTER
K slightly elevated - will await tac level.   Letty is following a low K diet. No s/s of hyperkalemia, will follow.

## 2019-01-29 ENCOUNTER — TELEPHONE (OUTPATIENT)
Dept: TRANSPLANT | Facility: CLINIC | Age: 59
End: 2019-01-29

## 2019-01-29 DIAGNOSIS — Z79.2 PROPHYLACTIC ANTIBIOTIC: Primary | ICD-10-CM

## 2019-01-29 DIAGNOSIS — Z94.0 KIDNEY TRANSPLANTED: ICD-10-CM

## 2019-01-29 RX ORDER — TACROLIMUS 0.5 MG/1
CAPSULE ORAL
Qty: 90 CAPSULE | Refills: 3 | Status: SHIPPED | OUTPATIENT
Start: 2019-01-29 | End: 2019-02-06

## 2019-01-29 RX ORDER — TACROLIMUS 1 MG/1
CAPSULE ORAL
Qty: 540 CAPSULE | Refills: 3 | Status: SHIPPED | OUTPATIENT
Start: 2019-01-29 | End: 2019-02-06

## 2019-01-29 RX ORDER — DAPSONE 100 MG/1
100 TABLET ORAL DAILY
Qty: 30 TABLET | Refills: 0 | Status: SHIPPED | OUTPATIENT
Start: 2019-01-29 | End: 2019-02-04

## 2019-01-29 NOTE — TELEPHONE ENCOUNTER
G6PD 12.7 (in care everywhere) - will start dapsone.     EBV - RNCC discussed the protocol for monitoring, EBV discordance and answered all questions that Letty had (including potential treatment and PTLD, if disease progresses).  Letty voiced understanding.     On tac 3mg AM, 2.5mg PM, awaiting level from 1/28.

## 2019-02-04 ENCOUNTER — TELEPHONE (OUTPATIENT)
Dept: TRANSPLANT | Facility: CLINIC | Age: 59
End: 2019-02-04

## 2019-02-04 DIAGNOSIS — Z79.2 PROPHYLACTIC ANTIBIOTIC: ICD-10-CM

## 2019-02-04 DIAGNOSIS — Z94.0 KIDNEY TRANSPLANTED: ICD-10-CM

## 2019-02-04 RX ORDER — DAPSONE 100 MG/1
100 TABLET ORAL DAILY
Qty: 90 TABLET | Refills: 3 | Status: SHIPPED | OUTPATIENT
Start: 2019-02-04 | End: 2019-02-08

## 2019-02-04 NOTE — LETTER
PHYSICIAN ORDERS      DATE & TIME ISSUED: 2019 3:59 PM  PATIENT NAME: Letty Benavidez   : 1960     [facility] MR# [if applicable]: [id number]     DIAGNOSIS:  Kidney Transplant  ICD-10 CODE: Z94.0     Please complete the following labs at the next routine transplant lab draw:  B12  Folate  TSH  Hepatic panel  LDH  Haptoglobin  Copper level    Any questions please call: [phone]  Please fax lab results to [phone].    .

## 2019-02-04 NOTE — TELEPHONE ENCOUNTER
Post Kidney and Pancreas Transplant Team Conference  Date: 2/4/2019  Transplant Coordinator: Tania Khan     Attendees:  [x]  Dr. Villalta [] Che Latham, RN  [] Lisbet Holley LPN     []  Dr. Romano [] Shasha Arreaga RN [] Norma Egan LPN   []  Dr. Fuentes [] Ofe Mujica RN    []  Dr. Anthony [] Selam Hoskins RN    [] Dr. Hernandez [x] Morena Khan RN    [] Dr. Hankins [] Chito Holley RN    [] Surgery Fellow [] Maddi Zaldivar RN    [] Julianna Thornton, HUNTER [] Apurva Soria RN    [] Wil Becker, PharmD [] Ofe Chairez RN     [] Ezequiel Javier RN     [] Che Villalba RN        Verbal Plan Read Back:   For elevated MCV:  B12, folate, TSH, hepatic panel, LDH, haptoglobin, copper level  Ensure no alcohol use - Letty denies.  Okay for lasix 10mg PO daily for weight gain (fluid retention and elevated K not likely due to dapsone) - Letty did not want to start this, will monitor.     Letty reports that pain over her graft has crept 'upward', but she does state that it still feels muscular. Will hold off on US - Letty did not want a US, will monitor.      Routed to RN Coordinator   Tania Khan

## 2019-02-06 ENCOUNTER — TELEPHONE (OUTPATIENT)
Dept: TRANSPLANT | Facility: CLINIC | Age: 59
End: 2019-02-06

## 2019-02-06 DIAGNOSIS — Z94.0 KIDNEY TRANSPLANTED: ICD-10-CM

## 2019-02-06 RX ORDER — TACROLIMUS 1 MG/1
2 CAPSULE ORAL 2 TIMES DAILY
Qty: 360 CAPSULE | Refills: 3 | Status: SHIPPED | OUTPATIENT
Start: 2019-02-06 | End: 2019-04-29

## 2019-02-06 RX ORDER — TACROLIMUS 0.5 MG/1
0.5 CAPSULE ORAL 2 TIMES DAILY
Qty: 180 CAPSULE | Refills: 3 | Status: SHIPPED | OUTPATIENT
Start: 2019-02-06 | End: 2019-04-29

## 2019-02-06 NOTE — TELEPHONE ENCOUNTER
Post Kidney and Pancreas Transplant Team Conference  Date: 2/6/2019  Transplant Coordinator: Tania Khan     Attendees:  [x]  Dr. Villalta [] Che Latham, RN  [x] Lisbet Holley LPN     [x]  Dr. Romano [x] Shasha Arreaga RN [] Norma Egan LPN   [x]  Dr. Fuentes [] Ofe Mujica RN    []  Dr. Anthony [] Selam Hoskins RN    [] Dr. Kang [x] Morena Khan RN    [] Dr. Hernandez [] Chito Holley RN    [] Dr. Hankins [] Maddi Zaldivar, RN    [] Surgery Fellow [] Apurva Soria RN    [] Julianna Thornton NP              Verbal Plan Read Back:   No changes at this time.    Routed to RN Coordinator   Lisbet Holley

## 2019-02-06 NOTE — TELEPHONE ENCOUNTER
ISSUE:   Tacrolimus level 10.8 on 2/4/19, goal 8-10, dose 3mg AM, 2.5 mg PM    PLAN:   Please call pt and confirm this was a good 12-hour trough. Verify dose as above. Confirm no new medications or illness (stacey. Diarrhea). If good trough, decrease dose to 2.5 mg BID and recheck level as scheduled.    Letty confirmed and voiced understanding of dose reduction.    Letty continue to have concern with facial edema and fluid retention after starting dapsone. She did state that the package insert on the dapsone has a warning that if facial swelling occurs, contact your doctor.    Letty will hold tonight's dapsone dose and will assess improvement in the AM.

## 2019-02-07 ENCOUNTER — MYC MEDICAL ADVICE (OUTPATIENT)
Dept: OTHER | Age: 59
End: 2019-02-07

## 2019-02-07 NOTE — LETTER
PHYSICIAN ORDERS      DATE & TIME ISSUED: 2019 11:29 AM,   EXPIRES 1 YEAR AFTER ISSUANCE.  PATIENT NAME: Letty Benavidez   : 1960     DIAGNOSIS:  Kidney replaced by transplant, prophylactic antibiotic  ICD-10 CODE: Z94.0, Z79.2     Pentamidine for Antibiotic Prophylaxis, give every 28 days +/- 3 days:  Vital Signs - PER UNIT ROUTINE   Blood pressure, pulse, respiratory rate, temperature and oxygen level prior to pentamidine and PRN.    Pre- Medications  Albuterol neb solution 2.5mg, Nebulization, ONCE  GIVE PRIOR TO PENTAMIDINE    Pentamidine (NEBUPENT) neb solution 300mg, Inhalation, ONCE  Administer neb in private room only. Reconstitute only with 6mL of sterile water for injection; do not use NS; recommended nebulizer flow rate is 5 to 7 liters per minute.    Emergency Medications:  INFUSION HYPERSENSITIVITY, Continuous PRN  If patient experiences a hypersensitivity reaction, RPh or RN to order Hypersensitivity reaction management order set.    Any questions please call: [facility] [phone], ext [phone]

## 2019-02-08 ENCOUNTER — TELEPHONE (OUTPATIENT)
Dept: TRANSPLANT | Facility: CLINIC | Age: 59
End: 2019-02-08

## 2019-02-08 NOTE — TELEPHONE ENCOUNTER
Pain has changed from incisional. It continues to be topical on the abdominal wall, incisional, but has regressed to similar pain as POD 14(brent) - stinging pain increased with walking that radiates upward.     Will need to start pentamidine as symptoms of high BP and fluid retention stopped after stopping dapsone.    RNCC discussed pain with Julianna Thornton NP who recommends CT w/o contrast - RNCC then spoke with Dr. Vance who will place orders.

## 2019-02-08 NOTE — TELEPHONE ENCOUNTER
Patient called to request tx coordinator call her back to discuss increasing abdominal pain. See [employer] message for more detail.

## 2019-02-08 NOTE — TELEPHONE ENCOUNTER
Patient left message stating that she tried to schedule Pentamidine inhalation treatment and her local clinic did not have orders.  I see orders in the letters tab.  Please advise.

## 2019-02-11 ENCOUNTER — TELEPHONE (OUTPATIENT)
Dept: TRANSPLANT | Facility: CLINIC | Age: 59
End: 2019-02-11

## 2019-02-11 DIAGNOSIS — Z79.2 PROPHYLACTIC ANTIBIOTIC: Primary | ICD-10-CM

## 2019-02-11 DIAGNOSIS — Z94.0 KIDNEY REPLACED BY TRANSPLANT: ICD-10-CM

## 2019-02-11 DIAGNOSIS — D84.9 IMMUNOSUPPRESSED STATUS (H): ICD-10-CM

## 2019-02-11 RX ORDER — PENTAMIDINE ISETHIONATE 300 MG/300MG
300 INHALANT RESPIRATORY (INHALATION) ONCE
Qty: 300 MG | Refills: 0 | COMMUNITY
Start: 2019-02-11

## 2019-02-11 NOTE — LETTER
PHYSICIAN ORDERS      DATE & TIME ISSUED: February 15, 2019 11:32 AM  PATIENT NAME: Letty Benavidez   : 1960     [facility] MR# [if applicable]: [id number]     DIAGNOSIS:  Kidney transplant   ICD-10 CODE: Z94.0      Please complete the following lab 2019  LFT level    Any questions please call: [phone] option #5    Please fax results to [phone].    .

## 2019-02-12 ENCOUNTER — TELEPHONE (OUTPATIENT)
Dept: TRANSPLANT | Facility: CLINIC | Age: 59
End: 2019-02-12

## 2019-02-12 NOTE — TELEPHONE ENCOUNTER
[facility]  [facility] remaining [facility] due to patient only receiving a 30 day supply today.

## 2019-02-12 NOTE — TELEPHONE ENCOUNTER
Patient Call: General  Route to LPN    Reason for call: patient needs a verbal sent to [facility] for the 90 day supply to be filled  and to void the 30 day supply prescription.right now pharmacy will only fill the 30 day and it cost the patient twice the amount for her Tacrolimus medication.    Call back needed? Yes    Return Call Needed  Same as documented in contacts section  When to return call?: Same day: Route High Priority

## 2019-02-14 ENCOUNTER — TELEPHONE (OUTPATIENT)
Dept: TRANSPLANT | Facility: CLINIC | Age: 59
End: 2019-02-14

## 2019-02-14 NOTE — TELEPHONE ENCOUNTER
Tacrolimus = 12.1 (2/11/19)  Goal 8-10/protocol    PLAN:  Called Letty Beanvidez who confirmed result is a good 12 hr trough.  Is taking Tacrolimus 2.5 mg BID. She did have labs done today.  Recommended waiting for todays levels. If remains elevated will decrease dose.

## 2019-02-15 NOTE — TELEPHONE ENCOUNTER
"Abdominal \"wall pain\" continues, nothing seen on CT that was completed.  Pain has improved after rest from exercise.  Dr. Vance has examined the area and does not have concern for hernia. Letty will see her PCP on 2/19.  "

## 2019-02-18 ENCOUNTER — TELEPHONE (OUTPATIENT)
Dept: TRANSPLANT | Facility: CLINIC | Age: 59
End: 2019-02-18

## 2019-02-19 NOTE — TELEPHONE ENCOUNTER
ISSUE:  Creatinine slightly trended up to 1.16.  Baseline 1    OUTCOME:   Call placed to pt regarding creatinine level.  Pt aware creatinine slightly elevated at 1.16.  Pt reports poor hydration this week and diarrhea over the weekend.  Pt reports she is now getting 2-3 L fluid in/day.  Pt reports abd pain has improved significantly since cutting back on her exercise routine.    Pt report BP running 100-120/50s.    Pt aware we will wait for her repeat labs Thursday.    Pt questions answered, pt v/u.

## 2019-02-21 ENCOUNTER — TELEPHONE (OUTPATIENT)
Dept: TRANSPLANT | Facility: CLINIC | Age: 59
End: 2019-02-21

## 2019-02-21 DIAGNOSIS — Z94.0 KIDNEY TRANSPLANTED: ICD-10-CM

## 2019-02-21 NOTE — TELEPHONE ENCOUNTER
Post Kidney and Pancreas Transplant Team Conference  Date: 2/21/2019  Transplant Coordinator: Tania Khan     Attendees:  [x]  Dr. Villalta [] Che Latham RN  [] Lisbet Holley LPN     []  Dr. Romano [] Shasha Arreaga RN [] Norma Egan LPN   []  Dr. Fuentes [] Ofe Mujica RN    []  Dr. Anthony [] Selam Hoskins RN    [] Dr. Hernandez [x] Morena Khan RN    [] Dr. Hankins [] Chito Holley RN    [] Dr. Starks [] Maddi Zaldivar RN    [] Surgery Fellow [] Apurva Soria RN    [] Julianna Thornton NP [] Ofe Chairez RN    [] Wil Becker, PharmD [] Ezequiel Javier RN     [] Che Villalba RN        Verbal Plan Read Back:   Due to low MPA level, second transplant, and somewhat high PRA, attempt to increase MPA dose to 540mg BID.    Monitor GI tolerance.     Routed to RN Coordinator   Tania Khan    Letty voiced understanding of trial of increased MPA dose. Will check MPA level with next lab draw.     Letty continues to have concern with MCV being elevated - Dr. Villalta is currently reviewing with [facility] hematology.   Letty believes that at B12 supplement may help although her level are WNL, as with her gastroparesis and IS meds, all of her stools are loose (but consistently this way).  Letty also has concerns that this may be cardiac related, with her history of CABG in 2017.    Will follow up after Dr. Villalta's discussion with tasha.

## 2019-02-22 ENCOUNTER — TELEPHONE (OUTPATIENT)
Dept: TRANSPLANT | Facility: CLINIC | Age: 59
End: 2019-02-22

## 2019-02-22 RX ORDER — MYCOPHENOLIC ACID 180 MG/1
540 TABLET, DELAYED RELEASE ORAL 2 TIMES DAILY
Qty: 540 TABLET | Refills: 3 | Status: SHIPPED | OUTPATIENT
Start: 2019-02-22 | End: 2019-04-04

## 2019-02-22 NOTE — TELEPHONE ENCOUNTER
3 month Kidney and Pancreas Transplant Patient Phone Call    Congratulations on your 3 month post-transplant anniversary!    Please re-review with the patient how to contact the Transplant Center:  Best phone contact is [phone], as if the need is urgent, any care coordinator will be able to assist you.      Medications:  Now that you are 3 months post-transplant, please make the following medication changes:  CMV mismatch: No.   STOP anti-viral Valtrex.  Please complete medication reconciliation (including confirmation of any magnesium or phosphorous supplements) and ensure the patient has an up to date medication card at home.     *ALWAYS BRING YOUR MED CARD TO APPOINTMENTS.*    Please confirm if the patient is independent with medication set up and administration: Yes:  Date  .  If no, who helps the patient with their medications?  NA  Have you missed any medication doses in the past week: No.  Have you missed any medication doses in the past month: No.  Only transplant specific medications will now be filled by the transplant center, please ensure your PCP will be able to fill your other medications.   Contact your pharmacy first for refills.  CONTACT THE TRANSPLANT CENTER IF YOU RUN OUT OF YOUR IS MEDICATIONS.    Labs:  Labs will now be drawn 1 time weekly through 6 months post-transplant.    Please try to have these labs drawn early in the week (Monday or Tuesday).  Contact the Transplant Center if additional lab orders are needed.   It is recommended that you take a copy of your lab letter to each lab draw.    If the patient does not have a copy of their lab letter, please send one now.  EBV mismatch: Yes:  Date  .   Ensure EBV PCR is drawn monthly if EBV discordant - yes, due NOW.  PHI high risk: No.   If patient is PHI high risk, ensure Hepatitis and HIV testing is completed NOW.  DSA is up to date, BK due now (may have been drawn 2/21).    General Transplant Recommendations:    Quarterly nephrology  visits with our MDs - ensure patient has had a 3 month follow up.    Follow up with your PCP now if you have not already done so.    Review your [facility] pamphlet; this will help you write your donor if you wish.    Frequent reviews of the transplant handbook and / or [facility].    Lab review on MyChart.   **Wait to resume routine dental cares until 6 months post transplant.  Tania Khan RN, BSN

## 2019-02-22 NOTE — LETTER
PHYSICIAN ORDERS      DATE & TIME ISSUED: 2019 8:02 AM  PATIENT NAME: Ltety Benavidez   : 1960     [facility] MR# [if applicable]: [id number]     DIAGNOSIS:  Kidney Transplant  ICD-10 CODE: Z94.0     Please complete the following lab at the next routine transplant lab draw:  Mycophenolic acid    Any questions please call: [phone]  Please fax lab results to [phone].    .

## 2019-02-25 ENCOUNTER — TELEPHONE (OUTPATIENT)
Dept: TRANSPLANT | Facility: CLINIC | Age: 59
End: 2019-02-25

## 2019-02-25 NOTE — LETTER
PHYSICIAN ORDERS      DATE & TIME ISSUED: 2019 2:48 PM  PATIENT NAME: Letty Benavidez   : 1960     [facility] MR# [if applicable]: [id number]     DIAGNOSIS:  Kidney replaced by transplant, elevated MCV  ICD-10 CODE: Z94.0,  R71.8    With standing ordered labs, please complete the follow labs on 19:  Reticulocyte count  Peripheral smear (Blood morphology pathologist review).    Any questions please call: [facility] SOT, [phone] ext [phone]    Fall all results and reports same-day available to:  [phone].    .

## 2019-02-25 NOTE — TELEPHONE ENCOUNTER
"Fluid accumulation has been at the top of the leg, now is traveling downward to her L knee.  Solid / slightly tender at sight of \"swelling\".  Letty does not feel a distinct mass. Texture of the swollen area is smooth, no other texture.    Per Dr. King and Ambar - will send orders for peripheral smear and reticulocyte count.     Letty voiced understanding of need for hematology follow up to help understand the elevated MCV. If bone marrow biopsy necessary, Letty would like to have this completed locally and have follow up hematology care locally as well.      "

## 2019-02-25 NOTE — LETTER
PHYSICIAN ORDERS      DATE & TIME ISSUED: 2019 2:48 PM  PATIENT NAME: Letty Benavidez   : 1960     [facility] MR# [if applicable]: [id number]     DIAGNOSIS:  Kidney replaced by transplant, elevated MCV  ICD-10 CODE: Z94.0,  R71.8    With standing ordered labs, please complete the follow labs on 19:  Reticulocyte count  Peripheral smear (Blood morphology pathologist review).    Any questions please call: [facility] SOT, [phone] ext [phone]    Fall all results and reports same-day available to:  [phone].    .

## 2019-02-25 NOTE — TELEPHONE ENCOUNTER
Spoke to patient regarding post 90 days txp items below.  Patient has concerns regarding:  Continued upper, inside (L) side thigh swelling.  Patient states that she will have PCP look at it at her next visit.  LEXUS

## 2019-03-06 ENCOUNTER — TELEPHONE (OUTPATIENT)
Dept: TRANSPLANT | Facility: CLINIC | Age: 59
End: 2019-03-06

## 2019-03-06 NOTE — TELEPHONE ENCOUNTER
Post Kidney and Pancreas Transplant Team Conference  Date: 3/6/2019  Transplant Coordinator: Tania Khan     Attendees:  [x]  Dr. Villalta [] Che Latham, RN  [x] Lisbet Holley LPN     [x]  Dr. Romano [x] Shasha Arreaga RN [] Norma Egan LPN   [x]  Dr. Fuentes [] Ofe Mujica RN    [x]  Dr. Anthony [] Selam Hoskins RN    [] Dr. Kang [x] Morena Khan RN    [] Dr. Hernandez [] Chito Holley RN    [] Dr. Hankins [] Maddi Zaldivar RN    [] Surgery Fellow [] Apurva Soria RN    [] Julianna Thornton NP              Verbal Plan Read Back:   Patient should f/u with PCP when Doxy is finished  Ultrasound for DVT needed  Monitor MCV until after thigh edema is resolved.        Routed to RN Coordinator   Lisbet Saenz will follow up with PCP next Wednesday or Thursday.  Letty voiced understanding of monitoring MCV and repeat US of vasculature if needed.

## 2019-03-19 ENCOUNTER — RESULTS ONLY (OUTPATIENT)
Dept: OTHER | Facility: CLINIC | Age: 59
End: 2019-03-19

## 2019-03-19 DIAGNOSIS — Z48.298 AFTERCARE FOLLOWING ORGAN TRANSPLANT: ICD-10-CM

## 2019-03-19 DIAGNOSIS — Z94.0 KIDNEY REPLACED BY TRANSPLANT: ICD-10-CM

## 2019-03-19 PROCEDURE — 80197 ASSAY OF TACROLIMUS: CPT | Performed by: TRANSPLANT SURGERY

## 2019-03-19 PROCEDURE — 86832 HLA CLASS I HIGH DEFIN QUAL: CPT | Performed by: TRANSPLANT SURGERY

## 2019-03-19 PROCEDURE — 86833 HLA CLASS II HIGH DEFIN QUAL: CPT | Performed by: TRANSPLANT SURGERY

## 2019-03-20 ENCOUNTER — TELEPHONE (OUTPATIENT)
Dept: TRANSPLANT | Facility: CLINIC | Age: 59
End: 2019-03-20

## 2019-03-20 NOTE — TELEPHONE ENCOUNTER
Post Kidney and Pancreas Transplant Team Conference  Date: 3/20/2019  Transplant Coordinator: Tania Khan     Attendees:  [x]  Dr. Villalta [] Che Latham, RN  [x] Lisbet Holley LPN     [x]  Dr. Romano [x] Shasha Arreaga, EAMON [] Norma Egan LPN   [x]  Dr. Fuentes [] Ofe Mujica RN    [x]  Dr. Anthony [] Selam Hoskins RN    [] Dr. Kang [x] Morena Khan RN    [] Dr. Hernandez [] Chito Holley RN    [] Dr. Hankins [] Maddi Zalidvar RN    [] Surgery Fellow [] Apurva Soria RN    [] Julianna Thornton NP              Verbal Plan Read Back:   Review labs and send a message to Bradford King to review      Routed to RN Coordinator   Lisbet Holley

## 2019-03-20 NOTE — TELEPHONE ENCOUNTER
Letty called with the following information:  She does have a brother and sister with hemochromotosis, her brother requires phlebotomy to ensure his ferritin levels remain at acceptable levels.   Letty notes ongoing low-level early onset fatigue when exercising. She is following up with her cardiologist earlier than her scheduled interval.   Letty did have an iron panel drawn at her most recent lab draw on 3/18.  She is follow up on her US for DVT and LLE artieral duplex today.

## 2019-03-22 ENCOUNTER — TELEPHONE (OUTPATIENT)
Dept: TRANSPLANT | Facility: CLINIC | Age: 59
End: 2019-03-22

## 2019-03-22 NOTE — TELEPHONE ENCOUNTER
Pt would like to touch base with her care team about a hematology referral. Pt also has some questions regarding her provider appt    Pt also left care coordinator MyChart message, please respond when available

## 2019-03-25 LAB
TACROLIMUS BLD-MCNC: 9.7 UG/L (ref 5–15)
TME LAST DOSE: NORMAL H

## 2019-03-26 LAB
DONOR IDENTIFICATION: NORMAL
DSA COMMENTS: NORMAL
DSA PRESENT: NO
DSA TEST METHOD: NORMAL
ORGAN: NORMAL
SA1 CELL: NORMAL
SA1 COMMENTS: NORMAL
SA1 HI RISK ABY: NORMAL
SA1 MOD RISK ABY: NORMAL
SA1 TEST METHOD: NORMAL
SA2 CELL: NORMAL
SA2 COMMENTS: NORMAL
SA2 HI RISK ABY UA: NORMAL
SA2 MOD RISK ABY: NORMAL
SA2 TEST METHOD: NORMAL
UNACCEPTABLE ANTIGEN: NORMAL
UNOS CPRA: 38

## 2019-03-26 NOTE — TELEPHONE ENCOUNTER
RNCC spoke with Letty about hematology referral and input per Dr. Tijerina.   Labs are not suggestive of hemochromatosis as culprit for elevated MCV - referral to hematology in [place] may be appropriate if PCP would prefer help in management.    Letty voiced understanding and will pursue hematology consult per her local PCP.

## 2019-03-29 DIAGNOSIS — Z94.0 KIDNEY TRANSPLANTED: Primary | ICD-10-CM

## 2019-03-29 DIAGNOSIS — D75.89 MACROCYTOSIS WITHOUT ANEMIA: ICD-10-CM

## 2019-03-29 RX ORDER — GLYCERIN/MALTODEXTRIN
30 LIQUID (ML) ORAL DAILY
Qty: 960 ML | Refills: 0 | COMMUNITY
Start: 2019-03-29

## 2019-03-29 RX ORDER — ACETAMINOPHEN 160 MG
0.5 TABLET,DISINTEGRATING ORAL EVERY EVENING
Qty: 15 CAPSULE | Refills: 0 | COMMUNITY
Start: 2019-03-29

## 2019-03-29 RX ORDER — POLYETHYLENE GLYCOL 3350 17 G/17G
17 POWDER, FOR SOLUTION ORAL EVERY MORNING
Qty: 500 G | Refills: 0 | COMMUNITY
Start: 2019-03-29

## 2019-04-02 ENCOUNTER — TELEPHONE (OUTPATIENT)
Dept: TRANSPLANT | Facility: CLINIC | Age: 59
End: 2019-04-02

## 2019-04-02 DIAGNOSIS — Z94.0 KIDNEY REPLACED BY TRANSPLANT: ICD-10-CM

## 2019-04-02 DIAGNOSIS — R71.8 ELEVATED MCV: Primary | ICD-10-CM

## 2019-04-02 NOTE — TELEPHONE ENCOUNTER
Potassium remains elevated - Letty continues on a strict low K diet. She does think that this may be due to her recently increased dose of MPA.    Letty does have heartburn / acid reflux, which again, she believes is due to MPA - she hopes to reduce this dose if able.     Will review at appt tomorrow with debbie RUTH.

## 2019-04-03 ENCOUNTER — TELEPHONE (OUTPATIENT)
Dept: NEPHROLOGY | Facility: CLINIC | Age: 59
End: 2019-04-03

## 2019-04-04 ENCOUNTER — OFFICE VISIT (OUTPATIENT)
Dept: PHARMACY | Facility: CLINIC | Age: 59
End: 2019-04-04
Payer: COMMERCIAL

## 2019-04-04 ENCOUNTER — OFFICE VISIT (OUTPATIENT)
Dept: NEPHROLOGY | Facility: CLINIC | Age: 59
End: 2019-04-04
Attending: INTERNAL MEDICINE
Payer: COMMERCIAL

## 2019-04-04 ENCOUNTER — RESULTS ONLY (OUTPATIENT)
Dept: OTHER | Facility: CLINIC | Age: 59
End: 2019-04-04

## 2019-04-04 ENCOUNTER — OFFICE VISIT (OUTPATIENT)
Dept: TRANSPLANT | Facility: CLINIC | Age: 59
End: 2019-04-04
Attending: TRANSPLANT SURGERY
Payer: COMMERCIAL

## 2019-04-04 VITALS
HEIGHT: 63 IN | TEMPERATURE: 97.8 F | DIASTOLIC BLOOD PRESSURE: 80 MMHG | OXYGEN SATURATION: 100 % | BODY MASS INDEX: 19.77 KG/M2 | HEART RATE: 73 BPM | SYSTOLIC BLOOD PRESSURE: 162 MMHG | WEIGHT: 111.6 LBS

## 2019-04-04 DIAGNOSIS — Z48.298 AFTERCARE FOLLOWING ORGAN TRANSPLANT: ICD-10-CM

## 2019-04-04 DIAGNOSIS — E87.5 HYPERKALEMIA: ICD-10-CM

## 2019-04-04 DIAGNOSIS — D84.9 IMMUNOSUPPRESSION (H): ICD-10-CM

## 2019-04-04 DIAGNOSIS — Z94.0 HTN, KIDNEY TRANSPLANT RELATED: ICD-10-CM

## 2019-04-04 DIAGNOSIS — Z76.82 ORGAN TRANSPLANT CANDIDATE: ICD-10-CM

## 2019-04-04 DIAGNOSIS — N18.6 END STAGE RENAL DISEASE (H): ICD-10-CM

## 2019-04-04 DIAGNOSIS — K21.9 GASTROESOPHAGEAL REFLUX DISEASE, ESOPHAGITIS PRESENCE NOT SPECIFIED: ICD-10-CM

## 2019-04-04 DIAGNOSIS — Z94.0 KIDNEY REPLACED BY TRANSPLANT: ICD-10-CM

## 2019-04-04 DIAGNOSIS — Z29.89 NEED FOR PNEUMOCYSTIS PROPHYLAXIS: ICD-10-CM

## 2019-04-04 DIAGNOSIS — Z00.6 EXAMINATION OF PARTICIPANT OR CONTROL IN CLINICAL RESEARCH: ICD-10-CM

## 2019-04-04 DIAGNOSIS — N25.81 SECONDARY HYPERPARATHYROIDISM (H): ICD-10-CM

## 2019-04-04 DIAGNOSIS — Z79.899 ENCOUNTER FOR LONG-TERM CURRENT USE OF MEDICATION: ICD-10-CM

## 2019-04-04 DIAGNOSIS — Z94.0 KIDNEY TRANSPLANTED: Primary | ICD-10-CM

## 2019-04-04 DIAGNOSIS — E87.1 HYPONATREMIA: ICD-10-CM

## 2019-04-04 DIAGNOSIS — I15.1 HTN, KIDNEY TRANSPLANT RELATED: ICD-10-CM

## 2019-04-04 DIAGNOSIS — K86.89 PANCREATIC INSUFFICIENCY: ICD-10-CM

## 2019-04-04 DIAGNOSIS — Z00.6 EXAMINATION OF PARTICIPANT OR CONTROL IN CLINICAL RESEARCH: Primary | ICD-10-CM

## 2019-04-04 DIAGNOSIS — E78.2 MIXED HYPERLIPIDEMIA: ICD-10-CM

## 2019-04-04 DIAGNOSIS — Z94.0 KIDNEY REPLACED BY TRANSPLANT: Primary | ICD-10-CM

## 2019-04-04 DIAGNOSIS — K31.84 GASTROPARESIS: ICD-10-CM

## 2019-04-04 DIAGNOSIS — N25.81 SECONDARY RENAL HYPERPARATHYROIDISM (H): ICD-10-CM

## 2019-04-04 DIAGNOSIS — Z48.298 AFTERCARE FOLLOWING ORGAN TRANSPLANT: Primary | ICD-10-CM

## 2019-04-04 DIAGNOSIS — Z94.0 KIDNEY TRANSPLANTED: ICD-10-CM

## 2019-04-04 DIAGNOSIS — G47.00 INSOMNIA, UNSPECIFIED TYPE: ICD-10-CM

## 2019-04-04 DIAGNOSIS — R71.8 ELEVATED MCV: ICD-10-CM

## 2019-04-04 LAB
ANION GAP SERPL CALCULATED.3IONS-SCNC: 8 MMOL/L (ref 3–14)
BASOPHILS # BLD AUTO: 0 10E9/L (ref 0–0.2)
BASOPHILS NFR BLD AUTO: 0.2 %
BUN SERPL-MCNC: 16 MG/DL (ref 7–30)
CALCIUM SERPL-MCNC: 9.4 MG/DL (ref 8.5–10.1)
CHLORIDE SERPL-SCNC: 100 MMOL/L (ref 94–109)
CO2 SERPL-SCNC: 24 MMOL/L (ref 20–32)
COPATH REPORT: NORMAL
CREAT SERPL-MCNC: 0.92 MG/DL (ref 0.52–1.04)
DIFFERENTIAL METHOD BLD: ABNORMAL
EOSINOPHIL # BLD AUTO: 0.1 10E9/L (ref 0–0.7)
EOSINOPHIL NFR BLD AUTO: 1 %
ERYTHROCYTE [DISTWIDTH] IN BLOOD BY AUTOMATED COUNT: 12.2 % (ref 10–15)
GFR SERPL CREATININE-BSD FRML MDRD: 68 ML/MIN/{1.73_M2}
GLUCOSE SERPL-MCNC: 141 MG/DL (ref 70–99)
HCT VFR BLD AUTO: 39.3 % (ref 35–47)
HGB BLD-MCNC: 13.1 G/DL (ref 11.7–15.7)
IMM GRANULOCYTES # BLD: 0 10E9/L (ref 0–0.4)
IMM GRANULOCYTES NFR BLD: 0.3 %
LYMPHOCYTES # BLD AUTO: 0.7 10E9/L (ref 0.8–5.3)
LYMPHOCYTES NFR BLD AUTO: 11.4 %
MCH RBC QN AUTO: 36.4 PG (ref 26.5–33)
MCHC RBC AUTO-ENTMCNC: 33.3 G/DL (ref 31.5–36.5)
MCV RBC AUTO: 109 FL (ref 78–100)
MONOCYTES # BLD AUTO: 0.8 10E9/L (ref 0–1.3)
MONOCYTES NFR BLD AUTO: 13.4 %
NEUTROPHILS # BLD AUTO: 4.5 10E9/L (ref 1.6–8.3)
NEUTROPHILS NFR BLD AUTO: 73.7 %
NRBC # BLD AUTO: 0 10*3/UL
NRBC BLD AUTO-RTO: 0 /100
OSMOLALITY SERPL: 280 MMOL/KG (ref 280–301)
OSMOLALITY UR: 165 MMOL/KG (ref 100–1200)
PLATELET # BLD AUTO: 185 10E9/L (ref 150–450)
POTASSIUM SERPL-SCNC: 4.1 MMOL/L (ref 3.4–5.3)
POTASSIUM UR-SCNC: 19 MMOL/L
RBC # BLD AUTO: 3.6 10E12/L (ref 3.8–5.2)
RETICS # AUTO: 44.6 10E9/L (ref 25–95)
RETICS/RBC NFR AUTO: 1.2 % (ref 0.5–2)
SODIUM SERPL-SCNC: 131 MMOL/L (ref 133–144)
SODIUM UR-SCNC: 26 MMOL/L
WBC # BLD AUTO: 6.1 10E9/L (ref 4–11)

## 2019-04-04 PROCEDURE — 86832 HLA CLASS I HIGH DEFIN QUAL: CPT | Performed by: TRANSPLANT SURGERY

## 2019-04-04 PROCEDURE — 36415 COLL VENOUS BLD VENIPUNCTURE: CPT | Performed by: PEDIATRICS

## 2019-04-04 PROCEDURE — 84300 ASSAY OF URINE SODIUM: CPT | Performed by: INTERNAL MEDICINE

## 2019-04-04 PROCEDURE — 83935 ASSAY OF URINE OSMOLALITY: CPT | Performed by: INTERNAL MEDICINE

## 2019-04-04 PROCEDURE — 84244 ASSAY OF RENIN: CPT | Performed by: INTERNAL MEDICINE

## 2019-04-04 PROCEDURE — 80048 BASIC METABOLIC PNL TOTAL CA: CPT | Performed by: INTERNAL MEDICINE

## 2019-04-04 PROCEDURE — 85025 COMPLETE CBC W/AUTO DIFF WBC: CPT | Performed by: PEDIATRICS

## 2019-04-04 PROCEDURE — 86833 HLA CLASS II HIGH DEFIN QUAL: CPT | Performed by: TRANSPLANT SURGERY

## 2019-04-04 PROCEDURE — G0463 HOSPITAL OUTPT CLINIC VISIT: HCPCS | Mod: ZF

## 2019-04-04 PROCEDURE — 99207 ZZC NO CHARGE LOS: CPT | Performed by: PHARMACIST

## 2019-04-04 PROCEDURE — 85045 AUTOMATED RETICULOCYTE COUNT: CPT | Performed by: PEDIATRICS

## 2019-04-04 PROCEDURE — 84425 ASSAY OF VITAMIN B-1: CPT | Performed by: INTERNAL MEDICINE

## 2019-04-04 PROCEDURE — 36415 COLL VENOUS BLD VENIPUNCTURE: CPT | Performed by: INTERNAL MEDICINE

## 2019-04-04 PROCEDURE — 83930 ASSAY OF BLOOD OSMOLALITY: CPT | Performed by: INTERNAL MEDICINE

## 2019-04-04 PROCEDURE — 80299 QUANTITATIVE ASSAY DRUG: CPT | Performed by: INTERNAL MEDICINE

## 2019-04-04 PROCEDURE — 82088 ASSAY OF ALDOSTERONE: CPT | Performed by: INTERNAL MEDICINE

## 2019-04-04 PROCEDURE — 84133 ASSAY OF URINE POTASSIUM: CPT | Performed by: INTERNAL MEDICINE

## 2019-04-04 PROCEDURE — 40000611 ZZHCL STATISTIC MORPHOLOGY W/INTERP HEMEPATH TC 85060: Performed by: INTERNAL MEDICINE

## 2019-04-04 RX ORDER — MYCOPHENOLIC ACID 180 MG/1
540 TABLET, DELAYED RELEASE ORAL EVERY MORNING
Qty: 540 TABLET | Refills: 3 | COMMUNITY
Start: 2019-04-04 | End: 2019-04-18 | Stop reason: ALTCHOICE

## 2019-04-04 RX ORDER — MYCOPHENOLIC ACID 180 MG/1
360 TABLET, DELAYED RELEASE ORAL EVERY EVENING
Qty: 90 TABLET | Refills: 3 | COMMUNITY
Start: 2019-04-04 | End: 2019-04-18

## 2019-04-04 ASSESSMENT — MIFFLIN-ST. JEOR: SCORE: 1045.34

## 2019-04-04 ASSESSMENT — PAIN SCALES - GENERAL: PAINLEVEL: NO PAIN (0)

## 2019-04-04 NOTE — LETTER
4/4/2019       RE: Letty Benavidez  [address]  [place] [address]     Dear Colleague,    Thank you for referring your patient, Letty Benaivdez, to the [facility] SOLID ORGAN TRANSPLANT at [facility]. Please see a copy of my visit note below.    Transplant Surgery Progress Note    Transplants:  11/20/2018 (Kidney), 11/4/1998 (Kidney); Postoperative day:  140  S: Letty Benavidez is an 58 year old female with a history of DM type 1, diabetic nephropathy, CAD s/p CABG x 2 (10/2017), skin cancer, gastroparesis, pancreatic exocrine dysfunction, diabetic enteropathy, and ESRD s/p LDKT in 1998 which failed on HD since 4/2017, and is now s/p LDKT with ureteral stent placement on 11/20/18.        Overall doing well.  No fevers, chills, nausea or vomiting.  No hematuria, dysuria or frequency.    Patient states over while all she has been doing very wellShe has no further complaints at this time.     Patient states that her hyperkalemia she believes to be related to her Myfortic dose as this is previously occurred according to patient.     Patient states her blood sugars are well controlled at this period of time and she has no further surgical complaints    Transplant History:    Transplant Type:  LDKT  Donor Type: Living        Transplant Date:  11/20/2018 (Kidney), 11/4/1998 (Kidney)              Ureteral Stent:   no               Crossmatch:  negative              DSA at Tx:  No  Baseline Cr: 1.1   DeNovo DSA: No     Acute Rejection Hx:  No     Present Maintenance Immunosuppression:  Tacrolimus and Mycophenolic acid     CMV IgG Ab Discordance:  Yes  EBV IgG Ab Discordance:  No     BK Viremia:  No  EBV Viremia:  No     Transplant Coordinator: Tania Khan              Transplant Office Phone Number: [phone]         Immunsupresent Medications     Immunosuppressive Agents Disp Start End     MYFORTIC (BRAND) 180 MG EC tablet    540 tablet 4/4/2019     Sig - Route: Take  3 tablets (540 mg) by mouth every morning - Oral    Class: Historical     MYFORTIC (BRAND) 180 MG EC tablet    90 tablet 4/4/2019     Sig - Route: Take 2 tablets (360 mg) by mouth every evening - Oral    Class: Historical     tacrolimus (GENERIC EQUIVALENT) 0.5 MG capsule    180 capsule 2/6/2019     Sig - Route: Take 1 capsule (0.5 mg) by mouth 2 times daily Total dose =  2.5mg BID - Oral    Class: E-Prescribe     tacrolimus (GENERIC EQUIVALENT) 1 MG capsule    360 capsule 2/6/2019     Sig - Route: Take 2 capsules (2 mg) by mouth 2 times daily Total dose = 2.5mg BID - Oral    Class: E-Prescribe          Possible Immunosuppression-related side effects:   []             headache  []             vivid dreams  []             irritability  []             cognitive difficuties  []             fine tremor  []             nausea  []             diarrhea  []             neuropathy      []             edema  []             renal calcineurin toxicity  []             hyperkalemia  []             post-transplant diabetes  []             decreased appetite  []             increased appetite  []             other:  [x]             none    Prescription Medications as of 4/9/2019       Rx Number Disp Refills Start End Last Dispensed Date Next Fill Date Owning Pharmacy    Amino Acids (LIQUACEL) LIQD  960 mL 0 3/29/2019    [address] [place]    Sig: Take 30 mLs by mouth daily Amino acids 16 grams, 2.5 g of arginine per pacakge -90 kcal/30mL liquid    Class: Historical    Route: Oral    aspirin 81 MG EC tablet            Sig: Take 81 mg by mouth daily    Class: Historical    Route: Oral    atorvastatin (LIPITOR) 20 MG tablet    9/17/2018        Sig: Take 20 mg by mouth At Bedtime     Class: Historical    Route: Oral    Cholecalciferol (VITAMIN D3) 2000 units CAPS  15 capsule 0 3/29/2019    [place] [address] [address] [place]    Sig: Take 0.5 capsules by mouth  every evening With dinner    Class: Historical    Route: Oral    CO-ENZYME Q-10 PO            Sig: Take 400 mg by mouth daily For cramps from lipitor    Class: Historical    Route: Oral    eszopiclone (LUNESTA) 1 MG tablet  30 tablet 1 12/6/2018        Sig: Take 2 tablets (2 mg) by mouth At Bedtime    Class: Local Print    Route: Oral    gabapentin (NEURONTIN) 100 MG capsule    9/26/2016        Sig: Take 100 mg by mouth At Bedtime     Class: Historical    Route: Oral    insulin lispro (HUMALOG) 100 UNIT/ML VIAL    6/21/2016        Sig: Use with insulin pump    Class: Historical    Route: Subcutaneous    INSULIN PUMP - OUTPATIENT    2/17/1995        Sig: Inject 0.5 Units/hr Subcutaneous Carb ratio: 1:15g/hr  Sensitivity factor correction: 1 unit for every 75mg/dL over 100mg/dL    Class: Historical    Route: Subcutaneous    linaclotide (LINZESS) 290 MCG capsule    5/28/2017        Sig: Take 290 mcg by mouth every morning (before breakfast)     Class: Historical    Route: Oral    Magnesium Citrate 200 MG TABS  30 tablet 2 12/18/2018        Sig: Take 200 mg by mouth daily    Class: No Print Out    Route: Oral    multivitamin, therapeutic (THERA-VIT) TABS tablet  30 tablet 2 11/27/2018    [facility] [place], [place] - [address] [place] [address]    Sig: Take 1 tablet by mouth daily    Class: E-Prescribe    Route: Oral    MYFORTIC (BRAND) 180 MG EC tablet  540 tablet 3 4/4/2019    [place] [address] [address] [place]    Sig: Take 3 tablets (540 mg) by mouth every morning    Class: Historical    Route: Oral    MYFORTIC (BRAND) 180 MG EC tablet  90 tablet 3 4/4/2019    [place] [address] [address] [place]    Sig: Take 2 tablets (360 mg) by mouth every evening    Class: Historical    Route: Oral    Pancrelipase, Lip-Prot-Amyl, (ZENPEP PO)            Sig: Take 2 capsules by mouth 3 times daily L-15, A-82, P-51    Class: Historical    Route: Oral     pentamidine (NEBUPENT) 300 MG neb solution  300 mg 0 2/11/2019    [place] [address] [address] [place]    Sig: Inhale 300 mg into the lungs once for 1 dose    Class: Historical    Route: Inhalation    polyethylene glycol (MIRALAX/GLYCOLAX) powder  500 g 0 3/29/2019    [place] [address] [address] [place]    Sig: Take 17 g by mouth every morning 1/3 of 17g, mix with Liquidcel and water (use as liquid to take with her other morning meds)    Class: Historical    Route: Oral    tacrolimus (GENERIC EQUIVALENT) 0.5 MG capsule  180 capsule 3 2/6/2019    [address] [place]    Sig: Take 1 capsule (0.5 mg) by mouth 2 times daily Total dose =  2.5mg BID    Class: E-Prescribe    Route: Oral    tacrolimus (GENERIC EQUIVALENT) 1 MG capsule  360 capsule 3 2/6/2019    [address] [place]    Sig: Take 2 capsules (2 mg) by mouth 2 times daily Total dose = 2.5mg BID    Class: E-Prescribe    Route: Oral          O:      General Appearance: in no apparent distress.   Skin: Normal, no rashes or jaundice  Heart: regular rate and rhythm, normal S1 and S2  Lungs: easy respirations, no audible wheezing.  Abdomen: flat, The wound is dry and intact, without hernia. The abdomen is non-tender. The kidney graft is not tender.  There is no ascites.  Extremities: Tremor absent.   Edema: absent.     Transplant Immunosuppression Labs Latest Ref Rng & Units 4/4/2019 3/19/2019 12/19/2018 12/17/2018 12/14/2018   Tacro Level 5.0 - 15.0 ug/L - 9.7 11.9 7.2 7.9   Tacro Level - - Not Provided Not Provided 8:00PM,12/16 2,000   Creat 0.52 - 1.04 mg/dL 0.92 - 1.04 1.13(H) 1.11(H)   BUN 7 - 30 mg/dL 16 - 20 24 22   WBC 4.0 - 11.0 10e9/L 6.1 - 6.1 6.5 4.1   Neutrophil % 73.7 - 74.6 76.7 69.4   ANEU 1.6 - 8.3 10e9/L 4.5 - 4.5 5.0 2.8       Chemistries:   Recent Labs   Lab Test 04/04/19  1145   BUN 16   CR 0.92    GFRESTIMATED 68   *     Lab Results   Component Value Date    A1C 5.5 11/20/2018    CPEPT <0.1 10/03/2016     Recent Labs   Lab Test 11/19/18  1036   ALBUMIN 3.6   BILITOTAL 0.4   ALKPHOS 166*   AST 24   ALT 35     Urine Studies:  Recent Labs   Lab Test 12/17/18  0856   COLOR Yellow   APPEARANCE Slightly Cloudy   URINEGLC Negative   URINEBILI Negative   URINEKETONE Negative   SG 1.015   UBLD Negative   URINEPH 5.0   PROTEIN Negative   NITRITE Negative   LEUKEST Negative   RBCU 1   WBCU 1     Recent Labs   Lab Test 12/17/18  0856   UTPG 0.20     Hematology:   Recent Labs   Lab Test 04/04/19  0734 12/19/18  0745 12/17/18  0754   HGB 13.1 10.4* 9.7*    215 243   WBC 6.1 6.1 6.5     Coags:   Recent Labs   Lab Test 10/03/16  0653   INR 0.96     HLA antibodies:   SA1 Hi Risk Martha   Date Value Ref Range Status   04/04/2019 None  Final     SA1 Mod Risk Martha   Date Value Ref Range Status   04/04/2019 None  Final     SA2 Hi Risk Martha   Date Value Ref Range Status   04/04/2019 None  Final     SA2 Mod Risk Martha   Date Value Ref Range Status   04/04/2019 None  Final       Assessment: Letty Benavidez is doing well s/p LDKT:  Issues we addressed during her visit include:    Plan:    1. Graft function: cr is stable   2. Immunosuppression Management: Changed myfortic 540mg AM  360mg PM, and continue prograf 2mg BID   Complexity of management:Medium.  Contributing factors: slight nausea.  electrolyte imbalance  3. Hyperkalemia:  Discussed with nephrology. Etiology unclear.  Appreciate input of nephrology.  Nephrology to check renin/L though, urine K, urine OSM and in  interim continue low K diet.  Followup:  As directed    Total Time: 25 min,   Counselling Time: 15 min.          Again, thank you for allowing me to participate in the care of your patient.      Sincerely,    Julianna Thornton NP

## 2019-04-04 NOTE — NURSING NOTE
"Chief Complaint   Patient presents with     RECHECK     Post kidney tx     /80   Pulse 73   Temp 97.8  F (36.6  C) (Oral)   Ht 1.6 m (5' 3\")   Wt 50.6 kg (111 lb 9.6 oz)   SpO2 100%   BMI 19.77 kg/m    Aggie Castillo CMA    "

## 2019-04-04 NOTE — LETTER
4/4/2019      RE: Letty Benavidez  [address] [facility]  [place] [address]       ACUTE TRANSPLANT NEPHROLOGY VISIT    Assessment & Plan   # LDKT: baseline Cr ~ 0.9; Stable   - Proteinuria: Not checked recently   - Latest DSA: No Date of DSA last checked: 3/2019   - BK: No   - Kidney Tx Biopsy: No    Stable graft function.    # Immunosuppression: Tacrolimus immediate release (goal  8-10) and Mycophenolic acid (goal  1-3.5)   - Changes: Yes - Reduce Myfortic to 540mg AM and 360mg PM and monitor drug levels. Tac at 6.3 on last level, will follow up next level and adjust Tac if needed.    # Prophylaxis:   - PJP: Inhaled pentamidine (Hyperkalemia w Bactrim, Edema with Dapsone)   - CMV: Completed    # Hypertension: Controlled; Goal BP: < 130/80   - Changes: No     # Hyperkalemia  Etiology unclear, but it is mild. Unclear role Linzess is playing, given Chloride manipulation. Suspect may be related to Tac, may also be related to DM. It is lifestyle limiting and taxing for her, and thus will work up further so she may expand her diet. Of note, Florinef not ideal given history of diastolic dysfunction. Will check Renin/Prasad, Urine K, Urine Osm and in the interim continue low K+ diet. If Urine K+ is low, may consider patiromer or Tac reduction.     #Hyponatremia  Na low (BS normal) and urine is dilute. Given age, might not be able to dilute enough given her oral Crystal Light intake. Recommended she drink to thirst. Will trend. Checking urine osm as above.    # Diabetes: Controlled    # Macrocytosis:   On going pre transplant. TSH, B12, Folate normal. No ETOH use in ~3 years. Liver normal by imaging. Hgb remains normal without platelet or WBC abnormality. Smear pending, will check B1. Plan for Hematology visit outpatient 4/19.    # Mineral Bone Disorder:    - Secondary renal hyperparathyroidism; PTH level is: 212 yoly transplant  - Vitamin D; level is: Normal on Cholecal  - Calcium; level is: Normal    # Medical Compliance:  Yes     # EBV Discordance: EBV monthly, last EBV undetectable 3/26/2019     Return visit: As scheduled.     # Transplant History:  Etiology of kidney failure: diabetes mellitus type 1  Tx: LDKT  Transplant: 11/20/2018 (Kidney), 11/4/1998 (Kidney)  Donor Type: Living Donor Class:   Crossmatch at time of Tx: negative  DSA at time of Tx: No  Significant changes in immunosuppression: None  CMV IgG Ab Discordance (D+/R-): No  EBV IgG Ab Discordance (D+/R-): Yes ; monthly EBV PCR  Significant transplant-related complications: None    Transplant Office Phone Number: [phone]    Assessment and plan was discussed with the patient and she voiced her understanding and agreement.    Pt seen and discussed with Dr. Gabrielle Peres MD    Attestation:  This patient has been seen and evaluated by me, Balbir Anthony MD.  I have reviewed the note and agree with plan of care as documented by the fellow.       Chief Complaint   Ms. Benavidez is a 58 year old here for routine follow up and kidney transplant    History of Present Illness    The patient is a 58-year-old female with history of type 1 diabetes status post kidney transplant x2.  Her most recent transplant was on 11/20/2018.  Her immediate postoperative course has been uncomplicated to date.  The patient is currently on chronic immunosuppression therapy with immediate release tacrolimus and Myfortic 540 mg p.o. twice daily. Currently, the patient feels well.  Since last visit, she did have an episode of cellulitis of the LLE that resolved with Abx. Other major issue is that she has had some increase in Nausea after Myfortic was increased to 540 mg BID, but able to take PO without emesis. Constipation/Diarrhea is well managed with Pancreas Enzymes/Linzess. No abd pain.      Recent Hospitalizations:  [x] No [] Yes    New Medical Issues: [] No [x] Yes Cellulitis of LLE that resolved with Doxycyline.   Decreased energy: [x] No [] Yes    Chest pain or SOB with  exertion:  [x] No [] Yes    Appetite change or weight change: [x] No [] Yes    Nausea, vomiting or diarrhea:  [] No [x] Yes As per HPI, increased with higher dose MPA   Fever, sweats or chills: [x] No [] Yes    Leg swelling: [x] No [] Yes      Other medical issues:  No    Home BP: Log reviewed, at goal - <130/80    Review of Systems   A comprehensive review of systems was obtained and negative, except as noted in the HPI or PMH.    Problem List   Patient Active Problem List   Diagnosis     Secondary hyperparathyroidism (H)     Anemia of chronic kidney failure     Dyslipidemia     Hypertension     Gastroparesis     Diabetic peripheral neuropathy (H)     Diabetic retinopathy (H)     Type 1 diabetes (H)     Pancreatic insufficiency     History of skin cancer     End stage kidney disease (H)     Immunosuppressed status (H)     Kidney transplanted     Dehydration     Aftercare following organ transplant     Dysuria     Macrocytosis without anemia       Social History   Social History     Tobacco Use     Smoking status: Never Smoker     Smokeless tobacco: Never Used   Substance Use Topics     Alcohol use: No     Alcohol/week: 0.0 oz     Drug use: No       Allergies   Allergies   Allergen Reactions     Bactrim [Sulfamethoxazole W/Trimethoprim]      Dapsone      Erythromycin      Not a drug allergy- Patient told to avoid while taking tacrolimus due to drug interaction. Ok to have if not taking tacrolimus     Metronidazole Nausea and Vomiting     Flagyl     Mycophenolate Nausea and Vomiting and Other (See Comments)     Nausea / Vomiting / Tremors     Neomycin Other (See Comments)     Not a drug allergy- Patient told to avoid while taking tacrolimus due to drug interaction. Ok to have if not taking tacrolimus       Medications   Current Outpatient Medications   Medication Sig     Amino Acids (LIQUACEL) LIQD Take 30 mLs by mouth daily Amino acids 16 grams, 2.5 g of arginine per pacakge -90 kcal/30mL liquid     aspirin 81 MG EC  "tablet Take 81 mg by mouth daily     atorvastatin (LIPITOR) 20 MG tablet Take 20 mg by mouth At Bedtime      Cholecalciferol (VITAMIN D3) 2000 units CAPS Take 0.5 capsules by mouth every evening With dinner     CO-ENZYME Q-10 PO Take 400 mg by mouth daily For cramps from lipitor     eszopiclone (LUNESTA) 1 MG tablet Take 2 tablets (2 mg) by mouth At Bedtime     gabapentin (NEURONTIN) 100 MG capsule Take 100 mg by mouth At Bedtime      insulin lispro (HUMALOG) 100 UNIT/ML VIAL Use with insulin pump     INSULIN PUMP - OUTPATIENT Inject 0.5 Units/hr Subcutaneous Carb ratio: 1:15g/hr  Sensitivity factor correction: 1 unit for every 75mg/dL over 100mg/dL     linaclotide (LINZESS) 290 MCG capsule Take 290 mcg by mouth every morning (before breakfast)      Magnesium Citrate 200 MG TABS Take 200 mg by mouth daily     multivitamin, therapeutic (THERA-VIT) TABS tablet Take 1 tablet by mouth daily     MYFORTIC (BRAND) 180 MG EC tablet Take 3 tablets (540 mg) by mouth 2 times daily     Pancrelipase, Lip-Prot-Amyl, (ZENPEP PO) Take 2 capsules by mouth 3 times daily L-15, A-82, P-51     pentamidine (NEBUPENT) 300 MG neb solution Inhale 300 mg into the lungs once for 1 dose     polyethylene glycol (MIRALAX/GLYCOLAX) powder Take 17 g by mouth every morning 1/3 of 17g, mix with Liquidcel and water (use as liquid to take with her other morning meds)     tacrolimus (GENERIC EQUIVALENT) 0.5 MG capsule Take 1 capsule (0.5 mg) by mouth 2 times daily Total dose =  2.5mg BID     tacrolimus (GENERIC EQUIVALENT) 1 MG capsule Take 2 capsules (2 mg) by mouth 2 times daily Total dose = 2.5mg BID     No current facility-administered medications for this visit.      There are no discontinued medications.    Physical Exam   Vital Signs: /80   Pulse 73   Temp 97.8  F (36.6  C) (Oral)   Ht 1.6 m (5' 3\")   Wt 50.6 kg (111 lb 9.6 oz)   SpO2 100%   BMI 19.77 kg/m       GENERAL APPEARANCE: alert and no distress  HENT: mouth without ulcers or " lesions  LYMPHATICS: no cervical or supraclavicular nodes  RESP: lungs clear to auscultation - no  wheezes  CV: regular rhythm, normal rate  EDEMA: no LE edema bilaterally  ABDOMEN: soft, nondistended, nontender, bowel sounds normal  MS: extremities normal - no gross deformities noted  SKIN: no rash  TX Kidney: Normal    Data     Renal Latest Ref Rng & Units 4/2/2019 3/26/2019 3/19/2019   Na 133 - 144 mmol/L - - -   Na (external) 135 - 145 MMOL/L 133(L) 134(L) 137   K 3.4 - 5.3 mmol/L - - -   K (external) 3.5 - 5.3 MMOL/L 5.6(H) 5.4(H) 4.5   Cl 94 - 109 mmol/L - - -   Cl (external) 98 - 108 MMOL/L 96(L) 97(L) 98   CO2 20 - 32 mmol/L - - -   CO2 (external) 23 - 32 MMOL/L 27 25 25   BUN 7 - 30 mg/dL - - -   BUN (external) 8 - 23 MG/DL 14 13 14   Cr 0.52 - 1.04 mg/dL - - -   Cr (external) 0.60 - 1.10 MG/DL 0.95 1.03 0.91   Glucose 70 - 99 mg/dL - - -   Glucose (external) 70 - 99 MG/(H) 75 106(H)   Ca  8.5 - 10.1 mg/dL - - -   Ca (external) 8.6 - 10.3 MG/DL 9.8 9.8 9.5   Mg 1.6 - 2.3 mg/dL - - -   Mg (external) 1.7 - 2.8 MG/DL - - -     Bone Health Latest Ref Rng & Units 3/19/2019 2/18/2019 2/11/2019   Phos 2.5 - 4.5 mg/dL - - -   Phos (external) 2.7 - 4.5 MG/DL - 4.4 4.6(H)   PTHi 18 - 80 pg/mL - - -   PTHi (external) 16 - 80 pg/mL - - -   Vit D Def 20 - 75 ug/L - - -   Vit D Def (external) 30 - 100 NG/ML 56 - -     Heme Latest Ref Rng & Units 4/4/2019 4/2/2019 3/26/2019   WBC 4.0 - 11.0 10e9/L 6.1 - -   WBC (external) 4.0 - 11.0 X10*(9)/L - 5.5 4.5   Hgb 11.7 - 15.7 g/dL 13.1 - -   Hgb (external) 12.0 - 16.0 GM/DL - 12.9 13.0   Plt 150 - 450 10e9/L 185 - -   Plt (external) 140 - 440 X10*(9)/L - 199 198     Liver Latest Ref Rng & Units 2/21/2019 2/7/2019 11/19/2018   AP 40 - 150 U/L - - 166(H)   AP (external) 39 - 117 U/L 79 92 -   TBili 0.2 - 1.3 mg/dL - - 0.4   TBili (external) 0.0 - 1.2 MG/DL 0.3 0.4 -   DBili (external) 0.0 - 0.3 MG/DL 0.1 0.1 -   ALT 0 - 50 U/L - - 35   ALT (external) 6 - 37 U/L 34 48(H) -    AST 0 - 45 U/L - - 24   AST (external) 0 - 37 U/L 30 35 -   Tot Protein 6.8 - 8.8 g/dL - - 7.2   Tot Protein (external) 6.4 - 8.3 G/DL 6.3(L) 6.7 -   Albumin 3.4 - 5.0 g/dL - - 3.6   Albumin (external) 3.5 - 5.0 g/dL 3.8 3.9 -     Pancreas Latest Ref Rng & Units 1/7/2019 11/20/2018 10/2/2018   A1C 0 - 5.6 % - 5.5 -   A1C (external) 4.4 - 5.6 % 5.6 - 5.6     Iron studies Latest Ref Rng & Units 3/19/2019 11/19/2018 10/2/2018   Iron 35 - 180 ug/dL - 38 -   Iron sat 15 - 46 % - 18 -   Ferritin 8 - 252 ng/mL - 952(H) -   Ferritin (external) 15 - 150 NG/(H) - 1,155(H)     [facility] Txp Virology Latest Ref Rng & Units 3/26/2019 2/26/2019 1/3/2019   BK Spec - - - -   BK Res BKNEG:BK Virus DNA Not Detected copies/mL - - -   BK Log <2.7 Log copies/mL - - -   BK Quant Result Ext None detected None detected None detected None detected   Hep B Core NR:Nonreactive - - -   Hep B Core Ext Negative - - -   Hep B Surf Ext  See comment mIU/mL - - -        Recent Labs   Lab Test 12/17/18  0753 12/19/18  0741 03/19/19  0855   DOSTAC 8:00PM,12/16 Not Provided Not Provided   TACROL 7.2 11.9 9.7     Recent Labs   Lab Test 12/14/18  0804 12/17/18  0753 12/19/18  0741   DOSMPA 2,000 8:00PM,12/16 0800pm   MPACID 2.03 2.61 <0.25*   MPAG 50.1 45.3 47.8       Early Post Transplant

## 2019-04-04 NOTE — PROGRESS NOTES
SUBJECTIVE/OBJECTIVE:                Letty Benavidez is a 58 year old female coming in for a follow-up visit for Medication Therapy Management.  She was referred to me from txp team.     Chief Complaint: 4 month post txp.    Tobacco: No tobacco use  Alcohol: not currently using    Medication Adherence/Access:  Patient uses pill box(es) and uses reminders/alarms. antirejections 8:45am and 8:45pm, Noon for magnesium.   Patient takes medications 5 time(s) per day.   Per patient, misses medication 0 times per week.     Renal Transplant:  Current immunosuppressants include Tacrolimus 2.5mg BID (0-6 months post tx, goal 8-10) and Myfortic 540mg BID.  When taking Myfortic 360mg BID patient did not have SE, when increased to 540mg she got acid reflux and heartburn, difficulty swallowing. As such, txp team has reduced Myfortic to 540mg qAM and 360mg qPM.   Transplant date: 11/20/18, second txp, first was 1998.  CMV prophylaxis:  Completed  PCP prophylaxis: Pentamidine q 28 days  Current supplements for electrolyte replacement: Mag Citrate 200mg daily.  ( 2 hours from MMF) . MVI daily  Home BPs: 112/63, 115/58  Tx Coordinator: Brenda Scott MD: Dr. Romano today, Using Med Card: Yes,  Immunizations: annual flu shot yes, [id number]:  Due once >66 yo; Prevnar 13: UTD, TDaP:  Due 2026, Shingrix: has had 2 doses.     Hyperlipidemia: Current therapy includes Atorvastatin 20mg once daily.  Pt reports no significant myalgias or other side effects.  The ASCVD Risk score (Gurinder DC Jr., et al., 2013) failed to calculate for the following reasons:    The valid systolic blood pressure range is 90 to 200 mmHg    GERD: Pt c/o heartburn, acid reflux. Does not want to take medications due to concerns of long term effects of Omeprazole and PPIs.     Gastroparesis: Pt is taking Linzess daily and Miralax daily.  BMss are soft to watery, worsened by Myfortic acid. Most constipation predominant, but stools are still soft and watery.  Several BMs daily.     Insomnia: Current medications include: eszopiclone 1mg qHS. Pt reports no issues. It is effective.     Today's Vitals: There were no vitals taken for this visit.    ASSESSMENT:              Current medications were reviewed today as discussed above.      Medication Adherence: good, no issues identified    Renal Transplant:  Stable.     Hyperlipidemia: Stable.     GERD: Discussed alternatives to PPIs, such as Ranitidine or Famotidine.     Gastroparesis:Stable.     Insomnia: Stable. Long term, patient should consider other treatment for insomnia such as Trazodone. Pt is not >64 yo at this point.      PLAN:                  I spent 30 minutes with this patient today. I offer these suggestions for consideration by txp team. A copy of the visit note was provided to the patient's referring provider.     Will follow up in as needed.    The patient declined a summary of these recommendations as an after visit summary.    Wil Becker, PharmD  [facility] Pharmacist    Phone: [phone]

## 2019-04-04 NOTE — NURSING NOTE
Chief Complaint   Patient presents with     Surgical Followup     4mo post follow up     Riri Sabillon CMA on 4/4/2019 at 10:55 AM

## 2019-04-04 NOTE — PROGRESS NOTES
ACUTE TRANSPLANT NEPHROLOGY VISIT    Assessment & Plan   # LDKT: baseline Cr ~ 0.9; Stable   - Proteinuria: Not checked recently   - Latest DSA: No Date of DSA last checked: 3/2019   - BK: No   - Kidney Tx Biopsy: No    Stable graft function.    # Immunosuppression: Tacrolimus immediate release (goal  8-10) and Mycophenolic acid (goal  1-3.5)   - Changes: Yes - Reduce Myfortic to 540mg AM and 360mg PM and monitor drug levels. Tac at 6.3 on last level, will follow up next level and adjust Tac if needed.    # Prophylaxis:   - PJP: Inhaled pentamidine (Hyperkalemia w Bactrim, Edema with Dapsone)   - CMV: Completed    # Hypertension: Controlled; Goal BP: < 130/80   - Changes: No     # Hyperkalemia  Etiology unclear, but it is mild. Unclear role Linzess is playing, given Chloride manipulation. Suspect may be related to Tac, may also be related to DM. It is lifestyle limiting and taxing for her, and thus will work up further so she may expand her diet. Of note, Florinef not ideal given history of diastolic dysfunction. Will check Renin/Prasad, Urine K, Urine Osm and in the interim continue low K+ diet. If Urine K+ is low, may consider patiromer or Tac reduction.     #Hyponatremia  Na low (BS normal) and urine is dilute. Given age, might not be able to dilute enough given her oral Crystal Light intake. Recommended she drink to thirst. Will trend. Checking urine osm as above.    # Diabetes: Controlled    # Macrocytosis:   On going pre transplant. TSH, B12, Folate normal. No ETOH use in ~3 years. Liver normal by imaging. Hgb remains normal without platelet or WBC abnormality. Smear pending, will check B1. Plan for Hematology visit outpatient 4/19.    # Mineral Bone Disorder:    - Secondary renal hyperparathyroidism; PTH level is: 212 yoly transplant  - Vitamin D; level is: Normal on Cholecal  - Calcium; level is: Normal    # Medical Compliance: Yes     # EBV Discordance: EBV monthly, last EBV undetectable 3/26/2019     Return  visit: As scheduled.     # Transplant History:  Etiology of kidney failure: diabetes mellitus type 1  Tx: LDKT  Transplant: 11/20/2018 (Kidney), 11/4/1998 (Kidney)  Donor Type: Living Donor Class:   Crossmatch at time of Tx: negative  DSA at time of Tx: No  Significant changes in immunosuppression: None  CMV IgG Ab Discordance (D+/R-): No  EBV IgG Ab Discordance (D+/R-): Yes ; monthly EBV PCR  Significant transplant-related complications: None    Transplant Office Phone Number: [phone]    Assessment and plan was discussed with the patient and she voiced her understanding and agreement.    Pt seen and discussed with Dr. Gabrielle Peres MD    Attestation:  This patient has been seen and evaluated by me, Balbir Anthony MD.  I have reviewed the note and agree with plan of care as documented by the fellow.       Chief Complaint   Ms. Benavidez is a 58 year old here for routine follow up and kidney transplant    History of Present Illness    The patient is a 58-year-old female with history of type 1 diabetes status post kidney transplant x2.  Her most recent transplant was on 11/20/2018.  Her immediate postoperative course has been uncomplicated to date.  The patient is currently on chronic immunosuppression therapy with immediate release tacrolimus and Myfortic 540 mg p.o. twice daily. Currently, the patient feels well.  Since last visit, she did have an episode of cellulitis of the LLE that resolved with Abx. Other major issue is that she has had some increase in Nausea after Myfortic was increased to 540 mg BID, but able to take PO without emesis. Constipation/Diarrhea is well managed with Pancreas Enzymes/Linzess. No abd pain.      Recent Hospitalizations:  [x] No [] Yes    New Medical Issues: [] No [x] Yes Cellulitis of LLE that resolved with Doxycyline.   Decreased energy: [x] No [] Yes    Chest pain or SOB with exertion:  [x] No [] Yes    Appetite change or weight change: [x] No [] Yes    Nausea,  vomiting or diarrhea:  [] No [x] Yes As per HPI, increased with higher dose MPA   Fever, sweats or chills: [x] No [] Yes    Leg swelling: [x] No [] Yes      Other medical issues:  No    Home BP: Log reviewed, at goal - <130/80    Review of Systems   A comprehensive review of systems was obtained and negative, except as noted in the HPI or PMH.    Problem List   Patient Active Problem List   Diagnosis     Secondary hyperparathyroidism (H)     Anemia of chronic kidney failure     Dyslipidemia     Hypertension     Gastroparesis     Diabetic peripheral neuropathy (H)     Diabetic retinopathy (H)     Type 1 diabetes (H)     Pancreatic insufficiency     History of skin cancer     End stage kidney disease (H)     Immunosuppressed status (H)     Kidney transplanted     Dehydration     Aftercare following organ transplant     Dysuria     Macrocytosis without anemia       Social History   Social History     Tobacco Use     Smoking status: Never Smoker     Smokeless tobacco: Never Used   Substance Use Topics     Alcohol use: No     Alcohol/week: 0.0 oz     Drug use: No       Allergies   Allergies   Allergen Reactions     Bactrim [Sulfamethoxazole W/Trimethoprim]      Dapsone      Erythromycin      Not a drug allergy- Patient told to avoid while taking tacrolimus due to drug interaction. Ok to have if not taking tacrolimus     Metronidazole Nausea and Vomiting     Flagyl     Mycophenolate Nausea and Vomiting and Other (See Comments)     Nausea / Vomiting / Tremors     Neomycin Other (See Comments)     Not a drug allergy- Patient told to avoid while taking tacrolimus due to drug interaction. Ok to have if not taking tacrolimus       Medications   Current Outpatient Medications   Medication Sig     Amino Acids (LIQUACEL) LIQD Take 30 mLs by mouth daily Amino acids 16 grams, 2.5 g of arginine per pacakge -90 kcal/30mL liquid     aspirin 81 MG EC tablet Take 81 mg by mouth daily     atorvastatin (LIPITOR) 20 MG tablet Take 20 mg by  "mouth At Bedtime      Cholecalciferol (VITAMIN D3) 2000 units CAPS Take 0.5 capsules by mouth every evening With dinner     CO-ENZYME Q-10 PO Take 400 mg by mouth daily For cramps from lipitor     eszopiclone (LUNESTA) 1 MG tablet Take 2 tablets (2 mg) by mouth At Bedtime     gabapentin (NEURONTIN) 100 MG capsule Take 100 mg by mouth At Bedtime      insulin lispro (HUMALOG) 100 UNIT/ML VIAL Use with insulin pump     INSULIN PUMP - OUTPATIENT Inject 0.5 Units/hr Subcutaneous Carb ratio: 1:15g/hr  Sensitivity factor correction: 1 unit for every 75mg/dL over 100mg/dL     linaclotide (LINZESS) 290 MCG capsule Take 290 mcg by mouth every morning (before breakfast)      Magnesium Citrate 200 MG TABS Take 200 mg by mouth daily     multivitamin, therapeutic (THERA-VIT) TABS tablet Take 1 tablet by mouth daily     MYFORTIC (BRAND) 180 MG EC tablet Take 3 tablets (540 mg) by mouth 2 times daily     Pancrelipase, Lip-Prot-Amyl, (ZENPEP PO) Take 2 capsules by mouth 3 times daily L-15, A-82, P-51     pentamidine (NEBUPENT) 300 MG neb solution Inhale 300 mg into the lungs once for 1 dose     polyethylene glycol (MIRALAX/GLYCOLAX) powder Take 17 g by mouth every morning 1/3 of 17g, mix with Liquidcel and water (use as liquid to take with her other morning meds)     tacrolimus (GENERIC EQUIVALENT) 0.5 MG capsule Take 1 capsule (0.5 mg) by mouth 2 times daily Total dose =  2.5mg BID     tacrolimus (GENERIC EQUIVALENT) 1 MG capsule Take 2 capsules (2 mg) by mouth 2 times daily Total dose = 2.5mg BID     No current facility-administered medications for this visit.      There are no discontinued medications.    Physical Exam   Vital Signs: /80   Pulse 73   Temp 97.8  F (36.6  C) (Oral)   Ht 1.6 m (5' 3\")   Wt 50.6 kg (111 lb 9.6 oz)   SpO2 100%   BMI 19.77 kg/m      GENERAL APPEARANCE: alert and no distress  HENT: mouth without ulcers or lesions  LYMPHATICS: no cervical or supraclavicular nodes  RESP: lungs clear to " auscultation - no  wheezes  CV: regular rhythm, normal rate  EDEMA: no LE edema bilaterally  ABDOMEN: soft, nondistended, nontender, bowel sounds normal  MS: extremities normal - no gross deformities noted  SKIN: no rash  TX Kidney: Normal    Data     Renal Latest Ref Rng & Units 4/2/2019 3/26/2019 3/19/2019   Na 133 - 144 mmol/L - - -   Na (external) 135 - 145 MMOL/L 133(L) 134(L) 137   K 3.4 - 5.3 mmol/L - - -   K (external) 3.5 - 5.3 MMOL/L 5.6(H) 5.4(H) 4.5   Cl 94 - 109 mmol/L - - -   Cl (external) 98 - 108 MMOL/L 96(L) 97(L) 98   CO2 20 - 32 mmol/L - - -   CO2 (external) 23 - 32 MMOL/L 27 25 25   BUN 7 - 30 mg/dL - - -   BUN (external) 8 - 23 MG/DL 14 13 14   Cr 0.52 - 1.04 mg/dL - - -   Cr (external) 0.60 - 1.10 MG/DL 0.95 1.03 0.91   Glucose 70 - 99 mg/dL - - -   Glucose (external) 70 - 99 MG/(H) 75 106(H)   Ca  8.5 - 10.1 mg/dL - - -   Ca (external) 8.6 - 10.3 MG/DL 9.8 9.8 9.5   Mg 1.6 - 2.3 mg/dL - - -   Mg (external) 1.7 - 2.8 MG/DL - - -     Bone Health Latest Ref Rng & Units 3/19/2019 2/18/2019 2/11/2019   Phos 2.5 - 4.5 mg/dL - - -   Phos (external) 2.7 - 4.5 MG/DL - 4.4 4.6(H)   PTHi 18 - 80 pg/mL - - -   PTHi (external) 16 - 80 pg/mL - - -   Vit D Def 20 - 75 ug/L - - -   Vit D Def (external) 30 - 100 NG/ML 56 - -     Heme Latest Ref Rng & Units 4/4/2019 4/2/2019 3/26/2019   WBC 4.0 - 11.0 10e9/L 6.1 - -   WBC (external) 4.0 - 11.0 X10*(9)/L - 5.5 4.5   Hgb 11.7 - 15.7 g/dL 13.1 - -   Hgb (external) 12.0 - 16.0 GM/DL - 12.9 13.0   Plt 150 - 450 10e9/L 185 - -   Plt (external) 140 - 440 X10*(9)/L - 199 198     Liver Latest Ref Rng & Units 2/21/2019 2/7/2019 11/19/2018   AP 40 - 150 U/L - - 166(H)   AP (external) 39 - 117 U/L 79 92 -   TBili 0.2 - 1.3 mg/dL - - 0.4   TBili (external) 0.0 - 1.2 MG/DL 0.3 0.4 -   DBili (external) 0.0 - 0.3 MG/DL 0.1 0.1 -   ALT 0 - 50 U/L - - 35   ALT (external) 6 - 37 U/L 34 48(H) -   AST 0 - 45 U/L - - 24   AST (external) 0 - 37 U/L 30 35 -   Tot Protein 6.8 -  8.8 g/dL - - 7.2   Tot Protein (external) 6.4 - 8.3 G/DL 6.3(L) 6.7 -   Albumin 3.4 - 5.0 g/dL - - 3.6   Albumin (external) 3.5 - 5.0 g/dL 3.8 3.9 -     Pancreas Latest Ref Rng & Units 1/7/2019 11/20/2018 10/2/2018   A1C 0 - 5.6 % - 5.5 -   A1C (external) 4.4 - 5.6 % 5.6 - 5.6     Iron studies Latest Ref Rng & Units 3/19/2019 11/19/2018 10/2/2018   Iron 35 - 180 ug/dL - 38 -   Iron sat 15 - 46 % - 18 -   Ferritin 8 - 252 ng/mL - 952(H) -   Ferritin (external) 15 - 150 NG/(H) - 1,155(H)     [facility] Txp Virology Latest Ref Rng & Units 3/26/2019 2/26/2019 1/3/2019   BK Spec - - - -   BK Res BKNEG:BK Virus DNA Not Detected copies/mL - - -   BK Log <2.7 Log copies/mL - - -   BK Quant Result Ext None detected None detected None detected None detected   Hep B Core NR:Nonreactive - - -   Hep B Core Ext Negative - - -   Hep B Surf Ext  See comment mIU/mL - - -        Recent Labs   Lab Test 12/17/18  0753 12/19/18  0741 03/19/19  0855   DOSTAC 8:00PM,12/16 Not Provided Not Provided   TACROL 7.2 11.9 9.7     Recent Labs   Lab Test 12/14/18  0804 12/17/18  0753 12/19/18  0741   DOSMPA 2,000 8:00PM,12/16 0800pm   MPACID 2.03 2.61 <0.25*   MPAG 50.1 45.3 47.8

## 2019-04-04 NOTE — LETTER
4/4/2019       RE: Letty Benavidez  [address]  [place] [address]     Dear Colleague,    Thank you for referring your patient, Letty Benavidez, to the [facility] NEPHROLOGY at [facility]. Please see a copy of my visit note below.    ACUTE TRANSPLANT NEPHROLOGY VISIT    Assessment & Plan   # LDKT: baseline Cr ~ 0.9; Stable   - Proteinuria: Not checked recently   - Latest DSA: No Date of DSA last checked: 3/2019   - BK: No   - Kidney Tx Biopsy: No    Stable graft function.    # Immunosuppression: Tacrolimus immediate release (goal  8-10) and Mycophenolic acid (goal  1-3.5)   - Changes: Yes - Reduce Myfortic to 540mg AM and 360mg PM and monitor drug levels. Tac at 6.3 on last level, will follow up next level and adjust Tac if needed.    # Prophylaxis:   - PJP: Inhaled pentamidine (Hyperkalemia w Bactrim, Edema with Dapsone)   - CMV: Completed    # Hypertension: Controlled; Goal BP: < 130/80   - Changes: No     # Hyperkalemia  Etiology unclear, but it is mild. Unclear role Staci is playing, given Chloride manipulation. Suspect may be related to Tac, may also be related to DM. It is lifestyle limiting and taxing for her, and thus will work up further so she may expand her diet. Of note, Florinef not ideal given history of diastolic dysfunction. Will check Renin/Prasad, Urine K, Urine Osm and in the interim continue low K+ diet. If Urine K+ is low, may consider patiromer or Tac reduction.     #Hyponatremia  Na low (BS normal) and urine is dilute. Given age, might not be able to dilute enough given her oral Crystal Light intake. Recommended she drink to thirst. Will trend. Checking urine osm as above.    # Diabetes: Controlled    # Macrocytosis:   On going pre transplant. TSH, B12, Folate normal. No ETOH use in ~3 years. Liver normal by imaging. Hgb remains normal without platelet or WBC abnormality. Smear pending, will check B1. Plan for Hematology visit outpatient 4/19.    #  Mineral Bone Disorder:    - Secondary renal hyperparathyroidism; PTH level is: 212 yoly transplant  - Vitamin D; level is: Normal on Cholecal  - Calcium; level is: Normal    # Medical Compliance: Yes     # EBV Discordance: EBV monthly, last EBV undetectable 3/26/2019     Return visit: As scheduled.     # Transplant History:  Etiology of kidney failure: diabetes mellitus type 1  Tx: LDKT  Transplant: 11/20/2018 (Kidney), 11/4/1998 (Kidney)  Donor Type: Living Donor Class:   Crossmatch at time of Tx: negative  DSA at time of Tx: No  Significant changes in immunosuppression: None  CMV IgG Ab Discordance (D+/R-): No  EBV IgG Ab Discordance (D+/R-): Yes ; monthly EBV PCR  Significant transplant-related complications: None    Transplant Office Phone Number: [phone]    Assessment and plan was discussed with the patient and she voiced her understanding and agreement.    Pt seen and discussed with Dr. Gabrielle Peres MD    Attestation:  This patient has been seen and evaluated by me, Balbir Anthony MD.  I have reviewed the note and agree with plan of care as documented by the fellow.       Chief Complaint   Ms. Benavidez is a 58 year old here for routine follow up and kidney transplant    History of Present Illness    The patient is a 58-year-old female with history of type 1 diabetes status post kidney transplant x2.  Her most recent transplant was on 11/20/2018.  Her immediate postoperative course has been uncomplicated to date.  The patient is currently on chronic immunosuppression therapy with immediate release tacrolimus and Myfortic 540 mg p.o. twice daily. Currently, the patient feels well.  Since last visit, she did have an episode of cellulitis of the LLE that resolved with Abx. Other major issue is that she has had some increase in Nausea after Myfortic was increased to 540 mg BID, but able to take PO without emesis. Constipation/Diarrhea is well managed with Pancreas Enzymes/Linzess. No abd pain.       Recent Hospitalizations:  [x] No [] Yes    New Medical Issues: [] No [x] Yes Cellulitis of LLE that resolved with Doxycyline.   Decreased energy: [x] No [] Yes    Chest pain or SOB with exertion:  [x] No [] Yes    Appetite change or weight change: [x] No [] Yes    Nausea, vomiting or diarrhea:  [] No [x] Yes As per HPI, increased with higher dose MPA   Fever, sweats or chills: [x] No [] Yes    Leg swelling: [x] No [] Yes      Other medical issues:  No    Home BP: Log reviewed, at goal - <130/80    Review of Systems   A comprehensive review of systems was obtained and negative, except as noted in the HPI or PMH.    Problem List   Patient Active Problem List   Diagnosis     Secondary hyperparathyroidism (H)     Anemia of chronic kidney failure     Dyslipidemia     Hypertension     Gastroparesis     Diabetic peripheral neuropathy (H)     Diabetic retinopathy (H)     Type 1 diabetes (H)     Pancreatic insufficiency     History of skin cancer     End stage kidney disease (H)     Immunosuppressed status (H)     Kidney transplanted     Dehydration     Aftercare following organ transplant     Dysuria     Macrocytosis without anemia       Social History   Social History     Tobacco Use     Smoking status: Never Smoker     Smokeless tobacco: Never Used   Substance Use Topics     Alcohol use: No     Alcohol/week: 0.0 oz     Drug use: No       Allergies   Allergies   Allergen Reactions     Bactrim [Sulfamethoxazole W/Trimethoprim]      Dapsone      Erythromycin      Not a drug allergy- Patient told to avoid while taking tacrolimus due to drug interaction. Ok to have if not taking tacrolimus     Metronidazole Nausea and Vomiting     Flagyl     Mycophenolate Nausea and Vomiting and Other (See Comments)     Nausea / Vomiting / Tremors     Neomycin Other (See Comments)     Not a drug allergy- Patient told to avoid while taking tacrolimus due to drug interaction. Ok to have if not taking tacrolimus       Medications   Current  Outpatient Medications   Medication Sig     Amino Acids (LIQUACEL) LIQD Take 30 mLs by mouth daily Amino acids 16 grams, 2.5 g of arginine per pacakge -90 kcal/30mL liquid     aspirin 81 MG EC tablet Take 81 mg by mouth daily     atorvastatin (LIPITOR) 20 MG tablet Take 20 mg by mouth At Bedtime      Cholecalciferol (VITAMIN D3) 2000 units CAPS Take 0.5 capsules by mouth every evening With dinner     CO-ENZYME Q-10 PO Take 400 mg by mouth daily For cramps from lipitor     eszopiclone (LUNESTA) 1 MG tablet Take 2 tablets (2 mg) by mouth At Bedtime     gabapentin (NEURONTIN) 100 MG capsule Take 100 mg by mouth At Bedtime      insulin lispro (HUMALOG) 100 UNIT/ML VIAL Use with insulin pump     INSULIN PUMP - OUTPATIENT Inject 0.5 Units/hr Subcutaneous Carb ratio: 1:15g/hr  Sensitivity factor correction: 1 unit for every 75mg/dL over 100mg/dL     linaclotide (LINZESS) 290 MCG capsule Take 290 mcg by mouth every morning (before breakfast)      Magnesium Citrate 200 MG TABS Take 200 mg by mouth daily     multivitamin, therapeutic (THERA-VIT) TABS tablet Take 1 tablet by mouth daily     MYFORTIC (BRAND) 180 MG EC tablet Take 3 tablets (540 mg) by mouth 2 times daily     Pancrelipase, Lip-Prot-Amyl, (ZENPEP PO) Take 2 capsules by mouth 3 times daily L-15, A-82, P-51     pentamidine (NEBUPENT) 300 MG neb solution Inhale 300 mg into the lungs once for 1 dose     polyethylene glycol (MIRALAX/GLYCOLAX) powder Take 17 g by mouth every morning 1/3 of 17g, mix with Liquidcel and water (use as liquid to take with her other morning meds)     tacrolimus (GENERIC EQUIVALENT) 0.5 MG capsule Take 1 capsule (0.5 mg) by mouth 2 times daily Total dose =  2.5mg BID     tacrolimus (GENERIC EQUIVALENT) 1 MG capsule Take 2 capsules (2 mg) by mouth 2 times daily Total dose = 2.5mg BID     No current facility-administered medications for this visit.      There are no discontinued medications.    Physical Exam   Vital Signs: /80   Pulse 73  "  Temp 97.8  F (36.6  C) (Oral)   Ht 1.6 m (5' 3\")   Wt 50.6 kg (111 lb 9.6 oz)   SpO2 100%   BMI 19.77 kg/m       GENERAL APPEARANCE: alert and no distress  HENT: mouth without ulcers or lesions  LYMPHATICS: no cervical or supraclavicular nodes  RESP: lungs clear to auscultation - no  wheezes  CV: regular rhythm, normal rate  EDEMA: no LE edema bilaterally  ABDOMEN: soft, nondistended, nontender, bowel sounds normal  MS: extremities normal - no gross deformities noted  SKIN: no rash  TX Kidney: Normal    Data     Renal Latest Ref Rng & Units 4/2/2019 3/26/2019 3/19/2019   Na 133 - 144 mmol/L - - -   Na (external) 135 - 145 MMOL/L 133(L) 134(L) 137   K 3.4 - 5.3 mmol/L - - -   K (external) 3.5 - 5.3 MMOL/L 5.6(H) 5.4(H) 4.5   Cl 94 - 109 mmol/L - - -   Cl (external) 98 - 108 MMOL/L 96(L) 97(L) 98   CO2 20 - 32 mmol/L - - -   CO2 (external) 23 - 32 MMOL/L 27 25 25   BUN 7 - 30 mg/dL - - -   BUN (external) 8 - 23 MG/DL 14 13 14   Cr 0.52 - 1.04 mg/dL - - -   Cr (external) 0.60 - 1.10 MG/DL 0.95 1.03 0.91   Glucose 70 - 99 mg/dL - - -   Glucose (external) 70 - 99 MG/(H) 75 106(H)   Ca  8.5 - 10.1 mg/dL - - -   Ca (external) 8.6 - 10.3 MG/DL 9.8 9.8 9.5   Mg 1.6 - 2.3 mg/dL - - -   Mg (external) 1.7 - 2.8 MG/DL - - -     Bone Health Latest Ref Rng & Units 3/19/2019 2/18/2019 2/11/2019   Phos 2.5 - 4.5 mg/dL - - -   Phos (external) 2.7 - 4.5 MG/DL - 4.4 4.6(H)   PTHi 18 - 80 pg/mL - - -   PTHi (external) 16 - 80 pg/mL - - -   Vit D Def 20 - 75 ug/L - - -   Vit D Def (external) 30 - 100 NG/ML 56 - -     Heme Latest Ref Rng & Units 4/4/2019 4/2/2019 3/26/2019   WBC 4.0 - 11.0 10e9/L 6.1 - -   WBC (external) 4.0 - 11.0 X10*(9)/L - 5.5 4.5   Hgb 11.7 - 15.7 g/dL 13.1 - -   Hgb (external) 12.0 - 16.0 GM/DL - 12.9 13.0   Plt 150 - 450 10e9/L 185 - -   Plt (external) 140 - 440 X10*(9)/L - 199 198     Liver Latest Ref Rng & Units 2/21/2019 2/7/2019 11/19/2018   AP 40 - 150 U/L - - 166(H)   AP (external) 39 - 117 U/L " 79 92 -   TBili 0.2 - 1.3 mg/dL - - 0.4   TBili (external) 0.0 - 1.2 MG/DL 0.3 0.4 -   DBili (external) 0.0 - 0.3 MG/DL 0.1 0.1 -   ALT 0 - 50 U/L - - 35   ALT (external) 6 - 37 U/L 34 48(H) -   AST 0 - 45 U/L - - 24   AST (external) 0 - 37 U/L 30 35 -   Tot Protein 6.8 - 8.8 g/dL - - 7.2   Tot Protein (external) 6.4 - 8.3 G/DL 6.3(L) 6.7 -   Albumin 3.4 - 5.0 g/dL - - 3.6   Albumin (external) 3.5 - 5.0 g/dL 3.8 3.9 -     Pancreas Latest Ref Rng & Units 1/7/2019 11/20/2018 10/2/2018   A1C 0 - 5.6 % - 5.5 -   A1C (external) 4.4 - 5.6 % 5.6 - 5.6     Iron studies Latest Ref Rng & Units 3/19/2019 11/19/2018 10/2/2018   Iron 35 - 180 ug/dL - 38 -   Iron sat 15 - 46 % - 18 -   Ferritin 8 - 252 ng/mL - 952(H) -   Ferritin (external) 15 - 150 NG/(H) - 1,155(H)     [facility] Txp Virology Latest Ref Rng & Units 3/26/2019 2/26/2019 1/3/2019   BK Spec - - - -   BK Res BKNEG:BK Virus DNA Not Detected copies/mL - - -   BK Log <2.7 Log copies/mL - - -   BK Quant Result Ext None detected None detected None detected None detected   Hep B Core NR:Nonreactive - - -   Hep B Core Ext Negative - - -   Hep B Surf Ext  See comment mIU/mL - - -        Recent Labs   Lab Test 12/17/18  0753 12/19/18  0741 03/19/19  0855   DOSTAC 8:00PM,12/16 Not Provided Not Provided   TACROL 7.2 11.9 9.7     Recent Labs   Lab Test 12/14/18  0804 12/17/18  0753 12/19/18  0741   DOSMPA 2,000 8:00PM,12/16 0800pm   MPACID 2.03 2.61 <0.25*   MPAG 50.1 45.3 47.8       Again, thank you for allowing me to participate in the care of your patient.      Sincerely,    Early Post Transplant

## 2019-04-05 LAB — ALDOST SERPL-MCNC: 16.3 NG/DL (ref 0–31)

## 2019-04-06 LAB — RENIN PLAS-CCNC: 0.3 NG/ML/HR

## 2019-04-07 LAB — VIT B1 BLD-MCNC: 82 NMOL/L (ref 70–180)

## 2019-04-09 NOTE — PROGRESS NOTES
Transplant Surgery Progress Note    Transplants:  11/20/2018 (Kidney), 11/4/1998 (Kidney); Postoperative day:  140  S: Letty Benavidez is an 58 year old female with a history of DM type 1, diabetic nephropathy, CAD s/p CABG x 2 (10/2017), skin cancer, gastroparesis, pancreatic exocrine dysfunction, diabetic enteropathy, and ESRD s/p LDKT in 1998 which failed on HD since 4/2017, and is now s/p LDKT with ureteral stent placement on 11/20/18.        Overall doing well.  No fevers, chills, nausea or vomiting.  No hematuria, dysuria or frequency.    Patient states over while all she has been doing very wellShe has no further complaints at this time.     Patient states that her hyperkalemia she believes to be related to her Myfortic dose as this is previously occurred according to patient.     Patient states her blood sugars are well controlled at this period of time and she has no further surgical complaints    Transplant History:    Transplant Type:  LDKT  Donor Type: Living        Transplant Date:  11/20/2018 (Kidney), 11/4/1998 (Kidney)              Ureteral Stent:   no               Crossmatch:  negative              DSA at Tx:  No  Baseline Cr: 1.1   DeNovo DSA: No     Acute Rejection Hx:  No     Present Maintenance Immunosuppression:  Tacrolimus and Mycophenolic acid     CMV IgG Ab Discordance:  Yes  EBV IgG Ab Discordance:  No     BK Viremia:  No  EBV Viremia:  No     Transplant Coordinator: Tania Khan              Transplant Office Phone Number: [phone]         Immunsupresent Medications     Immunosuppressive Agents Disp Start End     MYFORTIC (BRAND) 180 MG EC tablet    540 tablet 4/4/2019     Sig - Route: Take 3 tablets (540 mg) by mouth every morning - Oral    Class: Historical     MYFORTIC (BRAND) 180 MG EC tablet    90 tablet 4/4/2019     Sig - Route: Take 2 tablets (360 mg) by mouth every evening - Oral    Class: Historical     tacrolimus (GENERIC EQUIVALENT) 0.5 MG capsule    180 capsule  2/6/2019     Sig - Route: Take 1 capsule (0.5 mg) by mouth 2 times daily Total dose =  2.5mg BID - Oral    Class: E-Prescribe     tacrolimus (GENERIC EQUIVALENT) 1 MG capsule    360 capsule 2/6/2019     Sig - Route: Take 2 capsules (2 mg) by mouth 2 times daily Total dose = 2.5mg BID - Oral    Class: E-Prescribe          Possible Immunosuppression-related side effects:   []             headache  []             vivid dreams  []             irritability  []             cognitive difficuties  []             fine tremor  []             nausea  []             diarrhea  []             neuropathy      []             edema  []             renal calcineurin toxicity  []             hyperkalemia  []             post-transplant diabetes  []             decreased appetite  []             increased appetite  []             other:  [x]             none    Prescription Medications as of 4/9/2019       Rx Number Disp Refills Start End Last Dispensed Date Next Fill Date Owning Pharmacy    Amino Acids (LIQUACEL) LIQD  960 mL 0 3/29/2019    [facility] [place] [address]    Sig: Take 30 mLs by mouth daily Amino acids 16 grams, 2.5 g of arginine per pacakge -90 kcal/30mL liquid    Class: Historical    Route: Oral    aspirin 81 MG EC tablet            Sig: Take 81 mg by mouth daily    Class: Historical    Route: Oral    atorvastatin (LIPITOR) 20 MG tablet    9/17/2018        Sig: Take 20 mg by mouth At Bedtime     Class: Historical    Route: Oral    Cholecalciferol (VITAMIN D3) 2000 units CAPS  15 capsule 0 3/29/2019    [facility] [place] [address]    Sig: Take 0.5 capsules by mouth every evening With dinner    Class: Historical    Route: Oral    CO-ENZYME Q-10 PO            Sig: Take 400 mg by mouth daily For cramps from lipitor    Class: Historical    Route: Oral    eszopiclone (LUNESTA) 1 MG tablet  30 tablet 1 12/6/2018        Sig: Take 2 tablets (2 mg) by mouth  At Bedtime    Class: Local Print    Route: Oral    gabapentin (NEURONTIN) 100 MG capsule    9/26/2016        Sig: Take 100 mg by mouth At Bedtime     Class: Historical    Route: Oral    insulin lispro (HUMALOG) 100 UNIT/ML VIAL    6/21/2016        Sig: Use with insulin pump    Class: Historical    Route: Subcutaneous    INSULIN PUMP - OUTPATIENT    2/17/1995        Sig: Inject 0.5 Units/hr Subcutaneous Carb ratio: 1:15g/hr  Sensitivity factor correction: 1 unit for every 75mg/dL over 100mg/dL    Class: Historical    Route: Subcutaneous    linaclotide (LINZESS) 290 MCG capsule    5/28/2017        Sig: Take 290 mcg by mouth every morning (before breakfast)     Class: Historical    Route: Oral    Magnesium Citrate 200 MG TABS  30 tablet 2 12/18/2018        Sig: Take 200 mg by mouth daily    Class: No Print Out    Route: Oral    multivitamin, therapeutic (THERA-VIT) TABS tablet  30 tablet 2 11/27/2018    [place], [place] - [address] [place] [address]    Sig: Take 1 tablet by mouth daily    Class: E-Prescribe    Route: Oral    MYFORTIC (BRAND) 180 MG EC tablet  540 tablet 3 4/4/2019    [place] [address]    Sig: Take 3 tablets (540 mg) by mouth every morning    Class: Historical    Route: Oral    MYFORTIC (BRAND) 180 MG EC tablet  90 tablet 3 4/4/2019    [place] [address] [address]    Sig: Take 2 tablets (360 mg) by mouth every evening    Class: Historical    Route: Oral    Pancrelipase, Lip-Prot-Amyl, (ZENPEP PO)            Sig: Take 2 capsules by mouth 3 times daily L-15, A-82, P-51    Class: Historical    Route: Oral    pentamidine (NEBUPENT) 300 MG neb solution  300 mg 0 2/11/2019    [place] [address] [address]    Sig: Inhale 300 mg into the lungs once for 1 dose    Class: Historical    Route: Inhalation    polyethylene glycol (MIRALAX/GLYCOLAX) powder  500 g 0 3/29/2019     [place] [address] [address]    Sig: Take 17 g by mouth every morning 1/3 of 17g, mix with Liquidcel and water (use as liquid to take with her other morning meds)    Class: Historical    Route: Oral    tacrolimus (GENERIC EQUIVALENT) 0.5 MG capsule  180 capsule 3 2/6/2019    [place] [address] [address]    Sig: Take 1 capsule (0.5 mg) by mouth 2 times daily Total dose =  2.5mg BID    Class: E-Prescribe    Route: Oral    tacrolimus (GENERIC EQUIVALENT) 1 MG capsule  360 capsule 3 2/6/2019    [place] [address] [address]    Sig: Take 2 capsules (2 mg) by mouth 2 times daily Total dose = 2.5mg BID    Class: E-Prescribe    Route: Oral          O:      General Appearance: in no apparent distress.   Skin: Normal, no rashes or jaundice  Heart: regular rate and rhythm, normal S1 and S2  Lungs: easy respirations, no audible wheezing.  Abdomen: flat, The wound is dry and intact, without hernia. The abdomen is non-tender. The kidney graft is not tender.  There is no ascites.  Extremities: Tremor absent.   Edema: absent.     Transplant Immunosuppression Labs Latest Ref Rng & Units 4/4/2019 3/19/2019 12/19/2018 12/17/2018 12/14/2018   Tacro Level 5.0 - 15.0 ug/L - 9.7 11.9 7.2 7.9   Tacro Level - - Not Provided Not Provided 8:00PM,12/16 2,000   Creat 0.52 - 1.04 mg/dL 0.92 - 1.04 1.13(H) 1.11(H)   BUN 7 - 30 mg/dL 16 - 20 24 22   WBC 4.0 - 11.0 10e9/L 6.1 - 6.1 6.5 4.1   Neutrophil % 73.7 - 74.6 76.7 69.4   ANEU 1.6 - 8.3 10e9/L 4.5 - 4.5 5.0 2.8       Chemistries:   Recent Labs   Lab Test 04/04/19  1145   BUN 16   CR 0.92   GFRESTIMATED 68   *     Lab Results   Component Value Date    A1C 5.5 11/20/2018    CPEPT <0.1 10/03/2016     Recent Labs   Lab Test 11/19/18  1036   ALBUMIN 3.6   BILITOTAL 0.4   ALKPHOS 166*   AST 24   ALT 35     Urine Studies:  Recent Labs   Lab Test 12/17/18  0856   COLOR Yellow   APPEARANCE  Slightly Cloudy   URINEGLC Negative   URINEBILI Negative   URINEKETONE Negative   SG 1.015   UBLD Negative   URINEPH 5.0   PROTEIN Negative   NITRITE Negative   LEUKEST Negative   RBCU 1   WBCU 1     Recent Labs   Lab Test 12/17/18  0856   UTPG 0.20     Hematology:   Recent Labs   Lab Test 04/04/19  0734 12/19/18  0745 12/17/18  0754   HGB 13.1 10.4* 9.7*    215 243   WBC 6.1 6.1 6.5     Coags:   Recent Labs   Lab Test 10/03/16  0653   INR 0.96     HLA antibodies:   SA1 Hi Risk Martha   Date Value Ref Range Status   04/04/2019 None  Final     SA1 Mod Risk Martha   Date Value Ref Range Status   04/04/2019 None  Final     SA2 Hi Risk Martha   Date Value Ref Range Status   04/04/2019 None  Final     SA2 Mod Risk Martha   Date Value Ref Range Status   04/04/2019 None  Final       Assessment: Letty Benavidez is doing well s/p LDKT:  Issues we addressed during her visit include:    Plan:    1. Graft function: cr is stable   2. Immunosuppression Management: Changed myfortic 540mg AM  360mg PM, and continue prograf 2mg BID   Complexity of management:Medium.  Contributing factors: slight nausea.  electrolyte imbalance  3. Hyperkalemia:  Discussed with nephrology. Etiology unclear.  Appreciate input of nephrology.  Nephrology to check renin/L though, urine K, urine OSM and in  interim continue low K diet.  Followup:  As directed    Total Time: 25 min,   Counselling Time: 15 min.

## 2019-04-10 ENCOUNTER — TELEPHONE (OUTPATIENT)
Dept: TRANSPLANT | Facility: CLINIC | Age: 59
End: 2019-04-10

## 2019-04-10 NOTE — TELEPHONE ENCOUNTER
Potassium elevation - no s/s of hyperkalemia.   This is not a new phenomena with this transplant. She continues to follow a low K diet.  CC did confirm with outside lab that samples have not been hemolyzing.  Letty has used kayexalate in the past, but has not in quite a while due to her worsening gastroparesis.    Will repeat urine studies as reviewed / requested by Dr. Anthony.    Tac level pending (not presumed to be elevated).    Letty does believe that the elevated potassium may be due to her gastroparesis - she does have to take linzess and miralax daily to have BMs and BMs are one time a day, watery. Letty wonders if the potassium is elevated due to loss of sodium with BMs.    After information received at most recent visit with [facility] neprhology an in light of most recent MCV level (106), Letty has chosen NOT to have follow up with hematology locally. Dr. Tijerina did not note any morphological changes on her peripheral smear.

## 2019-04-10 NOTE — LETTER
PHYSICIAN ORDERS      DATE & TIME ISSUED: 2019 7:04 AM  PATIENT NAME: Letty Benavidez   : 1960     [facility] MR# [if applicable]: [id number]     DIAGNOSIS:  Kidney Transplant  ICD-10 CODE: Z94.0     Please complete the following labs at the next routine transplant lab draw:  Urine osmolality   Serum osmolality   Urine potassium random     Any questions please call: [phone]  Please fax lab results to [phone].

## 2019-04-15 LAB — ACYCLOVIR SERPL-MCNC: <0.2 UG/ML

## 2019-04-17 ENCOUNTER — TELEPHONE (OUTPATIENT)
Dept: TRANSPLANT | Facility: CLINIC | Age: 59
End: 2019-04-17

## 2019-04-17 NOTE — TELEPHONE ENCOUNTER
Post Kidney and Pancreas Transplant Team Conference  Date: 4/17/2019  Transplant Coordinator: Tania Khan     Attendees:  [x]  Dr. Villalta [] Che Latham, EAMON  [x] Lisbet Holley LPN     [x]  Dr. Romano [x] Shasha Arreaga RN [] Norma Egan LPN   []  Dr. Fuentes [] Ofe Mujica RN    []  Dr. Anthony [] Selam Hoskins RN    [] Dr. Kang [x] Morena Khan RN    [] Dr. Hernandez [] Chito Holley RN    [] Dr. Hankins [] Maddi Zaldivar, RN    [] Surgery Fellow [] Apurva Soria RN    [] Julianna Thornton NP              Verbal Plan Read Back:   Reduce tacrolimus goal = 6-8 due to hyperkalemia.    Routed to RN Coordinator   Lisbet Holley

## 2019-04-18 ENCOUNTER — TELEPHONE (OUTPATIENT)
Dept: TRANSPLANT | Facility: CLINIC | Age: 59
End: 2019-04-18

## 2019-04-18 DIAGNOSIS — Z94.0 KIDNEY REPLACED BY TRANSPLANT: Primary | ICD-10-CM

## 2019-04-18 RX ORDER — MYCOPHENOLIC ACID 180 MG/1
360 TABLET, DELAYED RELEASE ORAL 2 TIMES DAILY
Qty: 360 TABLET | Refills: 3 | Status: SHIPPED | OUTPATIENT
Start: 2019-04-18 | End: 2019-05-02

## 2019-04-18 NOTE — TELEPHONE ENCOUNTER
Post Kidney and Pancreas Transplant Team Conference  Date: 4/18/2019  Transplant Coordinator: Tania Khan     Attendees:  []  Dr. Villalta [] Che Latham, RN  [] Lisbet Holley LPN     []  Dr. Romano [] Shasha Arreaga RN [] Norma Egan LPN   [x]  Dr. Fuentes [] Ofe Mujica RN    []  Dr. Anthony [] Selam Hoskins RN    [] Dr. Hernandez [x] Morena Khan RN    [] Dr. Hankins [] Chito Holley RN    [] Dr. Starks [] Maddi Zaldivar RN    [] Surgery Fellow [] Apurva Soria RN    [] Julianna Thornton NP [] Ofe Chairez RN    [] Wil Becker, PharmD [] Ezequiel Javier RN     [] Che Villalba RN        Verbal Plan Read Back:   Labs drawn 4/16 indicate that secretion of potassium is limited, likely due to tac. Continue tac goal 7-8.    Due to reflux:  Start omeprazole 20mg daily x2 weeks.  Reduce myfortic to 360  BID and recheck mpa level.  Check mag with next lab draw.    Routed to RN Coordinator   Tania Khan    Letty would like to implement the following:  NO start on omeprazole, as she has lessened the acidity in her diet and symptoms have improved.  She follows up with GI on 5/7 - if she is still having issues she will ask her GI MD to order scope.  Will reduce myfortic and recheck level.

## 2019-04-18 NOTE — LETTER
PHYSICIAN ORDERS      DATE & TIME ISSUED: 2019 4:50 PM  PATIENT NAME: Letty Benavidez   : 1960     [facility] MR# [if applicable]: [id number]     DIAGNOSIS:  Kidney transplant and Secondary renal hyperparathyroidism   ICD-10 CODE: Z94.0 and N25.81    Please complete the following labs on 2019 with patient standing labs  Magnesium level  Mycophenolate level    Any questions please call: [phone] option #5    Please fax results to [phone].      Viral Gabrielle

## 2019-04-18 NOTE — LETTER
PHYSICIAN ORDERS      DATE & TIME ISSUED: 2019 3:22 PM  PATIENT NAME: Letty Benavidez   : 1960     [facility] MR# [if applicable]: [id number]     DIAGNOSIS:  Kidney Transplant  ICD-10 CODE: Z94.0     Please complete the following labs tomorrow:  Stool culture  C diff.    Any questions please call: [phone]  Please fax lab results to [phone].    .

## 2019-04-22 ENCOUNTER — TELEPHONE (OUTPATIENT)
Dept: TRANSPLANT | Facility: CLINIC | Age: 59
End: 2019-04-22

## 2019-04-22 NOTE — TELEPHONE ENCOUNTER
Patient Call: Transplant Illness  If the patient reports CHEST PAIN, SEVERE SHORTNESS OF BREATH, ONE SIDED WEAKNESS, or DIFFICULTY SPEAKING: CONTACT RN FACE-TO-FACE IMMEDIATELY    Duration of illness: Friday 4/19/19 afternoon   Transplanted organ? kidneys  Illness: Nausea, Other Fever (99), Fatigue, Vomiting greather than 24 hours, diaherra     Pt has an appt  From to 12p to 2p eastern time (11-1p) please call back then

## 2019-04-22 NOTE — LETTER
PHYSICIAN ORDERS      DATE & TIME ISSUED: 2019 3:22 PM  PATIENT NAME: Letty Benavidez   : 1960     [facility] MR# [if applicable]: [id number]     DIAGNOSIS:  Kidney Transplant  ICD-10 CODE: Z94.0     Please complete the following labs tomorrow:  Stool culture  C diff.    Any questions please call: [phone]  Please fax lab results to [phone].    .

## 2019-04-22 NOTE — TELEPHONE ENCOUNTER
Friday night and Saturday N/D with fever to 99 after eating at a local restaurant.  Cramping in lower intestines and nausea / diarrhea more than usual has persisted. Denies ongoing fever.  General fagitue and weakness, denies body aches.    Pentamidine today - stopped slightly early due to nausea.  She is getting her meds down and is NOT vomiting.   Letty denied any Rx for antiemetic.    RNCC advised viral swab - Letty will talk to her PCP about appt for this.    Will order DAYDAY and Cdiff stool studies at local lab.

## 2019-04-24 ENCOUNTER — TELEPHONE (OUTPATIENT)
Dept: TRANSPLANT | Facility: CLINIC | Age: 59
End: 2019-04-24

## 2019-04-24 NOTE — LETTER
PHYSICIAN ORDERS      DATE & TIME ISSUED: 2019 10:54 AM  PATIENT NAME: Letty Benavidez   : 1960     [facility] MR# [if applicable]: [id number]     DIAGNOSIS:  Kidney Transplant  ICD-10 CODE: Z94.0     Please complete the following labs on 2019:  Cbc  Bmp  Tacrolimus drug level  UA/UC     Any questions please call: [phone]  Please fax lab results to [phone].

## 2019-04-24 NOTE — TELEPHONE ENCOUNTER
Elevated WBCs.  Recent diarrhea, cramping / nausea / vomiting: Letty reports this is slowly improving.   All viral screening was inconclusive. Letty does not feel this is similar to her history of GI bacterial overgrowth or UTI.      Letty denies need for any IV hydration.     Letty will repeat labs on Friday with good oral hydration.

## 2019-04-26 ENCOUNTER — TELEPHONE (OUTPATIENT)
Dept: TRANSPLANT | Facility: CLINIC | Age: 59
End: 2019-04-26

## 2019-04-26 NOTE — TELEPHONE ENCOUNTER
Abdominal distention and belly pain continue to be quite problematic.  Diarrhea is improved.     Tylenol helps, but she is still very uncomfortable.    Hemoglobin continues to drop.  Appetite has continued to be suppressed due to    Weight is 106, down 4lbs from transplant.     BMP is okay, but she overhydrated.     Letty will see her GI doc to discuss this potential being bacterial overgrowth - this appt is 5/7.  She will call her GI doc early next week if this persists.     RNCC encouraged that if tylenol is not cutting it over the weekend she should to present to urgent care / ED.  Letty voiced understanding.

## 2019-04-26 NOTE — LETTER
PHYSICIAN ORDERS      DATE & TIME ISSUED: 2019 2:37 PM  PATIENT NAME: Letty Benavidez   : 1960     [facility] MR# [if applicable]: [id number]     DIAGNOSIS:  Kidney Transplant  ICD-10 CODE: Z94.0     Please complete the following lab at the next routine transplant lab draw:  CMV PCR QT      Any questions please call: [phone]  Please fax lab results to [phone].           Troponin 0.056

## 2019-04-29 ENCOUNTER — TELEPHONE (OUTPATIENT)
Dept: TRANSPLANT | Facility: CLINIC | Age: 59
End: 2019-04-29

## 2019-04-29 DIAGNOSIS — Z94.0 KIDNEY TRANSPLANTED: ICD-10-CM

## 2019-04-29 RX ORDER — TACROLIMUS 0.5 MG/1
CAPSULE ORAL
Qty: 180 CAPSULE | Refills: 3 | Status: SHIPPED | OUTPATIENT
Start: 2019-04-29 | End: 2019-05-10

## 2019-04-29 RX ORDER — TACROLIMUS 1 MG/1
3 CAPSULE ORAL 2 TIMES DAILY
Qty: 540 CAPSULE | Refills: 3 | Status: SHIPPED | OUTPATIENT
Start: 2019-04-29 | End: 2019-05-08

## 2019-04-29 NOTE — TELEPHONE ENCOUNTER
ISSUE:   Tacrolimus level 6 on 4/26, goal 8, dose 2.5 mg BID    PLAN:   Please call pt and confirm this was a good 12-hour trough. Verify dose 2.5 mg BID. Confirm no new medications or illness (stacey. Diarrhea). If good trough, increase dose to 3 mg BID and recheck level as scheduled.     Letty confirmed the above (she is having diarrhea right now) and voiced understanding dose increase.    Diarrhea now is increased (had been improved per [employer] message over the weekend).    Letty has now had symptoms for 11 days - will try to see PCP tomorrow. She does have follow up with GI next week Tuesday.    Letty continues to deny fevers (since 4/19).

## 2019-05-01 ENCOUNTER — TELEPHONE (OUTPATIENT)
Dept: TRANSPLANT | Facility: CLINIC | Age: 59
End: 2019-05-01

## 2019-05-01 NOTE — TELEPHONE ENCOUNTER
Post Kidney and Pancreas Transplant Team Conference  Date: 5/1/2019  Transplant Coordinator: Tania Khan     Attendees:  [x]  Dr. Villalta [] Che Latham, RN  [x] Lisbte Holley LPN     []  Dr. Romano [x] Shasha Arreaga RN [] Norma Egan LPN   [x]  Dr. Fuentes [] Ofe Mujica RN    []  Dr. Anthony [] Selam Hoskins RN    [] Dr. Kang [x] Morena Khan RN    [] Dr. Hernandez [] Chito Holley RN    [x] Dr. Hankins [] Maddi Zaldivar, RN    [] Surgery Fellow [] Apurva Soria RN    [] Julianna Thornton NP              Verbal Plan Read Back:   No changes at this time    Routed to RN Coordinator   Lisbet Holley

## 2019-05-01 NOTE — TELEPHONE ENCOUNTER
Repeat UA for calcium oxalate -   Letty voiced understanding.    Duhn can biopsy if necessary.     Continues to deny fevers.     Will repeat full set of labs on Friday.

## 2019-05-01 NOTE — LETTER
PHYSICIAN ORDERS      DATE & TIME ISSUED: May 2, 2019 7:33 AM  PATIENT NAME: Letty Benavidez   : 1960     [facility] MR# [if applicable]: [id number]     DIAGNOSIS:  Kidney Transplant  ICD-10 CODE: Z94.0     Please complete the following labs on Friday, 5/3/2019:  UA  Bmp  cbc with diff  tacrolimus    Any questions please call: [phone]  Please fax lab results to [phone].

## 2019-05-02 ENCOUNTER — TELEPHONE (OUTPATIENT)
Dept: TRANSPLANT | Facility: CLINIC | Age: 59
End: 2019-05-02

## 2019-05-02 DIAGNOSIS — Z94.0 KIDNEY REPLACED BY TRANSPLANT: ICD-10-CM

## 2019-05-02 RX ORDER — MYCOPHENOLIC ACID 180 MG/1
540 TABLET, DELAYED RELEASE ORAL 2 TIMES DAILY
Qty: 540 TABLET | Refills: 3 | Status: SHIPPED | OUTPATIENT
Start: 2019-05-02 | End: 2019-05-10

## 2019-05-02 NOTE — TELEPHONE ENCOUNTER
Post Kidney and Pancreas Transplant Team Conference  Date: 5/2/2019  Transplant Coordinator: Tania Khan     Attendees:  []  Dr. Villalta [] Che Latham, RN  [] Lisbet Holley LPN     []  Dr. Romano [] Shasha Arreaga RN [] Norma Egan LPN   []  Dr. Fuentes [] Ofe Mujica RN    [x]  Dr. Anthony [] Selam Hoskins RN    [] Dr. Hernandez [x] Morena Khan RN    [] Dr. Hankins [] Chito Holley RN    [] Dr. Starks [] Maddi Zaldivar, RN    [] Surgery Fellow [] Apurva Soria RN    [] Julianna Thornton, HUNTER [] Ofe Chairez RN    [] Wil Becker, PharmD [] Ezequiel Javier RN     [] Che Villalba RN        Verbal Plan Read Back:   MPA level 0.7.  Increase myfortic to 540 BID.    Routed to RN Coordinator   Tania Saenz voiced understanding.

## 2019-05-03 ENCOUNTER — TELEPHONE (OUTPATIENT)
Dept: TRANSPLANT | Facility: CLINIC | Age: 59
End: 2019-05-03

## 2019-05-03 NOTE — TELEPHONE ENCOUNTER
Post Kidney and Pancreas Transplant Team Conference  Date: 5/3/2019  Transplant Coordinator: Tania Khan     Attendees:  []  Dr. Villalta [] Che Latham, RN  [] Lisbet Holley LPN     []  Dr. Romano [] Shasha Arreaga RN [] Norma Egan LPN   []  Dr. Fuentes [] Ofe Mujica RN    [x]  Dr. Anthony [] Selam Hoskins RN    [] Dr. Hernandez [x] Morena Khan RN    [] Dr. Hankins [] Chito Holley RN    [] Dr. Starks [] Maddi Zaldivar RN    [] Surgery Fellow [] Apurva Soria RN    [] Julianna Thornton NP [] Ofe Chairez RN    [] Wil Becker, PharmD [] Ezequiel Javier RN     [] Che Villalba RN        Verbal Plan Read Back:   For increased heart burn with increased MPA dose:    START prilosec 20mg daily.    Routed to RN Coordinator   Tania Khan

## 2019-05-03 NOTE — TELEPHONE ENCOUNTER
Again, labs from 5/3 reviewed with MD. Give 1L NS, repeat labs and plan for biopsy if no improvement.    Letty will have 1L over the weekend and repeat labs as scheduled.

## 2019-05-03 NOTE — TELEPHONE ENCOUNTER
Spoke to patient who verbalizes understanding to sees GI, and see if they may want to do an endoscopy. Also, have GI weigh in on prilosec management.  Patient verbalizes understanding to start prilosec 20 mg daily.  Patient will buy otc.  Patient also reports decreased pain in lower abdominal area but still c/o distention and burning sensation.  Patient also states that she will stop Linzess over the weekend to see if this helps with current s/sx.  Patient states that she will have labs completed on Monday.

## 2019-05-03 NOTE — LETTER
PHYSICIAN ORDERS      DATE & TIME ISSUED: May 3, 2019 3:56 PM  PATIENT NAME: Letty Benavidez   : 1960     [facility] MR# [if applicable]: [id number]     DIAGNOSIS:  Kidney replaced by transplant, dehydration, elevated serum lipase.  ICD-10 CODE: Z94.0, E86.0, R74.8     Please infuse 1L 0.9% normal saline, intravenous, one time.    Any questions please call: [facility] SOT                                             [phone]          Balbir Anthony MD

## 2019-05-06 ENCOUNTER — TELEPHONE (OUTPATIENT)
Dept: TRANSPLANT | Facility: CLINIC | Age: 59
End: 2019-05-06

## 2019-05-06 NOTE — TELEPHONE ENCOUNTER
Post Kidney and Pancreas Transplant Team Conference  Date: 5/6/2019  Transplant Coordinator: Tania Khan     Attendees:  []  Dr. Villalta [] Che Latham, RN  [] Lisbet Holley LPN     [x]  Dr. Romano [] Shasha Arreaga RN [] Norma Egan LPN   []  Dr. Fuentes [] Ofe Mujica RN    []  Dr. Anthony [] Selam Hoskins RN    [] Dr. Hernandez [x] Morena Khan RN    [] Dr. Hankins [] Chito Holley RN    [] Dr. Starks [] Maddi Zaldivar, RN    [] Surgery Fellow [] Apurva Soria RN    [] Julianna Thornton NP [] Ofe Chairez RN    [] Wil Becker, PharmD [] Ezequiel Javier RN     [] Che Villalba RN        Verbal Plan Read Back:   Biopsy for creatinine >1.6  Give 1L NS 2-3x weekly for one week. Check labs prior to each lab draw.  Aim for weight of 110.    Ensure CT was not done with contrast.     Routed to RN Coordinator   Tania Khan

## 2019-05-06 NOTE — TELEPHONE ENCOUNTER
RNCC LVM for Dr. Vance's office to discuss kidney transplant biopsy as well as co-signing orders for IV hydration, as [facility] does not accept orders from physicians that are outside of [place].    RNCC LVM for nursing for Dr. Jennifer Putnam (GI) to discuss concern for MPA toxicity.      UPDATE:  Dr. Putnam did call RNCC and we discussed concern for MMF toxicity, what could be seen on biopsy of tissue and what we suggest as far as EGD v colonoscopy. Dr. Putnam did state that it would be ambitious to have a scope completed within the next 3 weeks.    Dr. Vance gave the following info:  She is willing to co-sign orders for IV hydration (fax to ).  She would be able to do biopsy in IR and discuss with their IR team if she can get pathology sent to [facility]. If biopsy needs to be done same / next day, will need to admit to the hospital.

## 2019-05-06 NOTE — TELEPHONE ENCOUNTER
RNCC spoke with Letty, who was in tears due to her current decompensation. Letty would like to get off of her MPA as soon as possible, as she feels that her symptoms are consistent with her previous MMF toxicity.    RNCC discussed with Letty - we are not able to switch off of MMF without some sort of pathology proving either MMF toxicity or NO rejection.    Letty is agreeable to purse either kidney transplant biopsy or colonoscopy (alhtough she would rather EGD, as her colonoscopy prep with her severe gastroparesis is quite taxing, taking up to 48h), whichever can be completed more quickly.    See alternate note for more information.

## 2019-05-06 NOTE — TELEPHONE ENCOUNTER
Patient Call: Medication Clarification  Would like to try Imuran .   She has been on Imuran before and it worked, no side effects.  Right now she is not feeling well at all taking MPA.  Call back needed? Yes    Return Call Needed  Same as documented in contacts section  When to return call?: Same day: Route [facility]

## 2019-05-06 NOTE — TELEPHONE ENCOUNTER
Current weight - 107 (1 lb up after infusion).  Today 106.6lb.  Dry weight 106 - 110  Change from baseline -   BP today - 118 and 124 / 60s (Saturday was low). HR running in the 80s.    Diarrhea - back to Letty's baseline for the last 5 days.    Distention and lower intestinal pain is more     Continues to be afebrile.       Drop in hgb.   Rise in WBCs.

## 2019-05-06 NOTE — LETTER
PHYSICIAN ORDERS      DATE & TIME ISSUED: May 7, 2019 12:24 PM  PATIENT NAME: Letty Benavidez   : 1960     [facility] MR# [if applicable]: [id number]     DIAGNOSIS:  Kidney replaced by transplant, elevated serum creatinine.  ICD-10 CODE: Z94.0,  R79.89    Please infuse 1L 0.9% normal saline, intravenous, two times weekly for two weeks.    Any questions please call: [facility] SOT                                             [phone] ext [phone]          Ben Romano MD

## 2019-05-07 NOTE — TELEPHONE ENCOUNTER
Letty gave update:  endocospy tomorrow AM.  Night sweats persist with unintentional weight loss of 4lb (3lb prior to recent illness).    Orthostatic SBP today 80. Orders sent to Dr. Vance's office, if unable to get IV hydration and infusion center today, RNCC advised she go to the ED.    Letty reports that she has been taking reglan 5mg prn post meals, as prescribed by ED MD. Myrna for this. However, Letty did note that she experienced hallucinations last night. HOLD reglan.

## 2019-05-08 ENCOUNTER — TRANSFERRED RECORDS (OUTPATIENT)
Dept: HEALTH INFORMATION MANAGEMENT | Facility: CLINIC | Age: 59
End: 2019-05-08

## 2019-05-08 ENCOUNTER — TELEPHONE (OUTPATIENT)
Dept: TRANSPLANT | Facility: CLINIC | Age: 59
End: 2019-05-08

## 2019-05-08 DIAGNOSIS — Z94.0 KIDNEY TRANSPLANTED: Primary | ICD-10-CM

## 2019-05-08 RX ORDER — TACROLIMUS 1 MG/1
3 CAPSULE ORAL 2 TIMES DAILY
Qty: 540 CAPSULE | Refills: 3 | Status: SHIPPED | OUTPATIENT
Start: 2019-05-08 | End: 2019-05-10

## 2019-05-08 NOTE — TELEPHONE ENCOUNTER
Post Kidney and Pancreas Transplant Team Conference  Date: 5/8/2019  Transplant Coordinator: Tania Khan     Attendees:  [x]  Dr. Villalta [] Che Latham RN  [x] Lisbet Holley LPN     [x]  Dr. Romano [x] Shasha Arreaga RN [] Norma Egan LPN   []  Dr. Fuentes [] Ofe Mujica RN    []  Dr. Anthony [] Selam Hoskins RN    [] Dr. Kang [x] Morena Khan RN    [] Dr. Hernandez [] Chito Holley RN    [] Dr. Hankins [] Maddi Zaldivar RN    [] Surgery Fellow [] Apurva Soria RN    [] Julianna Thornton NP              Verbal Plan Read Back:   OK to biopsy with local Nephrologist  Due to increased creatinine    Routed to RN Coordinator   Lisbet Holley       RNCC did discuss with Dr. Vance - admission to local hospital for kidney transplant biopsy is recommended.  Dr. Vance was agreeable.    RNCC also spoke with Letty who is agreeable to the plan.

## 2019-05-10 ENCOUNTER — TELEPHONE (OUTPATIENT)
Dept: TRANSPLANT | Facility: CLINIC | Age: 59
End: 2019-05-10

## 2019-05-10 DIAGNOSIS — Z94.0 KIDNEY REPLACED BY TRANSPLANT: ICD-10-CM

## 2019-05-10 DIAGNOSIS — Z94.0 KIDNEY TRANSPLANTED: ICD-10-CM

## 2019-05-10 RX ORDER — TACROLIMUS 0.5 MG/1
0.5 CAPSULE ORAL 2 TIMES DAILY
Qty: 180 CAPSULE | Refills: 3 | Status: SHIPPED | OUTPATIENT
Start: 2019-05-10 | End: 2019-05-23

## 2019-05-10 RX ORDER — MYCOPHENOLIC ACID 180 MG/1
TABLET, DELAYED RELEASE ORAL
Qty: 540 TABLET | Refills: 3 | Status: SHIPPED | OUTPATIENT
Start: 2019-05-10

## 2019-05-10 RX ORDER — TACROLIMUS 1 MG/1
1 CAPSULE ORAL 2 TIMES DAILY
Qty: 180 CAPSULE | Refills: 3 | Status: SHIPPED | OUTPATIENT
Start: 2019-05-10 | End: 2019-05-23

## 2019-05-10 RX ORDER — FLUCONAZOLE 200 MG/1
200 TABLET ORAL DAILY
COMMUNITY
Start: 2019-05-10 | End: 2019-05-31

## 2019-05-10 RX ORDER — PANTOPRAZOLE SODIUM 20 MG/1
40 TABLET, DELAYED RELEASE ORAL
COMMUNITY
Start: 2019-05-10

## 2019-05-10 NOTE — TELEPHONE ENCOUNTER
Post Kidney and Pancreas Transplant Team Conference  Date: 5/10/2019  Transplant Coordinator: Tania Khan     Attendees:  []  Dr. Villalta [] Che Latham RN  [] Lisbet Holley LPN     []  Dr. Romano [] Shasha Arreaga RN [] Norma Egan LPN   [x]  Dr. Fuentes [] Ofe Mujica RN    []  Dr. Anthony [] Selam Hoskins RN    [] Dr. Hernandez [x] Morena Khan RN    [] Dr. Hankins [] Chito Holley RN    [] Dr. Starks [] Maddi Zaldivar RN    [] Surgery Fellow [] Apurva Soria RN    [] Julianna Thornton NP [] Ofe Chairez RN    [] Wil Becker, PharmD [] Ezequiel Javier RN     [] Che Villalba RN        Verbal Plan Read Back:   Transplant renal biopsy and EGD with biopsy in process.    Okay to change myfortic to azathioprine, 2.5mg / kg.    Routed to RN Coordinator   Tania Khan    RNCC received phone call from Dr. Putnam's office - EGD with biopsy did not show any sign of MMF toxicity, however, there was yeast.    They will be starting her on fluconazole, 200mg daily for 21 days.    RNCC adjusted tacrolimus accordingly.     Phone number for Dr. Fuentes (who is on over the weekend) was left as a VM for Dr. Vance (who will have kidney biopsy results).    Letty does report that she has been having difficulty swallowing.     [place] did put her on protonix 40mg daily for 6 weeks.     Will await change to imuran after starting fluconazole until after biopsy results are back.    Letty did request to reduce MPA to 540 / 360 from 540 BID - okay for now per Dr. Fuentes.    Will reassess labs on Tuesday.    RNCC did also discuss this with Dr. Fuentes.    Letty does report that her weight is 114lb today (up 8lbs from admission to hospital, however, her best weight is 110lbs). She feels full in her abdomen and her face. She has low grade fever of 99.0. RNCC suggested that she update Dr. Vance with this information, as Dr. Vance is nearest in proximity and would be able to make a decision regarding any need  for diuresis.

## 2019-05-14 ENCOUNTER — TELEPHONE (OUTPATIENT)
Dept: TRANSPLANT | Facility: CLINIC | Age: 59
End: 2019-05-14

## 2019-05-14 NOTE — TELEPHONE ENCOUNTER
RNCC spoke with Letty - low grade fever continues post biopsy along with fluid retention (swelling in legs, abdomen and face). She was 106 pre biopsy, now weighting 113lbs despite 1 dose of 20mg lasix (which Letty stated made her feel dehydrated, so she will not take again).    Creatinine is elevated, likely due to elevate tac level. Most recent level was pre-fluconazole start, so level drawn 5/14/2019 expected to be quite high. Therefore:    ISSUE:   Tacrolimus level 13.x on 5/6/19, goal 8, dose 1.5 mg BID    PLAN:   Please call pt and confirm this was a good 12-hour trough. Verify dose 1.5 mg BID. Confirm no new medications or illness (stacey. Diarrhea). If good trough, decrease dose to 1mg AM, 0.5 mg PMand recheck level as scheduled.    Letty confirmed the above and voiced understanding.     RNCC spoke with Dr. Vance who is agreeable to order renal tx US with doppler -  Will overnight disc from HIM for images, as RNCC spoke with PACS both at [place] and PACS admin at [facility]  - [facility] is not currently using VPN for sharing.  Will ask for faxed report.    To be discussed with transplant team.   How Severe Are Your Spot(S)?: mild Have Your Spot(S) Been Treated In The Past?: has not been treated Hpi Title: Evaluation of Skin Lesions Year Removed: 1900

## 2019-05-14 NOTE — LETTER
PHYSICIAN ORDERS      DATE & TIME ISSUED: May 14, 2019 2:34 PM  PATIENT NAME: Letty Benavidez   : 1960     [facility] MR# [if applicable]: [id number]     DIAGNOSIS:  Kidney Transplant  ICD-10 CODE: Z94.0     Please complete the following lab:  Urine Culture    Any questions please call: [phone]  Please fax lab results to [phone].    .

## 2019-05-14 NOTE — TELEPHONE ENCOUNTER
Post Kidney and Pancreas Transplant Team Conference  Date: 5/14/2019  Transplant Coordinator: Tania Khan     Attendees:  [x]  Dr. Villalta [] Che Latham, RN  [] Lisbet Holley LPN     []  Dr. Romano [] Shasha Arreaga RN [] Norma Egan LPN   []  Dr. Fuentes [] Ofe Mujica RN    []  Dr. Anthony [] Selam Hoskins RN    [] Dr. Hernandez [x] Morena Khan RN    [] Dr. Hankins [] Chito Holley RN    [] Dr. Starks [] Maddi Zaldivar RN    [] Surgery Fellow [] Apurva Soria RN    [] Julianna Thornton NP [] Ofe Chairez RN    [] Wil Becker, PharmD [] Ezequiel Javier RN     [] Che Villalba RN        Verbal Plan Read Back:   For elevated creatinine in setting of fungal esophagitis, elevated WBCs, fever and recent kidney transplant biopsy:    Check US.  Check UC.    Use lasix 10mg daily for net reduction of 1/2 lb.  Letty is agreeable to this.    Routed to RN Coordinator   Tania Khan

## 2019-05-17 ENCOUNTER — TELEPHONE (OUTPATIENT)
Dept: TRANSPLANT | Facility: CLINIC | Age: 59
End: 2019-05-17

## 2019-05-17 NOTE — LETTER
PHYSICIAN ORDERS      DATE & TIME ISSUED: 2019, 4:52 PM  PATIENT NAME: Letty Benavidez   : 1960     [facility] MR# [if applicable]: [id number]     DIAGNOSIS:  Kidney Transplant  ICD-10 CODE: Z94.0     Please complete the following lab:  Urine Culture  Please perform urine culture whether or not urinalysis meets criteria.    Any questions please call: [phone]  Please fax lab results to [phone].    .

## 2019-05-17 NOTE — TELEPHONE ENCOUNTER
Post Kidney and Pancreas Transplant Team Conference  Date: 5/17/2019  Transplant Coordinator: Tania Khan     Attendees:  []  Dr. Villalta [] Che Latham, RN  [] Lisbet Holley LPN     []  Dr. Romano [] Shasha Arreaga RN [] Norma Egan LPN   []  Dr. Fuentes [] Ofe Mujica RN    [x]  Dr. Anthony [] Selam Hoskins RN    [] Dr. Hernandez [x] Morena Khan RN    [] Dr. Hankins [] Chito Holley RN    [] Dr. Starks [] Maddi Zaldivar RN    [] Surgery Fellow [] Apurva Soria RN    [] Julianna Thornton NP [] Ofe Chairez RN    [] Wil Becker, PharmD [] Ezequiel Javier RN     [] Che Villalba RN        Verbal Plan Read Back:   For ongoing fevers, obtain urine culture.      Routed to RN Coordinator   Tania Saenz voiced understanding.

## 2019-05-21 ENCOUNTER — TELEPHONE (OUTPATIENT)
Dept: TRANSPLANT | Facility: CLINIC | Age: 59
End: 2019-05-21

## 2019-05-21 ENCOUNTER — RESULTS ONLY (OUTPATIENT)
Dept: OTHER | Facility: CLINIC | Age: 59
End: 2019-05-21

## 2019-05-21 DIAGNOSIS — Z48.298 AFTERCARE FOLLOWING ORGAN TRANSPLANT: ICD-10-CM

## 2019-05-21 DIAGNOSIS — Z94.0 KIDNEY REPLACED BY TRANSPLANT: ICD-10-CM

## 2019-05-21 PROCEDURE — 86833 HLA CLASS II HIGH DEFIN QUAL: CPT | Performed by: TRANSPLANT SURGERY

## 2019-05-21 PROCEDURE — 86832 HLA CLASS I HIGH DEFIN QUAL: CPT | Performed by: TRANSPLANT SURGERY

## 2019-05-21 NOTE — LETTER
PHYSICIAN ORDERS      DATE & TIME ISSUED: May 21, 2019 1:44 PM  PATIENT NAME: Letty Benavidez   : 1960     [facility] MR# [if applicable]: [id number]     DIAGNOSIS:  Kidney Transplant  ICD-10 CODE: Z94.0     Please complete the following lab tomorrow, 2019:   BMP    Any questions please call: [phone]  Please fax lab results to [phone].

## 2019-05-21 NOTE — TELEPHONE ENCOUNTER
Post Kidney and Pancreas Transplant Team Conference  Date: 5/21/2019  Transplant Coordinator: Tania Khan     Attendees:  []  Dr. Villalta [] Che Latham RN  [] Lisbet Holley LPN     [x]  Dr. Romano [] Shasha Arreaga RN [] Norma Egan LPN   []  Dr. Fuentes [] Ofe Mujica RN    []  Dr. Anthony [] Selam Hoskins RN    [] Dr. Hernandez [x] Morena Khan RN    [] Dr. Hankins [] Chito Holley RN    [] Dr. Starks [] Maddi Zaldivar RN    [] Surgery Fellow [] Apurva Soria RN    [] Julianna Thornton NP [] Ofe Chairez RN    [] Wil Becker, PharmD [] Ezequiel Javier RN     [] Che Villalba RN        Verbal Plan Read Back:   5/21/2019 labs reviewed -   Recommendation for sodium correction in-patient due to r/f seizure.     Alternative - restrict fluids to 800cc, repeat labs tomorrow with serum and urine sodium and osmolality.     Routed to RN Coordinator   Tania Khan    Discussed weight - euvolemic per weight.   BP today is running 118/ 57, not orthostatic. Denies lightheadedness / dizziness. Weakness continues.     RNCC discussed with Letty who reports she has an appt with Dr. Vance tomorrow. Letty preferred fluid restriction (she is drinking chicken broth as we speak) and repeat labs tomorrow.

## 2019-05-21 NOTE — TELEPHONE ENCOUNTER
Letty has been ill lately due to fungal esophagisits / gastritis with ulcer. RNCC advised high salt diet, reduce free water intake and repeat BMP tomorrow.     Creatinine elevated  - to be reviewed with transplant MD. Tacro level pending.

## 2019-05-21 NOTE — TELEPHONE ENCOUNTER
DATE:  5/21/2019   TIME OF RECEIPT FROM LAB:  10:24 AM  LAB TEST:  Na+  LAB VALUE:  120  RESULTS GIVEN WITH READ-BACK TO (PROVIDER):  Routed to EDUIN Khan RN IB  TIME LAB VALUE REPORTED TO PROVIDER:   10:31 AM

## 2019-05-21 NOTE — LETTER
PHYSICIAN ORDERS      DATE & TIME ISSUED: May 21, 2019 3:07 PM  PATIENT NAME: Letty Benavidez   : 1960     [facility] MR# [if applicable]: [id number]     DIAGNOSIS:  Kidney Transplant  ICD-10 CODE: Z94.0     Please complete the following labs tomorrow, 2019:  serum and urine Na and osmolality     Any questions please call: [phone]  Please fax lab results to [phone].

## 2019-05-22 ENCOUNTER — TELEPHONE (OUTPATIENT)
Dept: TRANSPLANT | Facility: CLINIC | Age: 59
End: 2019-05-22

## 2019-05-22 NOTE — TELEPHONE ENCOUNTER
Dr. Vance called with report that labs appear TELLY is due to high tac and pre-renal in etiology.  Dr. Vance will see Letty today.    Tac level from 5/20 pending.

## 2019-05-23 ENCOUNTER — TELEPHONE (OUTPATIENT)
Dept: TRANSPLANT | Facility: CLINIC | Age: 59
End: 2019-05-23

## 2019-05-23 DIAGNOSIS — Z94.0 KIDNEY TRANSPLANTED: Primary | ICD-10-CM

## 2019-05-23 RX ORDER — TACROLIMUS 0.5 MG/1
0.5 CAPSULE ORAL 2 TIMES DAILY
Qty: 180 CAPSULE | Refills: 3 | Status: SHIPPED | OUTPATIENT
Start: 2019-05-23

## 2019-05-23 RX ORDER — TACROLIMUS 1 MG/1
CAPSULE ORAL
Qty: 180 CAPSULE | Refills: 3
Start: 2019-05-23

## 2019-05-23 NOTE — TELEPHONE ENCOUNTER
Spoke to patient who confirms current Tacrolimus dose and good 12 hour trough level.  Patient verbalizes understanding to decrease Tacrolimus dose to 0.5 mg BID.

## 2019-05-23 NOTE — TELEPHONE ENCOUNTER
ISSUE:   Tacrolimus level 10.2 on 5/21/19, goal 6-8 (now that Letty is >6 months post transplant), dose 1mg AM, 0.5 mg PM    PLAN:   Please call pt and confirm this was a good 12-hour trough. Verify dose as above. Confirm no new medications or illness (stacey. Diarrhea). If good trough, decrease dose to 0.5 mg BID.    Recheck level as scheduled - call Morena when stopping fluconazole so that we can INCREASE tacro.

## 2019-05-23 NOTE — TELEPHONE ENCOUNTER
Post Kidney and Pancreas Transplant Team Conference  Date: 5/23/2019  Transplant Coordinator: Tanai Khan     Attendees:  []  Dr. Villalta [] Che Latham, RN  [] Lisbet Holley LPN     [x]  Dr. Romano [] Shasha Arreaga RN [] Norma Egan LPN   []  Dr. Fuentes [] Ofe Mujica RN    []  Dr. Anthony [] Selam Hoskins RN [] Wil Becker, PharmD   [] Dr. Hernandez [x] Morena Khan, EAMON    [] Dr. Hankins [] Chito Holley RN    [] Dr. Johnson [] Maddi Zaldivar RN    [] Dr. Starks [] Apurva Soria RN    [] Dr. Lutz [] Ofe Chairez RN    [] Surgery Fellow [] Ezequiel Javier RN    [] Julianna Thornton, NP [] Che Villalba RN        Verbal Plan Read Back:   Labs and volume status reviewed with Dr. Romano-  Drink to thirst and eat a high salt diet (as previously recommended by RNCC to the patient).    Routed to RN Coordinator   Tania Khan

## 2019-05-24 ENCOUNTER — TELEPHONE (OUTPATIENT)
Dept: TRANSPLANT | Facility: CLINIC | Age: 59
End: 2019-05-24

## 2019-05-24 NOTE — TELEPHONE ENCOUNTER
Letty would like to discuss IS regimen,   Letty would like to stay on low dose myfortic if okay with transplant team.   Will stop PPI in middle of June.    Letty is currently taking the split dose of 540 / 360 myfortic.     UPDATE:  Will discuss at a later date after acute illness is improved.

## 2019-05-28 NOTE — LETTER
PHYSICIAN ORDERS      DATE & TIME ISSUED: May 28, 2019 7:21 AM  PATIENT NAME: Letty Benavidez   : 1960     [facility] MR# [if applicable]: [id number]     DIAGNOSIS:  Kidney Transplant  ICD-10 CODE: Z94.0     Please complete the following lab:  Hep C virus DNA    Any questions please call: [phone]  Please fax lab results to [phone].

## 2019-05-29 ENCOUNTER — MYC MEDICAL ADVICE (OUTPATIENT)
Dept: OTHER | Age: 59
End: 2019-05-29

## 2019-05-29 ENCOUNTER — TELEPHONE (OUTPATIENT)
Dept: TRANSPLANT | Facility: CLINIC | Age: 59
End: 2019-05-29

## 2019-05-29 NOTE — TELEPHONE ENCOUNTER
Post Kidney and Pancreas Transplant Team Conference  Date: 5/29/2019  Transplant Coordinator: Tania Khan     Attendees:  [x]  Dr. Villalta [] Che Latham, EAMON  [x] Lisbet Holley LPN     []  Dr. Romano [x] Shasha Arreaga RN [] Norma Egan LPN   [x]  Dr. Fuentes [] Ofe Mujica RN    []  Dr. Anthony [] Selam Hoskins RN    [] Dr. Kang [x] Morena Khan RN    [] Dr. Hernandez [] Chito Holley RN    [] Dr. Hankins [] Maddi Zaldivar RN    [] Surgery Fellow [] Apurva Soria RN    [] Julianna Thornton NP              Verbal Plan Read Back:   Recommend coming to [facility] for txp care, if not here possible [place] txp care    Routed to RN Coordinator   Lisbet Holley          RNCC explained recommendation for admission to transplant center due to rise in WBCs, decline in hemoglobin and rise in creatinine. Letty was hesitant, but agreeable. She did not think she could make it to [facility], but did agree to go to [place].     RNCC also communicated recommendation to Dr. Vance.

## 2019-06-07 ENCOUNTER — TELEPHONE (OUTPATIENT)
Dept: TRANSPLANT | Facility: CLINIC | Age: 59
End: 2019-06-07

## 2019-06-07 NOTE — TELEPHONE ENCOUNTER
Lauro Villalta MD [place], Angela, RN   Cc: Tania Khan, RN             Would offer transfer to our transplant program, otherwise, no changes.    Previous Messages      ----- Message -----   From: Maddi Zaldivar, RN   Sent: 6/7/2019   1:29 PM   To: Lauro Villalta MD   Subject: new PTLD - in [place].                           Got a call from [facility] [place] transplant today. She's in the hospital. 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 Having TELLY now, creatinine is 2. Tac is only agent (5-7.5). They have stopped Myfortic. Likely to start Ritux. Also low level CMV.     Please advise with recs if any.         PLAN:  Call placed to nephrologist at [facility] [place]. No answer. Left detailed vm per above. I asked that he call provider line with further questions.

## 2019-06-07 NOTE — TELEPHONE ENCOUNTER
Call returned to transplant team at [facility]. Pt admitted currently with low counts, lung nodule was biopsy and fount to have PTLD. She is off Myfortic. IS single agent tac, running 5-7.5. To likely receive Ritux. Also low level CMV  To review with nephrology.

## 2019-06-07 NOTE — TELEPHONE ENCOUNTER
Provider Call: General  Route to LPN    Reason for call: she was admitted to [facility] and was diagnose with  BTLD, please connect with provider for more details     Call back needed? Yes    Return Call Needed  Same as documented in contacts section  When to return call?: Same day: Route High Priority

## 2019-06-12 ENCOUNTER — TELEPHONE (OUTPATIENT)
Dept: TRANSPLANT | Facility: CLINIC | Age: 59
End: 2019-06-12

## 2019-06-12 NOTE — TELEPHONE ENCOUNTER
Post Kidney and Pancreas Transplant Team Conference  Date: 6/12/2019  Transplant Coordinator: Tania Khan     Attendees:  [x]  Dr. Villalta [] Che Latham, RN  [x] Lsibet Holley LPN     []  Dr. Romano [x] Shahsa Arreaga, EAMON [] Norma Egan LPN   []  Dr. Fuentes [] Ofe Mujica, RN    []  Dr. Anthony [] Selam Hoskins RN [] Wil Becker, PharmD   [] Dr. Hernandez [x] Morena Khan, EAMON    [x] Dr. Hankins [] Chito Holley RN    [] Dr. Johnson [x] Maddi Zaldivar RN    [x] Dr. Starks [] Apurva Soria RN    [] Dr. Lutz [] Ofe Chairez RN    [] Surgery Fellow [] Ezequiel Javier RN    [] Julianna Thornton, NP [] Che Villalba RN        Verbal Plan Read Back:   Currently inpatient locally, dx w/PTLD    Routed to RN Coordinator   Lisbet Holley    RNCC spoke with Letty -   Small node in chest and another in a lymph node.  Eye sight has changed along with speech changes - brain MRI completed w/o contrast but will repeat 6/13/2019 with contrast. R/o PTLD mets v. EBV changes.   In addition to concern for brain mets, there is a concern for PTLD in the colon, however, the PET scan may have given a false-positive due to corrine colonization associated with gastroparesis.    Current IS regimen:  Tac goal 5  NO antimetabolite.  Decadron daily (dose unknown)- use for antiemetics rather than IS.  .  Current treatment plan for PTLD:  Discharge in 2-3 days from [facility].  Continue on reduced IS until follow up with [facility] managing transplant nephrologist (Dr. Jerry Russo).  If no improvement in PTLD at that time, Dr. Edwards will pursue use of ritux.    No plan to start rituxan until after reassessing after discharge    When treatment of PTLD is complete, Letty would like to continue cares here at [facility].

## 2019-06-19 ENCOUNTER — TELEPHONE (OUTPATIENT)
Dept: TRANSPLANT | Facility: CLINIC | Age: 59
End: 2019-06-19

## 2019-06-19 NOTE — TELEPHONE ENCOUNTER
Post Kidney and Pancreas Transplant Team Conference  Date: 6/19/2019  Transplant Coordinator: Tania Khan     Attendees:  []  Dr. Villalta [] Che Latham, EAMON  [x] Lisbet Holley LPN     [x]  Dr. Romano [x] Shasha Arreaga, EAMON [] Norma Egan LPN   [x]  Dr. Fuentes [] Ofe Mujica, EAMON    []  Dr. Anthony [] Selam Hoskins RN [x] Wil Becker, PharmD   [] Dr. Hernandez [x] Morena Khan, EAMON    [] Dr. Hankins [] Chito Holley RN    [] Dr. Johnson [x] Maddi Zaldivar RN    [x] Dr. Starks [] Apurva Soria RN    [] Dr. Lutz [] Ofe Chairez RN    [] Surgery Fellow [] Ezequiel Javier RN    [] Julianna Thornton NP [] Che Villalba RN        Verbal Plan Read Back:   General health check with patient who is currently inpatient in [place] due to PTLD    Routed to RN Coordinator   Lisbet Holley RNCC left  for general health check in.   Upon review of [facility], patient is currently completing EEG with recent MRI showing enlarging brain lesions - diagnosis potentially PTLD or infection, biopsy not performed.

## 2019-06-21 ENCOUNTER — TELEPHONE (OUTPATIENT)
Dept: TRANSPLANT | Facility: CLINIC | Age: 59
End: 2019-06-21

## 2019-06-21 NOTE — TELEPHONE ENCOUNTER
Letty called with report:  No change in plan for PTLD and care team.  Everolimus goal 4-6, no antimetabolite.  PTLD in lung and under L arm node.  Diagnosis for brain lesions still pending.  No plan for ritux until outpatient at this point.    Creatinine in low 1.0 range.  EBV now >10,000 copies.    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     She is significantly weaker and weight is down to 101 lbs (baseline 110).

## 2019-06-26 ENCOUNTER — TELEPHONE (OUTPATIENT)
Dept: TRANSPLANT | Facility: CLINIC | Age: 59
End: 2019-06-26

## 2019-06-26 NOTE — TELEPHONE ENCOUNTER
Post Kidney and Pancreas Transplant Team Conference  Date: 6/26/2019  Transplant Coordinator: Tania Khan     Attendees:  [x]  Dr. Villalta [x] Che Latham, RN  [x] Lisbet Holley LPN     [x]  Dr. Romano [x] Shasha Arreaga, EAMON [] Norma Egan LPN   [x]  Dr. Fuentes [x] Ofe Mujica, EAMON    [x]  Dr. Anthony [x] Selam Hoskins RN [] Wil Becker, PharmD   [] Dr. Hernandez [x] Morena Khan, EAMON    [] Dr. Hankins [x] Chito Holley RN    [] Dr. Johnson [x] Maddi Zaldivar RN    [] Dr. Starks [x] Apurva Soria RN    [] Dr. Lutz [x] Ofe Chairez RN    [] Surgery Fellow [] Ezequiel Javier RN    [] Julianna Thornton, NP [] Che Villalba RN        Verbal Plan Read Back:   [place], pt choice to stay and be treated there    Routed to RN Coordinator   Lisbet Holley

## 2019-06-27 ENCOUNTER — TELEPHONE (OUTPATIENT)
Dept: TRANSPLANT | Facility: CLINIC | Age: 59
End: 2019-06-27

## 2019-06-28 NOTE — TELEPHONE ENCOUNTER
Letty reports she had emergency surgery on Monday - she had a partial colectomy due to bowel perforation. She also had a port placed for TPN.    Currently in ICU. Will transfer to critical care unit where rituxan will be started.     Changed off of everolimus to tacro, sublingual.  Only on tacrolimus (no prednisone), goal of 5.    Now confirmed: Lymphoma lung, lymph node (under left arm), colon, and brain.    RNCC discussed that plan of care may have been different here at [facility] (more or less aggressive with regards to treating EBV).  Letty continues to be happy with care currently at [place] and would like to continue to follow there until PTLD is resolved.     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   EBV now at 17k copies.

## 2019-07-10 ENCOUNTER — TELEPHONE (OUTPATIENT)
Dept: TRANSPLANT | Facility: CLINIC | Age: 59
End: 2019-07-10

## 2019-07-10 NOTE — TELEPHONE ENCOUNTER
Post Kidney and Pancreas Transplant Team Conference  Date: 7/10/2019  Transplant Coordinator: Tania Khan     Attendees:  [x]  Dr. Villalta [] Che Latham, RN  [x] Lisbet Holley LPN     [x]  Dr. Romano [x] Shasha Arreaga, EAMON [] Norma Egan LPN   [x]  Dr. Fuentes [] Ofe Mujica, EAMON    []  Dr. Anthony [] Selam Hoskins RN [x] Wil Becker, PharmD   [] Dr. Hernandez [x] Morena Khan, EAMON    [] Dr. Hankins [] Chito Holley RN    [] Dr. Johnson [x] Maddi Zaldivar, RN    [x] Dr. Starks [] Apurva Soria RN    [] Dr. Lutz [] Ofe Chairez, EAMON    [] Surgery Fellow [] Ezequiel Javier RN    [] Julianna Thornton, NP [] Che Villalba RN        Verbal Plan Read Back:   Currently inpatient in [facility]    Routed to RN Coordinator   Lisbet Holley

## 2019-07-10 NOTE — TELEPHONE ENCOUNTER
RNCC spoke Letty in depth about current health status and ongoing cares at [facility]. Letty gave same verbal update as what was sent via [employer].

## 2019-07-25 ENCOUNTER — TELEPHONE (OUTPATIENT)
Dept: TRANSPLANT | Facility: CLINIC | Age: 59
End: 2019-07-25

## 2019-07-25 NOTE — TELEPHONE ENCOUNTER
Letty called to give an update:  She continues her rituxan treatment, but methotrexate therapy start date has been put on hold as her brain MRI has improved.  Letty is currently in an advanced acute rehab unit.    She wishes to pursue her care at [facility].    RNCC let Letty know that I will assist her with this process.    Letty was tearful with greatfulness for the [facility] SOT program and her care with us.

## 2019-08-01 ENCOUNTER — TELEPHONE (OUTPATIENT)
Dept: TRANSPLANT | Facility: CLINIC | Age: 59
End: 2019-08-01

## 2019-08-01 NOTE — TELEPHONE ENCOUNTER
RNCC spoke with NATALY Cohen at [facility]. Most recent tx nephrology and surgeon's operative note were faxed to [facility] transplant.  RNCC asked to speak with supervisor regarding [facility] reporting.

## 2019-10-24 ENCOUNTER — TRANSFERRED RECORDS (OUTPATIENT)
Dept: HEALTH INFORMATION MANAGEMENT | Facility: CLINIC | Age: 59
End: 2019-10-24

## 2020-02-23 NOTE — TELEPHONE ENCOUNTER
Post Kidney and Pancreas Transplant Team Conference  Date: 2/11/2019  Transplant Coordinator: Tania Khan     Attendees:  [x]  Dr. Villalta [] Che Latham, RN  [] Lisbet Holley LPN     []  Dr. Romano [] Shasha Arreaga RN [] Norma Egan LPN   []  Dr. Fuentes [] Ofe Mujica RN    []  Dr. Anthony [] Selam Hoskins RN    [] Dr. Hernandez [x] Morena Khan RN    [] Dr. Hankins [] Chito Holley RN    [] Surgery Fellow [] Maddi Zaldivar RN    [] Julianna Thornton, NP [] Apurva Soria RN    [] Wil Becker, PharmD [] Ofe Chairez RN     [] Ezequiel Javier RN     [] Che Villalba RN        Verbal Plan Read Back:   Due to elevated MCV with all other labs WNL, repeat LFTs in 2 weeks due to increasing LFTs.    Routed to RN Coordinator   Tania Saenz voided understanding.       Problem: Pain:  Goal: Pain level will decrease  Description  Pain level will decrease  Outcome: Ongoing   Patient receiving P.O. pain medication for chronic pain needs. See MAR for interventions. Problem: Falls - Risk of:  Goal: Will remain free from falls  Description  Will remain free from falls  2/22/2020 2147 by Sharath Nelson RN  Outcome: Met This Shift  Patient meets Sabrina Stable fall risk guidelines. Non skid footwear with ambulation. Bed in lowest position. Call light in reach. Bed alarm activated. Reminded to call for assistance before exiting bed. Continue safety precautions. Problem: Respiratory  Goal: No pulmonary complications  Outcome: Ongoing   RT as ordered. Patient with occasional wheezes and SOB with activity or long conversation. Continue current regimen.

## 2020-03-10 ENCOUNTER — HEALTH MAINTENANCE LETTER (OUTPATIENT)
Age: 60
End: 2020-03-10

## 2020-06-16 ENCOUNTER — TRANSFERRED RECORDS (OUTPATIENT)
Dept: HEALTH INFORMATION MANAGEMENT | Facility: CLINIC | Age: 60
End: 2020-06-16

## 2020-08-19 ENCOUNTER — TRANSFERRED RECORDS (OUTPATIENT)
Dept: HEALTH INFORMATION MANAGEMENT | Facility: CLINIC | Age: 60
End: 2020-08-19

## 2020-08-24 NOTE — DISCHARGE INSTRUCTIONS
What to expect when you have contrast    During your exam, we will inject  contrast  into your vein or artery. (Contrast is a clear liquid with iodine in it. It shows up on X-rays.)    You may feel warm or hot. You may have a metal taste in your mouth and a slight upset stomach. You may also feel pressure near the kidneys and bladder. These effects will last about 1 to 3 minutes.    Please tell us if you have:    Sneezing     Itching    Hives     Swelling in the face    A hoarse voice    Breathing problems    Other new symptoms    Serious problems are rare.  They may include:    Irregular heartbeat     Seizures    Kidney failure              Tissue damage    Shock      Death    If you have any problems during the exam, we  will treat them right away.    When you get home    Call your hospital if you have any new symptoms in the next 2 days, like hives or swelling. (Phone numbers are at the bottom of this page.) Or call your family doctor.     If you have wheezing or trouble breathing, call 911.    Self-care  -Drink at least 4 extra glasses of water today.   This reduces the stress on your kidneys.  -Keep taking your regular medicines.    The contrast will pass out of your body in your  Urine(pee). This will happen in the next 24 hours. You  will not feel this. Your urine will not  change color.    If you have kidney problems or take metformin    Drink 4 to 8 large glasses of water for the next  2 days, if you are not on a fluid restriction.    ?If you take metformin (Glucophage or Glucovance) for diabetes, keep taking it.      ?Your kidney function tests are abnormal.  If you take Metformin, do not take it for 48 hours. Please go to your clinic for a blood test within 3 days after your exam before the restarting this medicine.     (Note to provider:please give patient prescription for lab tests.)    ?Special instructions: ***    I have read and understand the above information.    Patient Sign  Here:______________________________________Date:________Time:______    Staff Sign Here:________________________________________Date:_______Time:______      Radiology Departments:     ?[facility]: [phone] ?[place]: [phone]     ?[place]: [phone] ?[place]:[phone]      ?[place]: [phone]  ?[place]: [phone]  ?[place]:[phone]    ?[facility]:[phone]  ?[facility]:[phone]   [None] : None [Good understanding] : Patient has a good understanding of lifestyle changes and the steps needed to achieve self management goals MEDICINE

## 2020-09-02 ENCOUNTER — TRANSFERRED RECORDS (OUTPATIENT)
Dept: HEALTH INFORMATION MANAGEMENT | Facility: CLINIC | Age: 60
End: 2020-09-02

## 2020-09-02 LAB
CREAT SERPL-MCNC: 0.86 MG/DL (ref 0.6–1.1)
GFR SERPL CREATININE-BSD FRML MDRD: >60 ML/MIN
GLUCOSE SERPL-MCNC: 123 MG/DL (ref 70–99)
POTASSIUM SERPL-SCNC: 4.9 MMOL/L (ref 3.5–5.3)

## 2020-12-27 ENCOUNTER — HEALTH MAINTENANCE LETTER (OUTPATIENT)
Age: 60
End: 2020-12-27

## 2021-01-08 ENCOUNTER — TRANSFERRED RECORDS (OUTPATIENT)
Dept: HEALTH INFORMATION MANAGEMENT | Facility: CLINIC | Age: 61
End: 2021-01-08
Payer: COMMERCIAL

## 2021-03-17 ENCOUNTER — MEDICAL CORRESPONDENCE (OUTPATIENT)
Dept: HEALTH INFORMATION MANAGEMENT | Facility: CLINIC | Age: 61
End: 2021-03-17

## 2021-04-24 ENCOUNTER — HEALTH MAINTENANCE LETTER (OUTPATIENT)
Age: 61
End: 2021-04-24

## 2021-08-14 ENCOUNTER — HEALTH MAINTENANCE LETTER (OUTPATIENT)
Age: 61
End: 2021-08-14

## 2021-09-01 ENCOUNTER — TRANSFERRED RECORDS (OUTPATIENT)
Dept: HEALTH INFORMATION MANAGEMENT | Facility: CLINIC | Age: 61
End: 2021-09-01
Payer: COMMERCIAL

## 2021-10-09 ENCOUNTER — HEALTH MAINTENANCE LETTER (OUTPATIENT)
Age: 61
End: 2021-10-09

## 2022-03-20 ENCOUNTER — HEALTH MAINTENANCE LETTER (OUTPATIENT)
Age: 62
End: 2022-03-20

## 2022-05-16 ENCOUNTER — HEALTH MAINTENANCE LETTER (OUTPATIENT)
Age: 62
End: 2022-05-16

## 2022-09-06 ENCOUNTER — TRANSFERRED RECORDS (OUTPATIENT)
Dept: HEALTH INFORMATION MANAGEMENT | Facility: CLINIC | Age: 62
End: 2022-09-06

## 2022-09-11 ENCOUNTER — HEALTH MAINTENANCE LETTER (OUTPATIENT)
Age: 62
End: 2022-09-11

## 2022-11-15 ENCOUNTER — TRANSFERRED RECORDS (OUTPATIENT)
Dept: HEALTH INFORMATION MANAGEMENT | Facility: CLINIC | Age: 62
End: 2022-11-15

## 2023-01-23 ENCOUNTER — HEALTH MAINTENANCE LETTER (OUTPATIENT)
Age: 63
End: 2023-01-23

## 2023-05-12 ENCOUNTER — TRANSFERRED RECORDS (OUTPATIENT)
Dept: HEALTH INFORMATION MANAGEMENT | Facility: CLINIC | Age: 63
End: 2023-05-12

## 2023-05-17 ENCOUNTER — TRANSFERRED RECORDS (OUTPATIENT)
Dept: HEALTH INFORMATION MANAGEMENT | Facility: CLINIC | Age: 63
End: 2023-05-17

## 2023-05-17 LAB
ALT SERPL-CCNC: 22 U/L (ref 6–37)
AST SERPL-CCNC: 24 U/L (ref 0–37)
CHOLESTEROL (EXTERNAL): 134 MG/DL
HDLC SERPL-MCNC: 59 MG/DL
LDL CHOLESTEROL CALCULATED (EXTERNAL): 52 MG/DL
NON HDL CHOLESTEROL (EXTERNAL): 75 MG/DL
TRIGLYCERIDES (EXTERNAL): 134 MG/DL
TSH SERPL-ACNC: 3.33 UIU/ML (ref 0.27–4.2)

## 2023-06-09 ENCOUNTER — TRANSFERRED RECORDS (OUTPATIENT)
Dept: HEALTH INFORMATION MANAGEMENT | Facility: CLINIC | Age: 63
End: 2023-06-09

## 2023-06-13 ENCOUNTER — TRANSFERRED RECORDS (OUTPATIENT)
Dept: HEALTH INFORMATION MANAGEMENT | Facility: CLINIC | Age: 63
End: 2023-06-13

## 2023-06-29 ENCOUNTER — TRANSFERRED RECORDS (OUTPATIENT)
Dept: HEALTH INFORMATION MANAGEMENT | Facility: CLINIC | Age: 63
End: 2023-06-29

## 2023-07-29 ENCOUNTER — HEALTH MAINTENANCE LETTER (OUTPATIENT)
Age: 63
End: 2023-07-29

## 2023-09-01 ENCOUNTER — TELEPHONE (OUTPATIENT)
Dept: TRANSPLANT | Facility: CLINIC | Age: 63
End: 2023-09-01
Payer: COMMERCIAL

## 2023-09-01 NOTE — TELEPHONE ENCOUNTER
Patient Call: General  Route to LPN    Reason for call: Letty has questions about transferring patient care.    Call back needed? Yes    Return Call Needed  Same as documented in contacts section  When to return call?: Greater than one day: Route standard priority

## 2023-09-27 ENCOUNTER — TRANSFERRED RECORDS (OUTPATIENT)
Dept: HEALTH INFORMATION MANAGEMENT | Facility: CLINIC | Age: 63
End: 2023-09-27

## 2023-10-07 ENCOUNTER — HEALTH MAINTENANCE LETTER (OUTPATIENT)
Age: 63
End: 2023-10-07

## 2023-10-09 LAB — HBA1C MFR BLD: 6 % (ref 4.2–5.6)

## 2023-10-18 ENCOUNTER — TRANSFERRED RECORDS (OUTPATIENT)
Dept: HEALTH INFORMATION MANAGEMENT | Facility: CLINIC | Age: 63
End: 2023-10-18

## 2023-10-31 ENCOUNTER — TRANSFERRED RECORDS (OUTPATIENT)
Dept: HEALTH INFORMATION MANAGEMENT | Facility: CLINIC | Age: 63
End: 2023-10-31

## 2023-10-31 LAB
CREATININE (EXTERNAL): 0.8 MG/DL (ref 0.6–1.1)
GFR ESTIMATED (EXTERNAL): 82 ML/MIN
GLUCOSE (EXTERNAL): 87 MG/DL (ref 70–99)
POTASSIUM (EXTERNAL): 5.1 MMOL/L (ref 3.5–5.3)

## 2024-02-24 ENCOUNTER — HEALTH MAINTENANCE LETTER (OUTPATIENT)
Age: 64
End: 2024-02-24

## 2024-04-16 ENCOUNTER — TRANSFERRED RECORDS (OUTPATIENT)
Dept: MULTI SPECIALTY CLINIC | Facility: CLINIC | Age: 64
End: 2024-04-16
Payer: COMMERCIAL

## 2024-04-16 LAB — GFR ESTIMATED (EXTERNAL): 84.4 ML/MIN/1.73M2

## 2024-10-28 NOTE — MR AVS SNAPSHOT
After Visit Summary   8/28/2018    Letty Benavidez    MRN: [id number]           Patient Information     Date Of Birth          1960        Visit Information        Provider Department      8/28/2018 10:00 AM Dina Mitchell, ZACHARY [facility] Solid Organ Transplant        Today's Diagnoses     Awaiting organ transplant status    -  1       Follow-ups after your visit        Your next 10 appointments already scheduled     Aug 28, 2018 12:00 PM CDT   (Arrive by 11:30 AM)   RETURN WAIT LIST with Leda Lemons PA-C   [facility] Nephrology ([facility] [place])    [address] [place] [address]  [address]  [facility] [address] [phone]              Future tests that were ordered for you today     Open Standing Orders        Priority Remaining Interval Expires Ordered    CBC with platelets Routine 4/4 Every 3 Months 8/28/2019 8/28/2018    Basic metabolic panel Routine 4/4 Every 3 Months 8/28/2019 8/28/2018    Tacrolimus level Routine 4/4 Every 3 Months 8/28/2019 8/28/2018    Protein  random urine with Creat Ratio Routine 2/2 Every 6 Months 8/28/2019 8/28/2018            Who to contact     If you have questions or need follow up information about today's clinic visit or your schedule please contact [facility] SOLID ORGAN TRANSPLANT directly at [phone].  Normal or non-critical lab and imaging results will be communicated to you by MyChart, letter or phone within 4 business days after the clinic has received the results. If you do not hear from us within 7 days, please contact the clinic through MyChart or phone. If you have a critical or abnormal lab result, we will notify you by phone as soon as possible.  Submit refill requests through [employer] or call your pharmacy and they will forward the refill request to us. Please allow 3 business days for your refill to be completed.          Additional Information About Your Visit        [employer] Information     [employer] gives you secure  ----- Message from Dr. Oswald Christianson MD sent at 10/28/2024  8:00 AM EDT -----  Tell him/her that their  cologuard was normal    access to your electronic health record. If you see a primary care provider, you can also send messages to your care team and make appointments. If you have questions, please call your primary care clinic.  If you do not have a primary care provider, please call [phone] and they will assist you.        Care EveryWhere ID     This is your Care EveryWhere ID. This could be used by other organizations to access your [place] medical records  [id number]         Blood Pressure from Last 3 Encounters:   08/16/17 157/72   10/03/16 170/76    Weight from Last 3 Encounters:   08/16/17 49.7 kg (109 lb 8 oz)   10/03/16 49.3 kg (108 lb 9.6 oz)              Today, you had the following     No orders found for display       Primary Care Provider Office Phone # Fax #    Anita Jurado [phone]       [place] [address]        Equal Access to Services     JIM MCBRIDE : Hadii aad ku hadasho Soomaali, waaxda luqadaha, qaybta kaalmada adeegyada, waxay silvioin haywildern bianka sims . So [facility] [phone].    ATENCIÓN: Si habla español, tiene a guerin disposición servicios gratuitos de asistencia lingüística. Llame al [phone].    We comply with applicable federal civil rights laws and [place] laws. We do not discriminate on the basis of race, color, national origin, age, disability, sex, sexual orientation, or gender identity.            Thank you!     Thank you for choosing [facility] SOLID ORGAN TRANSPLANT  for your care. Our goal is always to provide you with excellent care. Hearing back from our patients is one way we can continue to improve our services. Please take a few minutes to complete the written survey that you may receive in the mail after your visit with us. Thank you!             Your Updated Medication List - Protect others around you: Learn how to safely use, store and throw away your medicines at www.disposemymeds.org.          This list is  accurate as of 8/28/18 11:36 AM.  Always use your most recent med list.                   Brand Name Dispense Instructions for use Diagnosis    amylase-lipase-protease 73189 units Cpep    ZENPEP     Take by mouth 3 times daily (with meals) Pt reports taking Zenpep (L-15K; A=82K; P=51K)        ASPIRIN PO      Take 81 mg by mouth daily Cardiac health        Co-Enzyme Q-10 60 MG Caps      Take 600 mg by mouth daily        humaLOG 100 UNIT/ML injection   Generic drug:  insulin lispro      Inject 100 Units Subcutaneous        insulin pump infusion           lidocaine-prilocaine cream    EMLA     Apply 5 mg topically three times a week        LINZESS 290 MCG capsule   Generic drug:  linaclotide      See Admin Instructions 4x/wk, taken on non-dialysis days        LIPITOR 10 MG tablet   Generic drug:  atorvastatin      Take 20 mg by mouth daily        LUNESTA PO      Take by mouth daily        MIRALAX PO      Take 17 g by mouth three times a week        NEURONTIN 100 MG capsule   Generic drug:  gabapentin      Take 100 mg by mouth        NONFORMULARY      as needed Pt reports Epogen (IBETH infusion) as needed w/ dialysis for ESRD anemia        tacrolimus 1 MG capsule    GENERIC EQUIVALENT     Take 2 mg by mouth        UNABLE TO FIND      1 oz by Oral or Feeding Tube route 2 times daily MEDICATION NAME: Liquacel collagen protein supp        UNABLE TO FIND      250 mg by Oral or Feeding Tube route three times a week MEDICATION NAME: Magnesium Citrate        VITAMIN D (CHOLECALCIFEROL) PO      Take 1,000 Int'l Units by mouth daily        zinc sulfate 220 (50 Zn) MG capsule    ZINCATE     Take 50 mg by mouth daily Patient reports taking Dialyvite RX/Zince 50 mg 1 tab daily

## 2024-10-31 ENCOUNTER — TELEPHONE (OUTPATIENT)
Dept: TRANSPLANT | Facility: CLINIC | Age: 64
End: 2024-10-31
Payer: COMMERCIAL

## 2024-10-31 ASSESSMENT — ENCOUNTER SYMPTOMS: NEW SYMPTOMS OF CORONARY ARTERY DISEASE: 0

## (undated) DEVICE — SPONGE LAP 18X18" X8435

## (undated) DEVICE — GLOVE PROTEXIS W/NEU-THERA 6.5  2D73TE65

## (undated) DEVICE — SUCTION MANIFOLD DORNOCH ULTRA CART UL-CL500

## (undated) DEVICE — INSERT FOGARTY 33MM TRACTION HYDRAJAW HYDRA33

## (undated) DEVICE — SU PDS II 4-0 RB-1 27" Z304H

## (undated) DEVICE — DRAPE SHEET REV FOLD 3/4 9349

## (undated) DEVICE — TUBING IRRIG CYSTO/BLADDER SET 81" LF 2C4040

## (undated) DEVICE — SU SILK 4-0 TIE 12X30" A303H

## (undated) DEVICE — SU PROLENE 5-0 RB-1DA 36"  8556H

## (undated) DEVICE — SU PROLENE 4-0 RB-1DA 36" 8557H

## (undated) DEVICE — DEVICE CATH STABILIZATION STATLOCK FOLEY 3-WAY FOL0105

## (undated) DEVICE — SPONGE RAY-TEC 4X8" 7318

## (undated) DEVICE — SOL WATER IRRIG 1000ML BOTTLE 2F7114

## (undated) DEVICE — DRSG PRIMAPORE 02X3" 7133

## (undated) DEVICE — SU ETHILON 3-0 PS-1 18" 1663H

## (undated) DEVICE — SU PDS II 6-0 RB-2DA 30" Z149H

## (undated) DEVICE — SU SILK 1 TIE 6X30" A307H

## (undated) DEVICE — WIPES FOLEY CARE SURESTEP PROVON DFC100

## (undated) DEVICE — Device

## (undated) DEVICE — PREP CHLORAPREP 26ML TINTED ORANGE  260815

## (undated) DEVICE — CATH FOLEY 3WAY 16FR 30ML SIL 73016SI

## (undated) DEVICE — SOL NACL 0.9% INJ 1000ML BAG 2B1324X

## (undated) DEVICE — DRAIN JACKSON PRATT RESERVOIR 100ML SU130-1305

## (undated) DEVICE — CATH PLUG W/CAP 000076

## (undated) DEVICE — SU SILK 3-0 SH CR 8X18" C013D

## (undated) DEVICE — SU MONOCRYL 4-0 PS-2 27" UND Y426H

## (undated) DEVICE — PUNCH AORTIC 4MM SINGLE USE 1001-602

## (undated) DEVICE — CATH FOLEY 3WAY 18FR 30ML LATEX 0167SI18

## (undated) DEVICE — LINEN TOWEL PACK X6 WHITE 5487

## (undated) DEVICE — SU PROLENE 6-0 RB-2DA 30" 8711H

## (undated) DEVICE — CLIP HORIZON LG ORANGE 004200

## (undated) DEVICE — SU SILK 2-0 TIE 12X30" A305H

## (undated) DEVICE — SU VICRYL 3-0 SH 27" J316H

## (undated) DEVICE — LINEN TOWEL PACK X30 5481

## (undated) DEVICE — CLIP HORIZON MED BLUE 002200

## (undated) DEVICE — SU PDS II 5-0 RB-1 27" Z303H

## (undated) DEVICE — SU PDS II 0 TP-1 60" Z991G

## (undated) DEVICE — SU SILK 0 TIE 6X30" A306H

## (undated) DEVICE — DECANTER BAG 2002S

## (undated) DEVICE — SU DERMABOND ADVANCED .7ML DNX12

## (undated) DEVICE — BNDG COBAN 2"X5YDS UNSTERILE 2082

## (undated) DEVICE — SU SILK 3-0 TIE 12X30" A304H

## (undated) DEVICE — DRAPE SLUSH/WARMER 66X44" ORS-320

## (undated) DEVICE — CLIP HORIZON SM RED WIDE SLOT 001201

## (undated) DEVICE — DRAIN JACKSON PRATT ROUND SIL 19FR W/TROCAR LF JP-2232

## (undated) DEVICE — SOL NACL 0.9% IRRIG 1000ML BOTTLE 2F7124

## (undated) RX ORDER — CALCIUM CHLORIDE 100 MG/ML
INJECTION INTRAVENOUS; INTRAVENTRICULAR
Status: DISPENSED
Start: 2018-11-20

## (undated) RX ORDER — PROPOFOL 10 MG/ML
INJECTION, EMULSION INTRAVENOUS
Status: DISPENSED
Start: 2018-11-20

## (undated) RX ORDER — VERAPAMIL HYDROCHLORIDE 2.5 MG/ML
INJECTION, SOLUTION INTRAVENOUS
Status: DISPENSED
Start: 2018-11-20

## (undated) RX ORDER — FUROSEMIDE 10 MG/ML
INJECTION INTRAMUSCULAR; INTRAVENOUS
Status: DISPENSED
Start: 2018-11-20

## (undated) RX ORDER — HYDROMORPHONE HYDROCHLORIDE 1 MG/ML
INJECTION, SOLUTION INTRAMUSCULAR; INTRAVENOUS; SUBCUTANEOUS
Status: DISPENSED
Start: 2018-11-20

## (undated) RX ORDER — SODIUM CHLORIDE, SODIUM LACTATE, POTASSIUM CHLORIDE, CALCIUM CHLORIDE 600; 310; 30; 20 MG/100ML; MG/100ML; MG/100ML; MG/100ML
INJECTION, SOLUTION INTRAVENOUS
Status: DISPENSED
Start: 2018-11-20

## (undated) RX ORDER — SODIUM CHLORIDE 9 MG/ML
INJECTION, SOLUTION INTRAVENOUS
Status: DISPENSED
Start: 2018-11-20

## (undated) RX ORDER — HEPARIN SODIUM 1000 [USP'U]/ML
INJECTION, SOLUTION INTRAVENOUS; SUBCUTANEOUS
Status: DISPENSED
Start: 2018-11-20

## (undated) RX ORDER — PAPAVERINE HYDROCHLORIDE 30 MG/ML
INJECTION INTRAMUSCULAR; INTRAVENOUS
Status: DISPENSED
Start: 2018-11-20

## (undated) RX ORDER — SODIUM CHLORIDE 450 MG/100ML
INJECTION, SOLUTION INTRAVENOUS
Status: DISPENSED
Start: 2018-11-20

## (undated) RX ORDER — FENTANYL CITRATE 50 UG/ML
INJECTION, SOLUTION INTRAMUSCULAR; INTRAVENOUS
Status: DISPENSED
Start: 2018-11-20

## (undated) RX ORDER — ONDANSETRON 2 MG/ML
INJECTION INTRAMUSCULAR; INTRAVENOUS
Status: DISPENSED
Start: 2018-11-20

## (undated) RX ORDER — REGADENOSON 0.08 MG/ML
INJECTION, SOLUTION INTRAVENOUS
Status: DISPENSED
Start: 2017-08-16

## (undated) RX ORDER — DEXTROSE, SODIUM CHLORIDE, SODIUM LACTATE, POTASSIUM CHLORIDE, AND CALCIUM CHLORIDE 5; .6; .31; .03; .02 G/100ML; G/100ML; G/100ML; G/100ML; G/100ML
INJECTION, SOLUTION INTRAVENOUS
Status: DISPENSED
Start: 2018-11-20

## (undated) RX ORDER — CARDIOPLEG/ORGAN PRESERV NO.1 9-198-2-1
BOTTLE PERFUSION
Status: DISPENSED
Start: 2018-11-20

## (undated) RX ORDER — ACETAMINOPHEN 325 MG/1
TABLET ORAL
Status: DISPENSED
Start: 2018-11-20

## (undated) RX ORDER — GLYCOPYRROLATE 0.2 MG/ML
INJECTION, SOLUTION INTRAMUSCULAR; INTRAVENOUS
Status: DISPENSED
Start: 2018-11-20

## (undated) RX ORDER — LIDOCAINE HYDROCHLORIDE 20 MG/ML
INJECTION, SOLUTION EPIDURAL; INFILTRATION; INTRACAUDAL; PERINEURAL
Status: DISPENSED
Start: 2018-11-20

## (undated) RX ORDER — CEFUROXIME SODIUM 1.5 G/16ML
INJECTION, POWDER, FOR SOLUTION INTRAVENOUS
Status: DISPENSED
Start: 2018-11-20